# Patient Record
Sex: FEMALE | Race: OTHER | NOT HISPANIC OR LATINO | Employment: OTHER | URBAN - NONMETROPOLITAN AREA
[De-identification: names, ages, dates, MRNs, and addresses within clinical notes are randomized per-mention and may not be internally consistent; named-entity substitution may affect disease eponyms.]

---

## 2017-02-02 ENCOUNTER — HOSPITAL ENCOUNTER (EMERGENCY)
Facility: HOSPITAL | Age: 32
Discharge: HOME | End: 2017-02-03
Attending: EMERGENCY MEDICINE | Admitting: EMERGENCY MEDICINE

## 2017-02-02 ENCOUNTER — APPOINTMENT (OUTPATIENT)
Dept: CT IMAGING | Facility: HOSPITAL | Age: 32
End: 2017-02-02

## 2017-02-02 DIAGNOSIS — K51.90 ULCERATIVE COLITIS WITHOUT COMPLICATIONS, UNSPECIFIED LOCATION (HCC): Primary | ICD-10-CM

## 2017-02-02 LAB
ALBUMIN SERPL-MCNC: 4.3 G/DL (ref 3.5–5)
ALBUMIN/GLOB SERPL: 1 G/DL (ref 1–2)
ALP SERPL-CCNC: 112 U/L (ref 38–126)
ALT SERPL W P-5'-P-CCNC: 49 U/L (ref 13–69)
ANION GAP SERPL CALCULATED.3IONS-SCNC: 14 MMOL/L
AST SERPL-CCNC: 35 U/L (ref 15–46)
B-HCG UR QL: NEGATIVE
BACTERIA UR QL AUTO: ABNORMAL /HPF
BASOPHILS # BLD AUTO: 0.05 10*3/MM3 (ref 0–0.2)
BASOPHILS NFR BLD AUTO: 0.4 % (ref 0–2.5)
BILIRUB SERPL-MCNC: 0.3 MG/DL (ref 0.2–1.3)
BILIRUB UR QL STRIP: NEGATIVE
BUN BLD-MCNC: 11 MG/DL (ref 7–20)
BUN/CREAT SERPL: 15.7 (ref 7.1–23.5)
CALCIUM SPEC-SCNC: 9.4 MG/DL (ref 8.4–10.2)
CHLORIDE SERPL-SCNC: 101 MMOL/L (ref 98–107)
CLARITY UR: CLEAR
CO2 SERPL-SCNC: 29 MMOL/L (ref 26–30)
COLOR UR: YELLOW
CREAT BLD-MCNC: 0.7 MG/DL (ref 0.6–1.3)
DEPRECATED RDW RBC AUTO: 39.2 FL (ref 37–54)
EOSINOPHIL # BLD AUTO: 0.17 10*3/MM3 (ref 0–0.7)
EOSINOPHIL NFR BLD AUTO: 1.3 % (ref 0–7)
ERYTHROCYTE [DISTWIDTH] IN BLOOD BY AUTOMATED COUNT: 13.3 % (ref 11.5–14.5)
GFR SERPL CREATININE-BSD FRML MDRD: 98 ML/MIN/1.73
GLOBULIN UR ELPH-MCNC: 4.3 GM/DL
GLUCOSE BLD-MCNC: 147 MG/DL (ref 74–98)
GLUCOSE UR STRIP-MCNC: NEGATIVE MG/DL
HCT VFR BLD AUTO: 40.7 % (ref 37–47)
HGB BLD-MCNC: 13.5 G/DL (ref 12–16)
HGB UR QL STRIP.AUTO: ABNORMAL
HYALINE CASTS UR QL AUTO: ABNORMAL /LPF
IMM GRANULOCYTES # BLD: 0.04 10*3/MM3 (ref 0–0.06)
IMM GRANULOCYTES NFR BLD: 0.3 % (ref 0–0.6)
KETONES UR QL STRIP: NEGATIVE
LEUKOCYTE ESTERASE UR QL STRIP.AUTO: NEGATIVE
LIPASE SERPL-CCNC: 40 U/L (ref 23–300)
LYMPHOCYTES # BLD AUTO: 5.29 10*3/MM3 (ref 0.6–3.4)
LYMPHOCYTES NFR BLD AUTO: 39.4 % (ref 10–50)
MCH RBC QN AUTO: 27 PG (ref 27–31)
MCHC RBC AUTO-ENTMCNC: 33.2 G/DL (ref 30–37)
MCV RBC AUTO: 81.4 FL (ref 81–99)
MONOCYTES # BLD AUTO: 0.5 10*3/MM3 (ref 0–0.9)
MONOCYTES NFR BLD AUTO: 3.7 % (ref 0–12)
MUCOUS THREADS URNS QL MICRO: ABNORMAL /HPF
NEUTROPHILS # BLD AUTO: 7.36 10*3/MM3 (ref 2–6.9)
NEUTROPHILS NFR BLD AUTO: 54.9 % (ref 37–80)
NITRITE UR QL STRIP: NEGATIVE
NRBC BLD MANUAL-RTO: 0 /100 WBC (ref 0–0)
PH UR STRIP.AUTO: 6 [PH] (ref 5–8)
PLATELET # BLD AUTO: 333 10*3/MM3 (ref 130–400)
PMV BLD AUTO: 10.1 FL (ref 6–12)
POTASSIUM BLD-SCNC: 4 MMOL/L (ref 3.5–5.1)
PROT SERPL-MCNC: 8.6 G/DL (ref 6.3–8.2)
PROT UR QL STRIP: NEGATIVE
RBC # BLD AUTO: 5 10*6/MM3 (ref 4.2–5.4)
RBC # UR: ABNORMAL /HPF
REF LAB TEST METHOD: ABNORMAL
SODIUM BLD-SCNC: 140 MMOL/L (ref 137–145)
SP GR UR STRIP: >=1.03 (ref 1–1.03)
SQUAMOUS #/AREA URNS HPF: ABNORMAL /HPF
UROBILINOGEN UR QL STRIP: ABNORMAL
WBC NRBC COR # BLD: 13.41 10*3/MM3 (ref 4.8–10.8)
WBC UR QL AUTO: ABNORMAL /HPF

## 2017-02-02 PROCEDURE — 25010000002 METHYLPREDNISOLONE PER 125 MG: Performed by: EMERGENCY MEDICINE

## 2017-02-02 PROCEDURE — 81025 URINE PREGNANCY TEST: CPT | Performed by: EMERGENCY MEDICINE

## 2017-02-02 PROCEDURE — 80053 COMPREHEN METABOLIC PANEL: CPT | Performed by: EMERGENCY MEDICINE

## 2017-02-02 PROCEDURE — 0 IOPAMIDOL 61 % SOLUTION: Performed by: EMERGENCY MEDICINE

## 2017-02-02 PROCEDURE — 96361 HYDRATE IV INFUSION ADD-ON: CPT

## 2017-02-02 PROCEDURE — 99284 EMERGENCY DEPT VISIT MOD MDM: CPT

## 2017-02-02 PROCEDURE — 25010000002 MORPHINE PER 10 MG: Performed by: EMERGENCY MEDICINE

## 2017-02-02 PROCEDURE — 81001 URINALYSIS AUTO W/SCOPE: CPT | Performed by: EMERGENCY MEDICINE

## 2017-02-02 PROCEDURE — 83690 ASSAY OF LIPASE: CPT | Performed by: EMERGENCY MEDICINE

## 2017-02-02 PROCEDURE — 74177 CT ABD & PELVIS W/CONTRAST: CPT

## 2017-02-02 PROCEDURE — 96375 TX/PRO/DX INJ NEW DRUG ADDON: CPT

## 2017-02-02 PROCEDURE — 87086 URINE CULTURE/COLONY COUNT: CPT | Performed by: EMERGENCY MEDICINE

## 2017-02-02 PROCEDURE — 96374 THER/PROPH/DIAG INJ IV PUSH: CPT

## 2017-02-02 PROCEDURE — 85025 COMPLETE CBC W/AUTO DIFF WBC: CPT | Performed by: EMERGENCY MEDICINE

## 2017-02-02 RX ORDER — MORPHINE SULFATE 4 MG/ML
4 INJECTION, SOLUTION INTRAMUSCULAR; INTRAVENOUS ONCE
Status: COMPLETED | OUTPATIENT
Start: 2017-02-02 | End: 2017-02-02

## 2017-02-02 RX ORDER — SODIUM CHLORIDE 9 MG/ML
1000 INJECTION, SOLUTION INTRAVENOUS ONCE
Status: COMPLETED | OUTPATIENT
Start: 2017-02-02 | End: 2017-02-03

## 2017-02-02 RX ORDER — METHYLPREDNISOLONE SODIUM SUCCINATE 125 MG/2ML
125 INJECTION, POWDER, LYOPHILIZED, FOR SOLUTION INTRAMUSCULAR; INTRAVENOUS ONCE
Status: COMPLETED | OUTPATIENT
Start: 2017-02-02 | End: 2017-02-02

## 2017-02-02 RX ORDER — SODIUM CHLORIDE 0.9 % (FLUSH) 0.9 %
10 SYRINGE (ML) INJECTION AS NEEDED
Status: DISCONTINUED | OUTPATIENT
Start: 2017-02-02 | End: 2017-02-03 | Stop reason: HOSPADM

## 2017-02-02 RX ORDER — ONDANSETRON 2 MG/ML
4 INJECTION INTRAMUSCULAR; INTRAVENOUS ONCE
Status: DISCONTINUED | OUTPATIENT
Start: 2017-02-02 | End: 2017-02-03 | Stop reason: HOSPADM

## 2017-02-02 RX ORDER — PROMETHAZINE HYDROCHLORIDE 25 MG/ML
12.5 INJECTION, SOLUTION INTRAMUSCULAR; INTRAVENOUS ONCE
Status: COMPLETED | OUTPATIENT
Start: 2017-02-02 | End: 2017-02-03

## 2017-02-02 RX ADMIN — MORPHINE SULFATE 4 MG: 4 INJECTION, SOLUTION INTRAMUSCULAR; INTRAVENOUS at 22:45

## 2017-02-02 RX ADMIN — METHYLPREDNISOLONE SODIUM SUCCINATE 125 MG: 125 INJECTION, POWDER, FOR SOLUTION INTRAMUSCULAR; INTRAVENOUS at 22:43

## 2017-02-02 RX ADMIN — SODIUM CHLORIDE 1000 ML: 9 INJECTION, SOLUTION INTRAVENOUS at 22:42

## 2017-02-02 RX ADMIN — IOPAMIDOL 90 ML: 612 INJECTION, SOLUTION INTRAVENOUS at 23:15

## 2017-02-03 VITALS
BODY MASS INDEX: 38.98 KG/M2 | HEART RATE: 112 BPM | HEIGHT: 63 IN | WEIGHT: 220 LBS | TEMPERATURE: 98.5 F | RESPIRATION RATE: 16 BRPM | DIASTOLIC BLOOD PRESSURE: 73 MMHG | OXYGEN SATURATION: 96 % | SYSTOLIC BLOOD PRESSURE: 106 MMHG

## 2017-02-03 LAB
GASTROCULT GAST QL: POSITIVE
HOLD SPECIMEN: NORMAL
WHOLE BLOOD HOLD SPECIMEN: NORMAL

## 2017-02-03 PROCEDURE — 25010000002 PROMETHAZINE PER 50 MG: Performed by: EMERGENCY MEDICINE

## 2017-02-03 PROCEDURE — 25010000002 MORPHINE PER 10 MG: Performed by: EMERGENCY MEDICINE

## 2017-02-03 PROCEDURE — 82270 OCCULT BLOOD FECES: CPT | Performed by: EMERGENCY MEDICINE

## 2017-02-03 PROCEDURE — 96376 TX/PRO/DX INJ SAME DRUG ADON: CPT

## 2017-02-03 RX ORDER — HYDROCODONE BITARTRATE AND ACETAMINOPHEN 5; 325 MG/1; MG/1
1 TABLET ORAL EVERY 6 HOURS PRN
Qty: 10 TABLET | Refills: 0 | Status: SHIPPED | OUTPATIENT
Start: 2017-02-03 | End: 2019-07-22

## 2017-02-03 RX ORDER — PROMETHAZINE HYDROCHLORIDE 25 MG/1
25 TABLET ORAL EVERY 6 HOURS PRN
Qty: 15 TABLET | Refills: 0 | Status: SHIPPED | OUTPATIENT
Start: 2017-02-03 | End: 2019-08-05 | Stop reason: SDUPTHER

## 2017-02-03 RX ORDER — MORPHINE SULFATE 4 MG/ML
4 INJECTION, SOLUTION INTRAMUSCULAR; INTRAVENOUS ONCE
Status: COMPLETED | OUTPATIENT
Start: 2017-02-03 | End: 2017-02-03

## 2017-02-03 RX ORDER — PREDNISONE 50 MG/1
50 TABLET ORAL DAILY
Qty: 5 TABLET | Refills: 0 | Status: SHIPPED | OUTPATIENT
Start: 2017-02-03 | End: 2019-07-22

## 2017-02-03 RX ADMIN — PROMETHAZINE HYDROCHLORIDE 12.5 MG: 25 INJECTION INTRAMUSCULAR; INTRAVENOUS at 00:38

## 2017-02-03 RX ADMIN — MORPHINE SULFATE 4 MG: 4 INJECTION, SOLUTION INTRAMUSCULAR; INTRAVENOUS at 00:44

## 2017-02-03 NOTE — ED PROVIDER NOTES
TRIAGE CHIEF COMPLAINT:   Chief Complaint   Patient presents with   • Black or Bloody Stool     bright red blood in vomit and stool x 3 days. hx ulcerative colitis   • Abdominal Pain      HPI: Thais Hobson is a 31 y.o. female who presents to the emergency department complaining of abdominal pain, nausea, vomiting, and diarrhea.  Patient has a history of ulcerative colitis.  She states she's been having symptoms for the past 3 days.  She states she's had a lot of diarrhea and has had some blood in her diarrhea.  Has had several episodes of nonbilious emesis with a small amount of blood and that as well.  Denies fevers or chills.  Describes a diffuse abdominal cramping and sharp type pain.  States this feels like previous episodes of her ulcerative colitis that she has had.  Is followed by a GI physician at [facility] in [place].     REVIEW OF SYSTEMS: Otherwise negative unless stated above     PAST MEDICAL HISTORY:   Past Medical History   Diagnosis Date   • Abdominal pain      periumbilicul   • Abdominal tenderness      Periumbil   • Alopecia    • Anemia    • Anxiety    • Back pain    • Blood in stool    • Colitis    • Colon polyps    • Depression    • Diabetes mellitus    • Diarrhea    • H/O colonoscopy 08/01/2013     Terminal ileal biopsies negative. Cecal&ascending biopsie revealed chronic active colitis w/ features cons. w/ inflammatory bowel disease. Transevere colon biopsies revealved chronic active colitis. Desc. colon biopsies revealed chronic active colitis. Rect colon biopsie revealed chronic active colitis. Negative for dysplasia   • Hematemesis    • Nausea      alone   • Positive pregnancy test    • Universal ulcerative colitis    • UTI (urinary tract infection)    • Weight loss         FAMILY HISTORY:   Family History   Problem Relation Age of Onset   • Heart failure Father         SOCIAL HISTORY:   Social History     Social History   • Marital status: Single     Spouse name: N/A    • Number of children: N/A   • Years of education: N/A     Occupational History   • Not on file.     Social History Main Topics   • Smoking status: Never Smoker   • Smokeless tobacco: Not on file   • Alcohol use No   • Drug use: No   • Sexual activity: Not on file     Other Topics Concern   • Not on file     Social History Narrative   • No narrative on file        SURGICAL HISTORY:   Past Surgical History   Procedure Laterality Date   • Cholecystectomy       for stone disease   • Breast surgery Left      breast lump removed benign   • Hip surgery     •  section     • Dilatation and curettage     • Colonoscopy     • Leep     • Endoscopy  2013     Erosive esophagitis, grade 2; erosive gastritis; small sliding hiatal hernia less than 3 cm, erosive deodenitis duodenal polyp.No gan mucosa.Second portion duedenal biopsy neg. Second postion of dudoenal biopsy revealed polypoid intestinal mucosa w/ lymphoid aggregate. 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 no helicobacter pylori. Negative for mateaplasia, dysplasia        CURRENT MEDICATIONS:      Medication List      Notice     You have not been prescribed any medications.         ALLERGIES: Hydrochlorothiazide; Penicillins; and Reglan [metoclopramide]     PHYSICAL EXAM:   VITAL SIGNS:   Vitals:    17 2037   BP: 119/79   Pulse: (!) 133   Resp: 20   Temp: 98.6 °F (37 °C)   SpO2: 100%      CONSTITUTIONAL: Awake, oriented, appears non-toxic   HENT: Atraumatic, normocephalic, oral mucosa pink and moist, airway patent. Nares patent without drainage.  EYES: Conjunctiva clear   NECK: Trachea midline, non-tender, supple   CARDIOVASCULAR: Normal heart rate, Normal rhythm, No murmurs, rubs, gallops   PULMONARY/CHEST: Clear to auscultation, no rhonchi, wheezes, or rales. Symmetrical breath sounds.  ABDOMINAL: Non-distended, soft, mild diffuse abdominal tenderness to palpation  NEUROLOGIC: Non-focal, moving all four  extremities, no gross sensory or motor deficits.   EXTREMITIES: No clubbing, cyanosis, or edema   SKIN: Warm, Dry, No erythema, No rash     ED COURSE / MEDICAL DECISION MAKING:   Thais Hobson is a 31 y.o. female who presents to the emergency department for evaluation of abdominal discomfort, nausea, vomiting, and diarrhea.  Patient with a history of ulcerative colitis.  Exam reveals mild diffuse abdominal tenderness to palpation.  No rebound or guarding per my interpretation.  We'll obtain labs and imaging for further evaluation.  Patient was also given IV fluids, pain, nausea medicine with improvement in her symptoms.  Her heart rate improved as well.  Laboratory tests revealed a slight leukocytosis with a white blood cell count of 13.  Patient had no gross blood on her rectal exam but did have positive fecal occult blood.  CAT scan did not reveal any obvious signs of inflammation and did not reveal any acute abnormalities.  Given the patient's symptoms I suspect she is likely starting to develop an ulcerative colitis exacerbation.  I will start the patient on steroids and she was given a dose of steroids in the emergency department.  Given her vital signs improved and the patient was stable I felt she should be safe for outpatient therapy to follow-up with her GI physician in the next 1-2 days.  Patient states that she would call her GI physician in the morning.    DECISION TO DISCHARGE/ADMIT: see ED care timeline     FINAL IMPRESSION:   1 -- ulcerative colitis   2 --   3 --     Electronically signed by: Janet Yee MD, 2/2/2017 9:18 PM       Janet Yee MD  02/03/17 0120

## 2017-02-03 NOTE — DISCHARGE INSTRUCTIONS
"Most recent vital signs:   Visit Vitals   • /84   • Pulse 114   • Temp 98.6 °F (37 °C) (Oral)   • Resp 18   • Ht 63\" (160 cm)   • Wt 220 lb (99.8 kg)   • SpO2 98%   • BMI 38.97 kg/m2        Below you fill find any summaries of imaging results and further discharge instructions as discussed during your visit.     CT Abdomen Pelvis With Contrast   Final Result   No acute abnormality      Authenticated by Krystin Ronquillo MD on 02/02/2017 11:38:59 PM           --PLEASE READ THESE DIRECTIONS IN FULL--     Our goal is to provide the best care we can AND to find any medical or surgical emergency while you are in the ER. If a medical or surgical emergency was not identified during your visit (or if the issue was resolved during the visit), following these directions for follow-up with a primary care provider and/or specialists and following the return precautions will be the safest and most effective way to protect your health after leaving the emergency room.     Therefore, in addition to the conversation we had in the room, please read all of the information in these discharge instructions, take medications as directed, and follow-up as directed.     You should seek immediate medical help for:     Worsening pain that is not helped with prescribed pain medicines and/or the recommended doses of over the counter pain medicines such as acetaminophen (Tylenol) or ibuprofen (Motrin/Advil)*.   New or worsening chest pain or trouble breathing   New or worsening headache, confusion, weakness, or visual changes   New or worsening fever (>100.4 F) that does not improve with the recommended doses of over the counter fever treatments such as acetaminophen (Tylenol) or ibuprofen (Motrin/Advil)* for more than a few hours.   Any other concerns that you feel could be life, limb, or eye-sight threatening     As a reminder, if you received any medicines or prescriptions for medicines that may be sedating (\"narcotics\", \"opioids\"), do " NOT:   - operate any vehicles   - take if you are already tired   - take if you have had or plan to have alcohol   - perform other responsibilities that require your full attention.     Common medications include, but are not limited to:   Opiates: Morphine, Norco, Lortab, Vicodin, Percocet, oxycodone, hydrocodone, Dilaudid, hydromorphone   Benzodiazepines: Ativan, Valium, alprazolam, lorazepam, diazepam,   Other sedating medications: promethazine, Phenergan, trazodone, Benadryl, hydroxyzine, Vistaril, diphenhydramine, zolpidem, and Ambien     *If you have any history of GI (stomach/intestinal) bleeding, allergic reactions, kidney problems, and/or certain heart problems or there is any chance you could be pregnant, and have been told to avoid NSAIDs (ibuprofen, naproxen, Aleve, Motrin, Advil) please avoid these medications. Please remember that prescribed pain medicines often contain Tylenol/acetaminophen, so make sure your total daily (24 hour) intake does not exceed 4 grams or 4000mg.

## 2017-02-04 LAB — BACTERIA SPEC AEROBE CULT: NORMAL

## 2019-07-22 ENCOUNTER — OFFICE VISIT (OUTPATIENT)
Dept: FAMILY MEDICINE CLINIC | Facility: CLINIC | Age: 34
End: 2019-07-22

## 2019-07-22 ENCOUNTER — PRIOR AUTHORIZATION (OUTPATIENT)
Dept: FAMILY MEDICINE CLINIC | Facility: CLINIC | Age: 34
End: 2019-07-22

## 2019-07-22 VITALS
HEART RATE: 101 BPM | BODY MASS INDEX: 42.52 KG/M2 | RESPIRATION RATE: 14 BRPM | WEIGHT: 240 LBS | HEIGHT: 63 IN | SYSTOLIC BLOOD PRESSURE: 112 MMHG | DIASTOLIC BLOOD PRESSURE: 74 MMHG | OXYGEN SATURATION: 98 %

## 2019-07-22 DIAGNOSIS — R13.19 ESOPHAGEAL DYSPHAGIA: ICD-10-CM

## 2019-07-22 DIAGNOSIS — K51.90 ULCERATIVE COLITIS WITHOUT COMPLICATIONS, UNSPECIFIED LOCATION (HCC): ICD-10-CM

## 2019-07-22 DIAGNOSIS — Z90.710 HISTORY OF TOTAL ABDOMINAL HYSTERECTOMY: ICD-10-CM

## 2019-07-22 DIAGNOSIS — K21.9 GASTROESOPHAGEAL REFLUX DISEASE WITHOUT ESOPHAGITIS: Chronic | ICD-10-CM

## 2019-07-22 DIAGNOSIS — Z79.4 TYPE 2 DIABETES MELLITUS TREATED WITH INSULIN (HCC): Primary | ICD-10-CM

## 2019-07-22 DIAGNOSIS — E11.9 TYPE 2 DIABETES MELLITUS TREATED WITH INSULIN (HCC): Primary | ICD-10-CM

## 2019-07-22 DIAGNOSIS — E11.44 DIABETIC AMYOTROPHY ASSOCIATED WITH TYPE 2 DIABETES MELLITUS (HCC): ICD-10-CM

## 2019-07-22 DIAGNOSIS — F41.8 DEPRESSION WITH ANXIETY: ICD-10-CM

## 2019-07-22 PROCEDURE — 99204 OFFICE O/P NEW MOD 45 MIN: CPT | Performed by: FAMILY MEDICINE

## 2019-07-22 RX ORDER — DIPHENHYDRAMINE HCL 25 MG
25 CAPSULE ORAL EVERY 6 HOURS PRN
COMMUNITY
End: 2019-08-05 | Stop reason: SDUPTHER

## 2019-07-22 RX ORDER — ESTRADIOL 0.5 MG/1
0.5 TABLET ORAL
COMMUNITY
End: 2020-02-21 | Stop reason: SDUPTHER

## 2019-07-22 RX ORDER — OMEPRAZOLE 40 MG/1
40 CAPSULE, DELAYED RELEASE ORAL DAILY
Qty: 30 CAPSULE | Refills: 3 | Status: SHIPPED | OUTPATIENT
Start: 2019-07-22 | End: 2019-11-20

## 2019-07-22 RX ORDER — GABAPENTIN 600 MG/1
600 TABLET ORAL 3 TIMES DAILY
Qty: 90 TABLET | Refills: 0 | Status: SHIPPED | OUTPATIENT
Start: 2019-07-22 | End: 2019-08-19 | Stop reason: SDUPTHER

## 2019-07-22 NOTE — PROGRESS NOTES
New Patient History and Physical      Referring Physician: No ref. provider found    Chief Complaint:    Chief Complaint   Patient presents with   • Establish Care     Would like referral to GI closer to home       History of Present Illness:   Patient is a 34-year-old female who is here to establish with a new primary care provider.  She presents with a known history of ulcerative colitis, having previously been followed by gastroenterology in [place].  Patient also admits to esophageal stricture history, her last EGD with dilatation was approximately 1 year ago.  She does continue to have periodic esophageal dysphagia, predominantly to solids.  Patient also gives a history of gestational diabetes, having transition to type 2 diabetes mellitus in the past.  She has 2 children, age 11 years and 5 years old.  Patient states her last hemoglobin A1c was approximately 4 weeks ago; she does admit to varying levels of control, predominantly remaining high despite admitting to some noncompliance with her insulin dosing.  Patient does state that she did not take this morning's dose yet..  She denies any cyclical/persistent episodes of hypoglycemia.  She does utilize combination insulin therapy at this time, Humalog 75/25 with 60 units in the morning and 30 units at night.  She is requesting to be referred to a gastroenterologist closer to her home in between [place] and [place].  She states the distance to drive to [place] is of significant burden to her.  Patient states she was last hospitalized, as a result of her ulcerative colitis, approximately 2 months ago at [facility] in [place].    Subjective     Review of Systems     1. Constitutional: Negative for fever. Negative for chills, diaphoresis, fatigue and unexpected weight change.   2. HENT: No dysphagia; no changes to vision/hearing/smell/taste; no epistaxis  3. Eyes: Negative for redness and visual disturbance.   4. Respiratory: negative for  shortness of breath. Negative for chest pain . Negative for cough and chest tightness.   5. Cardiovascular: Negative for chest pain and palpitations.   6. Gastrointestinal: Negative for abdominal distention, abdominal pain and blood in stool.   7. Endocrine: Negative for cold intolerance and heat intolerance.   8. Genitourinary: Negative for difficulty urinating, dysuria and frequency.   9. Musculoskeletal: Negative for arthralgias, back pain and myalgias.   10. Skin: Negative for color change, rash and wound.   11. Neurological: Negative for syncope, weakness and headaches.   12. Hematological: Negative for adenopathy. Does not bruise/bleed easily.   13. Psychiatric/Behavioral: Negative for confusion. The patient is not nervous/anxious.     The following portions of the patient's history were reviewed and updated as appropriate: allergies, current medications, past family history, past medical history, past social history, past surgical history and problem list.    Past Medical History:   Past Medical History:   Diagnosis Date   • Abdominal pain     periumbilicul   • Abdominal tenderness     Periumbil   • Alopecia    • Anemia    • Anxiety    • Arthritis    • Asthma    • Back pain    • Bipolar disorder (CMS/HCC)    • Blood in stool    • Colitis    • Colitis    • Colon polyps    • Depression    • Diabetes mellitus (CMS/HCC)    • Diarrhea    • Fractures    • GERD (gastroesophageal reflux disease)    • H/O colonoscopy 08/01/2013    Terminal ileal biopsies negative. Cecal&ascending biopsie revealed chronic active colitis w/ features cons. w/ inflammatory bowel disease. Transevere colon biopsies revealved chronic active colitis. Desc. colon biopsies revealed chronic active colitis. Rect colon biopsie revealed chronic active colitis. Negative for dysplasia   • Hematemesis    • Migraine headache    • Nausea     alone   • Pancreatitis    • Positive pregnancy test    • Universal ulcerative colitis (CMS/HCC)    • UTI (urinary  tract infection)    • Weight loss        Past Surgical History:  Past Surgical History:   Procedure Laterality Date   • BREAST SURGERY Left     breast lump removed benign   •  SECTION     • CHOLECYSTECTOMY      for stone disease   • COLONOSCOPY     • DILATATION AND CURETTAGE     • ENDOSCOPY  2013    Erosive esophagitis, grade 2; erosive gastritis; small sliding hiatal hernia less than 3 cm, erosive deodenitis duodenal polyp.No gan mucosa.Second portion duedenal biopsy neg. Second postion of dudoenal biopsy revealed polypoid intestinal mucosa w/ lymphoid aggregate. 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 no helicobacter pylori. Negative for mateaplasia, dysplasia   • HIP SURGERY     • HYSTERECTOMY     • LEEP         Family History: family history includes Heart failure in her father.    Social History:  reports that she has never smoked. She has never used smokeless tobacco. She reports that she does not drink alcohol or use drugs.    Medications:    Current Outpatient Medications:   •  BUSPIRONE HCL PO, Take  by mouth., Disp: , Rfl:   •  diphenhydrAMINE (BENADRYL) 25 mg capsule, Take 25 mg by mouth Every 6 (Six) Hours As Needed., Disp: , Rfl:   •  estradiol (ESTRACE) 0.5 MG tablet, Take 0.5 mg by mouth. ONE EVERY 3 DAYS, Disp: , Rfl:   •  guanFACINE HCl (INTUNIV PO), Take  by mouth., Disp: , Rfl:   •  promethazine (PHENERGAN) 25 MG tablet, Take 1 tablet by mouth Every 6 (Six) Hours As Needed for nausea or vomiting., Disp: 15 tablet, Rfl: 0  •  gabapentin (NEURONTIN) 600 MG tablet, Take 1 tablet by mouth 3 (Three) Times a Day., Disp: 90 tablet, Rfl: 0  •  insulin detemir (LEVEMIR) 100 UNIT/ML injection, Inject 35 Units under the skin into the appropriate area as directed 2 (Two) Times a Day., Disp: 10 pen, Rfl: 0  •  omeprazole (priLOSEC) 40 MG capsule, Take 1 capsule by mouth Daily., Disp: 30 capsule, Rfl: 3    Allergies:  Allergies   Allergen Reactions   •  "Hydrochlorothiazide    • Penicillins    • Reglan [Metoclopramide] Other (See Comments)     States feels weird when takes it   • Remicade [Infliximab] Unknown (See Comments)     Unknown       Objective     Physical Exam:  Vital Signs: /74   Pulse 101   Resp 14   Ht 160 cm (63\")   Wt 109 kg (240 lb)   SpO2 98%   BMI 42.51 kg/m²      General Appearance: alert, oriented x 3, no acute distress.  Skin: warm and dry.   HEENT: Atraumatic.  pupils round and reactive to light and accommodation, oral mucosa pink and moist.  Nares patent without epistaxis.  External auditory canals are patent tympanic membranes intact.  Neck: supple, no JVD, trachea midline.  No thyromegaly  Lungs: CTA, unlabored breathing effort.  Heart: RRR, normal S1 and S2, no S3, no rub.  Abdomen: soft, non-tender, no palpable bladder, present bowel sounds to auscultation ×4.  No guarding or rigidity.  Extremities: no clubbing, cyanosis or edema.  Good range of motion actively and passively.  Symmetric muscle strength and development  Neuro: normal speech and mental status.  Cranial nerves II through XII intact.  No anosmia. DTR 2+; proprioception intact.  No focal motor/sensory deficits.        Assessment / Plan     Assessment:   Thais was seen today for establish care.    Diagnoses and all orders for this visit:    Type 2 diabetes mellitus treated with insulin (CMS/[facility])  -     Hemoglobin A1c  -     Comprehensive Metabolic Panel  -     CBC & Differential  -     TSH  -     T4, Free  -     insulin detemir (LEVEMIR) 100 UNIT/ML injection; Inject 35 Units under the skin into the appropriate area as directed 2 (Two) Times a Day.    Depression with anxiety    Esophageal dysphagia  -     Ambulatory Referral to Gastroenterology  -     omeprazole (priLOSEC) 40 MG capsule; Take 1 capsule by mouth Daily.    Gastroesophageal reflux disease without esophagitis  -     Comprehensive Metabolic Panel  -     CBC & Differential  -     omeprazole (priLOSEC) 40 " MG capsule; Take 1 capsule by mouth Daily.    Ulcerative colitis without complications, unspecified location (CMS/[facility])  -     Comprehensive Metabolic Panel  -     CBC & Differential  -     TSH  -     T4, Free  -     Ambulatory Referral to Gastroenterology    Diabetic amyotrophy associated with type 2 diabetes mellitus (CMS/[facility])  -     Hemoglobin A1c  -     Comprehensive Metabolic Panel  -     CBC & Differential  -     TSH  -     T4, Free  -     gabapentin (NEURONTIN) 600 MG tablet; Take 1 tablet by mouth 3 (Three) Times a Day.    BMI 40.0-44.9, adult (CMS/[facility])    History of total abdominal hysterectomy        Plan:  Referral has been placed to gastroenterologist closer to her home.  Surveillance labs today will include hemoglobin A1c/CMP/CBC/thyroid evaluation.  I have refilled her gabapentin, of chronic use, today.  I recommended addition of proton pump inhibitor therapy for treatment of her esophageal dysphagia, most likely a result of GERD, as patient states that her reflux symptoms have worsened additionally.  I recommended a change to her insulin regimen to Levemir, utilizing 35 units twice daily.  Planned titration of her dose upwards and follow-up visits to maximize her blood glucose control.  I have advised her to avoid prolonged fasting periods, maintain adequate hydration status.  Make healthier choices with her snacks and meals.  Discussion Summary:  Discussed need for reduction in sodium/salt/caffeine intake; improve sleep habits as able; inc formal CV exercise program with goal of vigorous activity most, if not all, days of the week; goal of 50 min of sustained HR CV exercise.    Anti - reflux measures, trigger foods and drinks to avoid: Fatty foods, alcohol, chocolate, coffee, tea, caffeinated soft drinks (decaffeinated coffee still has some caffeine), peppermint and spearmint, spices and vinegar, citrus fruits and juices, tomatoes and tomato sauces, and smoking. Other antireflux measures include  weight reduction if overweight, avoid tight clothing around the abdomen, elevate the head of her bed 6 inches (May use a bed wedge which is placed between the mattress in box [place]) or blocks under the head of the bed, don't drink or eat for 2 hours before going to bed and avoid lying down immediately after meals.    Discussed plan of care in detail with pt today; pt verb understanding and agrees.  Follow up:  No Follow-up on file.     There are no Patient Instructions on file for this visit.    Lv Sanchez,   07/22/19  2:18 PM    Please note that portions of this note may have been completed with a voice recognition program. Efforts were made to edit the dictations, but occasionally words are mistranscribed.

## 2019-07-23 ENCOUNTER — TELEPHONE (OUTPATIENT)
Dept: FAMILY MEDICINE CLINIC | Facility: CLINIC | Age: 34
End: 2019-07-23

## 2019-07-26 LAB
ALBUMIN SERPL-MCNC: 3.9 G/DL (ref 3.5–5)
ALBUMIN/GLOB SERPL: 1.2 G/DL (ref 1–2)
ALP SERPL-CCNC: 145 U/L (ref 38–126)
ALT SERPL-CCNC: 41 U/L (ref 13–69)
AST SERPL-CCNC: 39 U/L (ref 15–46)
BASOPHILS # BLD AUTO: 0.05 10*3/MM3 (ref 0–0.2)
BASOPHILS NFR BLD AUTO: 0.5 % (ref 0–1.5)
BILIRUB SERPL-MCNC: 0.3 MG/DL (ref 0.2–1.3)
BUN SERPL-MCNC: 4 MG/DL (ref 7–20)
BUN/CREAT SERPL: 10 (ref 7.1–23.5)
CALCIUM SERPL-MCNC: 9.3 MG/DL (ref 8.4–10.2)
CHLORIDE SERPL-SCNC: 98 MMOL/L (ref 98–107)
CO2 SERPL-SCNC: 26 MMOL/L (ref 26–30)
CREAT SERPL-MCNC: 0.4 MG/DL (ref 0.6–1.3)
EOSINOPHIL # BLD AUTO: 0.23 10*3/MM3 (ref 0–0.4)
EOSINOPHIL NFR BLD AUTO: 2.2 % (ref 0.3–6.2)
ERYTHROCYTE [DISTWIDTH] IN BLOOD BY AUTOMATED COUNT: 12.5 % (ref 12.3–15.4)
GLOBULIN SER CALC-MCNC: 3.3 GM/DL
GLUCOSE SERPL-MCNC: 299 MG/DL (ref 74–98)
HBA1C MFR BLD: 10.3 % (ref 4.8–5.6)
HCT VFR BLD AUTO: 40.9 % (ref 34–46.6)
HGB BLD-MCNC: 13.1 G/DL (ref 12–15.9)
IMM GRANULOCYTES # BLD AUTO: 0.04 10*3/MM3 (ref 0–0.05)
IMM GRANULOCYTES NFR BLD AUTO: 0.4 % (ref 0–0.5)
LYMPHOCYTES # BLD AUTO: 3.68 10*3/MM3 (ref 0.7–3.1)
LYMPHOCYTES NFR BLD AUTO: 35.2 % (ref 19.6–45.3)
MCH RBC QN AUTO: 27 PG (ref 26.6–33)
MCHC RBC AUTO-ENTMCNC: 32 G/DL (ref 31.5–35.7)
MCV RBC AUTO: 84.2 FL (ref 79–97)
MONOCYTES # BLD AUTO: 0.42 10*3/MM3 (ref 0.1–0.9)
MONOCYTES NFR BLD AUTO: 4 % (ref 5–12)
NEUTROPHILS # BLD AUTO: 6.04 10*3/MM3 (ref 1.7–7)
NEUTROPHILS NFR BLD AUTO: 57.7 % (ref 42.7–76)
NRBC BLD AUTO-RTO: 0 /100 WBC (ref 0–0.2)
PLATELET # BLD AUTO: 311 10*3/MM3 (ref 140–450)
POTASSIUM SERPL-SCNC: 4.3 MMOL/L (ref 3.5–5.1)
PROT SERPL-MCNC: 7.2 G/DL (ref 6.3–8.2)
RBC # BLD AUTO: 4.86 10*6/MM3 (ref 3.77–5.28)
SODIUM SERPL-SCNC: 135 MMOL/L (ref 137–145)
T4 FREE SERPL-MCNC: 1.28 NG/DL (ref 0.78–2.19)
TSH SERPL DL<=0.005 MIU/L-ACNC: 1.46 MIU/ML (ref 0.47–4.68)
WBC # BLD AUTO: 10.46 10*3/MM3 (ref 3.4–10.8)

## 2019-08-05 ENCOUNTER — OFFICE VISIT (OUTPATIENT)
Dept: FAMILY MEDICINE CLINIC | Facility: CLINIC | Age: 34
End: 2019-08-05

## 2019-08-05 VITALS
WEIGHT: 238 LBS | OXYGEN SATURATION: 96 % | DIASTOLIC BLOOD PRESSURE: 70 MMHG | SYSTOLIC BLOOD PRESSURE: 118 MMHG | TEMPERATURE: 97.3 F | BODY MASS INDEX: 42.17 KG/M2 | RESPIRATION RATE: 14 BRPM | HEART RATE: 104 BPM | HEIGHT: 63 IN

## 2019-08-05 DIAGNOSIS — F41.8 DEPRESSION WITH ANXIETY: ICD-10-CM

## 2019-08-05 DIAGNOSIS — K21.9 GASTROESOPHAGEAL REFLUX DISEASE WITHOUT ESOPHAGITIS: Chronic | ICD-10-CM

## 2019-08-05 DIAGNOSIS — K51.90 ULCERATIVE COLITIS WITHOUT COMPLICATIONS, UNSPECIFIED LOCATION (HCC): ICD-10-CM

## 2019-08-05 DIAGNOSIS — Z79.4 TYPE 2 DIABETES MELLITUS TREATED WITH INSULIN (HCC): Primary | ICD-10-CM

## 2019-08-05 DIAGNOSIS — E11.9 TYPE 2 DIABETES MELLITUS TREATED WITH INSULIN (HCC): Primary | ICD-10-CM

## 2019-08-05 DIAGNOSIS — E11.44 DIABETIC AMYOTROPHY ASSOCIATED WITH TYPE 2 DIABETES MELLITUS (HCC): ICD-10-CM

## 2019-08-05 PROCEDURE — 99214 OFFICE O/P EST MOD 30 MIN: CPT | Performed by: FAMILY MEDICINE

## 2019-08-05 RX ORDER — PROMETHAZINE HYDROCHLORIDE 25 MG/1
25 TABLET ORAL EVERY 6 HOURS PRN
Qty: 15 TABLET | Refills: 0 | Status: SHIPPED | OUTPATIENT
Start: 2019-08-05 | End: 2019-08-05 | Stop reason: SDUPTHER

## 2019-08-05 RX ORDER — PROMETHAZINE HYDROCHLORIDE 25 MG/1
25 TABLET ORAL EVERY 6 HOURS PRN
Qty: 15 TABLET | Refills: 1 | Status: SHIPPED | OUTPATIENT
Start: 2019-08-05 | End: 2019-08-15 | Stop reason: SDUPTHER

## 2019-08-05 RX ORDER — DIPHENHYDRAMINE HCL 25 MG
25 CAPSULE ORAL EVERY 6 HOURS PRN
Qty: 90 CAPSULE | Refills: 1 | Status: SHIPPED | OUTPATIENT
Start: 2019-08-05 | End: 2020-02-21 | Stop reason: ALTCHOICE

## 2019-08-05 NOTE — PROGRESS NOTES
Established Patient        Chief Complaint:   Chief Complaint   Patient presents with   • Diabetes     Follow up on labs        Thais Hobson is a 34 y.o. female    History of Present Illness:   Here for scheduled follow-up visit concerning known type 2 diabetes mellitus/GERD/ulcerative colitis/bipolar disorder.    Blood glucose fasting this am was 200, which is consistent to readings since change to insulin regimen.    No episodes of cyclical/persistent hypoglycemia.  Subjective     The following portions of the patient's history were reviewed and updated as appropriate: allergies, current medications, past family history, past medical history, past social history, past surgical history and problem list.    Allergies   Allergen Reactions   • Hydrochlorothiazide    • Penicillins    • Reglan [Metoclopramide] Other (See Comments)     States feels weird when takes it   • Remicade [Infliximab] Unknown (See Comments)     Unknown       Review of Systems  1. Constitutional: Negative for fever. Negative for chills, diaphoresis, fatigue and unexpected weight change.   2. HENT: No dysphagia; no changes to vision/hearing/smell/taste; no epistaxis  3. Eyes: Negative for redness and visual disturbance.   4. Respiratory: negative for shortness of breath. Negative for chest pain . Negative for cough and chest tightness.   5. Cardiovascular: Negative for chest pain and palpitations.   6. Gastrointestinal: Negative for abdominal distention, abdominal pain and blood in stool.   7. Endocrine: Negative for cold intolerance and heat intolerance.   8. Genitourinary: Negative for difficulty urinating, dysuria and frequency.   9. Musculoskeletal: Negative for arthralgias, back pain and myalgias.   10. Skin: Negative for color change, rash and wound.   11. Neurological: Negative for syncope, weakness and headaches.   12. Hematological: Negative for adenopathy. Does not bruise/bleed easily.   13. Psychiatric/Behavioral: Negative  "for confusion. The patient is not nervous/anxious.    Objective     Physical Exam   Vital Signs: /70   Pulse 104   Temp 97.3 °F (36.3 °C)   Resp 14   Ht 160 cm (63\")   Wt 108 kg (238 lb)   SpO2 96%   BMI 42.16 kg/m²     General Appearance: alert, oriented x 3, no acute distress.  Skin: warm and dry.   HEENT: Atraumatic.  pupils round and reactive to light and accommodation, oral mucosa pink and moist.  Nares patent without epistaxis.  External auditory canals are patent tympanic membranes intact.  Neck: supple, no JVD, trachea midline.  No thyromegaly  Lungs: CTA, unlabored breathing effort.  Heart: RRR, normal S1 and S2, no S3, no rub.  Abdomen: soft, non-tender, no palpable bladder, present bowel sounds to auscultation ×4.  No guarding or rigidity.  Extremities: no clubbing, cyanosis or edema.  Good range of motion actively and passively.  Symmetric muscle strength and development  Neuro: normal speech and mental status.  Cranial nerves II through XII intact.  No anosmia. DTR 2+; proprioception intact.  No focal motor/sensory deficits.    Assessment and Plan      Assessment:   Thais was seen today for diabetes.    Diagnoses and all orders for this visit:    Type 2 diabetes mellitus treated with insulin (CMS/[facility])  -     insulin detemir (LEVEMIR) 100 UNIT/ML injection; Inject 42 Units under the skin into the appropriate area as directed 2 (Two) Times a Day.    Diabetic amyotrophy associated with type 2 diabetes mellitus (CMS/[facility])    Depression with anxiety    Gastroesophageal reflux disease without esophagitis  -     promethazine (PHENERGAN) 25 MG tablet; Take 1 tablet by mouth Every 6 (Six) Hours As Needed for Nausea or Vomiting.    Ulcerative colitis without complications, unspecified location (CMS/[facility])  -     promethazine (PHENERGAN) 25 MG tablet; Take 1 tablet by mouth Every 6 (Six) Hours As Needed for Nausea or Vomiting.    Other orders  -     Discontinue: promethazine (PHENERGAN) 25 MG tablet; " Take 1 tablet by mouth Every 6 (Six) Hours As Needed for Nausea or Vomiting.  -     diphenhydrAMINE (BENADRYL) 25 mg capsule; Take 1 capsule by mouth Every 6 (Six) Hours As Needed for Itching or Allergies.        Plan:  BP at goal.    Inc levemir to 42 units bid; maintain adequate hydration and avoid prolonged fasting periods.    Has appt to see GI.    Refilled phenergan; refilled benadryl; suspect diabetic gastroparesis, but intolerance to metoclopramide in past.  Discussion Summary:    Discussed plan of care in detail with pt today; pt verb understanding and agrees.  Follow up:  Return in about 4 weeks (around 9/2/2019) for Recheck.     There are no Patient Instructions on file for this visit.    Lv Sanchez,   08/05/19  10:26 AM    Please note that portions of this note may have been completed with a voice recognition program. Efforts were made to edit the dictations, but occasionally words are mistranscribed.

## 2019-08-15 DIAGNOSIS — K51.90 ULCERATIVE COLITIS WITHOUT COMPLICATIONS, UNSPECIFIED LOCATION (HCC): ICD-10-CM

## 2019-08-15 DIAGNOSIS — K21.9 GASTROESOPHAGEAL REFLUX DISEASE WITHOUT ESOPHAGITIS: Chronic | ICD-10-CM

## 2019-08-15 RX ORDER — PROMETHAZINE HYDROCHLORIDE 25 MG/1
25 TABLET ORAL EVERY 6 HOURS PRN
Qty: 30 TABLET | Refills: 1 | Status: SHIPPED | OUTPATIENT
Start: 2019-08-15 | End: 2019-10-09 | Stop reason: SDUPTHER

## 2019-08-19 DIAGNOSIS — E11.44 DIABETIC AMYOTROPHY ASSOCIATED WITH TYPE 2 DIABETES MELLITUS (HCC): ICD-10-CM

## 2019-08-19 RX ORDER — GABAPENTIN 600 MG/1
600 TABLET ORAL 3 TIMES DAILY
Qty: 90 TABLET | Refills: 0 | Status: SHIPPED | OUTPATIENT
Start: 2019-08-19 | End: 2019-11-20 | Stop reason: SDUPTHER

## 2019-09-25 ENCOUNTER — TELEPHONE (OUTPATIENT)
Dept: INTERNAL MEDICINE | Facility: CLINIC | Age: 34
End: 2019-09-25

## 2019-09-25 NOTE — TELEPHONE ENCOUNTER
Pt was admitted to [facility] 9/16/19-9/20/19.  Talked with patient today. She states she is still in pain and her stools were starting to be formed, but now have gone back to loose. Has eaten very little, but is trying to drink.  She is taking her medicines, but does not feel they are working.  Denies fever or SOB.   Appointment changed to tomorrow with Anh DUNAWAY.

## 2019-09-27 ENCOUNTER — OFFICE VISIT (OUTPATIENT)
Dept: FAMILY MEDICINE CLINIC | Facility: CLINIC | Age: 34
End: 2019-09-27

## 2019-09-27 VITALS
SYSTOLIC BLOOD PRESSURE: 106 MMHG | HEIGHT: 63 IN | BODY MASS INDEX: 41.29 KG/M2 | DIASTOLIC BLOOD PRESSURE: 72 MMHG | WEIGHT: 233 LBS | OXYGEN SATURATION: 98 % | HEART RATE: 104 BPM

## 2019-09-27 DIAGNOSIS — R10.9 INTRACTABLE ABDOMINAL PAIN: ICD-10-CM

## 2019-09-27 DIAGNOSIS — A04.72 C. DIFFICILE COLITIS: ICD-10-CM

## 2019-09-27 DIAGNOSIS — K51.818 OTHER ULCERATIVE COLITIS WITH OTHER COMPLICATION (HCC): Primary | ICD-10-CM

## 2019-09-27 DIAGNOSIS — R11.2 NON-INTRACTABLE VOMITING WITH NAUSEA, UNSPECIFIED VOMITING TYPE: ICD-10-CM

## 2019-09-27 PROCEDURE — 99214 OFFICE O/P EST MOD 30 MIN: CPT | Performed by: NURSE PRACTITIONER

## 2019-09-27 RX ORDER — SULFAMETHOXAZOLE AND TRIMETHOPRIM 800; 160 MG/1; MG/1
TABLET ORAL
COMMUNITY
Start: 2019-07-09 | End: 2019-11-20

## 2019-09-27 RX ORDER — DICYCLOMINE HCL 20 MG
TABLET ORAL
COMMUNITY
Start: 2019-07-18 | End: 2020-03-02

## 2019-09-27 RX ORDER — FIDAXOMICIN 200 MG/1
TABLET, FILM COATED ORAL
Refills: 0 | COMMUNITY
Start: 2019-09-18 | End: 2019-11-20

## 2019-09-27 RX ORDER — INSULIN LISPRO 100 [IU]/ML
INJECTION, SUSPENSION SUBCUTANEOUS
COMMUNITY
Start: 2019-06-25 | End: 2019-11-20 | Stop reason: SDUPTHER

## 2019-09-27 NOTE — PROGRESS NOTES
Transitional Care Follow Up Visit  Subjective     Thais Hobson is a 34 y.o. female who presents for a transitional care management visit.    Within 48 business hours after discharge our office contacted her via telephone to coordinate her care and needs.      I reviewed and discussed the details of that call along with the discharge summary, hospital problems, inpatient lab results, inpatient diagnostic studies, and consultation reports with Thais.     Current outpatient and discharge medications have been reconciled for the patient.  Reviewed by: EMELYN Seymour      No flowsheet data found.  Risk for Readmission (LACE) No Data Recorded    History of Present Illness   Course During Hospital Stay: She was recently diagnosed with c diff and was given oral vanocmycin. She took as directed. She started experiencing worsening abdominal pain and diarrhea. She presented to [facility] and then was transferred to [facility]. She was seen by GI and ID. She was admitted and given IVF. She was given Dificid. Symptoms improved. She was discharged on 9/20 on dificid.     HPI as of today:   Has ran out of dificid. Took it TID instead of BID by mistake. Last dose was 2 days ago. While on it she was having 5 BM per day, loose. Since being off dificid she is having worsening abdominal pain and more frequent BM. Watery, foul odor. Fever last 2 nights has been low grade around 100.0. + headaches. No dizziness. Pain in her abdominal is severe. Cannot eat. Can barely drink. + nausea. Feels weak.      The following portions of the patient's history were reviewed and updated as appropriate: allergies, current medications, past family history, past medical history, past social history, past surgical history and problem list.    Review of Systems   Constitutional: Positive for activity change, appetite change, chills and fever.   HENT: Negative.    Eyes: Negative.    Respiratory: Negative for cough and shortness of breath.     Cardiovascular: Positive for palpitations. Negative for chest pain.   Gastrointestinal: Positive for abdominal pain, blood in stool, diarrhea, nausea and vomiting.   Genitourinary: Negative for dysuria and frequency.   Musculoskeletal: Positive for myalgias.   Skin: Negative.    Psychiatric/Behavioral: Positive for sleep disturbance.       Objective   Physical Exam   Constitutional: She is oriented to person, place, and time. She appears ill.   HENT:   Head: Normocephalic and atraumatic.   Eyes: Pupils are equal, round, and reactive to light.   Neck: Neck supple.   Cardiovascular: Regular rhythm, normal heart sounds and intact distal pulses. Tachycardia present.   Pulmonary/Chest: Effort normal and breath sounds normal.   Abdominal: Soft. Bowel sounds are increased. There is generalized tenderness.   Musculoskeletal: She exhibits no edema.   Neurological: She is alert and oriented to person, place, and time.   Skin: Skin is warm and dry.   Psychiatric: She has a normal mood and affect. Her behavior is normal.   Nursing note and vitals reviewed.      Assessment/Plan   Thais was seen today for diarrhea, abdominal pain and nausea.    Diagnoses and all orders for this visit:    Other ulcerative colitis with other complication (CMS/[facility])    C. difficile colitis    Intractable abdominal pain    Non-intractable vomiting with nausea, unspecified vomiting type        -- It appears that she has recurring C Diff +/- UC flare  -- Due to her current intractable abdominal pain, worsening diarrhea, poor oral intake I feel she needs to be admitted  -- I have reached out to Dr. Martin with [facility]. She has agreed to see patient in hospital, she recommends GI consult for possible scope to explore other etiologies to her symptoms  -- I spoke with the hospitalist (Dr. Mccollum) at [facility] who has agreed for admission  -- Plan of care was discussed at length with patient and she expresses understanding

## 2019-10-02 ENCOUNTER — TELEPHONE (OUTPATIENT)
Dept: INTERNAL MEDICINE | Facility: CLINIC | Age: 34
End: 2019-10-02

## 2019-10-02 NOTE — TELEPHONE ENCOUNTER
"Pt admitted to [facility] 9/2719-10/1/19.  States she is better.  Stools are more formed and going approx 2-3 times/day.  Some nausea, but no vomiting.  Able to eat and drink.  Denies fever, chills, SOB, heart racing or dizziness.  Up and about, but states has to rest. \"I get tired very easy.\"  Is having abdominal pain/cramping that she describes as 7/10 on pain scale.  States it starts just before going for BM, but last 2-3 hours afterwards.  Requested a few norco 5mg. to hold her over until her hospital follow up visit.  I told her I would send the message to office, but she may need to talk to Pallavi Alcazar at her Friday visit for this.   "

## 2019-10-09 DIAGNOSIS — K21.9 GASTROESOPHAGEAL REFLUX DISEASE WITHOUT ESOPHAGITIS: Chronic | ICD-10-CM

## 2019-10-09 DIAGNOSIS — K51.90 ULCERATIVE COLITIS WITHOUT COMPLICATIONS, UNSPECIFIED LOCATION (HCC): ICD-10-CM

## 2019-10-09 RX ORDER — PROMETHAZINE HYDROCHLORIDE 25 MG/1
25 TABLET ORAL EVERY 6 HOURS PRN
Qty: 30 TABLET | Refills: 1 | Status: SHIPPED | OUTPATIENT
Start: 2019-10-09 | End: 2020-02-21 | Stop reason: SDUPTHER

## 2019-10-09 NOTE — TELEPHONE ENCOUNTER
Pt called & said that she forgot about her appt this AM. She rescheduled to next Friday - but said that she will be out of phenergan & would like a refill sent to [facility].  Please advise.

## 2019-11-20 ENCOUNTER — OFFICE VISIT (OUTPATIENT)
Dept: FAMILY MEDICINE CLINIC | Facility: CLINIC | Age: 34
End: 2019-11-20

## 2019-11-20 VITALS
DIASTOLIC BLOOD PRESSURE: 70 MMHG | OXYGEN SATURATION: 98 % | HEART RATE: 100 BPM | TEMPERATURE: 97 F | BODY MASS INDEX: 39.87 KG/M2 | WEIGHT: 225 LBS | SYSTOLIC BLOOD PRESSURE: 130 MMHG | HEIGHT: 63 IN

## 2019-11-20 DIAGNOSIS — K51.90 ULCERATIVE COLITIS WITHOUT COMPLICATIONS, UNSPECIFIED LOCATION (HCC): Primary | ICD-10-CM

## 2019-11-20 DIAGNOSIS — E11.65 TYPE 2 DIABETES MELLITUS WITH HYPERGLYCEMIA, WITH LONG-TERM CURRENT USE OF INSULIN (HCC): ICD-10-CM

## 2019-11-20 DIAGNOSIS — Z79.4 TYPE 2 DIABETES MELLITUS WITH HYPERGLYCEMIA, WITH LONG-TERM CURRENT USE OF INSULIN (HCC): ICD-10-CM

## 2019-11-20 DIAGNOSIS — E11.44 DIABETIC AMYOTROPHY ASSOCIATED WITH TYPE 2 DIABETES MELLITUS (HCC): ICD-10-CM

## 2019-11-20 DIAGNOSIS — R80.9 MICROALBUMINURIA: ICD-10-CM

## 2019-11-20 DIAGNOSIS — F43.10 PTSD (POST-TRAUMATIC STRESS DISORDER): ICD-10-CM

## 2019-11-20 LAB
HBA1C MFR BLD: 10.7 %
POC CREATININE URINE: 100
POC MICROALBUMIN URINE: 30

## 2019-11-20 PROCEDURE — 82044 UR ALBUMIN SEMIQUANTITATIVE: CPT | Performed by: NURSE PRACTITIONER

## 2019-11-20 PROCEDURE — 83036 HEMOGLOBIN GLYCOSYLATED A1C: CPT | Performed by: NURSE PRACTITIONER

## 2019-11-20 PROCEDURE — 99214 OFFICE O/P EST MOD 30 MIN: CPT | Performed by: NURSE PRACTITIONER

## 2019-11-20 RX ORDER — BUSPIRONE HYDROCHLORIDE 7.5 MG/1
7.5 TABLET ORAL 2 TIMES DAILY
Qty: 60 TABLET | Refills: 2 | Status: SHIPPED | OUTPATIENT
Start: 2019-11-20 | End: 2020-03-02

## 2019-11-20 RX ORDER — LISINOPRIL 2.5 MG/1
2.5 TABLET ORAL DAILY
Qty: 30 TABLET | Refills: 2 | Status: SHIPPED | OUTPATIENT
Start: 2019-11-20 | End: 2020-09-23 | Stop reason: HOSPADM

## 2019-11-20 RX ORDER — INSULIN LISPRO 100 [IU]/ML
15 INJECTION, SUSPENSION SUBCUTANEOUS 2 TIMES DAILY WITH MEALS
Qty: 1 PEN | Refills: 2 | Status: SHIPPED | OUTPATIENT
Start: 2019-11-20 | End: 2020-02-21 | Stop reason: SDUPTHER

## 2019-11-20 RX ORDER — GABAPENTIN 600 MG/1
600 TABLET ORAL 3 TIMES DAILY
Qty: 90 TABLET | Refills: 2 | Status: SHIPPED | OUTPATIENT
Start: 2019-11-20 | End: 2020-02-21 | Stop reason: SDUPTHER

## 2019-11-20 NOTE — PROGRESS NOTES
Subjective     Chief Complaint:    Chief Complaint   Patient presents with   • Follow-up     ANEMIA; MED REFILLS       History of Present Illness:   Here to follow up diabetes.  Has been out of insulin for a month.  Glucose has been 200-300.  Is overdue for diabetic eye exam.  Noncompliant with diet.  Has ran out of BuSpar.  Notes worsening anxiety and would like to restart.  Takes gabapentin for diabetic neuropathy.  Works well and helps her symptoms.     Notes she is have a UC flare up for the past week. + cramping. +bloddy BM. + N/V. Has been keeping some fluids down. Unable to keep food down. She sees Dr. Duran. Last flare a few months ago.      Review of Systems   Constitutional: Positive for fatigue. Negative for chills and fever.   Respiratory: Negative for cough and shortness of breath.    Cardiovascular: Negative for chest pain and palpitations.   Neurological: Negative for dizziness and headache.   Psychiatric/Behavioral: Negative for depressed mood. The patient is nervous/anxious.         I have reviewed and/or updated the patient's past medical, surgical, family, social history and problem list as appropriate.     Medications:    Current Outpatient Medications:   •  dicyclomine (BENTYL) 20 MG tablet, , Disp: , Rfl:   •  diphenhydrAMINE (BENADRYL) 25 mg capsule, Take 1 capsule by mouth Every 6 (Six) Hours As Needed for Itching or Allergies., Disp: 90 capsule, Rfl: 1  •  estradiol (ESTRACE) 0.5 MG tablet, Take 0.5 mg by mouth. ONE EVERY 3 DAYS, Disp: , Rfl:   •  gabapentin (NEURONTIN) 600 MG tablet, Take 1 tablet by mouth 3 (Three) Times a Day., Disp: 90 tablet, Rfl: 2  •  HUMALOG MIX 75/25 KWIKPEN (75-25) 100 UNIT/ML suspension pen-injector pen, Inject 15 Units under the skin into the appropriate area as directed 2 (Two) Times a Day With Meals., Disp: 1 pen, Rfl: 2  •  promethazine (PHENERGAN) 25 MG tablet, Take 1 tablet by mouth Every 6 (Six) Hours As Needed for Nausea or Vomiting., Disp: 30  "tablet, Rfl: 1  •  busPIRone (BUSPAR) 7.5 MG tablet, Take 1 tablet by mouth 2 (Two) Times a Day., Disp: 60 tablet, Rfl: 2  •  Continuous Glucose Monitor kit, Use as directed, Disp: 1 each, Rfl: 11  •  lisinopril (ZESTRIL) 2.5 MG tablet, Take 1 tablet by mouth Daily., Disp: 30 tablet, Rfl: 2    Allergies:  Allergies   Allergen Reactions   • Hydrochlorothiazide    • Penicillins    • Reglan [Metoclopramide] Other (See Comments)     States feels weird when takes it   • Remicade [Infliximab] Unknown (See Comments)     Unknown       Objective     Vital Signs:   Vitals:    11/20/19 0909   BP: 130/70   Pulse: 100   Temp: 97 °F (36.1 °C)   SpO2: 98%   Weight: 102 kg (225 lb)   Height: 160 cm (63\")       Physical Exam:    Physical Exam   Constitutional: She is oriented to person, place, and time. She appears well-developed and well-nourished.   HENT:   Head: Normocephalic and atraumatic.   Eyes: Pupils are equal, round, and reactive to light.   Neck: Neck supple.   Cardiovascular: Normal rate and regular rhythm.   Pulmonary/Chest: Effort normal and breath sounds normal.   Abdominal: Soft. Bowel sounds are normal. She exhibits no distension. There is tenderness.   generalized   Neurological: She is alert and oriented to person, place, and time.   Skin: Skin is warm and dry.   Psychiatric: She has a normal mood and affect. Her behavior is normal.   Nursing note and vitals reviewed.      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)    Relevant Medications    gabapentin (NEURONTIN) 600 MG tablet    " HUMALOG MIX 75/25 KWIKPEN (75-25) 100 UNIT/ML suspension pen-injector pen    lisinopril (ZESTRIL) 2.5 MG tablet    Other Relevant Orders    Comprehensive Metabolic Panel (Completed)    CBC Auto Differential    POC Glycosylated Hemoglobin (Hb A1C) (Completed)    POCT microalbumin (Completed)       Other    PTSD (post-traumatic stress disorder)    Relevant Medications    busPIRone (BUSPAR) 7.5 MG tablet      Other Visit Diagnoses     Type 2 diabetes mellitus with hyperglycemia, with long-term current use of insulin (CMS/[facility])        Relevant Medications    HUMALOG MIX 75/25 KWIKPEN (75-25) 100 UNIT/ML suspension pen-injector pen    Continuous Glucose Monitor kit    Other Relevant Orders    Referral for Diabetic Eye Exam (Optometry)    Microalbuminuria              Type 2 diabetes with hyperglycemia and microalbuminuria   -Uncontrolled.  A1c 10.7. Restart Humalog MIX at 15 units BID before meals. Would benefit from continuous glucose monitor.   - neurontin refilled  - POC urine microalbumin . Start ACE-1   - Follow diabetic diet, discussed at length, limit carbs, increase protein in moderation, increase water intake  - Monitor blood sugars as discussed  - See eye doctor annually or as discussed  - Wear protective foot wear/no bare feet  - Check feet regularly for calluses or ulcers  - Discussed risk of poorly controlled diabetes and long-term complications  - Exercise as tolerated up to 30 minutes 5 days a week  - Take all medications as prescribed    Ulcerative Colitis  - called and spoke with Dr. Duran nurse for advice on steroids. She will relay message to Dr. Duran and call patient back  - labs    PTSD  - buspar    Of note, her continued medical non compliance makes her care difficult and puts her at high risk for complications.     Follow up:  Return in about 3 months (around 2/20/2020).    Electronically signed by EMELYN Seymour   11/20/2019 9:35 AM      Please note that portions of this note may  have been completed with a voice recognition program. Efforts were made to edit the dictations, but occasionally words are mistranscribed.

## 2019-11-21 LAB
ALBUMIN SERPL-MCNC: 4.6 G/DL (ref 3.5–5.2)
ALBUMIN/GLOB SERPL: 1.4 G/DL
ALP SERPL-CCNC: 159 U/L (ref 39–117)
ALT SERPL-CCNC: 34 U/L (ref 1–33)
AST SERPL-CCNC: 24 U/L (ref 1–32)
BASOPHILS # BLD AUTO: 0.07 10*3/MM3 (ref 0–0.2)
BASOPHILS NFR BLD AUTO: 0.7 % (ref 0–1.5)
BILIRUB SERPL-MCNC: 0.3 MG/DL (ref 0.2–1.2)
BUN SERPL-MCNC: 7 MG/DL (ref 6–20)
BUN/CREAT SERPL: 12.3 (ref 7–25)
CALCIUM SERPL-MCNC: 9.8 MG/DL (ref 8.6–10.5)
CHLORIDE SERPL-SCNC: 97 MMOL/L (ref 98–107)
CO2 SERPL-SCNC: 23.1 MMOL/L (ref 22–29)
CREAT SERPL-MCNC: 0.57 MG/DL (ref 0.57–1)
EOSINOPHIL # BLD AUTO: 0.28 10*3/MM3 (ref 0–0.4)
EOSINOPHIL NFR BLD AUTO: 2.6 % (ref 0.3–6.2)
ERYTHROCYTE [DISTWIDTH] IN BLOOD BY AUTOMATED COUNT: 13.2 % (ref 12.3–15.4)
GLOBULIN SER CALC-MCNC: 3.3 GM/DL
GLUCOSE SERPL-MCNC: 351 MG/DL (ref 65–99)
HCT VFR BLD AUTO: 43.4 % (ref 34–46.6)
HGB BLD-MCNC: 14.1 G/DL (ref 12–15.9)
IMM GRANULOCYTES # BLD AUTO: 0.05 10*3/MM3 (ref 0–0.05)
IMM GRANULOCYTES NFR BLD AUTO: 0.5 % (ref 0–0.5)
LYMPHOCYTES # BLD AUTO: 4.3 10*3/MM3 (ref 0.7–3.1)
LYMPHOCYTES NFR BLD AUTO: 40 % (ref 19.6–45.3)
MCH RBC QN AUTO: 27.6 PG (ref 26.6–33)
MCHC RBC AUTO-ENTMCNC: 32.5 G/DL (ref 31.5–35.7)
MCV RBC AUTO: 84.9 FL (ref 79–97)
MONOCYTES # BLD AUTO: 0.45 10*3/MM3 (ref 0.1–0.9)
MONOCYTES NFR BLD AUTO: 4.2 % (ref 5–12)
NEUTROPHILS # BLD AUTO: 5.59 10*3/MM3 (ref 1.7–7)
NEUTROPHILS NFR BLD AUTO: 52 % (ref 42.7–76)
NRBC BLD AUTO-RTO: 0 /100 WBC (ref 0–0.2)
PLATELET # BLD AUTO: 358 10*3/MM3 (ref 140–450)
POTASSIUM SERPL-SCNC: 4.4 MMOL/L (ref 3.5–5.2)
PROT SERPL-MCNC: 7.9 G/DL (ref 6–8.5)
RBC # BLD AUTO: 5.11 10*6/MM3 (ref 3.77–5.28)
SODIUM SERPL-SCNC: 134 MMOL/L (ref 136–145)
WBC # BLD AUTO: 10.74 10*3/MM3 (ref 3.4–10.8)

## 2020-02-21 ENCOUNTER — OFFICE VISIT (OUTPATIENT)
Dept: FAMILY MEDICINE CLINIC | Facility: CLINIC | Age: 35
End: 2020-02-21

## 2020-02-21 VITALS
HEART RATE: 108 BPM | DIASTOLIC BLOOD PRESSURE: 70 MMHG | TEMPERATURE: 97.2 F | WEIGHT: 225 LBS | HEIGHT: 63 IN | BODY MASS INDEX: 39.87 KG/M2 | OXYGEN SATURATION: 99 % | SYSTOLIC BLOOD PRESSURE: 120 MMHG

## 2020-02-21 DIAGNOSIS — K21.9 GASTROESOPHAGEAL REFLUX DISEASE WITHOUT ESOPHAGITIS: Chronic | ICD-10-CM

## 2020-02-21 DIAGNOSIS — R13.19 ESOPHAGEAL DYSPHAGIA: ICD-10-CM

## 2020-02-21 DIAGNOSIS — E11.44 DIABETIC AMYOTROPHY ASSOCIATED WITH TYPE 2 DIABETES MELLITUS (HCC): ICD-10-CM

## 2020-02-21 DIAGNOSIS — Z91.14 NONCOMPLIANCE WITH MEDICATIONS: ICD-10-CM

## 2020-02-21 DIAGNOSIS — K51.90 ULCERATIVE COLITIS WITHOUT COMPLICATIONS, UNSPECIFIED LOCATION (HCC): Primary | ICD-10-CM

## 2020-02-21 DIAGNOSIS — E11.9 TYPE 2 DIABETES MELLITUS TREATED WITH INSULIN (HCC): ICD-10-CM

## 2020-02-21 DIAGNOSIS — Z79.4 TYPE 2 DIABETES MELLITUS TREATED WITH INSULIN (HCC): ICD-10-CM

## 2020-02-21 PROBLEM — Z91.148 NONCOMPLIANCE WITH MEDICATIONS: Status: ACTIVE | Noted: 2020-02-21

## 2020-02-21 LAB
GLUCOSE BLDC GLUCOMTR-MCNC: 402 MG/DL (ref 70–130)
HBA1C MFR BLD: 10.6 %

## 2020-02-21 PROCEDURE — 82962 GLUCOSE BLOOD TEST: CPT | Performed by: FAMILY MEDICINE

## 2020-02-21 PROCEDURE — 99214 OFFICE O/P EST MOD 30 MIN: CPT | Performed by: FAMILY MEDICINE

## 2020-02-21 PROCEDURE — 83036 HEMOGLOBIN GLYCOSYLATED A1C: CPT | Performed by: FAMILY MEDICINE

## 2020-02-21 RX ORDER — GABAPENTIN 800 MG/1
800 TABLET ORAL 3 TIMES DAILY
Qty: 90 TABLET | Refills: 1 | Status: SHIPPED | OUTPATIENT
Start: 2020-02-21 | End: 2020-04-14

## 2020-02-21 RX ORDER — DIMENHYDRINATE 50 MG/1
50 TABLET ORAL EVERY 8 HOURS PRN
Start: 2020-02-21 | End: 2020-07-09 | Stop reason: SDUPTHER

## 2020-02-21 RX ORDER — INSULIN LISPRO 100 [IU]/ML
25 INJECTION, SUSPENSION SUBCUTANEOUS 2 TIMES DAILY WITH MEALS
Qty: 1 PEN | Refills: 2
Start: 2020-02-21 | End: 2020-02-21 | Stop reason: SDUPTHER

## 2020-02-21 RX ORDER — INSULIN LISPRO 100 [IU]/ML
15 INJECTION, SUSPENSION SUBCUTANEOUS 2 TIMES DAILY WITH MEALS
Qty: 1 PEN | Refills: 2 | Status: SHIPPED | OUTPATIENT
Start: 2020-02-21 | End: 2020-02-21 | Stop reason: SDUPTHER

## 2020-02-21 RX ORDER — PROMETHAZINE HYDROCHLORIDE 25 MG/1
25 TABLET ORAL EVERY 6 HOURS PRN
Qty: 30 TABLET | Refills: 1 | Status: SHIPPED | OUTPATIENT
Start: 2020-02-21 | End: 2020-03-30

## 2020-02-21 RX ORDER — ESTRADIOL 0.5 MG/1
0.5 TABLET ORAL DAILY
Qty: 30 TABLET | Refills: 3 | Status: SHIPPED | OUTPATIENT
Start: 2020-02-21 | End: 2020-02-24 | Stop reason: SDUPTHER

## 2020-02-21 RX ORDER — INSULIN LISPRO 100 [IU]/ML
25 INJECTION, SUSPENSION SUBCUTANEOUS 2 TIMES DAILY WITH MEALS
Qty: 5 PEN | Refills: 3 | Status: SHIPPED | OUTPATIENT
Start: 2020-02-21 | End: 2020-06-18 | Stop reason: SDUPTHER

## 2020-02-26 ENCOUNTER — TELEPHONE (OUTPATIENT)
Dept: FAMILY MEDICINE CLINIC | Facility: CLINIC | Age: 35
End: 2020-02-26

## 2020-02-26 NOTE — TELEPHONE ENCOUNTER
Diallo with [facility] called requesting last office note in regards to GI. Fax to [phone]  Stated patient is scheduled for infusion tomorrow.

## 2020-03-02 ENCOUNTER — OFFICE VISIT (OUTPATIENT)
Dept: GASTROENTEROLOGY | Facility: CLINIC | Age: 35
End: 2020-03-02

## 2020-03-02 VITALS
HEIGHT: 63 IN | TEMPERATURE: 97.6 F | BODY MASS INDEX: 40.82 KG/M2 | WEIGHT: 230.4 LBS | SYSTOLIC BLOOD PRESSURE: 120 MMHG | DIASTOLIC BLOOD PRESSURE: 80 MMHG | HEART RATE: 112 BPM | RESPIRATION RATE: 18 BRPM | OXYGEN SATURATION: 98 %

## 2020-03-02 DIAGNOSIS — R79.89 ELEVATED LFTS: ICD-10-CM

## 2020-03-02 DIAGNOSIS — R13.19 ESOPHAGEAL DYSPHAGIA: ICD-10-CM

## 2020-03-02 DIAGNOSIS — K51.00 ULCERATIVE PANCOLITIS WITHOUT COMPLICATION (HCC): Primary | ICD-10-CM

## 2020-03-02 DIAGNOSIS — Z80.0 FAMILY HX OF COLON CANCER: ICD-10-CM

## 2020-03-02 DIAGNOSIS — A04.72 C. DIFFICILE COLITIS: ICD-10-CM

## 2020-03-02 DIAGNOSIS — E66.01 CLASS 2 SEVERE OBESITY DUE TO EXCESS CALORIES WITH SERIOUS COMORBIDITY AND BODY MASS INDEX (BMI) OF 39.0 TO 39.9 IN ADULT (HCC): ICD-10-CM

## 2020-03-02 PROCEDURE — 99204 OFFICE O/P NEW MOD 45 MIN: CPT | Performed by: INTERNAL MEDICINE

## 2020-03-02 RX ORDER — BISACODYL 5 MG/1
20 TABLET, DELAYED RELEASE ORAL ONCE
Qty: 4 TABLET | Refills: 0 | Status: SHIPPED | OUTPATIENT
Start: 2020-03-02 | End: 2020-03-02

## 2020-03-02 RX ORDER — SODIUM CHLORIDE 9 MG/ML
70 INJECTION, SOLUTION INTRAVENOUS CONTINUOUS PRN
Status: CANCELLED | OUTPATIENT
Start: 2020-03-02

## 2020-03-02 RX ORDER — DICYCLOMINE HCL 20 MG
20 TABLET ORAL 3 TIMES DAILY PRN
Qty: 60 TABLET | Refills: 1 | Status: SHIPPED | OUTPATIENT
Start: 2020-03-02 | End: 2020-05-12 | Stop reason: SDUPTHER

## 2020-03-02 RX ORDER — ESOMEPRAZOLE MAGNESIUM 20 MG/1
20 TABLET, DELAYED RELEASE ORAL DAILY
Qty: 30 TABLET | Refills: 1 | Status: SHIPPED | OUTPATIENT
Start: 2020-03-02 | End: 2020-04-01

## 2020-03-02 NOTE — PROGRESS NOTES
New Patient Consult      Date: 2020   Patient Name: Thais Hobson  MRN: [id number]  : 1985     Referring Physician: Lv Sanchez DO    Chief Complaint   Patient presents with   • Difficulty Swallowing       History of Present Illness: Thais Hobson is a 34 y.o. female who is here today to establish care with Gastroenterology for evaluation of difficulty swallowing and ulcerative colitis.     The patient has difficulty swallowing off and on for the last few years. Known to have esophogeal stricture as per pt. The symptom is moderate in severity, occurs occasionally 2-3 per week and is mostly associated with solid foods and occasionally liquids too. Last EGD was about year ago and had a dilatation.  The symptoms are progressive.  No odynophagia. Associated mild acid reflux. Deny any nausea or vomiting.   Diagnosed with UC in , pancolitis. Initially tried with mesalamine that did not work. She has allergy to remicade and humira did not work and now on entyvio every three weeks.   Recent C -diff infection 4 months ago was admitted at [facility]. Was on vanco and flagyl for 3 weeks. Flex sig 3 months ago at [facility] and revealed mild sigmoid inflammation without any pseudomembrane. Associated diffuse abdominal pain.  Bowel movement at least 10-15 times now with blood. Last colonoscopy was in  that reveal pancolitis. TI biopsy negative.   There is no history of anemia.  Has family history of colon cancer. Grandmother had colon Ca, dad side.     Subjective      Past Medical History:   Past Medical History:   Diagnosis Date   • Abdominal pain     periumbilicul   • Abdominal tenderness     Periumbil   • Alopecia    • Anemia    • Anxiety    • Arthritis    • Asthma    • Back pain    • Bipolar disorder (CMS/HCC)    • Blood in stool    • Colitis    • Colitis    • Colon polyps    • Depression    • Diabetes mellitus (CMS/HCC)    • Diarrhea    • Fractures    • GERD  (gastroesophageal reflux disease)    • H/O colonoscopy 2013    Terminal ileal biopsies negative. Cecal&ascending biopsie revealed chronic active colitis w/ features cons. w/ inflammatory bowel disease. Transevere colon biopsies revealved chronic active colitis. Desc. colon biopsies revealed chronic active colitis. Rect colon biopsie revealed chronic active colitis. Negative for dysplasia   • Hematemesis    • History of Clostridium difficile infection    • Migraine headache    • Nausea     alone   • Pancreatitis    • Positive pregnancy test    • Universal ulcerative colitis (CMS/HCC)    • UTI (urinary tract infection)    • Weight loss        Past Surgical History:   Past Surgical History:   Procedure Laterality Date   • BREAST SURGERY Left     breast lump removed benign   •  SECTION     • CHOLECYSTECTOMY      for stone disease   • COLONOSCOPY     • DILATATION AND CURETTAGE     • ENDOSCOPY  2013    Erosive esophagitis, grade 2; erosive gastritis; small sliding hiatal hernia less than 3 cm, erosive deodenitis duodenal polyp.No gan mucosa.Second portion duedenal biopsy neg. Second postion of dudoenal biopsy revealed polypoid intestinal mucosa w/ lymphoid aggregate. 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 no helicobacter pylori. Negative for mateaplasia, dysplasia   • HIP SURGERY     • HYSTERECTOMY     • LEEP         Family History:   Family History   Problem Relation Age of Onset   • Heart failure Father        Social History:   Social History     Socioeconomic History   • Marital status: Single     Spouse name: Not on file   • Number of children: Not on file   • Years of education: Not on file   • Highest education level: Not on file   Tobacco Use   • Smoking status: Never Smoker   • Smokeless tobacco: Never Used   Substance and Sexual Activity   • Alcohol use: No   • Drug use: No         Current Outpatient Medications:   •  Continuous Glucose Monitor kit, Use  as directed, Disp: 1 each, Rfl: 11  •  dimenhyDRINATE (DRAMAMINE) 50 MG tablet, Take 1 tablet by mouth Every 8 (Eight) Hours As Needed for Nausea., Disp: , Rfl:   •  gabapentin (NEURONTIN) 800 MG tablet, Take 1 tablet by mouth 3 (Three) Times a Day., Disp: 90 tablet, Rfl: 1  •  HUMALOG MIX 75/25 KWIKPEN (75-25) 100 UNIT/ML suspension pen-injector pen, Inject 25 Units under the skin into the appropriate area as directed 2 (Two) Times a Day With Meals., Disp: 5 pen, Rfl: 3  •  lisinopril (ZESTRIL) 2.5 MG tablet, Take 1 tablet by mouth Daily., Disp: 30 tablet, Rfl: 2  •  promethazine (PHENERGAN) 25 MG tablet, Take 1 tablet by mouth Every 6 (Six) Hours As Needed for Nausea or Vomiting., Disp: 30 tablet, Rfl: 1  •  vedolizumab (ENTYVIO) 300 MG injection, Infuse 300 mg into a venous catheter 1 (One) Time., Disp: , Rfl:   •  bisacodyl (DULCOLAX) 5 MG EC tablet, Take 4 tablets by mouth 1 (One) Time for 1 dose. Please see prep instructions from office., Disp: 4 tablet, Rfl: 0  •  dicyclomine (BENTYL) 20 MG tablet, Take 1 tablet by mouth 3 (Three) Times a Day As Needed (abdominal cramps)., Disp: 60 tablet, Rfl: 1  •  Esomeprazole Magnesium (nexIUM 24HR) 20 MG tablet delayed-release, Take 20 mg by mouth Daily for 30 days., Disp: 30 tablet, Rfl: 1  •  magnesium citrate solution, Dispense three bottles for colonoscopy prep. Please see prep instructions from office., Disp: 888 mL, Rfl: 0    Allergies   Allergen Reactions   • Hydrochlorothiazide Hives   • Penicillins Hives   • Remicade [Infliximab] Anaphylaxis   • Reglan [Metoclopramide] Other (See Comments)     States feels weird when takes it       Review of Systems:   Review of Systems   Constitutional: Negative for chills, fever, unexpected weight gain and unexpected weight loss.   HENT: Positive for trouble swallowing. Negative for congestion, ear pain, postnasal drip, sinus pressure and sore throat.    Eyes: Negative for blurred vision and visual disturbance.   Respiratory:  "Negative for cough, chest tightness and shortness of breath.    Cardiovascular: Negative for chest pain, palpitations and leg swelling.   Gastrointestinal: Positive for abdominal distention, abdominal pain, anal bleeding, blood in stool, diarrhea, nausea and vomiting. Negative for constipation and indigestion.   Endocrine: Negative for polyphagia.   Genitourinary: Negative for dysuria and hematuria.   Musculoskeletal: Negative for back pain, joint swelling and neck pain.   Skin: Negative for rash, skin lesions and bruise.   Neurological: Negative for dizziness, seizures, speech difficulty, weakness, numbness and confusion.   Hematological: Negative for adenopathy. Does not bruise/bleed easily.   Psychiatric/Behavioral: Negative for hallucinations, suicidal ideas and depressed mood.       The following portions of the patient's history were reviewed and updated as appropriate: allergies, current medications, past family history, past medical history, past social history, past surgical history and problem list.    Objective     Physical Exam:  Vital Signs:   Vitals:    03/02/20 1408   BP: 120/80   Pulse: 112   Resp: 18   Temp: 97.6 °F (36.4 °C)   TempSrc: Temporal   SpO2: 98%   Weight: 105 kg (230 lb 6.4 oz)   Height: 160 cm (63\")       Physical Exam   Constitutional: She is oriented to person, place, and time. She appears well-developed and well-nourished.   obese   HENT:   Head: Normocephalic and atraumatic.   Right Ear: External ear normal.   Left Ear: External ear normal.   Mouth/Throat: Oropharynx is clear and moist.   Eyes: EOM are normal.   pallor   Neck: Normal range of motion. No tracheal deviation present. No thyromegaly present.   Cardiovascular: Normal rate and regular rhythm.   No murmur heard.  Pulmonary/Chest: Effort normal and breath sounds normal. No respiratory distress.   Abdominal: Soft. Bowel sounds are normal. She exhibits no mass. There is tenderness (Diffuse discomfort on deep palpation lower " abdomen). No hernia.   Musculoskeletal: Normal range of motion. She exhibits no edema.   Neurological: She is alert and oriented to person, place, and time. No cranial nerve deficit or sensory deficit.   Skin: Skin is warm and dry.   Psychiatric: She has a normal mood and affect. Her behavior is normal. Judgment and thought content normal.   Nursing note and vitals reviewed.      Results Review:   I have reviewed the patient's new clinical and imaging results and agree with the interpretation.     Assessment / Plan      Assessment & Plan:  1. Ulcerative pancolitis without complication (CMS/HCC)  Pancolitis diagnosed in 2013.  Colonoscopy revealed a colitis involving the ascending colon transverse colon descending colon and biopsies were consistent with ulcerative colitis.  Abdominal ileal biopsies were negative.  She was initially treated with mesalamine which did not help.  Subsequently she was on Remicade which caused throat swelling and stopped.  She was on Humira for about 6 months or so did not improve her symptoms.  She is now on Entyvio for the last 3 years or so.  She was followed at [facility] with Dr. Irwni.  Doing reasonably well with the vedolizumab.  Recently 3 months ago she was admitted in [facility] with the diarrhea and she was noted to have a C. difficile colitis.  Per patient she was treated with Vanco and Flagyl and discharged with a p.o. Vanco.  This discharged her diarrhea transiently got better but since last few weeks diarrhea has gotten worse.  Watery stool with blood.  Went to ED and had a blood work done which did not reveal any significant anemia and her CT scan abdomen pelvis was unremarkable.     I suspect patient probably had a relapse of C. Difficile  To rule out nonresponse to entyvio.  We will get the stool studies including the stool C. Difficile stool calprotectin and will schedule for her a colonoscopy for further evaluation  We will start her on Bentyl as needed for significant  abdominal cramps  Last dose of Entyvio few days ago and due for later this month, has been already arranged at [facility]  No significant signs of any extraintestinal manifestations    - Clostridium Difficile Toxin, PCR - Stool, Per Rectum; Future  - Stool Culture (Reference Lab) - Stool, Per Rectum; Future  - Case Request; Standing  - Implement Anesthesia Orders Day of Procedure; Standing  - Obtain Informed Consent; Standing  - Verify Bowel Prep Was Successful; Standing  - Oxygen Therapy- Nasal Cannula; 2 LPM; Titrate for SPO2: equal to or greater than, 90%; Standing  - POC Glucose Once; Standing  - sodium chloride 0.9 % infusion  - Case Request    2. C. difficile colitis  Recently did with the Vanco and Flagyl 3 months ago.  Suspected C. difficile relapse.  We will get a repeat stool studies  - Calprotectin, Fecal - Stool, Per Rectum; Future  - Clostridium Difficile Toxin, PCR - Stool, Per Rectum; Future  - Stool Culture (Reference Lab) - Stool, Per Rectum; Future  - Case Request; Standing  - Implement Anesthesia Orders Day of Procedure; Standing  - Obtain Informed Consent; Standing  - Verify Bowel Prep Was Successful; Standing  - Oxygen Therapy- Nasal Cannula; 2 LPM; Titrate for SPO2: equal to or greater than, 90%; Standing  - POC Glucose Once; Standing  - sodium chloride 0.9 % infusion  - Case Request    3. Elevated LFTs  Borderline elevated ALT and the alkaline phosphatase.  Could be related to fatty liver possible [place]  However soon will work-up for any possibility of sclerosing cholangitis with elevated alkaline phosphatase  We will schedule for MRCP and once her EGD and colonoscopy was performed    4. Esophageal dysphagia  Per patient she did have a esophageal stricture and was dilated a year ago.  Suspect she may have some element of esophageal dysmotility secondary to diabetes mellitus  Will schedule for EGD and dilatation    5. Family hx of colon cancer  Grandma had a colon cancer.  Patient had her last  colonoscopy in 2013  Schedule her for colonoscopy now    6. Class 2 severe obesity due to excess calories with serious comorbidity and body mass index (BMI) of 39.0 to 39.9 in adult (CMS/[facility])  Advised regular exercise half hour every day at least 3 days in a week.   Reduced calorie intake  and lifestyle and dietary changes  have been discussed  Wt reduction of at least 5-7% of the current body weight has been discussed.   Advised to avoid alcohol and smoking cigarette      Follow Up:   Return for Follow Up after procedure.    Alonzo He MD  Gastroenter[facility] [place]  3/2/2020  3:07 PM    Please note that portions of this note may have been completed with a voice recognition program. Efforts were made to edit the dictations, but occasionally words are mistranscribed.

## 2020-03-03 ENCOUNTER — HOSPITAL ENCOUNTER (OUTPATIENT)
Facility: HOSPITAL | Age: 35
Setting detail: HOSPITAL OUTPATIENT SURGERY
Discharge: HOME | End: 2020-03-03
Attending: INTERNAL MEDICINE | Admitting: INTERNAL MEDICINE

## 2020-03-03 DIAGNOSIS — K51.00 ULCERATIVE PANCOLITIS WITHOUT COMPLICATION (HCC): ICD-10-CM

## 2020-03-03 DIAGNOSIS — A04.72 C. DIFFICILE COLITIS: ICD-10-CM

## 2020-03-06 ENCOUNTER — APPOINTMENT (OUTPATIENT)
Dept: LAB | Facility: HOSPITAL | Age: 35
End: 2020-03-06

## 2020-03-06 LAB — C DIFF TOX GENS STL QL NAA+PROBE: NOT DETECTED

## 2020-03-06 PROCEDURE — 87045 FECES CULTURE AEROBIC BACT: CPT | Performed by: INTERNAL MEDICINE

## 2020-03-06 PROCEDURE — 87046 STOOL CULTR AEROBIC BACT EA: CPT | Performed by: INTERNAL MEDICINE

## 2020-03-06 PROCEDURE — 87493 C DIFF AMPLIFIED PROBE: CPT | Performed by: INTERNAL MEDICINE

## 2020-03-06 PROCEDURE — 83993 ASSAY FOR CALPROTECTIN FECAL: CPT | Performed by: INTERNAL MEDICINE

## 2020-03-06 PROCEDURE — 87427 SHIGA-LIKE TOXIN AG IA: CPT | Performed by: INTERNAL MEDICINE

## 2020-03-10 ENCOUNTER — TELEPHONE (OUTPATIENT)
Dept: FAMILY MEDICINE CLINIC | Facility: CLINIC | Age: 35
End: 2020-03-10

## 2020-03-10 LAB
CALPROTECTIN STL-MCNT: 1087 UG/G (ref 0–120)
CAMPYLOBACTER STL CULT: NORMAL
E COLI SXT STL QL IA: NEGATIVE
Lab: NORMAL
Lab: NORMAL
SALM + SHIG STL CULT: NORMAL

## 2020-03-10 RX ORDER — ESTRADIOL 0.05 MG/D
1 FILM, EXTENDED RELEASE TRANSDERMAL WEEKLY
Qty: 4 PATCH | Refills: 5 | Status: SHIPPED | OUTPATIENT
Start: 2020-03-10 | End: 2020-03-16 | Stop reason: SDUPTHER

## 2020-03-10 NOTE — TELEPHONE ENCOUNTER
Patient requesting a refill ASAP on:    Estradial 0.05 mg hormone patches  Apply 1 patch to skin every 72 hours.     Patient stated she is getting migraines from not using the patches. She stated that Dr. Sanchez sent a prescription for tablets but she requested the patches.     [facility] to: [id number] - [place], [place] - [address]    Patient’s call back number is: [phone]

## 2020-03-16 ENCOUNTER — TELEPHONE (OUTPATIENT)
Dept: FAMILY MEDICINE CLINIC | Facility: CLINIC | Age: 35
End: 2020-03-16

## 2020-03-16 RX ORDER — ESTRADIOL 0.05 MG/D
1 FILM, EXTENDED RELEASE TRANSDERMAL
Qty: 10 PATCH | Refills: 2 | Status: SHIPPED | OUTPATIENT
Start: 2020-03-16 | End: 2020-05-12 | Stop reason: SDUPTHER

## 2020-03-16 NOTE — TELEPHONE ENCOUNTER
DONALD FROM [facility] CALLED STATES THEIR IS AN ISSUE WITH PT'S MEDICATION. DONALD IS REQUESTING A CALL BACK AT [employer].     CONTACT: [phone]

## 2020-03-16 NOTE — TELEPHONE ENCOUNTER
PT STATED THAT HER MEDICATION estradiol (MINIVELLE, VIVELLE-DOT) 0.05 MG/24HR patch HAS BEEN CALLED IN SEVERAL TIMES INCORRECTLY. THE PT STATED THAT THE CORRECT INSTRUCTIONS FOR THE RX IS 1 PATCH EVERY 3 DAYS. PT STATED SHE IS HAVING MIGRAINES FROM NOT HAVING THE RX FOR 3 WEEKS.     PT CONTACT [phone]    PLEASE ADVISE     [facility]

## 2020-03-28 DIAGNOSIS — K21.9 GASTROESOPHAGEAL REFLUX DISEASE WITHOUT ESOPHAGITIS: Chronic | ICD-10-CM

## 2020-03-28 DIAGNOSIS — K51.90 ULCERATIVE COLITIS WITHOUT COMPLICATIONS, UNSPECIFIED LOCATION (HCC): ICD-10-CM

## 2020-03-30 RX ORDER — PROMETHAZINE HYDROCHLORIDE 25 MG/1
TABLET ORAL
Qty: 90 TABLET | Refills: 0 | Status: SHIPPED | OUTPATIENT
Start: 2020-03-30 | End: 2020-05-04

## 2020-03-30 RX ORDER — DIPHENHYDRAMINE HYDROCHLORIDE 25 MG/1
CAPSULE ORAL
Qty: 90 CAPSULE | Refills: 0 | Status: SHIPPED | OUTPATIENT
Start: 2020-03-30 | End: 2020-06-18 | Stop reason: SDUPTHER

## 2020-04-13 DIAGNOSIS — E11.44 DIABETIC AMYOTROPHY ASSOCIATED WITH TYPE 2 DIABETES MELLITUS (HCC): ICD-10-CM

## 2020-04-14 RX ORDER — GABAPENTIN 800 MG/1
TABLET ORAL
Qty: 90 TABLET | Refills: 0 | Status: SHIPPED | OUTPATIENT
Start: 2020-04-14 | End: 2020-05-12 | Stop reason: SDUPTHER

## 2020-05-04 DIAGNOSIS — K51.90 ULCERATIVE COLITIS WITHOUT COMPLICATIONS, UNSPECIFIED LOCATION (HCC): ICD-10-CM

## 2020-05-04 DIAGNOSIS — K21.9 GASTROESOPHAGEAL REFLUX DISEASE WITHOUT ESOPHAGITIS: Chronic | ICD-10-CM

## 2020-05-04 RX ORDER — PROMETHAZINE HYDROCHLORIDE 25 MG/1
TABLET ORAL
Qty: 30 TABLET | Refills: 1 | Status: SHIPPED | OUTPATIENT
Start: 2020-05-04 | End: 2020-06-18 | Stop reason: SDUPTHER

## 2020-05-04 RX ORDER — PROMETHAZINE HYDROCHLORIDE 25 MG/1
TABLET ORAL
Qty: 90 TABLET | Refills: 0 | OUTPATIENT
Start: 2020-05-04

## 2020-05-06 ENCOUNTER — TELEPHONE (OUTPATIENT)
Dept: GASTROENTEROLOGY | Facility: CLINIC | Age: 35
End: 2020-05-06

## 2020-05-12 DIAGNOSIS — E11.44 DIABETIC AMYOTROPHY ASSOCIATED WITH TYPE 2 DIABETES MELLITUS (HCC): ICD-10-CM

## 2020-05-12 RX ORDER — DICYCLOMINE HCL 20 MG
20 TABLET ORAL 3 TIMES DAILY PRN
Qty: 60 TABLET | Refills: 1 | Status: SHIPPED | OUTPATIENT
Start: 2020-05-12 | End: 2020-10-01

## 2020-05-13 ENCOUNTER — TELEMEDICINE (OUTPATIENT)
Dept: FAMILY MEDICINE CLINIC | Facility: CLINIC | Age: 35
End: 2020-05-13

## 2020-05-13 DIAGNOSIS — G43.101 MIGRAINE WITH AURA AND WITH STATUS MIGRAINOSUS, NOT INTRACTABLE: Primary | ICD-10-CM

## 2020-05-13 PROCEDURE — 99213 OFFICE O/P EST LOW 20 MIN: CPT | Performed by: FAMILY MEDICINE

## 2020-05-13 RX ORDER — GABAPENTIN 800 MG/1
800 TABLET ORAL 3 TIMES DAILY
Qty: 90 TABLET | Refills: 0 | Status: SHIPPED | OUTPATIENT
Start: 2020-05-13 | End: 2020-06-16

## 2020-05-13 RX ORDER — ESTRADIOL 0.05 MG/D
1 FILM, EXTENDED RELEASE TRANSDERMAL
Qty: 8 PATCH | Refills: 2 | Status: SHIPPED | OUTPATIENT
Start: 2020-05-13 | End: 2020-07-09 | Stop reason: SDUPTHER

## 2020-05-13 RX ORDER — TOPIRAMATE 25 MG/1
CAPSULE, COATED PELLETS ORAL
Qty: 60 CAPSULE | Refills: 1 | Status: SHIPPED | OUTPATIENT
Start: 2020-05-13 | End: 2020-07-09 | Stop reason: SDUPTHER

## 2020-05-13 RX ORDER — RIZATRIPTAN BENZOATE 10 MG/1
10 TABLET ORAL ONCE AS NEEDED
Qty: 8 TABLET | Refills: 1 | Status: SHIPPED | OUTPATIENT
Start: 2020-05-13 | End: 2020-06-18 | Stop reason: SDUPTHER

## 2020-05-13 NOTE — PATIENT INSTRUCTIONS
Migraine Headache    A migraine headache is a very strong throbbing pain on one side or both sides of your head. Migraines can also cause other symptoms. Talk with your doctor about what things may bring on (trigger) your migraine headaches.  Follow these instructions at home:  Medicines  · Take over-the-counter and prescription medicines only as told by your doctor.  · Do not drive or use heavy machinery while taking prescription pain medicine.  · To prevent or treat constipation while you are taking prescription pain medicine, your doctor may recommend that you:  ? Drink enough fluid to keep your pee (urine) clear or pale yellow.  ? Take over-the-counter or prescription medicines.  ? Eat foods that are high in fiber. These include fresh fruits and vegetables, whole grains, and beans.  ? Limit foods that are high in fat and processed sugars. These include fried and sweet foods.  Lifestyle  · Avoid alcohol.  · Do not use any products that contain nicotine or tobacco, such as cigarettes and e-cigarettes. If you need help quitting, ask your doctor.  · Get at least 8 hours of sleep every night.  · Limit your stress.  General instructions    · Keep a journal to find out what may bring on your migraines. For example, write down:  ? What you eat and drink.  ? How much sleep you get.  ? Any change in what you eat or drink.  ? Any change in your medicines.  · If you have a migraine:  ? Avoid things that make your symptoms worse, such as bright lights.  ? It may help to lie down in a dark, quiet room.  ? Do not drive or use heavy machinery.  ? Ask your doctor what activities are safe for you.  · Keep all follow-up visits as told by your doctor. This is important.  Contact a doctor if:  · You get a migraine that is different or worse than your usual migraines.  Get help right away if:  · Your migraine gets very bad.  · You have a fever.  · You have a stiff neck.  · You have trouble seeing.  · Your muscles feel weak or like  you cannot control them.  · You start to lose your balance a lot.  · You start to have trouble walking.  · You pass out (faint).  This information is not intended to replace advice given to you by your health care provider. Make sure you discuss any questions you have with your health care provider.  Document Released: 09/26/2009 Document Revised: 09/11/2019 Document Reviewed: 06/05/2017  [employer] Interactive Patient Education © 2020 [employer]

## 2020-05-13 NOTE — PROGRESS NOTES
Established Patient        Chief Complaint: Migraine BEAN       Thais Hobson is a 34 y.o. female    History of Present Illness:   Presents today with request for virtual visit due to the current viral epidemic and healthcare guidelines.  Patient has a known history of migraine headaches throughout her life, she states they have become progressively worse with respect to frequency and severity.  They have been unresponsive to over-the-counter medications.  She gives a history of approximately 2-3 significant migraines weekly on average, usually involving 8 to 12 days of symptoms per month.  She describes photophobia and phonophobia.  She reports no seizure-like activity, without epistaxis or abnormal smells.  She does admit to an oral, as she can usually sense that the headaches are approaching.  She denies any tinnitus or loss of hearing.  She denies any single known triggers.  No reports of fever, chills or night sweats.  No reports of aspiration or dysphagia.    Subjective     The following portions of the patient's history were reviewed and updated as appropriate: allergies, current medications, past family history, past medical history, past social history, past surgical history and problem list.    Allergies   Allergen Reactions   • Hydrochlorothiazide Hives   • Penicillins Hives   • Remicade [Infliximab] Anaphylaxis   • Reglan [Metoclopramide] Other (See Comments)     States feels weird when takes it       Review of Systems  1. Constitutional: Negative for fever. Negative for chills, diaphoresis, fatigue and unexpected weight change.   2. HENT: No dysphagia; no changes to vision/hearing/smell/taste; no epistaxis  3. Eyes: Negative for redness and visual disturbance.   4. Respiratory: negative for shortness of breath. Negative for chest pain . Negative for cough and chest tightness.   5. Cardiovascular: Negative for chest pain and palpitations.   6. Gastrointestinal: Negative for abdominal distention,  abdominal pain and blood in stool.   7. Endocrine: Negative for cold intolerance and heat intolerance.   8. Genitourinary: Negative for difficulty urinating, dysuria and frequency.   9. Musculoskeletal: Chronic arthralgias.  10. Skin: Negative for color change, rash and wound.   11. Neurological: Negative for syncope, weakness.  Headaches as per above.  12. Hematological: Negative for adenopathy. Does not bruise/bleed easily.   13. Psychiatric/Behavioral: Negative for confusion. The patient is not nervous/anxious.    Objective     Physical Exam   Vital Signs: There were no vitals taken for this visit.    General Appearance: alert, oriented x 3, no acute distress.  Interactive during questioning  Skin: warm and dry.  Without jaundice  HEENT: Atraumatic.  pupils round and reactive to light and accommodation, oral mucosa pink and moist.  Nares patent without epistaxis.  External auditory canals are patent.  Neck: supple, no JVD, trachea midline.  No thyromegaly  Lungs: unlabored breathing effort.  Abdomen: soft, non-tender on patient mediated self exam.  Extremities: Good range of motion actively and passively.  Symmetric muscle strength and development  Neuro: normal speech and mental status.     Assessment and Plan      Assessment:   Diagnoses and all orders for this visit:    Migraine with aura and with status migrainosus, not intractable  -     topiramate (TOPAMAX) 25 MG capsule (sprinkle); 1 po qhs x 7days, then inc to 2 po qhs each night thereafter  -     rizatriptan (MAXALT) 10 MG tablet; Take 1 tablet by mouth 1 (One) Time As Needed for Migraine for up to 1 dose. May repeat in 2 hours if needed        Plan:  Planned trial of Topamax, titration of dosing to achieve therapeutic response if able.  PRN utilization of Maxalt.  Patient verbalized understanding of the correct usage of both medications.  Should she develop any ill effects to the medicines, or if they fail to improve her symptoms, she is to notify the  office immediately for potential change in treatment regimen.  If these medications prove ineffective at reducing the frequency and severity of her headaches, consideration for once monthly injectable medication to aid in symptom control.    I have stressed the importance of avoidance of excessive caffeine intake, as well as alcohol use.  I have also instructed her to maintain appropriate sleep regimen, as well as appropriate hydration status.  Avoid known triggers as best able.  Discussion Summary:  I have spent a total of 15 minutes in direct patient care time today, including formulation of treatment plan and electronic prescriptions sent to her pharmacy of choice.    Discussed plan of care in detail with pt today; pt verb understanding and agrees.  Follow up:  No follow-ups on file.     Patient Instructions   Migraine Headache    A migraine headache is a very strong throbbing pain on one side or both sides of your head. Migraines can also cause other symptoms. Talk with your doctor about what things may bring on (trigger) your migraine headaches.  Follow these instructions at home:  Medicines  · Take over-the-counter and prescription medicines only as told by your doctor.  · Do not drive or use heavy machinery while taking prescription pain medicine.  · To prevent or treat constipation while you are taking prescription pain medicine, your doctor may recommend that you:  ? Drink enough fluid to keep your pee (urine) clear or pale yellow.  ? Take over-the-counter or prescription medicines.  ? Eat foods that are high in fiber. These include fresh fruits and vegetables, whole grains, and beans.  ? Limit foods that are high in fat and processed sugars. These include fried and sweet foods.  Lifestyle  · Avoid alcohol.  · Do not use any products that contain nicotine or tobacco, such as cigarettes and e-cigarettes. If you need help quitting, ask your doctor.  · Get at least 8 hours of sleep every night.  · Limit your  stress.  General instructions    · Keep a journal to find out what may bring on your migraines. For example, write down:  ? What you eat and drink.  ? How much sleep you get.  ? Any change in what you eat or drink.  ? Any change in your medicines.  · If you have a migraine:  ? Avoid things that make your symptoms worse, such as bright lights.  ? It may help to lie down in a dark, quiet room.  ? Do not drive or use heavy machinery.  ? Ask your doctor what activities are safe for you.  · Keep all follow-up visits as told by your doctor. This is important.  Contact a doctor if:  · You get a migraine that is different or worse than your usual migraines.  Get help right away if:  · Your migraine gets very bad.  · You have a fever.  · You have a stiff neck.  · You have trouble seeing.  · Your muscles feel weak or like you cannot control them.  · You start to lose your balance a lot.  · You start to have trouble walking.  · You pass out (faint).  This information is not intended to replace advice given to you by your health care provider. Make sure you discuss any questions you have with your health care provider.  Document Released: 09/26/2009 Document Revised: 09/11/2019 Document Reviewed: 06/05/2017  [employer] Interactive Patient Education © 2020 [employer]        Lv Sanchez DO  05/13/20  11:24          Please note that portions of this note may have been completed with a voice recognition program. Efforts were made to edit the dictations, but occasionally words are mistranscribed.

## 2020-05-14 ENCOUNTER — TELEPHONE (OUTPATIENT)
Dept: GASTROENTEROLOGY | Facility: CLINIC | Age: 35
End: 2020-05-14

## 2020-05-14 DIAGNOSIS — R19.7 BLOODY DIARRHEA: ICD-10-CM

## 2020-05-14 DIAGNOSIS — K51.00 ULCERATIVE PANCOLITIS WITHOUT COMPLICATION (HCC): Primary | ICD-10-CM

## 2020-05-14 RX ORDER — BISACODYL 5 MG/1
20 TABLET, DELAYED RELEASE ORAL ONCE
Qty: 4 TABLET | Refills: 0 | Status: SHIPPED | OUTPATIENT
Start: 2020-05-14 | End: 2020-05-14

## 2020-05-14 RX ORDER — SODIUM CHLORIDE 9 MG/ML
70 INJECTION, SOLUTION INTRAVENOUS CONTINUOUS PRN
Status: CANCELLED | OUTPATIENT
Start: 2020-05-14

## 2020-05-14 NOTE — TELEPHONE ENCOUNTER
Dr. He feels it is important for patient to have colonoscopy next week. Tried calling patient. Reached voicemail. Left message for return call.

## 2020-06-05 PROBLEM — K21.9 GERD (GASTROESOPHAGEAL REFLUX DISEASE): Status: ACTIVE | Noted: 2020-06-05

## 2020-06-05 PROBLEM — R19.7 BLOODY DIARRHEA: Status: ACTIVE | Noted: 2020-06-05

## 2020-06-07 ENCOUNTER — HOSPITAL ENCOUNTER (EMERGENCY)
Facility: HOSPITAL | Age: 35
Discharge: HOME | End: 2020-06-07
Attending: EMERGENCY MEDICINE | Admitting: EMERGENCY MEDICINE

## 2020-06-07 VITALS
WEIGHT: 225 LBS | RESPIRATION RATE: 18 BRPM | SYSTOLIC BLOOD PRESSURE: 136 MMHG | HEART RATE: 101 BPM | BODY MASS INDEX: 39.87 KG/M2 | HEIGHT: 63 IN | DIASTOLIC BLOOD PRESSURE: 107 MMHG | TEMPERATURE: 99 F | OXYGEN SATURATION: 98 %

## 2020-06-07 DIAGNOSIS — Z87.19 HISTORY OF ULCERATIVE COLITIS: ICD-10-CM

## 2020-06-07 DIAGNOSIS — Z86.19 HISTORY OF CLOSTRIDIOIDES DIFFICILE COLITIS: ICD-10-CM

## 2020-06-07 DIAGNOSIS — R10.13 EPIGASTRIC ABDOMINAL PAIN: Primary | ICD-10-CM

## 2020-06-07 DIAGNOSIS — R19.7 DIARRHEA, UNSPECIFIED TYPE: ICD-10-CM

## 2020-06-07 LAB
ALBUMIN SERPL-MCNC: 3.5 G/DL (ref 3.5–5.2)
ALBUMIN/GLOB SERPL: 1 G/DL
ALP SERPL-CCNC: 131 U/L (ref 39–117)
ALT SERPL W P-5'-P-CCNC: 43 U/L (ref 1–33)
ANION GAP SERPL CALCULATED.3IONS-SCNC: 10.9 MMOL/L (ref 5–15)
AST SERPL-CCNC: 49 U/L (ref 1–32)
BASOPHILS # BLD AUTO: 0.05 10*3/MM3 (ref 0–0.2)
BASOPHILS NFR BLD AUTO: 0.6 % (ref 0–1.5)
BILIRUB SERPL-MCNC: 0.2 MG/DL (ref 0.2–1.2)
BILIRUB UR QL STRIP: NEGATIVE
BUN BLD-MCNC: 8 MG/DL (ref 6–20)
BUN/CREAT SERPL: 11.4 (ref 7–25)
CALCIUM SPEC-SCNC: 8.7 MG/DL (ref 8.6–10.5)
CHLORIDE SERPL-SCNC: 103 MMOL/L (ref 98–107)
CLARITY UR: CLEAR
CO2 SERPL-SCNC: 19.1 MMOL/L (ref 22–29)
COLOR UR: YELLOW
CREAT BLD-MCNC: 0.7 MG/DL (ref 0.57–1)
DEPRECATED RDW RBC AUTO: 38.2 FL (ref 37–54)
EOSINOPHIL # BLD AUTO: 0.25 10*3/MM3 (ref 0–0.4)
EOSINOPHIL NFR BLD AUTO: 2.9 % (ref 0.3–6.2)
ERYTHROCYTE [DISTWIDTH] IN BLOOD BY AUTOMATED COUNT: 12.8 % (ref 12.3–15.4)
GFR SERPL CREATININE-BSD FRML MDRD: 116 ML/MIN/1.73
GFR SERPL CREATININE-BSD FRML MDRD: 96 ML/MIN/1.73
GLOBULIN UR ELPH-MCNC: 3.5 GM/DL
GLUCOSE BLD-MCNC: 361 MG/DL (ref 65–99)
GLUCOSE UR STRIP-MCNC: ABNORMAL MG/DL
HCT VFR BLD AUTO: 38 % (ref 34–46.6)
HGB BLD-MCNC: 12.6 G/DL (ref 12–15.9)
HGB UR QL STRIP.AUTO: NEGATIVE
HOLD SPECIMEN: NORMAL
IMM GRANULOCYTES # BLD AUTO: 0.02 10*3/MM3 (ref 0–0.05)
IMM GRANULOCYTES NFR BLD AUTO: 0.2 % (ref 0–0.5)
KETONES UR QL STRIP: ABNORMAL
LEUKOCYTE ESTERASE UR QL STRIP.AUTO: NEGATIVE
LIPASE SERPL-CCNC: 16 U/L (ref 13–60)
LYMPHOCYTES # BLD AUTO: 3.91 10*3/MM3 (ref 0.7–3.1)
LYMPHOCYTES NFR BLD AUTO: 44.6 % (ref 19.6–45.3)
MAGNESIUM SERPL-MCNC: 1.6 MG/DL (ref 1.6–2.6)
MCH RBC QN AUTO: 27.3 PG (ref 26.6–33)
MCHC RBC AUTO-ENTMCNC: 33.2 G/DL (ref 31.5–35.7)
MCV RBC AUTO: 82.3 FL (ref 79–97)
MONOCYTES # BLD AUTO: 0.31 10*3/MM3 (ref 0.1–0.9)
MONOCYTES NFR BLD AUTO: 3.5 % (ref 5–12)
NEUTROPHILS # BLD AUTO: 4.22 10*3/MM3 (ref 1.7–7)
NEUTROPHILS NFR BLD AUTO: 48.2 % (ref 42.7–76)
NITRITE UR QL STRIP: NEGATIVE
NRBC BLD AUTO-RTO: 0 /100 WBC (ref 0–0.2)
PH UR STRIP.AUTO: <=5 [PH] (ref 5–8)
PLATELET # BLD AUTO: 276 10*3/MM3 (ref 140–450)
PMV BLD AUTO: 10.1 FL (ref 6–12)
POTASSIUM BLD-SCNC: 3.9 MMOL/L (ref 3.5–5.2)
PROT SERPL-MCNC: 7 G/DL (ref 6–8.5)
PROT UR QL STRIP: NEGATIVE
RBC # BLD AUTO: 4.62 10*6/MM3 (ref 3.77–5.28)
SODIUM BLD-SCNC: 133 MMOL/L (ref 136–145)
SP GR UR STRIP: >=1.03 (ref 1–1.03)
UROBILINOGEN UR QL STRIP: ABNORMAL
WBC NRBC COR # BLD: 8.76 10*3/MM3 (ref 3.4–10.8)
WHOLE BLOOD HOLD SPECIMEN: NORMAL

## 2020-06-07 PROCEDURE — 81003 URINALYSIS AUTO W/O SCOPE: CPT | Performed by: EMERGENCY MEDICINE

## 2020-06-07 PROCEDURE — 0097U HC BIOFIRE FILMARRAY GI PANEL: CPT | Performed by: EMERGENCY MEDICINE

## 2020-06-07 PROCEDURE — 83690 ASSAY OF LIPASE: CPT | Performed by: EMERGENCY MEDICINE

## 2020-06-07 PROCEDURE — 96375 TX/PRO/DX INJ NEW DRUG ADDON: CPT

## 2020-06-07 PROCEDURE — 25010000002 PROMETHAZINE PER 50 MG: Performed by: EMERGENCY MEDICINE

## 2020-06-07 PROCEDURE — 85025 COMPLETE CBC W/AUTO DIFF WBC: CPT | Performed by: EMERGENCY MEDICINE

## 2020-06-07 PROCEDURE — 96361 HYDRATE IV INFUSION ADD-ON: CPT

## 2020-06-07 PROCEDURE — 99283 EMERGENCY DEPT VISIT LOW MDM: CPT

## 2020-06-07 PROCEDURE — 80053 COMPREHEN METABOLIC PANEL: CPT | Performed by: EMERGENCY MEDICINE

## 2020-06-07 PROCEDURE — 25010000002 KETOROLAC TROMETHAMINE PER 15 MG: Performed by: EMERGENCY MEDICINE

## 2020-06-07 PROCEDURE — 25010000002 MAGNESIUM SULFATE 2 GM/50ML SOLUTION: Performed by: EMERGENCY MEDICINE

## 2020-06-07 PROCEDURE — 83735 ASSAY OF MAGNESIUM: CPT | Performed by: EMERGENCY MEDICINE

## 2020-06-07 PROCEDURE — 87493 C DIFF AMPLIFIED PROBE: CPT | Performed by: EMERGENCY MEDICINE

## 2020-06-07 PROCEDURE — 96374 THER/PROPH/DIAG INJ IV PUSH: CPT

## 2020-06-07 RX ORDER — SODIUM CHLORIDE 0.9 % (FLUSH) 0.9 %
10 SYRINGE (ML) INJECTION AS NEEDED
Status: DISCONTINUED | OUTPATIENT
Start: 2020-06-07 | End: 2020-06-07 | Stop reason: HOSPADM

## 2020-06-07 RX ORDER — PROMETHAZINE HYDROCHLORIDE 25 MG/ML
12.5 INJECTION, SOLUTION INTRAMUSCULAR; INTRAVENOUS ONCE
Status: COMPLETED | OUTPATIENT
Start: 2020-06-07 | End: 2020-06-07

## 2020-06-07 RX ORDER — PROMETHAZINE HYDROCHLORIDE 25 MG/1
25 TABLET ORAL EVERY 6 HOURS PRN
Qty: 12 TABLET | Refills: 0 | Status: SHIPPED | OUTPATIENT
Start: 2020-06-07 | End: 2020-06-10 | Stop reason: SDUPTHER

## 2020-06-07 RX ORDER — MAGNESIUM SULFATE HEPTAHYDRATE 40 MG/ML
2 INJECTION, SOLUTION INTRAVENOUS ONCE
Status: COMPLETED | OUTPATIENT
Start: 2020-06-07 | End: 2020-06-07

## 2020-06-07 RX ORDER — KETOROLAC TROMETHAMINE 30 MG/ML
30 INJECTION, SOLUTION INTRAMUSCULAR; INTRAVENOUS ONCE
Status: COMPLETED | OUTPATIENT
Start: 2020-06-07 | End: 2020-06-07

## 2020-06-07 RX ADMIN — LIDOCAINE HYDROCHLORIDE: 20 SOLUTION ORAL; TOPICAL at 14:22

## 2020-06-07 RX ADMIN — SODIUM CHLORIDE 1000 ML: 9 INJECTION, SOLUTION INTRAVENOUS at 14:19

## 2020-06-07 RX ADMIN — KETOROLAC TROMETHAMINE 30 MG: 30 INJECTION, SOLUTION INTRAMUSCULAR at 14:20

## 2020-06-07 RX ADMIN — PROMETHAZINE HYDROCHLORIDE 12.5 MG: 25 INJECTION INTRAMUSCULAR; INTRAVENOUS at 14:21

## 2020-06-07 RX ADMIN — MAGNESIUM SULFATE HEPTAHYDRATE 2 G: 40 INJECTION, SOLUTION INTRAVENOUS at 15:09

## 2020-06-07 NOTE — ED NOTES
Patient in the bathroom attempting to obtain a stool sample.      Iliana Andrade, RN  06/07/20 5238

## 2020-06-07 NOTE — ED PROVIDER NOTES
Subjective   History of Present Illness    Chief Complaint: Abdominal pain diarrhea nausea  History of Present Illness: 35-year-old female with a history of ulcerative colitis, C. difficile colitis, seen in outlying ER yesterday had labs which were reassuring.  Is scheduled for endoscopies with Dr. He next week.  Denies any fever GI bleeding and does not suspect that is an exacerbation of her UC due to lack of bleeding.  No mucus in the stools.  Reports multiple episodes of watery diarrhea.  No fever.  Also states that her hands were cramping concerned about her electrolytes being low  Onset: 4 days  Duration: Persistent  Exacerbating / Alleviating factors: Worse with p.o. intake  Associated symptoms: None     Nurses Notes reviewed and agree, including vitals, allergies, social history and prior medical history.     REVIEW OF SYSTEMS: All systems reviewed and not pertinent unless noted.    Positive for: Epigastric abdominal pain nausea diarrhea    Negative for: GI bleeding mucus in the stools flank pain urinary symptoms  Review of Systems    Past Medical History:   Diagnosis Date   • Abdominal pain     periumbilicul   • Abdominal tenderness     Periumbil   • Alopecia    • Anemia    • Anxiety    • Arthritis    • Asthma    • Back pain    • Bipolar disorder (CMS/HCC)    • Blood in stool    • Colitis    • Colitis    • Colon polyps    • Depression    • Diabetes mellitus (CMS/HCC)    • Diarrhea    • Fractures    • GERD (gastroesophageal reflux disease)    • H/O colonoscopy 08/01/2013    Terminal ileal biopsies negative. Cecal&ascending biopsie revealed chronic active colitis w/ features cons. w/ inflammatory bowel disease. Transevere colon biopsies revealved chronic active colitis. Desc. colon biopsies revealed chronic active colitis. Rect colon biopsie revealed chronic active colitis. Negative for dysplasia   • Hematemesis    • History of Clostridium difficile infection    • Migraine headache    • Nausea     alone    • Pancreatitis    • Positive pregnancy test    • Universal ulcerative colitis (CMS/HCC)    • UTI (urinary tract infection)    • Weight loss        Allergies   Allergen Reactions   • Hydrochlorothiazide Hives   • Penicillins Hives   • Remicade [Infliximab] Anaphylaxis   • Reglan [Metoclopramide] Other (See Comments)     States feels weird when takes it       Past Surgical History:   Procedure Laterality Date   • BREAST SURGERY Left     breast lump removed benign   •  SECTION     • CHOLECYSTECTOMY      for stone disease   • COLONOSCOPY     • DILATATION AND CURETTAGE     • ENDOSCOPY  2013    Erosive esophagitis, grade 2; erosive gastritis; small sliding hiatal hernia less than 3 cm, erosive deodenitis duodenal polyp.No gan mucosa.Second portion duedenal biopsy neg. Second postion of dudoenal biopsy revealed polypoid intestinal mucosa w/ lymphoid aggregate. 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 no helicobacter pylori. Negative for mateaplasia, dysplasia   • HIP SURGERY     • HYSTERECTOMY     • LEEP         Family History   Problem Relation Age of Onset   • Heart failure Father        Social History     Socioeconomic History   • Marital status: Single     Spouse name: Not on file   • Number of children: Not on file   • Years of education: Not on file   • Highest education level: Not on file   Tobacco Use   • Smoking status: Never Smoker   • Smokeless tobacco: Never Used   Substance and Sexual Activity   • Alcohol use: No   • Drug use: No           Objective   Physical Exam      GENERAL APPEARANCE: Well developed, well nourished, obese 35-year-old white female mildly uncomfortable no acute distress VITAL SIGNS: per nursing, reviewed and noted  SKIN: no rashes, ulcerations or petechiae.  Head: Normocephalic, atraumatic.   EYES: perrla. EOMI.  ENT: Normal voice.  Patient was wore mask throughout entire history and physical  LUNGS:  normal breath sounds. No  retractions.   CARDIOVASCULAR:  regular rate and rhythm, no murmurs.  Good Peripheral pulses.  ABDOMEN: Soft, epigastric tenderness palpation without rebound tenderness or guarding, normal bowel sounds. No hernia. No ascites.  MUSCULOSKELETAL:  No tenderness. Full ROM. Strength and tone normal.  NEUROLOGIC: Alert, oriented x 3. No gross deficits.   NECK: Supple, symmetric. No tenderness, no masses. Full ROM  Back: full rom, no paraspinal spasm. No CVA tenderness.   PSYCH: appropriate affect,   : no bladder tenderness or distention, no CVA tenderness      Procedures     No attending provider procedures were performed      ED Course  ED Course as of Jun 07 1703   Sun Jun 07, 2020   1455 Glucose(!): 361 [PF]   1455 Magnesium: 1.6 [PF]   1455 Lipase: 16 [PF]   1455 WBC: 8.76 [PF]   1455 Hemoglobin: 12.6 [PF]   1455 Hematocrit: 38.0 [PF]   1455 Platelets: 276 [PF]   1455 Total Bilirubin: 0.2 [PF]   1455 Alkaline Phosphatase(!): 131 [PF]   1455 AST (SGOT)(!): 49 [PF]   1455 ALT (SGPT)(!): 43 [PF]   1526 Glucose(!): 361 [PF]   1526 BUN: 8 [PF]   1526 Creatinine: 0.70 [PF]   1526 Sodium(!): 133 [PF]   1526 Potassium: 3.9 [PF]   1526 Chloride: 103 [PF]   1526 CO2(!): 19.1 [PF]   1526 Calcium: 8.7 [PF]   1526 Total Protein: 7.0 [PF]   1526 Albumin: 3.50 [PF]   1526 ALT (SGPT)(!): 43 [PF]   1526 AST (SGOT)(!): 49 [PF]   1526 Alkaline Phosphatase(!): 131 [PF]   1526 WBC: 8.76 [PF]   1526 Magnesium: 1.6 [PF]   1526 Hemoglobin: 12.6 [PF]   1526 Hematocrit: 38.0 [PF]   1526 Platelets: 276 [PF]   1526 Lipase: 16 [PF]   1526 Bilirubin, UA: Negative [PF]   1526 Blood, UA: Negative [PF]   1526 Protein, UA: Negative [PF]   1526 Leukocytes, UA: Negative [PF]   1526 Nitrite, UA: Negative [PF]   1556 Consent obtained. Mauricio report requested, number [id number]    [PF]      ED Course User Index  [PF] David Palmer W, DO      Old record review from patient's outpatient ER visit yesterday reveals benign work-up.  CT was not performed there as  "the patient states she has had \"too many \".  Patient is nontoxic here.  Unable to provide stool sample.  Reassuring labs other than mild hypomagnesemia, mild elevation glucose 361.  Normal white cell count no anemia.    Patient be discharged with Phenergan prescription and labs requisition for C. difficile toxin and GI panel PCR with results sent to Dr. He.  Advised to keep her appointment for endoscopy this week.  Return precautions were discussed.  Deferred imaging at this time given my evaluation.                                     MDM    Final diagnoses:   Epigastric abdominal pain   History of ulcerative colitis   History of Clostridioides difficile colitis   Diarrhea, unspecified type            David Palmer, DO  06/07/20 [id number]    "

## 2020-06-07 NOTE — DISCHARGE INSTRUCTIONS
WE sent orders for stool studies to the lab. We sent you home with a collection kit, Bring the stool sample back to the hospital lab so Dr. He can get the results.

## 2020-06-08 ENCOUNTER — TELEPHONE (OUTPATIENT)
Dept: GASTROENTEROLOGY | Facility: CLINIC | Age: 35
End: 2020-06-08

## 2020-06-08 ENCOUNTER — TELEPHONE (OUTPATIENT)
Dept: FAMILY MEDICINE CLINIC | Facility: CLINIC | Age: 35
End: 2020-06-08

## 2020-06-08 DIAGNOSIS — Z01.818 PREOP TESTING: Primary | ICD-10-CM

## 2020-06-08 NOTE — H&P (VIEW-ONLY)
Follow Up Note     Date: 2020   Patient Name: Thais Hobson  MRN: [id number]  : 1985     Referring Physician: No ref. provider found    Chief Complaint:    Chief Complaint   Patient presents with   • Follow-up   • Diarrhea       Interval History:   2020  Thais Hobson is a 35 y.o. female who is here today for follow up for her acute diarrhea and Crohn's disease  She had flare up this time with acute diarrhea since a week. Watery with occasional red blood. Bowel movement at least about 10-15 times daily, slightly improved. If she eats anything she will have a diarrhea. No fever, chills. She went ED twice and stool studies were sent and were negative for any infections.including C diff. Her last dose of entyvio was on May 18th  And she is on 4 weekly entyvio. Occasional nausea with vomiting. Her sugar is not under control as well.     3/2/2020  Thais Hobson is a 34 y.o. female who is here today to establish care with Gastroenterology for evaluation of difficulty swallowing and ulcerative colitis.   The patient has difficulty swallowing off and on for the last few years. Known to have esophogeal stricture as per pt. The symptom is moderate in severity, occurs occasionally 2-3 per week and is mostly associated with solid foods and occasionally liquids too. Last EGD was about year ago and had a dilatation.  The symptoms are progressive.  No odynophagia. Associated mild acid reflux. Deny any nausea or vomiting.   Diagnosed with UC in , pancolitis. Initially tried with mesalamine that did not work. She has allergy to remicade and humira did not work and now on entyvio every three weeks.   Recent C -diff infection 4 months ago was admitted at [facility]. Was on vanco and flagyl for 3 weeks. Flex sig 3 months ago at [facility] and revealed mild sigmoid inflammation without any pseudomembrane. Associated diffuse abdominal pain.  Bowel movement at least 10-15 times now with  blood. Last colonoscopy was in  that reveal pancolitis. TI biopsy negative.   There is no history of anemia.  Has family history of colon cancer. Grandmother had colon Ca, dad side.        Subjective      Past Medical History:   Past Medical History:   Diagnosis Date   • Abdominal pain     periumbilicul   • Abdominal tenderness     Periumbil   • Alopecia    • Anemia    • Anxiety    • Arthritis    • Asthma    • Back pain    • Bipolar disorder (CMS/HCC)    • Blood in stool    • Colitis    • Colitis    • Colon polyps    • Depression    • Diabetes mellitus (CMS/HCC)    • Diarrhea    • Fractures    • GERD (gastroesophageal reflux disease)    • H/O colonoscopy 2013    Terminal ileal biopsies negative. Cecal&ascending biopsie revealed chronic active colitis w/ features cons. w/ inflammatory bowel disease. Transevere colon biopsies revealved chronic active colitis. Desc. colon biopsies revealed chronic active colitis. Rect colon biopsie revealed chronic active colitis. Negative for dysplasia   • Hematemesis    • History of Clostridium difficile infection    • Migraine headache    • Nausea     alone   • Pancreatitis    • Positive pregnancy test    • Universal ulcerative colitis (CMS/HCC)    • UTI (urinary tract infection)    • Weight loss      Past Surgical History:   Past Surgical History:   Procedure Laterality Date   • BREAST SURGERY Left     breast lump removed benign   •  SECTION     • CHOLECYSTECTOMY      for stone disease   • COLONOSCOPY     • DILATATION AND CURETTAGE     • ENDOSCOPY  2013    Erosive esophagitis, grade 2; erosive gastritis; small sliding hiatal hernia less than 3 cm, erosive deodenitis duodenal polyp.No gan mucosa.Second portion duedenal biopsy neg. Second postion of dudoenal biopsy revealed polypoid intestinal mucosa w/ lymphoid aggregate. 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 no helicobacter pylori. Negative for mateaplasia, dysplasia     • HIP SURGERY     • HYSTERECTOMY     • LEEP         Family History:   Family History   Problem Relation Age of Onset   • Heart failure Father        Social History:   Social History     Socioeconomic History   • Marital status: Single     Spouse name: Not on file   • Number of children: Not on file   • Years of education: Not on file   • Highest education level: Not on file   Tobacco Use   • Smoking status: Never Smoker   • Smokeless tobacco: Never Used   Substance and Sexual Activity   • Alcohol use: No   • Drug use: No       Medications:     Current Outpatient Medications:   •  BANOPHEN 25 MG capsule, TAKE 1 CAPSULE BY MOUTH EVERY 8 HOURS, Disp: 90 capsule, Rfl: 0  •  Continuous Glucose Monitor kit, Use as directed, Disp: 1 each, Rfl: 11  •  dicyclomine (BENTYL) 20 MG tablet, Take 1 tablet by mouth 3 (Three) Times a Day As Needed (abdominal cramps)., Disp: 60 tablet, Rfl: 1  •  dimenhyDRINATE (DRAMAMINE) 50 MG tablet, Take 1 tablet by mouth Every 8 (Eight) Hours As Needed for Nausea., Disp: , Rfl:   •  gabapentin (NEURONTIN) 800 MG tablet, Take 1 tablet by mouth 3 (Three) Times a Day., Disp: 90 tablet, Rfl: 0  •  HUMALOG MIX 75/25 KWIKPEN (75-25) 100 UNIT/ML suspension pen-injector pen, Inject 25 Units under the skin into the appropriate area as directed 2 (Two) Times a Day With Meals., Disp: 5 pen, Rfl: 3  •  lisinopril (ZESTRIL) 2.5 MG tablet, Take 1 tablet by mouth Daily., Disp: 30 tablet, Rfl: 2  •  promethazine (PHENERGAN) 25 MG tablet, TAKE 1 TABLET BY MOUTH EVERY 6 HOURS AS NEEDED FOR NAUSEA OR VOMITING, Disp: 30 tablet, Rfl: 1  •  promethazine (PHENERGAN) 25 MG tablet, Take 1 tablet by mouth Every 6 (Six) Hours As Needed for Nausea or Vomiting., Disp: 12 tablet, Rfl: 0  •  rizatriptan (MAXALT) 10 MG tablet, Take 1 tablet by mouth 1 (One) Time As Needed for Migraine for up to 1 dose. May repeat in 2 hours if needed, Disp: 8 tablet, Rfl: 1  •  topiramate (TOPAMAX) 25 MG capsule (sprinkle), 1 po qhs x  "7days, then inc to 2 po qhs each night thereafter, Disp: 60 capsule, Rfl: 1  •  vedolizumab (ENTYVIO) 300 MG injection, Infuse 300 mg into a venous catheter 1 (One) Time., Disp: , Rfl:   •  estradiol (MINIVELLE, VIVELLE-DOT) 0.05 MG/24HR patch, Place 1 patch on the skin as directed by provider Every 3 (Three) Days for 30 days., Disp: 8 patch, Rfl: 2    Allergies:   Allergies   Allergen Reactions   • Hydrochlorothiazide Hives   • Penicillins Hives   • Remicade [Infliximab] Anaphylaxis   • Reglan [Metoclopramide] Other (See Comments)     States feels weird when takes it       Review of Systems:   Review of Systems   Constitutional: Positive for fatigue and unexpected weight loss. Negative for appetite change.   HENT: Positive for trouble swallowing.    Gastrointestinal: Positive for abdominal distention, abdominal pain, anal bleeding, blood in stool, diarrhea, nausea and vomiting. Negative for constipation, rectal pain, GERD and indigestion.       The following portions of the patient's history were reviewed and updated as appropriate: allergies, current medications, past family history, past medical history, past social history, past surgical history and problem list.    Objective     Physical Exam:  Vital Signs:   Vitals:    06/09/20 0849   Temp: 97.3 °F (36.3 °C)   TempSrc: Temporal   Weight: 103 kg (228 lb)   Height: 160 cm (63\")       Physical Exam   Constitutional: She is oriented to person, place, and time. She appears well-developed and well-nourished.   She appears comfortable now   HENT:   Mouth/Throat: Oropharynx is clear and moist.   Eyes: Conjunctivae and EOM are normal.   Neck: Neck supple.   Cardiovascular: Normal rate and regular rhythm.   No murmur heard.  Pulmonary/Chest: Effort normal and breath sounds normal. No respiratory distress.   Abdominal: Soft. Bowel sounds are normal. She exhibits no distension and no mass. There is tenderness (Diffuse mild abdominal tenderness involving the left lower " quadrant left upper quadrant and right side abdomen). There is no guarding. No hernia.   Lymphadenopathy:     She has no cervical adenopathy.   Neurological: She is alert and oriented to person, place, and time. No sensory deficit.   Skin: Skin is warm and dry.   Nursing note and vitals reviewed.      Results Review:   I reviewed the patient's new clinical results.    Scanned Document on 06/08/2020   Component Date Value Ref Range Status   • Campylobacter 06/07/2020 Not Detected  Not Detected Final   • Plesiomonas shigelloides 06/07/2020 Not Detected  Not Detected Final   • Salmonella 06/07/2020 Not Detected  Not Detected Final   • Vibrio 06/07/2020 Not Detected  Not Detected Final   • Vibrio cholerae 06/07/2020 Not Detected  Not Detected Final   • Yersinia enterocolitica 06/07/2020 Not Detected  Not Detected Final   • Enteroaggregative E. coli (EAEC) 06/07/2020 Not Detected  Not Detected Final   • Enteropathogenic E. coli (EPEC) 06/07/2020 Not Detected  Not Detected Final   • Enterotoxigenic E. coli (ETEC) lt/* 06/07/2020 Not Detected  Not Detected Final   • Shiga-like toxin-producing E. coli* 06/07/2020 Not Detected  Not Detected Final   • E. coli O157 06/07/2020 Not Detected  Not Detected Final   • Shigella/Enteroinvasive E. coli (E* 06/07/2020 Not Detected  Not Detected Final   • Cryptosporidium 06/07/2020 Not Detected  Not Detected Final   • Cyclospora cayetanensis 06/07/2020 Not Detected  Not Detected Final   • Entamoeba histolytica 06/07/2020 Not Detected  Not Detected Final   • Giardia lamblia 06/07/2020 Not Detected  Not Detected Final   • Adenovirus F40/41 06/07/2020 Not Detected  Not Detected Final   • Astrovirus 06/07/2020 Not Detected  Not Detected Final   • Norovirus GI/GII 06/07/2020 Not Detected  Not Detected Final   • Rotavirus A 06/07/2020 Not Detected  Not Detected Final   • Sapovirus (I, II, IV or V) 06/07/2020 Not Detected  Not Detected Final   • C. Difficile Toxins by PCR 06/07/2020 Not  Detected  Not Detected Final   Admission on 06/07/2020, Discharged on 06/07/2020   Component Date Value Ref Range Status   • Glucose 06/07/2020 361* 65 - 99 mg/dL Final    Glucose >180, Hemoglobin A1C recommended.   • BUN 06/07/2020 8  6 - 20 mg/dL Final   • Creatinine 06/07/2020 0.70  0.57 - 1.00 mg/dL Final   • Sodium 06/07/2020 133* 136 - 145 mmol/L Final   • Potassium 06/07/2020 3.9  3.5 - 5.2 mmol/L Final   • Chloride 06/07/2020 103  98 - 107 mmol/L Final   • CO2 06/07/2020 19.1* 22.0 - 29.0 mmol/L Final   • Calcium 06/07/2020 8.7  8.6 - 10.5 mg/dL Final   • Total Protein 06/07/2020 7.0  6.0 - 8.5 g/dL Final   • Albumin 06/07/2020 3.50  3.50 - 5.20 g/dL Final   • ALT (SGPT) 06/07/2020 43* 1 - 33 U/L Final   • AST (SGOT) 06/07/2020 49* 1 - 32 U/L Final    Specimen hemolyzed.  Results may be affected.   • Alkaline Phosphatase 06/07/2020 131* 39 - 117 U/L Final   • Total Bilirubin 06/07/2020 0.2  0.2 - 1.2 mg/dL Final   • eGFR Non African Amer 06/07/2020 96  >60 mL/min/1.73 Final   • eGFR   Amer 06/07/2020 116  >60 mL/min/1.73 Final   • Globulin 06/07/2020 3.5  gm/dL Final   • A/G Ratio 06/07/2020 1.0  g/dL Final   • BUN/Creatinine Ratio 06/07/2020 11.4  7.0 - 25.0 Final   • Anion Gap 06/07/2020 10.9  5.0 - 15.0 mmol/L Final   • Lipase 06/07/2020 16  13 - 60 U/L Final   • Color, UA 06/07/2020 Yellow  Yellow, Straw Final   • Appearance, UA 06/07/2020 Clear  Clear Final   • pH, UA 06/07/2020 <=5.0  5.0 - 8.0 Final   • Specific Gravity, UA 06/07/2020 >=1.030  1.005 - 1.030 Final   • Glucose, UA 06/07/2020 >=1000 mg/dL (3+)* Negative Final   • Ketones, UA 06/07/2020 Trace* Negative Final   • Bilirubin, UA 06/07/2020 Negative  Negative Final   • Blood, UA 06/07/2020 Negative  Negative Final   • Protein, UA 06/07/2020 Negative  Negative Final   • Leuk Esterase, UA 06/07/2020 Negative  Negative Final   • Nitrite, UA 06/07/2020 Negative  Negative Final   • Urobilinogen, UA 06/07/2020 1.0 E.U./dL  0.2 - 1.0 E.U./dL  Final   • Extra Tube 06/07/2020 Hold for add-ons.   Final    Auto resulted.   • Extra Tube 06/07/2020 hold for add-on   Final    Auto resulted   • WBC 06/07/2020 8.76  3.40 - 10.80 10*3/mm3 Final   • RBC 06/07/2020 4.62  3.77 - 5.28 10*6/mm3 Final   • Hemoglobin 06/07/2020 12.6  12.0 - 15.9 g/dL Final   • Hematocrit 06/07/2020 38.0  34.0 - 46.6 % Final   • MCV 06/07/2020 82.3  79.0 - 97.0 fL Final   • MCH 06/07/2020 27.3  26.6 - 33.0 pg Final   • MCHC 06/07/2020 33.2  31.5 - 35.7 g/dL Final   • RDW 06/07/2020 12.8  12.3 - 15.4 % Final   • RDW-SD 06/07/2020 38.2  37.0 - 54.0 fl Final   • MPV 06/07/2020 10.1  6.0 - 12.0 fL Final   • Platelets 06/07/2020 276  140 - 450 10*3/mm3 Final   • Neutrophil % 06/07/2020 48.2  42.7 - 76.0 % Final   • Lymphocyte % 06/07/2020 44.6  19.6 - 45.3 % Final   • Monocyte % 06/07/2020 3.5* 5.0 - 12.0 % Final   • Eosinophil % 06/07/2020 2.9  0.3 - 6.2 % Final   • Basophil % 06/07/2020 0.6  0.0 - 1.5 % Final   • Immature Grans % 06/07/2020 0.2  0.0 - 0.5 % Final   • Neutrophils, Absolute 06/07/2020 4.22  1.70 - 7.00 10*3/mm3 Final   • Lymphocytes, Absolute 06/07/2020 3.91* 0.70 - 3.10 10*3/mm3 Final   • Monocytes, Absolute 06/07/2020 0.31  0.10 - 0.90 10*3/mm3 Final   • Eosinophils, Absolute 06/07/2020 0.25  0.00 - 0.40 10*3/mm3 Final   • Basophils, Absolute 06/07/2020 0.05  0.00 - 0.20 10*3/mm3 Final   • Immature Grans, Absolute 06/07/2020 0.02  0.00 - 0.05 10*3/mm3 Final   • nRBC 06/07/2020 0.0  0.0 - 0.2 /100 WBC Final   • Magnesium 06/07/2020 1.6  1.6 - 2.6 mg/dL Final      No radiology results for the last 90 days.    Assessment / Plan      1.  Acute diarrhea  6/9/2012  UC flare versus IBS  She is on Entyvio last dose was 3 weeks ago.  As per patient she is compliant with the medication.  Watery diarrhea more than 10 times per day without any fever chills.   Given occasional blood in the stool I suspect this could be a UC flare.  If it is of UC flare, her interview may not be working.   This has happened 6-month ago as well.   We will send a NT view blood level in the interview antibodies  Her recent stool studies done last week including the GI stool PCR panel and the C. difficile were negative.  She has been scheduled for an urgent colonoscopy on Friday  I am going to start her on Imodium 2 mg p.o. twice daily as needed  We will start her on a steroid based on colonoscopy findings on Friday  We will schedule her for an EGD and dilatation when once a colonoscopy done in a separate session for her ongoing dysphagia  Patient already had a trial of Humira, infliximab and now is on Entyvio.  We may have to discontinue Entyvio if she still has ongoing endoscopic signs of colitis    2. Ulcerative pancolitis without complication (CMS/[facility])  3/2/2020  Pancolitis diagnosed in 2013.  Colonoscopy revealed a colitis involving the ascending colon transverse colon descending colon and biopsies were consistent with ulcerative colitis.  Abdominal ileal biopsies were negative.  She was initially treated with mesalamine which did not help.  Subsequently she was on Remicade which caused throat swelling and stopped.  She was on Humira for about 6 months or so did not improve her symptoms.  She is now on Entyvio for the last 3 years or so.  She was followed at [facility] with Dr. Irwin.  Doing reasonably well with the vedolizumab.  Recently 3 months ago she was admitted in [facility] with the diarrhea and she was noted to have a C. difficile colitis.  Per patient she was treated with Vanco and Flagyl and discharged with a p.o. Vanco.  This discharged her diarrhea transiently got better but since last few weeks diarrhea has gotten worse.  Watery stool with blood.  Went to ED and had a blood work done which did not reveal any significant anemia and her CT scan abdomen pelvis was unremarkable.   I suspect patient probably had a relapse of C. Difficile  To rule out nonresponse to entyvio.  We will get the stool studies  including the stool C. Difficile stool calprotectin and will schedule for her a colonoscopy for further evaluation  We will start her on Bentyl as needed for significant abdominal cramps  Last dose of Entyvio few days ago and due for later this month, has been already arranged at [place]  No significant signs of any extraintestinal manifestations     3. Elevated LFTs  We will get a basic liver work-up for elevated liver enzymes  Could be related to fatty liver possible [place].  Other etiology to rule out including PSC  will work-up for any possibility of sclerosing cholangitis with elevated alkaline phosphatase  We will schedule for MRCP and once her EGD and colonoscopy was performed     Previous history    4. Esophageal dysphagia  Per patient she did have a esophageal stricture and was dilated a year ago.  Suspect she may have some element of esophageal dysmotility secondary to diabetes mellitus  Will schedule for EGD and dilatation     5. Family hx of colon cancer  Grandma had a colon cancer.  Patient had her last colonoscopy in 2013, few polyps removed  Schedule her for colonoscopy now     6. Class 2 severe obesity due to excess calories with serious comorbidity and body mass index (BMI) of 39.0 to 39.9 in adult (CMS/HCC)  Advised regular exercise half hour every day at least 3 days in a week.   Reduced calorie intake  and lifestyle and dietary changes  have been discussed  Wt reduction of at least 5-7% of the current body weight has been discussed.   Advised to avoid alcohol and smoking cigarette    Follow Up:   No follow-ups on file.    Alonzo He MD  [facility]  6/9/2020  08:53     Please note that portions of this note may have been completed with a voice recognition program. Efforts were made to edit the dictations, but occasionally words are mistranscribed.

## 2020-06-08 NOTE — PROGRESS NOTES
Follow Up Note     Date: 2020   Patient Name: Thais Hobson  MRN: [id number]  : 1985     Referring Physician: No ref. provider found    Chief Complaint:    Chief Complaint   Patient presents with   • Follow-up   • Diarrhea       Interval History:   2020  Thais Hobson is a 35 y.o. female who is here today for follow up for her acute diarrhea and Crohn's disease  She had flare up this time with acute diarrhea since a week. Watery with occasional red blood. Bowel movement at least about 10-15 times daily, slightly improved. If she eats anything she will have a diarrhea. No fever, chills. She went ED twice and stool studies were sent and were negative for any infections.including C diff. Her last dose of entyvio was on May 18th  And she is on 4 weekly entyvio. Occasional nausea with vomiting. Her sugar is not under control as well.     3/2/2020  Thais Hobson is a 34 y.o. female who is here today to establish care with Gastroenterology for evaluation of difficulty swallowing and ulcerative colitis.   The patient has difficulty swallowing off and on for the last few years. Known to have esophogeal stricture as per pt. The symptom is moderate in severity, occurs occasionally 2-3 per week and is mostly associated with solid foods and occasionally liquids too. Last EGD was about year ago and had a dilatation.  The symptoms are progressive.  No odynophagia. Associated mild acid reflux. Deny any nausea or vomiting.   Diagnosed with UC in , pancolitis. Initially tried with mesalamine that did not work. She has allergy to remicade and humira did not work and now on entyvio every three weeks.   Recent C -diff infection 4 months ago was admitted at [facility]. Was on vanco and flagyl for 3 weeks. Flex sig 3 months ago at [facility] and revealed mild sigmoid inflammation without any pseudomembrane. Associated diffuse abdominal pain.  Bowel movement at least 10-15 times now with  blood. Last colonoscopy was in  that reveal pancolitis. TI biopsy negative.   There is no history of anemia.  Has family history of colon cancer. Grandmother had colon Ca, dad side.        Subjective      Past Medical History:   Past Medical History:   Diagnosis Date   • Abdominal pain     periumbilicul   • Abdominal tenderness     Periumbil   • Alopecia    • Anemia    • Anxiety    • Arthritis    • Asthma    • Back pain    • Bipolar disorder (CMS/HCC)    • Blood in stool    • Colitis    • Colitis    • Colon polyps    • Depression    • Diabetes mellitus (CMS/HCC)    • Diarrhea    • Fractures    • GERD (gastroesophageal reflux disease)    • H/O colonoscopy 2013    Terminal ileal biopsies negative. Cecal&ascending biopsie revealed chronic active colitis w/ features cons. w/ inflammatory bowel disease. Transevere colon biopsies revealved chronic active colitis. Desc. colon biopsies revealed chronic active colitis. Rect colon biopsie revealed chronic active colitis. Negative for dysplasia   • Hematemesis    • History of Clostridium difficile infection    • Migraine headache    • Nausea     alone   • Pancreatitis    • Positive pregnancy test    • Universal ulcerative colitis (CMS/HCC)    • UTI (urinary tract infection)    • Weight loss      Past Surgical History:   Past Surgical History:   Procedure Laterality Date   • BREAST SURGERY Left     breast lump removed benign   •  SECTION     • CHOLECYSTECTOMY      for stone disease   • COLONOSCOPY     • DILATATION AND CURETTAGE     • ENDOSCOPY  2013    Erosive esophagitis, grade 2; erosive gastritis; small sliding hiatal hernia less than 3 cm, erosive deodenitis duodenal polyp.No gan mucosa.Second portion duedenal biopsy neg. Second postion of dudoenal biopsy revealed polypoid intestinal mucosa w/ lymphoid aggregate. 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 no helicobacter pylori. Negative for mateaplasia, dysplasia    • HIP SURGERY     • HYSTERECTOMY     • LEEP         Family History:   Family History   Problem Relation Age of Onset   • Heart failure Father        Social History:   Social History     Socioeconomic History   • Marital status: Single     Spouse name: Not on file   • Number of children: Not on file   • Years of education: Not on file   • Highest education level: Not on file   Tobacco Use   • Smoking status: Never Smoker   • Smokeless tobacco: Never Used   Substance and Sexual Activity   • Alcohol use: No   • Drug use: No       Medications:     Current Outpatient Medications:   •  BANOPHEN 25 MG capsule, TAKE 1 CAPSULE BY MOUTH EVERY 8 HOURS, Disp: 90 capsule, Rfl: 0  •  Continuous Glucose Monitor kit, Use as directed, Disp: 1 each, Rfl: 11  •  dicyclomine (BENTYL) 20 MG tablet, Take 1 tablet by mouth 3 (Three) Times a Day As Needed (abdominal cramps)., Disp: 60 tablet, Rfl: 1  •  dimenhyDRINATE (DRAMAMINE) 50 MG tablet, Take 1 tablet by mouth Every 8 (Eight) Hours As Needed for Nausea., Disp: , Rfl:   •  gabapentin (NEURONTIN) 800 MG tablet, Take 1 tablet by mouth 3 (Three) Times a Day., Disp: 90 tablet, Rfl: 0  •  HUMALOG MIX 75/25 KWIKPEN (75-25) 100 UNIT/ML suspension pen-injector pen, Inject 25 Units under the skin into the appropriate area as directed 2 (Two) Times a Day With Meals., Disp: 5 pen, Rfl: 3  •  lisinopril (ZESTRIL) 2.5 MG tablet, Take 1 tablet by mouth Daily., Disp: 30 tablet, Rfl: 2  •  promethazine (PHENERGAN) 25 MG tablet, TAKE 1 TABLET BY MOUTH EVERY 6 HOURS AS NEEDED FOR NAUSEA OR VOMITING, Disp: 30 tablet, Rfl: 1  •  promethazine (PHENERGAN) 25 MG tablet, Take 1 tablet by mouth Every 6 (Six) Hours As Needed for Nausea or Vomiting., Disp: 12 tablet, Rfl: 0  •  rizatriptan (MAXALT) 10 MG tablet, Take 1 tablet by mouth 1 (One) Time As Needed for Migraine for up to 1 dose. May repeat in 2 hours if needed, Disp: 8 tablet, Rfl: 1  •  topiramate (TOPAMAX) 25 MG capsule (sprinkle), 1 po qhs x  "7days, then inc to 2 po qhs each night thereafter, Disp: 60 capsule, Rfl: 1  •  vedolizumab (ENTYVIO) 300 MG injection, Infuse 300 mg into a venous catheter 1 (One) Time., Disp: , Rfl:   •  estradiol (MINIVELLE, VIVELLE-DOT) 0.05 MG/24HR patch, Place 1 patch on the skin as directed by provider Every 3 (Three) Days for 30 days., Disp: 8 patch, Rfl: 2    Allergies:   Allergies   Allergen Reactions   • Hydrochlorothiazide Hives   • Penicillins Hives   • Remicade [Infliximab] Anaphylaxis   • Reglan [Metoclopramide] Other (See Comments)     States feels weird when takes it       Review of Systems:   Review of Systems   Constitutional: Positive for fatigue and unexpected weight loss. Negative for appetite change.   HENT: Positive for trouble swallowing.    Gastrointestinal: Positive for abdominal distention, abdominal pain, anal bleeding, blood in stool, diarrhea, nausea and vomiting. Negative for constipation, rectal pain, GERD and indigestion.       The following portions of the patient's history were reviewed and updated as appropriate: allergies, current medications, past family history, past medical history, past social history, past surgical history and problem list.    Objective     Physical Exam:  Vital Signs:   Vitals:    06/09/20 0849   Temp: 97.3 °F (36.3 °C)   TempSrc: Temporal   Weight: 103 kg (228 lb)   Height: 160 cm (63\")       Physical Exam   Constitutional: She is oriented to person, place, and time. She appears well-developed and well-nourished.   She appears comfortable now   HENT:   Mouth/Throat: Oropharynx is clear and moist.   Eyes: Conjunctivae and EOM are normal.   Neck: Neck supple.   Cardiovascular: Normal rate and regular rhythm.   No murmur heard.  Pulmonary/Chest: Effort normal and breath sounds normal. No respiratory distress.   Abdominal: Soft. Bowel sounds are normal. She exhibits no distension and no mass. There is tenderness (Diffuse mild abdominal tenderness involving the left lower " quadrant left upper quadrant and right side abdomen). There is no guarding. No hernia.   Lymphadenopathy:     She has no cervical adenopathy.   Neurological: She is alert and oriented to person, place, and time. No sensory deficit.   Skin: Skin is warm and dry.   Nursing note and vitals reviewed.      Results Review:   I reviewed the patient's new clinical results.    Scanned Document on 06/08/2020   Component Date Value Ref Range Status   • Campylobacter 06/07/2020 Not Detected  Not Detected Final   • Plesiomonas shigelloides 06/07/2020 Not Detected  Not Detected Final   • Salmonella 06/07/2020 Not Detected  Not Detected Final   • Vibrio 06/07/2020 Not Detected  Not Detected Final   • Vibrio cholerae 06/07/2020 Not Detected  Not Detected Final   • Yersinia enterocolitica 06/07/2020 Not Detected  Not Detected Final   • Enteroaggregative E. coli (EAEC) 06/07/2020 Not Detected  Not Detected Final   • Enteropathogenic E. coli (EPEC) 06/07/2020 Not Detected  Not Detected Final   • Enterotoxigenic E. coli (ETEC) lt/* 06/07/2020 Not Detected  Not Detected Final   • Shiga-like toxin-producing E. coli* 06/07/2020 Not Detected  Not Detected Final   • E. coli O157 06/07/2020 Not Detected  Not Detected Final   • Shigella/Enteroinvasive E. coli (E* 06/07/2020 Not Detected  Not Detected Final   • Cryptosporidium 06/07/2020 Not Detected  Not Detected Final   • Cyclospora cayetanensis 06/07/2020 Not Detected  Not Detected Final   • Entamoeba histolytica 06/07/2020 Not Detected  Not Detected Final   • Giardia lamblia 06/07/2020 Not Detected  Not Detected Final   • Adenovirus F40/41 06/07/2020 Not Detected  Not Detected Final   • Astrovirus 06/07/2020 Not Detected  Not Detected Final   • Norovirus GI/GII 06/07/2020 Not Detected  Not Detected Final   • Rotavirus A 06/07/2020 Not Detected  Not Detected Final   • Sapovirus (I, II, IV or V) 06/07/2020 Not Detected  Not Detected Final   • C. Difficile Toxins by PCR 06/07/2020 Not  Detected  Not Detected Final   Admission on 06/07/2020, Discharged on 06/07/2020   Component Date Value Ref Range Status   • Glucose 06/07/2020 361* 65 - 99 mg/dL Final    Glucose >180, Hemoglobin A1C recommended.   • BUN 06/07/2020 8  6 - 20 mg/dL Final   • Creatinine 06/07/2020 0.70  0.57 - 1.00 mg/dL Final   • Sodium 06/07/2020 133* 136 - 145 mmol/L Final   • Potassium 06/07/2020 3.9  3.5 - 5.2 mmol/L Final   • Chloride 06/07/2020 103  98 - 107 mmol/L Final   • CO2 06/07/2020 19.1* 22.0 - 29.0 mmol/L Final   • Calcium 06/07/2020 8.7  8.6 - 10.5 mg/dL Final   • Total Protein 06/07/2020 7.0  6.0 - 8.5 g/dL Final   • Albumin 06/07/2020 3.50  3.50 - 5.20 g/dL Final   • ALT (SGPT) 06/07/2020 43* 1 - 33 U/L Final   • AST (SGOT) 06/07/2020 49* 1 - 32 U/L Final    Specimen hemolyzed.  Results may be affected.   • Alkaline Phosphatase 06/07/2020 131* 39 - 117 U/L Final   • Total Bilirubin 06/07/2020 0.2  0.2 - 1.2 mg/dL Final   • eGFR Non African Amer 06/07/2020 96  >60 mL/min/1.73 Final   • eGFR   Amer 06/07/2020 116  >60 mL/min/1.73 Final   • Globulin 06/07/2020 3.5  gm/dL Final   • A/G Ratio 06/07/2020 1.0  g/dL Final   • BUN/Creatinine Ratio 06/07/2020 11.4  7.0 - 25.0 Final   • Anion Gap 06/07/2020 10.9  5.0 - 15.0 mmol/L Final   • Lipase 06/07/2020 16  13 - 60 U/L Final   • Color, UA 06/07/2020 Yellow  Yellow, Straw Final   • Appearance, UA 06/07/2020 Clear  Clear Final   • pH, UA 06/07/2020 <=5.0  5.0 - 8.0 Final   • Specific Gravity, UA 06/07/2020 >=1.030  1.005 - 1.030 Final   • Glucose, UA 06/07/2020 >=1000 mg/dL (3+)* Negative Final   • Ketones, UA 06/07/2020 Trace* Negative Final   • Bilirubin, UA 06/07/2020 Negative  Negative Final   • Blood, UA 06/07/2020 Negative  Negative Final   • Protein, UA 06/07/2020 Negative  Negative Final   • Leuk Esterase, UA 06/07/2020 Negative  Negative Final   • Nitrite, UA 06/07/2020 Negative  Negative Final   • Urobilinogen, UA 06/07/2020 1.0 E.U./dL  0.2 - 1.0 E.U./dL  Final   • Extra Tube 06/07/2020 Hold for add-ons.   Final    Auto resulted.   • Extra Tube 06/07/2020 hold for add-on   Final    Auto resulted   • WBC 06/07/2020 8.76  3.40 - 10.80 10*3/mm3 Final   • RBC 06/07/2020 4.62  3.77 - 5.28 10*6/mm3 Final   • Hemoglobin 06/07/2020 12.6  12.0 - 15.9 g/dL Final   • Hematocrit 06/07/2020 38.0  34.0 - 46.6 % Final   • MCV 06/07/2020 82.3  79.0 - 97.0 fL Final   • MCH 06/07/2020 27.3  26.6 - 33.0 pg Final   • MCHC 06/07/2020 33.2  31.5 - 35.7 g/dL Final   • RDW 06/07/2020 12.8  12.3 - 15.4 % Final   • RDW-SD 06/07/2020 38.2  37.0 - 54.0 fl Final   • MPV 06/07/2020 10.1  6.0 - 12.0 fL Final   • Platelets 06/07/2020 276  140 - 450 10*3/mm3 Final   • Neutrophil % 06/07/2020 48.2  42.7 - 76.0 % Final   • Lymphocyte % 06/07/2020 44.6  19.6 - 45.3 % Final   • Monocyte % 06/07/2020 3.5* 5.0 - 12.0 % Final   • Eosinophil % 06/07/2020 2.9  0.3 - 6.2 % Final   • Basophil % 06/07/2020 0.6  0.0 - 1.5 % Final   • Immature Grans % 06/07/2020 0.2  0.0 - 0.5 % Final   • Neutrophils, Absolute 06/07/2020 4.22  1.70 - 7.00 10*3/mm3 Final   • Lymphocytes, Absolute 06/07/2020 3.91* 0.70 - 3.10 10*3/mm3 Final   • Monocytes, Absolute 06/07/2020 0.31  0.10 - 0.90 10*3/mm3 Final   • Eosinophils, Absolute 06/07/2020 0.25  0.00 - 0.40 10*3/mm3 Final   • Basophils, Absolute 06/07/2020 0.05  0.00 - 0.20 10*3/mm3 Final   • Immature Grans, Absolute 06/07/2020 0.02  0.00 - 0.05 10*3/mm3 Final   • nRBC 06/07/2020 0.0  0.0 - 0.2 /100 WBC Final   • Magnesium 06/07/2020 1.6  1.6 - 2.6 mg/dL Final      No radiology results for the last 90 days.    Assessment / Plan      1.  Acute diarrhea  6/9/2012  UC flare versus IBS  She is on Entyvio last dose was 3 weeks ago.  As per patient she is compliant with the medication.  Watery diarrhea more than 10 times per day without any fever chills.   Given occasional blood in the stool I suspect this could be a UC flare.  If it is of UC flare, her interview may not be working.   This has happened 6-month ago as well.   We will send a NT view blood level in the interview antibodies  Her recent stool studies done last week including the GI stool PCR panel and the C. difficile were negative.  She has been scheduled for an urgent colonoscopy on Friday  I am going to start her on Imodium 2 mg p.o. twice daily as needed  We will start her on a steroid based on colonoscopy findings on Friday  We will schedule her for an EGD and dilatation when once a colonoscopy done in a separate session for her ongoing dysphagia  Patient already had a trial of Humira, infliximab and now is on Entyvio.  We may have to discontinue Entyvio if she still has ongoing endoscopic signs of colitis    2. Ulcerative pancolitis without complication (CMS/[facility])  3/2/2020  Pancolitis diagnosed in 2013.  Colonoscopy revealed a colitis involving the ascending colon transverse colon descending colon and biopsies were consistent with ulcerative colitis.  Abdominal ileal biopsies were negative.  She was initially treated with mesalamine which did not help.  Subsequently she was on Remicade which caused throat swelling and stopped.  She was on Humira for about 6 months or so did not improve her symptoms.  She is now on Entyvio for the last 3 years or so.  She was followed at [facility] with Dr. Irwin.  Doing reasonably well with the vedolizumab.  Recently 3 months ago she was admitted in [facility] with the diarrhea and she was noted to have a C. difficile colitis.  Per patient she was treated with Vanco and Flagyl and discharged with a p.o. Vanco.  This discharged her diarrhea transiently got better but since last few weeks diarrhea has gotten worse.  Watery stool with blood.  Went to ED and had a blood work done which did not reveal any significant anemia and her CT scan abdomen pelvis was unremarkable.   I suspect patient probably had a relapse of C. Difficile  To rule out nonresponse to entyvio.  We will get the stool studies  including the stool C. Difficile stool calprotectin and will schedule for her a colonoscopy for further evaluation  We will start her on Bentyl as needed for significant abdominal cramps  Last dose of Entyvio few days ago and due for later this month, has been already arranged at [place]  No significant signs of any extraintestinal manifestations     3. Elevated LFTs  We will get a basic liver work-up for elevated liver enzymes  Could be related to fatty liver possible Mackenzie.  Other etiology to rule out including PSC  will work-up for any possibility of sclerosing cholangitis with elevated alkaline phosphatase  We will schedule for MRCP and once her EGD and colonoscopy was performed     Previous history    4. Esophageal dysphagia  Per patient she did have a esophageal stricture and was dilated a year ago.  Suspect she may have some element of esophageal dysmotility secondary to diabetes mellitus  Will schedule for EGD and dilatation     5. Family hx of colon cancer  Grandma had a colon cancer.  Patient had her last colonoscopy in 2013, few polyps removed  Schedule her for colonoscopy now     6. Class 2 severe obesity due to excess calories with serious comorbidity and body mass index (BMI) of 39.0 to 39.9 in adult (CMS/HCC)  Advised regular exercise half hour every day at least 3 days in a week.   Reduced calorie intake  and lifestyle and dietary changes  have been discussed  Wt reduction of at least 5-7% of the current body weight has been discussed.   Advised to avoid alcohol and smoking cigarette      Addendum    This patient had a severe anaphylactic reaction to mesalamine preparation many years ago as per patient.  She was allergic to Remicade.  As per patient she lost response to Humira after some time and that need to be stopped.   She is on Entyvio for almost 3 years now.  She had a severe flare in March April 2019, and had a 1 more flare earlier this year in January, and had a third flare last month within 1  year.  Patient was on Entyvio high-dose every 4 weeks. This time her Entyvio drug levels was 16 which was within the therapeutic range and no Entyvio antibody detected, indicating lost the response to vedolizumab  Her colonoscopy again showed a moderate Cheng score 2 extensive colitis.  Stool studies including the C. difficile bacterial and viral studies were negative.  I have discussed with her discontinuing the Entyvio and starting  on  for now low molecule Xeljang 10mg po BID for now   We will reduce the dose to 5 mg twice daily after induction  I have also discussed the risk and adverse reactions, including, increased risk for infection,  Hematological neoplastic process, increased risk for DVT, PE.  Etc.  She agrees on continuing with Xeljang for now          Follow Up:   No follow-ups on file.    Alonzo He MD  [facility]  6/9/2020  08:53     Please note that portions of this note may have been completed with a voice recognition program. Efforts were made to edit the dictations, but occasionally words are mistranscribed.

## 2020-06-08 NOTE — TELEPHONE ENCOUNTER
Patients mother called stating that the patient has UC and is having severe pain and is extremely dehydrated.    She states that the patient was in the ER twice yesterday, once in [place] and once in [place].  She states that the patient can not hardly get out of bed, she is messing on her self.      I advised patient mother that she would need to take the patient back to the ER. Patient's mother kept wanting to get in with Dr. Sanchez, but I explained to her that the patient would receive the appropriate treatment in a more timely manor then what our office could provide.    Patients mother was agreeable and stated that she would take her back.    I also advised patients mother to contact Dr. Stokes office regarding the issues to see if he needed to intervene before the patients surgery on 6/12.    Dr. Sanchez has been notified of this.

## 2020-06-09 ENCOUNTER — OFFICE VISIT (OUTPATIENT)
Dept: GASTROENTEROLOGY | Facility: CLINIC | Age: 35
End: 2020-06-09

## 2020-06-09 ENCOUNTER — LAB (OUTPATIENT)
Dept: LAB | Facility: HOSPITAL | Age: 35
End: 2020-06-09

## 2020-06-09 VITALS
HEART RATE: 101 BPM | DIASTOLIC BLOOD PRESSURE: 78 MMHG | SYSTOLIC BLOOD PRESSURE: 110 MMHG | RESPIRATION RATE: 18 BRPM | HEIGHT: 63 IN | TEMPERATURE: 97.3 F | WEIGHT: 228 LBS | BODY MASS INDEX: 40.4 KG/M2 | OXYGEN SATURATION: 100 %

## 2020-06-09 DIAGNOSIS — R79.89 ELEVATED LFTS: ICD-10-CM

## 2020-06-09 DIAGNOSIS — K51.00 ULCERATIVE PANCOLITIS WITHOUT COMPLICATION (HCC): ICD-10-CM

## 2020-06-09 DIAGNOSIS — K51.00 ULCERATIVE PANCOLITIS WITHOUT COMPLICATION (HCC): Primary | ICD-10-CM

## 2020-06-09 DIAGNOSIS — Z01.818 PREOP TESTING: ICD-10-CM

## 2020-06-09 LAB
ALPHA1 GLOB MFR UR ELPH: 96 MG/DL (ref 90–200)
CERULOPLASMIN SERPL-MCNC: 31 MG/DL (ref 19–39)
FERRITIN SERPL-MCNC: 102 NG/ML (ref 13–150)
HBV SURFACE AB SER RIA-ACNC: REACTIVE
HBV SURFACE AG SERPL QL IA: NORMAL
HOLD SPECIMEN: NORMAL
IRON 24H UR-MRATE: 46 MCG/DL (ref 37–145)
IRON SATN MFR SERPL: 14 % (ref 20–50)
TIBC SERPL-MCNC: 338 MCG/DL (ref 298–536)
TRANSFERRIN SERPL-MCNC: 227 MG/DL (ref 200–360)

## 2020-06-09 PROCEDURE — 82397 CHEMILUMINESCENT ASSAY: CPT

## 2020-06-09 PROCEDURE — 36415 COLL VENOUS BLD VENIPUNCTURE: CPT

## 2020-06-09 PROCEDURE — C9803 HOPD COVID-19 SPEC COLLECT: HCPCS

## 2020-06-09 PROCEDURE — 86704 HEP B CORE ANTIBODY TOTAL: CPT

## 2020-06-09 PROCEDURE — 82728 ASSAY OF FERRITIN: CPT

## 2020-06-09 PROCEDURE — 83516 IMMUNOASSAY NONANTIBODY: CPT

## 2020-06-09 PROCEDURE — 87340 HEPATITIS B SURFACE AG IA: CPT

## 2020-06-09 PROCEDURE — 99214 OFFICE O/P EST MOD 30 MIN: CPT | Performed by: INTERNAL MEDICINE

## 2020-06-09 PROCEDURE — 86038 ANTINUCLEAR ANTIBODIES: CPT

## 2020-06-09 PROCEDURE — 84466 ASSAY OF TRANSFERRIN: CPT

## 2020-06-09 PROCEDURE — 82390 ASSAY OF CERULOPLASMIN: CPT

## 2020-06-09 PROCEDURE — 86706 HEP B SURFACE ANTIBODY: CPT

## 2020-06-09 PROCEDURE — 82103 ALPHA-1-ANTITRYPSIN TOTAL: CPT

## 2020-06-09 PROCEDURE — U0002 COVID-19 LAB TEST NON-CDC: HCPCS

## 2020-06-09 PROCEDURE — 86708 HEPATITIS A ANTIBODY: CPT

## 2020-06-09 PROCEDURE — U0004 COV-19 TEST NON-CDC HGH THRU: HCPCS

## 2020-06-09 PROCEDURE — 80280 DRUG ASSAY VEDOLIZUMAB: CPT

## 2020-06-09 PROCEDURE — 83540 ASSAY OF IRON: CPT

## 2020-06-09 RX ORDER — LOPERAMIDE HYDROCHLORIDE 2 MG/1
2 TABLET ORAL 2 TIMES DAILY PRN
Qty: 30 TABLET | Refills: 1 | Status: SHIPPED | OUTPATIENT
Start: 2020-06-09 | End: 2020-10-01

## 2020-06-10 LAB
ACTIN IGG SERPL-ACNC: 8 UNITS (ref 0–19)
ANA SER QL: NEGATIVE
DEPRECATED MITOCHONDRIA M2 IGG SER-ACNC: <20 UNITS (ref 0–20)
HAV AB SER QL IA: NEGATIVE
HBV CORE AB SER DONR QL IA: NEGATIVE
REF LAB TEST METHOD: NORMAL
SARS-COV-2 RNA RESP QL NAA+PROBE: NOT DETECTED

## 2020-06-12 ENCOUNTER — ANESTHESIA EVENT (OUTPATIENT)
Dept: GASTROENTEROLOGY | Facility: HOSPITAL | Age: 35
End: 2020-06-12

## 2020-06-12 ENCOUNTER — HOSPITAL ENCOUNTER (OUTPATIENT)
Facility: HOSPITAL | Age: 35
Setting detail: HOSPITAL OUTPATIENT SURGERY
Discharge: HOME | End: 2020-06-12
Attending: INTERNAL MEDICINE | Admitting: INTERNAL MEDICINE

## 2020-06-12 ENCOUNTER — ANESTHESIA (OUTPATIENT)
Dept: GASTROENTEROLOGY | Facility: HOSPITAL | Age: 35
End: 2020-06-12

## 2020-06-12 VITALS
TEMPERATURE: 97.3 F | RESPIRATION RATE: 16 BRPM | SYSTOLIC BLOOD PRESSURE: 129 MMHG | HEIGHT: 63 IN | OXYGEN SATURATION: 100 % | HEART RATE: 90 BPM | BODY MASS INDEX: 39.87 KG/M2 | DIASTOLIC BLOOD PRESSURE: 94 MMHG | WEIGHT: 225 LBS

## 2020-06-12 DIAGNOSIS — K21.9 GERD (GASTROESOPHAGEAL REFLUX DISEASE): ICD-10-CM

## 2020-06-12 DIAGNOSIS — K51.00 ULCERATIVE PANCOLITIS WITHOUT COMPLICATION (HCC): ICD-10-CM

## 2020-06-12 DIAGNOSIS — R13.19 ESOPHAGEAL DYSPHAGIA: ICD-10-CM

## 2020-06-12 DIAGNOSIS — R19.7 BLOODY DIARRHEA: ICD-10-CM

## 2020-06-12 LAB — GLUCOSE BLDC GLUCOMTR-MCNC: 314 MG/DL (ref 70–130)

## 2020-06-12 PROCEDURE — 25010000002 PROPOFOL 200 MG/20ML EMULSION: Performed by: NURSE ANESTHETIST, CERTIFIED REGISTERED

## 2020-06-12 PROCEDURE — 82962 GLUCOSE BLOOD TEST: CPT

## 2020-06-12 RX ORDER — SODIUM CHLORIDE 0.9 % (FLUSH) 0.9 %
10 SYRINGE (ML) INJECTION AS NEEDED
Status: DISCONTINUED | OUTPATIENT
Start: 2020-06-12 | End: 2020-06-12 | Stop reason: HOSPADM

## 2020-06-12 RX ORDER — PROPOFOL 10 MG/ML
INJECTION, EMULSION INTRAVENOUS AS NEEDED
Status: DISCONTINUED | OUTPATIENT
Start: 2020-06-12 | End: 2020-06-12 | Stop reason: SURG

## 2020-06-12 RX ORDER — PREDNISONE 10 MG/1
TABLET ORAL
Qty: 91 TABLET | Refills: 0 | Status: SHIPPED | OUTPATIENT
Start: 2020-06-12 | End: 2020-08-12 | Stop reason: SDUPTHER

## 2020-06-12 RX ORDER — LIDOCAINE HYDROCHLORIDE 20 MG/ML
INJECTION, SOLUTION INTRAVENOUS AS NEEDED
Status: DISCONTINUED | OUTPATIENT
Start: 2020-06-12 | End: 2020-06-12 | Stop reason: SURG

## 2020-06-12 RX ORDER — PROPOFOL 10 MG/ML
INJECTION, EMULSION INTRAVENOUS AS NEEDED
Status: DISCONTINUED | OUTPATIENT
Start: 2020-06-12 | End: 2020-06-12

## 2020-06-12 RX ORDER — SODIUM CHLORIDE 9 MG/ML
70 INJECTION, SOLUTION INTRAVENOUS CONTINUOUS PRN
Status: DISCONTINUED | OUTPATIENT
Start: 2020-06-12 | End: 2020-06-15 | Stop reason: HOSPADM

## 2020-06-12 RX ORDER — LIDOCAINE HYDROCHLORIDE 20 MG/ML
INJECTION, SOLUTION INTRAVENOUS AS NEEDED
Status: DISCONTINUED | OUTPATIENT
Start: 2020-06-12 | End: 2020-06-12

## 2020-06-12 RX ADMIN — PROPOFOL 50 MG: 10 INJECTION, EMULSION INTRAVENOUS at 13:29

## 2020-06-12 RX ADMIN — PROPOFOL 50 MG: 10 INJECTION, EMULSION INTRAVENOUS at 13:27

## 2020-06-12 RX ADMIN — SODIUM CHLORIDE 70 ML/HR: 9 INJECTION, SOLUTION INTRAVENOUS at 12:37

## 2020-06-12 RX ADMIN — PROPOFOL 50 MG: 10 INJECTION, EMULSION INTRAVENOUS at 13:22

## 2020-06-12 RX ADMIN — PROPOFOL 50 MG: 10 INJECTION, EMULSION INTRAVENOUS at 13:18

## 2020-06-12 RX ADMIN — PROPOFOL 50 MG: 10 INJECTION, EMULSION INTRAVENOUS at 13:24

## 2020-06-12 RX ADMIN — LIDOCAINE HYDROCHLORIDE 60 MG: 20 INJECTION, SOLUTION INTRAVENOUS at 13:18

## 2020-06-12 NOTE — ANESTHESIA PREPROCEDURE EVALUATION
Anesthesia Evaluation     Patient summary reviewed and Nursing notes reviewed   NPO Solid Status: > 8 hours  NPO Liquid Status: > 8 hours           Airway   Mallampati: II  TM distance: >3 FB  Neck ROM: full  No difficulty expected  Dental      Pulmonary    (+) asthma,  Cardiovascular     (+) hyperlipidemia,       Neuro/Psych  (+) headaches,     GI/Hepatic/Renal/Endo    (+) obesity, morbid obesity, GERD, GI bleeding , diabetes mellitus,     Musculoskeletal     (+) back pain,   Abdominal    Substance History      OB/GYN          Other   arthritis,                      Anesthesia Plan    ASA 3     MAC     intravenous induction     Anesthetic plan, all risks, benefits, and alternatives have been provided, discussed and informed consent has been obtained with: patient.    Plan discussed with CRNA.

## 2020-06-12 NOTE — DISCHARGE INSTRUCTIONS
No pushing, pulling, tugging,  heavy lifting, or strenuous activity.  No major decision making, driving, or drinking alcoholic beverages for 24 hours. ( due to the medications you have  received)  Always use good hand hygiene/washing techniques.  NO driving while taking pain medications.    * if you have an incision:  Check your incision area every day for signs of infection.   Check for:  * more redness, swelling, or pain  *more fluid or blood  *warmth  *pus or bad smell    To assist you in voiding:  Drink plenty of fluids  Listen to running water while attempting to void.    If you are unable to urinate and you have an uncomfortable urge to void or it has been   6 hours since you were discharged, return to the Emergency Room    Avoid NSAIDs  prednisone tapering dose  Continue imodium prn  F/u in office 4 weeks or keep current appointment

## 2020-06-12 NOTE — NURSING NOTE
"Pt with history of being a hard IV stick, multiple attempts made by myself, Haley MARTÍNEZ, Bart RING, and Francisco MYERS. Francisco MYERS has success on the final attempt. Pt has bruises to Bilat Breast, scattered to BUE she states is from previous IV sticks for her Infusion treatments. Pt states \"they always have a hard time with her veins\"  "

## 2020-06-12 NOTE — ANESTHESIA POSTPROCEDURE EVALUATION
Patient: Thais Hobson    Procedure Summary     Date:  06/12/20 Room / Location:  [facility] ENDOSCOPY 2 / [facility] ENDOSCOPY    Anesthesia Start:  1317 Anesthesia Stop:  1337    Procedure:  COLONOSCOPY (N/A Anus) Diagnosis:       Ulcerative pancolitis without complication (CMS/HCC)      Bloody diarrhea      Esophageal dysphagia      GERD (gastroesophageal reflux disease)      (Ulcerative pancolitis without complication (CMS/HCC) [K51.00])      (Bloody diarrhea [R19.7])    Surgeon:  Alonzo He MD Provider:      Anesthesia Type:  MAC ASA Status:  3          Anesthesia Type: MAC    Vitals  No vitals data found for the desired time range.          Post Anesthesia Care and Evaluation    Patient location during evaluation: bedside  Patient participation: complete - patient participated  Level of consciousness: awake and alert  Pain score: 0  Pain management: adequate  Airway patency: patent  Anesthetic complications: No anesthetic complications  PONV Status: none  Cardiovascular status: acceptable  Respiratory status: acceptable  Hydration status: acceptable

## 2020-06-14 LAB
ANTI-VEDOLIZUMAB ANTIBODY: <25 NG/ML
VEDOLIZUMAB: 14 UG/ML

## 2020-06-16 DIAGNOSIS — E11.44 DIABETIC AMYOTROPHY ASSOCIATED WITH TYPE 2 DIABETES MELLITUS (HCC): ICD-10-CM

## 2020-06-16 RX ORDER — GABAPENTIN 800 MG/1
TABLET ORAL
Qty: 90 TABLET | Refills: 0 | Status: SHIPPED | OUTPATIENT
Start: 2020-06-16 | End: 2020-07-09 | Stop reason: SDUPTHER

## 2020-06-17 LAB
LAB AP CASE REPORT: NORMAL
PATH REPORT.FINAL DX SPEC: NORMAL

## 2020-06-18 DIAGNOSIS — K51.90 ULCERATIVE COLITIS WITHOUT COMPLICATIONS, UNSPECIFIED LOCATION (HCC): ICD-10-CM

## 2020-06-18 DIAGNOSIS — K21.9 GASTROESOPHAGEAL REFLUX DISEASE WITHOUT ESOPHAGITIS: Chronic | ICD-10-CM

## 2020-06-18 DIAGNOSIS — Z79.4 TYPE 2 DIABETES MELLITUS TREATED WITH INSULIN (HCC): ICD-10-CM

## 2020-06-18 DIAGNOSIS — E11.9 TYPE 2 DIABETES MELLITUS TREATED WITH INSULIN (HCC): ICD-10-CM

## 2020-06-18 DIAGNOSIS — G43.101 MIGRAINE WITH AURA AND WITH STATUS MIGRAINOSUS, NOT INTRACTABLE: ICD-10-CM

## 2020-06-19 ENCOUNTER — TELEPHONE (OUTPATIENT)
Dept: GASTROENTEROLOGY | Facility: CLINIC | Age: 35
End: 2020-06-19

## 2020-06-19 RX ORDER — INSULIN LISPRO 100 [IU]/ML
25 INJECTION, SUSPENSION SUBCUTANEOUS 2 TIMES DAILY WITH MEALS
Qty: 5 PEN | Refills: 3 | Status: SHIPPED | OUTPATIENT
Start: 2020-06-19 | End: 2020-07-09 | Stop reason: SDUPTHER

## 2020-06-19 RX ORDER — RIZATRIPTAN BENZOATE 10 MG/1
10 TABLET ORAL ONCE AS NEEDED
Qty: 8 TABLET | Refills: 1 | Status: SHIPPED | OUTPATIENT
Start: 2020-06-19 | End: 2020-07-09 | Stop reason: SDUPTHER

## 2020-06-19 RX ORDER — PROMETHAZINE HYDROCHLORIDE 25 MG/1
25 TABLET ORAL EVERY 6 HOURS PRN
Qty: 30 TABLET | Refills: 1 | Status: SHIPPED | OUTPATIENT
Start: 2020-06-19 | End: 2020-08-03 | Stop reason: SDUPTHER

## 2020-06-19 RX ORDER — DIPHENHYDRAMINE HCL 25 MG
25 CAPSULE ORAL EVERY 8 HOURS
Qty: 90 CAPSULE | Refills: 0 | Status: SHIPPED | OUTPATIENT
Start: 2020-06-19 | End: 2020-09-01

## 2020-06-23 ENCOUNTER — PRIOR AUTHORIZATION (OUTPATIENT)
Dept: FAMILY MEDICINE CLINIC | Facility: CLINIC | Age: 35
End: 2020-06-23

## 2020-06-23 NOTE — TELEPHONE ENCOUNTER
A prior auth has been started through cover my meds for humalog.    Currently waiting on a response from the insurance.

## 2020-07-02 ENCOUNTER — TELEPHONE (OUTPATIENT)
Dept: FAMILY MEDICINE CLINIC | Facility: CLINIC | Age: 35
End: 2020-07-02

## 2020-07-02 NOTE — TELEPHONE ENCOUNTER
Patient stated she is out of insulin and needs a prescription. The Humalog is no longer covered and needs a new medication to replace it.     [id number] - [place], [place] - [address] - [phone] [facility] [phone] FX     Please call patient and [phone]

## 2020-07-09 DIAGNOSIS — Z79.4 TYPE 2 DIABETES MELLITUS TREATED WITH INSULIN (HCC): ICD-10-CM

## 2020-07-09 DIAGNOSIS — K51.90 ULCERATIVE COLITIS WITHOUT COMPLICATIONS, UNSPECIFIED LOCATION (HCC): ICD-10-CM

## 2020-07-09 DIAGNOSIS — E11.9 TYPE 2 DIABETES MELLITUS TREATED WITH INSULIN (HCC): ICD-10-CM

## 2020-07-09 DIAGNOSIS — G43.101 MIGRAINE WITH AURA AND WITH STATUS MIGRAINOSUS, NOT INTRACTABLE: ICD-10-CM

## 2020-07-09 DIAGNOSIS — E11.44 DIABETIC AMYOTROPHY ASSOCIATED WITH TYPE 2 DIABETES MELLITUS (HCC): ICD-10-CM

## 2020-07-13 ENCOUNTER — TELEPHONE (OUTPATIENT)
Dept: FAMILY MEDICINE CLINIC | Facility: CLINIC | Age: 35
End: 2020-07-13

## 2020-07-13 NOTE — TELEPHONE ENCOUNTER
Patient received a messaged from the nurse advising her the insurance has approved the Insulin Lispro 75/25 pen. She stated the pharmacy told her they don't have a prescription for Lispro - they only have a prescription for Humalog.     Please call pharmacy and patient to advise - she has been out of insulin for a couple of weeks.     Patient's call back number is [phone]    [id number] - [place], [place] - [address] - [phone]  - [phone] FX

## 2020-07-13 NOTE — TELEPHONE ENCOUNTER
Spoke to [facility] pharmacy, they have the prescription from our office but do not have medication in stock. Called patient and informed per pharmacy that medication shipment should be in today and advised to check back with pharmacy. Patient voiced understanding.

## 2020-07-14 RX ORDER — ESTRADIOL 0.05 MG/D
1 FILM, EXTENDED RELEASE TRANSDERMAL
Qty: 8 PATCH | Refills: 2 | Status: SHIPPED | OUTPATIENT
Start: 2020-07-14 | End: 2020-08-13

## 2020-07-14 RX ORDER — GABAPENTIN 800 MG/1
800 TABLET ORAL 3 TIMES DAILY
Qty: 90 TABLET | Refills: 0 | Status: SHIPPED | OUTPATIENT
Start: 2020-07-14 | End: 2020-08-13

## 2020-07-14 RX ORDER — RIZATRIPTAN BENZOATE 10 MG/1
10 TABLET ORAL ONCE AS NEEDED
Qty: 8 TABLET | Refills: 1 | Status: SHIPPED | OUTPATIENT
Start: 2020-07-14 | End: 2020-10-01 | Stop reason: SDUPTHER

## 2020-07-14 RX ORDER — DIMENHYDRINATE 50 MG/1
50 TABLET ORAL EVERY 8 HOURS PRN
Qty: 30 TABLET | Refills: 0 | Status: SHIPPED | OUTPATIENT
Start: 2020-07-14 | End: 2020-10-01

## 2020-07-14 RX ORDER — INSULIN LISPRO 100 [IU]/ML
25 INJECTION, SUSPENSION SUBCUTANEOUS 2 TIMES DAILY WITH MEALS
Qty: 5 PEN | Refills: 3 | Status: SHIPPED | OUTPATIENT
Start: 2020-07-14 | End: 2020-10-01 | Stop reason: SDUPTHER

## 2020-07-14 RX ORDER — TOPIRAMATE 25 MG/1
CAPSULE, COATED PELLETS ORAL
Qty: 60 CAPSULE | Refills: 1 | Status: SHIPPED | OUTPATIENT
Start: 2020-07-14 | End: 2020-09-08

## 2020-07-17 ENCOUNTER — PRIOR AUTHORIZATION (OUTPATIENT)
Dept: FAMILY MEDICINE CLINIC | Facility: CLINIC | Age: 35
End: 2020-07-17

## 2020-07-17 NOTE — TELEPHONE ENCOUNTER
A prior auth for [facility] 75/25 kwikpen has been started through cover my meds.    Currently waiting on a response from the insurance.

## 2020-07-27 ENCOUNTER — TELEMEDICINE (OUTPATIENT)
Dept: FAMILY MEDICINE CLINIC | Facility: CLINIC | Age: 35
End: 2020-07-27

## 2020-07-27 DIAGNOSIS — Z79.4 TYPE 2 DIABETES MELLITUS TREATED WITH INSULIN (HCC): Primary | ICD-10-CM

## 2020-07-27 DIAGNOSIS — F41.8 DEPRESSION WITH ANXIETY: ICD-10-CM

## 2020-07-27 DIAGNOSIS — B37.0 ORAL CANDIDIASIS: ICD-10-CM

## 2020-07-27 DIAGNOSIS — K21.9 GASTROESOPHAGEAL REFLUX DISEASE WITHOUT ESOPHAGITIS: Chronic | ICD-10-CM

## 2020-07-27 DIAGNOSIS — K51.00 ULCERATIVE PANCOLITIS WITHOUT COMPLICATION (HCC): ICD-10-CM

## 2020-07-27 DIAGNOSIS — E11.9 TYPE 2 DIABETES MELLITUS TREATED WITH INSULIN (HCC): Primary | ICD-10-CM

## 2020-07-27 PROBLEM — K51.90 ULCERATIVE COLITIS WITHOUT COMPLICATIONS: Status: RESOLVED | Noted: 2019-07-22 | Resolved: 2020-07-27

## 2020-07-27 PROCEDURE — 99214 OFFICE O/P EST MOD 30 MIN: CPT | Performed by: FAMILY MEDICINE

## 2020-07-27 RX ORDER — BUPROPION HYDROCHLORIDE 100 MG/1
100 TABLET, EXTENDED RELEASE ORAL EVERY MORNING
Qty: 30 TABLET | Refills: 1 | Status: SHIPPED | OUTPATIENT
Start: 2020-07-27 | End: 2020-10-01 | Stop reason: SDUPTHER

## 2020-07-27 RX ORDER — FLUCONAZOLE 100 MG/1
100 TABLET ORAL DAILY
Qty: 5 TABLET | Refills: 0 | Status: ON HOLD | OUTPATIENT
Start: 2020-07-27 | End: 2020-09-16

## 2020-08-03 ENCOUNTER — TELEPHONE (OUTPATIENT)
Dept: FAMILY MEDICINE CLINIC | Facility: CLINIC | Age: 35
End: 2020-08-03

## 2020-08-03 DIAGNOSIS — K51.90 ULCERATIVE COLITIS WITHOUT COMPLICATIONS, UNSPECIFIED LOCATION (HCC): ICD-10-CM

## 2020-08-03 DIAGNOSIS — K21.9 GASTROESOPHAGEAL REFLUX DISEASE WITHOUT ESOPHAGITIS: Chronic | ICD-10-CM

## 2020-08-03 RX ORDER — PROMETHAZINE HYDROCHLORIDE 25 MG/1
25 TABLET ORAL EVERY 6 HOURS PRN
Qty: 30 TABLET | Refills: 1 | Status: SHIPPED | OUTPATIENT
Start: 2020-08-03 | End: 2020-09-01

## 2020-08-03 NOTE — TELEPHONE ENCOUNTER
Caller: Thais Hobson    Relationship: Self    Best call back number: [phone]    Medication needed:   Requested Prescriptions     Pending Prescriptions Disp Refills   • promethazine (PHENERGAN) 25 MG tablet 30 tablet 1     Sig: Take 1 tablet by mouth Every 6 (Six) Hours As Needed for Nausea or Vomiting.       When do you need the refill by: ASAP    What details did the patient provide when requesting the medication: PATIENT HAS A REALLY BAD STOMACH VIRUS AND IS VOMITING.  WOULD LIKE THIS CALLED IN.  SHE DOESN'T CURRENTLY HAVE ANY MEDICATION  .    Does the patient have less than a 3 day supply:  [x] Yes  [] No    What is the patient's preferred pharmacy: [id number] - [place], [place] - [address] - [phone] [facility] [phone] FX

## 2020-08-12 ENCOUNTER — TELEPHONE (OUTPATIENT)
Dept: GASTROENTEROLOGY | Facility: CLINIC | Age: 35
End: 2020-08-12

## 2020-08-12 DIAGNOSIS — R19.7 BLOODY DIARRHEA: Primary | ICD-10-CM

## 2020-08-12 RX ORDER — PREDNISONE 10 MG/1
TABLET ORAL
Qty: 42 TABLET | Refills: 0 | Status: ON HOLD | OUTPATIENT
Start: 2020-08-12 | End: 2020-09-23 | Stop reason: SDUPTHER

## 2020-08-12 NOTE — TELEPHONE ENCOUNTER
----- Message from Katelin Womack sent at 8/7/2020 11:37 AM EDT -----  Called the patient to confirm her appointment on 08/10/2020.  She states she has diarrhea so bad that she cannot make it to the bathroom without messing on herself.  She, therefore, canceled her appt.  She was encouraged to call her PCP or present to the emergency room.

## 2020-08-12 NOTE — TELEPHONE ENCOUNTER
Spoke with Dr. He. He is ordered low dose of steroids for 4 weeks as well as stool studies. He states that patient needs to come into the office for evaluation as soon as possible. Returned call to patient. Reached voicemail. Left message for return call.

## 2020-08-13 DIAGNOSIS — E11.44 DIABETIC AMYOTROPHY ASSOCIATED WITH TYPE 2 DIABETES MELLITUS (HCC): ICD-10-CM

## 2020-08-13 RX ORDER — GABAPENTIN 800 MG/1
TABLET ORAL
Qty: 90 TABLET | Refills: 0 | Status: SHIPPED | OUTPATIENT
Start: 2020-08-13 | End: 2020-10-01 | Stop reason: SDUPTHER

## 2020-09-01 DIAGNOSIS — K51.90 ULCERATIVE COLITIS WITHOUT COMPLICATIONS, UNSPECIFIED LOCATION (HCC): ICD-10-CM

## 2020-09-01 DIAGNOSIS — K21.9 GASTROESOPHAGEAL REFLUX DISEASE WITHOUT ESOPHAGITIS: Chronic | ICD-10-CM

## 2020-09-01 RX ORDER — MEDICAL SUPPLY, MISCELLANEOUS
EACH MISCELLANEOUS
Qty: 90 CAPSULE | Refills: 0 | Status: SHIPPED | OUTPATIENT
Start: 2020-09-01 | End: 2020-10-01

## 2020-09-01 RX ORDER — PROMETHAZINE HYDROCHLORIDE 25 MG/1
TABLET ORAL
Qty: 30 TABLET | Refills: 1 | Status: SHIPPED | OUTPATIENT
Start: 2020-09-01 | End: 2020-10-01 | Stop reason: SDUPTHER

## 2020-09-08 DIAGNOSIS — G43.101 MIGRAINE WITH AURA AND WITH STATUS MIGRAINOSUS, NOT INTRACTABLE: ICD-10-CM

## 2020-09-08 RX ORDER — ESTRADIOL 0.05 MG/D
FILM, EXTENDED RELEASE TRANSDERMAL
Qty: 8 PATCH | Refills: 2 | Status: SHIPPED | OUTPATIENT
Start: 2020-09-08 | End: 2020-12-19 | Stop reason: SDUPTHER

## 2020-09-08 RX ORDER — TOPIRAMATE 25 MG/1
CAPSULE, COATED PELLETS ORAL
Qty: 60 CAPSULE | Refills: 1 | Status: SHIPPED | OUTPATIENT
Start: 2020-09-08 | End: 2020-10-01 | Stop reason: SDUPTHER

## 2020-09-10 ENCOUNTER — TELEPHONE (OUTPATIENT)
Dept: FAMILY MEDICINE CLINIC | Facility: CLINIC | Age: 35
End: 2020-09-10

## 2020-09-10 DIAGNOSIS — E11.44 DIABETIC AMYOTROPHY ASSOCIATED WITH TYPE 2 DIABETES MELLITUS (HCC): ICD-10-CM

## 2020-09-10 RX ORDER — GABAPENTIN 800 MG/1
TABLET ORAL
Qty: 90 TABLET | OUTPATIENT
Start: 2020-09-10

## 2020-09-10 NOTE — TELEPHONE ENCOUNTER
HUB to read  No Answer    Patient notified via [facility] message that she must schedule an appointment for further refills; A controlled Substance Agreement must be sign and urine drug screen must be done.

## 2020-09-10 NOTE — TELEPHONE ENCOUNTER
PT IS REQUESTING A CALL BACK REGARDING gabapentin (NEURONTIN) 800 MG tablet REFUSAL    PLEASE ADVISE AT: [phone]

## 2020-09-16 ENCOUNTER — TELEPHONE (OUTPATIENT)
Dept: GASTROENTEROLOGY | Facility: CLINIC | Age: 35
End: 2020-09-16

## 2020-09-16 ENCOUNTER — HOSPITAL ENCOUNTER (INPATIENT)
Facility: HOSPITAL | Age: 35
LOS: 7 days | Discharge: HOME | End: 2020-09-23
Attending: FAMILY MEDICINE | Admitting: INTERNAL MEDICINE

## 2020-09-16 ENCOUNTER — HOSPITAL ENCOUNTER (EMERGENCY)
Facility: HOSPITAL | Age: 35
Discharge: LEFT BEFORE DISCHARGE | End: 2020-09-16

## 2020-09-16 ENCOUNTER — TELEMEDICINE (OUTPATIENT)
Dept: FAMILY MEDICINE CLINIC | Facility: CLINIC | Age: 35
End: 2020-09-16

## 2020-09-16 VITALS
WEIGHT: 214.2 LBS | RESPIRATION RATE: 18 BRPM | OXYGEN SATURATION: 96 % | HEART RATE: 130 BPM | HEIGHT: 63 IN | BODY MASS INDEX: 37.95 KG/M2 | SYSTOLIC BLOOD PRESSURE: 130 MMHG | TEMPERATURE: 98.8 F | DIASTOLIC BLOOD PRESSURE: 98 MMHG

## 2020-09-16 DIAGNOSIS — E86.0 DEHYDRATION: ICD-10-CM

## 2020-09-16 DIAGNOSIS — R13.19 ESOPHAGEAL DYSPHAGIA: ICD-10-CM

## 2020-09-16 DIAGNOSIS — G43.101 MIGRAINE WITH AURA AND WITH STATUS MIGRAINOSUS, NOT INTRACTABLE: ICD-10-CM

## 2020-09-16 DIAGNOSIS — E11.9 TYPE 2 DIABETES MELLITUS TREATED WITH INSULIN (HCC): ICD-10-CM

## 2020-09-16 DIAGNOSIS — Z91.14 NONCOMPLIANCE WITH MEDICATIONS: ICD-10-CM

## 2020-09-16 DIAGNOSIS — K51.911 ACUTE ULCERATIVE COLITIS WITH RECTAL BLEEDING (HCC): Primary | ICD-10-CM

## 2020-09-16 DIAGNOSIS — Z87.19 HISTORY OF ESOPHAGEAL STRICTURE: ICD-10-CM

## 2020-09-16 DIAGNOSIS — Z79.4 TYPE 2 DIABETES MELLITUS TREATED WITH INSULIN (HCC): ICD-10-CM

## 2020-09-16 DIAGNOSIS — F41.8 DEPRESSION WITH ANXIETY: ICD-10-CM

## 2020-09-16 DIAGNOSIS — K51.00 ULCERATIVE PANCOLITIS WITHOUT COMPLICATION (HCC): Primary | ICD-10-CM

## 2020-09-16 PROBLEM — K51.90 ULCERATIVE COLITIS, ACUTE: Status: ACTIVE | Noted: 2020-09-16

## 2020-09-16 LAB
ALBUMIN SERPL-MCNC: 4.3 G/DL (ref 3.5–5.2)
ALBUMIN/GLOB SERPL: 1 G/DL
ALP SERPL-CCNC: 231 U/L (ref 39–117)
ALT SERPL W P-5'-P-CCNC: 88 U/L (ref 1–33)
ANION GAP SERPL CALCULATED.3IONS-SCNC: 18.5 MMOL/L (ref 5–15)
AST SERPL-CCNC: 49 U/L (ref 1–32)
BILIRUB SERPL-MCNC: 0.4 MG/DL (ref 0–1.2)
BUN SERPL-MCNC: 6 MG/DL (ref 6–20)
BUN/CREAT SERPL: 8.1 (ref 7–25)
CALCIUM SPEC-SCNC: 9.9 MG/DL (ref 8.6–10.5)
CHLORIDE SERPL-SCNC: 96 MMOL/L (ref 98–107)
CO2 SERPL-SCNC: 17.5 MMOL/L (ref 22–29)
CREAT SERPL-MCNC: 0.74 MG/DL (ref 0.57–1)
CRP SERPL-MCNC: 0.8 MG/DL (ref 0–0.5)
DEPRECATED RDW RBC AUTO: 40.8 FL (ref 37–54)
ERYTHROCYTE [DISTWIDTH] IN BLOOD BY AUTOMATED COUNT: 13.2 % (ref 12.3–15.4)
ERYTHROCYTE [SEDIMENTATION RATE] IN BLOOD: 20 MM/HR (ref 0–20)
GFR SERPL CREATININE-BSD FRML MDRD: 109 ML/MIN/1.73
GFR SERPL CREATININE-BSD FRML MDRD: 90 ML/MIN/1.73
GLOBULIN UR ELPH-MCNC: 4.1 GM/DL
GLUCOSE BLDC GLUCOMTR-MCNC: 291 MG/DL (ref 70–130)
GLUCOSE BLDC GLUCOMTR-MCNC: 450 MG/DL (ref 70–130)
GLUCOSE SERPL-MCNC: 446 MG/DL (ref 65–99)
HCT VFR BLD AUTO: 45.1 % (ref 34–46.6)
HGB BLD-MCNC: 14.6 G/DL (ref 12–15.9)
LIPASE SERPL-CCNC: 14 U/L (ref 13–60)
LYMPHOCYTES # BLD MANUAL: 3.54 10*3/MM3 (ref 0.7–3.1)
LYMPHOCYTES NFR BLD MANUAL: 27 % (ref 19.6–45.3)
LYMPHOCYTES NFR BLD MANUAL: 5 % (ref 5–12)
MCH RBC QN AUTO: 27.8 PG (ref 26.6–33)
MCHC RBC AUTO-ENTMCNC: 32.4 G/DL (ref 31.5–35.7)
MCV RBC AUTO: 85.7 FL (ref 79–97)
MONOCYTES # BLD AUTO: 0.66 10*3/MM3 (ref 0.1–0.9)
NEUTROPHILS # BLD AUTO: 8.92 10*3/MM3 (ref 1.7–7)
NEUTROPHILS NFR BLD MANUAL: 67 % (ref 42.7–76)
NEUTS BAND NFR BLD MANUAL: 1 % (ref 0–5)
PLAT MORPH BLD: NORMAL
PLATELET # BLD AUTO: 345 10*3/MM3 (ref 140–450)
PMV BLD AUTO: 10.1 FL (ref 6–12)
POTASSIUM SERPL-SCNC: 3.5 MMOL/L (ref 3.5–5.2)
PROT SERPL-MCNC: 8.4 G/DL (ref 6–8.5)
RBC # BLD AUTO: 5.26 10*6/MM3 (ref 3.77–5.28)
RBC MORPH BLD: NORMAL
SODIUM SERPL-SCNC: 132 MMOL/L (ref 136–145)
WBC # BLD AUTO: 13.12 10*3/MM3 (ref 3.4–10.8)
WBC MORPH BLD: NORMAL

## 2020-09-16 PROCEDURE — 99211 OFF/OP EST MAY X REQ PHY/QHP: CPT

## 2020-09-16 PROCEDURE — 63710000001 INSULIN ASPART PER 5 UNITS: Performed by: FAMILY MEDICINE

## 2020-09-16 PROCEDURE — 25010000002 METHYLPREDNISOLONE PER 40 MG: Performed by: FAMILY MEDICINE

## 2020-09-16 PROCEDURE — 25010000002 HYDROMORPHONE 1 MG/ML SOLUTION: Performed by: FAMILY MEDICINE

## 2020-09-16 PROCEDURE — 85007 BL SMEAR W/DIFF WBC COUNT: CPT | Performed by: FAMILY MEDICINE

## 2020-09-16 PROCEDURE — 80053 COMPREHEN METABOLIC PANEL: CPT | Performed by: FAMILY MEDICINE

## 2020-09-16 PROCEDURE — 63710000001 INSULIN DETEMIR PER 5 UNITS: Performed by: FAMILY MEDICINE

## 2020-09-16 PROCEDURE — 85651 RBC SED RATE NONAUTOMATED: CPT | Performed by: FAMILY MEDICINE

## 2020-09-16 PROCEDURE — 86140 C-REACTIVE PROTEIN: CPT | Performed by: FAMILY MEDICINE

## 2020-09-16 PROCEDURE — 99222 1ST HOSP IP/OBS MODERATE 55: CPT | Performed by: FAMILY MEDICINE

## 2020-09-16 PROCEDURE — 85027 COMPLETE CBC AUTOMATED: CPT | Performed by: FAMILY MEDICINE

## 2020-09-16 PROCEDURE — 63710000001 DIPHENHYDRAMINE PER 50 MG: Performed by: FAMILY MEDICINE

## 2020-09-16 PROCEDURE — 99214 OFFICE O/P EST MOD 30 MIN: CPT | Performed by: NURSE PRACTITIONER

## 2020-09-16 PROCEDURE — 82962 GLUCOSE BLOOD TEST: CPT

## 2020-09-16 PROCEDURE — 83690 ASSAY OF LIPASE: CPT | Performed by: FAMILY MEDICINE

## 2020-09-16 RX ORDER — ACETAMINOPHEN 650 MG/1
650 SUPPOSITORY RECTAL EVERY 4 HOURS PRN
Status: DISCONTINUED | OUTPATIENT
Start: 2020-09-16 | End: 2020-09-23 | Stop reason: HOSPADM

## 2020-09-16 RX ORDER — TOPIRAMATE 25 MG/1
50 TABLET ORAL NIGHTLY
Status: DISCONTINUED | OUTPATIENT
Start: 2020-09-16 | End: 2020-09-23 | Stop reason: HOSPADM

## 2020-09-16 RX ORDER — LOPERAMIDE HYDROCHLORIDE 2 MG/1
2 CAPSULE ORAL 3 TIMES DAILY
Status: DISPENSED | OUTPATIENT
Start: 2020-09-16 | End: 2020-09-18

## 2020-09-16 RX ORDER — BUPROPION HYDROCHLORIDE 100 MG/1
100 TABLET, EXTENDED RELEASE ORAL EVERY MORNING
Status: DISCONTINUED | OUTPATIENT
Start: 2020-09-17 | End: 2020-09-23 | Stop reason: HOSPADM

## 2020-09-16 RX ORDER — DEXTROSE MONOHYDRATE 25 G/50ML
25 INJECTION, SOLUTION INTRAVENOUS
Status: DISCONTINUED | OUTPATIENT
Start: 2020-09-16 | End: 2020-09-23 | Stop reason: HOSPADM

## 2020-09-16 RX ORDER — SODIUM CHLORIDE 0.9 % (FLUSH) 0.9 %
10 SYRINGE (ML) INJECTION EVERY 12 HOURS SCHEDULED
Status: DISCONTINUED | OUTPATIENT
Start: 2020-09-16 | End: 2020-09-23 | Stop reason: HOSPADM

## 2020-09-16 RX ORDER — NICOTINE POLACRILEX 4 MG
1 LOZENGE BUCCAL
Status: DISCONTINUED | OUTPATIENT
Start: 2020-09-16 | End: 2020-09-23 | Stop reason: HOSPADM

## 2020-09-16 RX ORDER — METHYLPREDNISOLONE SODIUM SUCCINATE 40 MG/ML
20 INJECTION, POWDER, LYOPHILIZED, FOR SOLUTION INTRAMUSCULAR; INTRAVENOUS EVERY 8 HOURS
Status: COMPLETED | OUTPATIENT
Start: 2020-09-16 | End: 2020-09-18

## 2020-09-16 RX ORDER — SODIUM CHLORIDE 0.9 % (FLUSH) 0.9 %
10 SYRINGE (ML) INJECTION AS NEEDED
Status: DISCONTINUED | OUTPATIENT
Start: 2020-09-16 | End: 2020-09-23 | Stop reason: HOSPADM

## 2020-09-16 RX ORDER — ACETAMINOPHEN 160 MG/5ML
650 SOLUTION ORAL EVERY 4 HOURS PRN
Status: DISCONTINUED | OUTPATIENT
Start: 2020-09-16 | End: 2020-09-23 | Stop reason: HOSPADM

## 2020-09-16 RX ORDER — ONDANSETRON 2 MG/ML
4 INJECTION INTRAMUSCULAR; INTRAVENOUS EVERY 6 HOURS PRN
Status: DISCONTINUED | OUTPATIENT
Start: 2020-09-16 | End: 2020-09-16

## 2020-09-16 RX ORDER — GABAPENTIN 400 MG/1
800 CAPSULE ORAL EVERY 8 HOURS SCHEDULED
Status: DISCONTINUED | OUTPATIENT
Start: 2020-09-16 | End: 2020-09-23 | Stop reason: HOSPADM

## 2020-09-16 RX ORDER — DICYCLOMINE HYDROCHLORIDE 10 MG/1
10 CAPSULE ORAL 3 TIMES DAILY PRN
Status: DISCONTINUED | OUTPATIENT
Start: 2020-09-16 | End: 2020-09-23 | Stop reason: HOSPADM

## 2020-09-16 RX ORDER — SODIUM CHLORIDE 9 MG/ML
100 INJECTION, SOLUTION INTRAVENOUS CONTINUOUS
Status: DISCONTINUED | OUTPATIENT
Start: 2020-09-16 | End: 2020-09-18

## 2020-09-16 RX ORDER — ACETAMINOPHEN 325 MG/1
650 TABLET ORAL EVERY 4 HOURS PRN
Status: DISCONTINUED | OUTPATIENT
Start: 2020-09-16 | End: 2020-09-23 | Stop reason: HOSPADM

## 2020-09-16 RX ORDER — PROCHLORPERAZINE EDISYLATE 5 MG/ML
2.5 INJECTION INTRAMUSCULAR; INTRAVENOUS EVERY 6 HOURS PRN
Status: DISCONTINUED | OUTPATIENT
Start: 2020-09-16 | End: 2020-09-23 | Stop reason: HOSPADM

## 2020-09-16 RX ORDER — PROMETHAZINE HYDROCHLORIDE 12.5 MG/1
12.5 TABLET ORAL EVERY 8 HOURS PRN
Status: DISCONTINUED | OUTPATIENT
Start: 2020-09-16 | End: 2020-09-23 | Stop reason: HOSPADM

## 2020-09-16 RX ORDER — DIPHENHYDRAMINE HCL 25 MG
25 CAPSULE ORAL EVERY 8 HOURS
Status: DISCONTINUED | OUTPATIENT
Start: 2020-09-16 | End: 2020-09-23 | Stop reason: HOSPADM

## 2020-09-16 RX ORDER — MORPHINE SULFATE 2 MG/ML
1 INJECTION, SOLUTION INTRAMUSCULAR; INTRAVENOUS EVERY 4 HOURS PRN
Status: DISCONTINUED | OUTPATIENT
Start: 2020-09-16 | End: 2020-09-16

## 2020-09-16 RX ORDER — NALOXONE HCL 0.4 MG/ML
0.4 VIAL (ML) INJECTION
Status: DISCONTINUED | OUTPATIENT
Start: 2020-09-16 | End: 2020-09-16

## 2020-09-16 RX ADMIN — HYDROMORPHONE HYDROCHLORIDE 0.5 MG: 1 INJECTION, SOLUTION INTRAMUSCULAR; INTRAVENOUS; SUBCUTANEOUS at 17:46

## 2020-09-16 RX ADMIN — SODIUM CHLORIDE 100 ML/HR: 9 INJECTION, SOLUTION INTRAVENOUS at 17:41

## 2020-09-16 RX ADMIN — GABAPENTIN 800 MG: 400 CAPSULE ORAL at 21:33

## 2020-09-16 RX ADMIN — DIPHENHYDRAMINE HYDROCHLORIDE 25 MG: 25 CAPSULE ORAL at 17:34

## 2020-09-16 RX ADMIN — METHYLPREDNISOLONE SODIUM SUCCINATE 20 MG: 40 INJECTION, POWDER, FOR SOLUTION INTRAMUSCULAR; INTRAVENOUS at 17:46

## 2020-09-16 RX ADMIN — SODIUM CHLORIDE, PRESERVATIVE FREE 10 ML: 5 INJECTION INTRAVENOUS at 21:00

## 2020-09-16 RX ADMIN — INSULIN DETEMIR 25 UNITS: 100 INJECTION, SOLUTION SUBCUTANEOUS at 21:32

## 2020-09-16 RX ADMIN — INSULIN ASPART 14 UNITS: 100 INJECTION, SOLUTION INTRAVENOUS; SUBCUTANEOUS at 17:34

## 2020-09-16 RX ADMIN — HYDROMORPHONE HYDROCHLORIDE 0.5 MG: 1 INJECTION, SOLUTION INTRAMUSCULAR; INTRAVENOUS; SUBCUTANEOUS at 21:33

## 2020-09-16 RX ADMIN — SODIUM CHLORIDE 500 ML: 9 INJECTION, SOLUTION INTRAVENOUS at 17:42

## 2020-09-16 RX ADMIN — TOPIRAMATE 50 MG: 25 TABLET, FILM COATED ORAL at 21:33

## 2020-09-16 NOTE — PROGRESS NOTES
Subjective     Chief Complaint:  F/u diabetes    History of Present Illness:   Notes she is currently in a flare up of Crohn's. She saw new GI doc (Ying). Notes she was changed to xeljanz. She had an allergic reaction. Has since been having trouble getting back in with Dr. He. Notes she cannot make it to the bathroom due severe diarrhea. She was put on low dose steroids back in August. She saw no improvement in her symptoms. She went to [facility] ED last night. She was given IV steroids, IV pain medication and bathroom.   She is also having trouble tolerating food and liquid.   Notes she is going have constant diarrhea. Bloody, feels weak, dehydrated. 15 BM per day. Cannot tolerate food. + vomiting.     She is diabetic. She has not been using her insulin due to not eating because she is afraid of bottoming out.   She takes topamax for migraines. Notes migraines are worse with being. Is taking wellbutrin for her mood but notes her mood is down due to being emotionally drained from being sick. Takes gabapentin for pain and neuropathy.     Review of Systems  Gen- No fevers, chills  CV- No chest pain, palpitations  Resp- No cough, dyspnea  GI- No N/V/D, abd pain  Neuro-No dizziness, headaches      I have reviewed and/or updated the patient's past medical, surgical, family, social history and problem list as appropriate.     Medications:    Current Outpatient Medications:   •  buPROPion SR (WELLBUTRIN SR) 100 MG 12 hr tablet, Take 1 tablet by mouth Every Morning., Disp: 30 tablet, Rfl: 1  •  CBD (cannabidiol) oral oil, Take 0.5 mL by mouth Daily., Disp: , Rfl:   •  Continuous Glucose Monitor kit, Use as directed, Disp: 1 each, Rfl: 11  •  dicyclomine (BENTYL) 20 MG tablet, Take 1 tablet by mouth 3 (Three) Times a Day As Needed (abdominal cramps)., Disp: 60 tablet, Rfl: 1  •  dimenhyDRINATE (Dramamine) 50 MG tablet, Take 1 tablet by mouth Every 8 (Eight) Hours As Needed for Nausea., Disp: 30 tablet, Rfl: 0  •   "estradiol (MINIVELLE, VIVELLE-DOT) 0.05 MG/24HR patch, APPLY 1 PATCH TO SKIN EVERY 3.5 DAYS(TWICE A WEEK), Disp: 8 patch, Rfl: 2  •  fluconazole (DIFLUCAN) 100 MG tablet, Take 1 tablet by mouth Daily., Disp: 5 tablet, Rfl: 0  •  gabapentin (NEURONTIN) 800 MG tablet, TAKE 1 TABLET BY MOUTH THREE TIMES DAILY, Disp: 90 tablet, Rfl: 0  •  HUMALOG MIX 75/25 KWIKPEN (75-25) 100 UNIT/ML suspension pen-injector pen, Inject 25 Units under the skin into the appropriate area as directed 2 (Two) Times a Day With Meals., Disp: 5 pen, Rfl: 3  •  lisinopril (ZESTRIL) 2.5 MG tablet, Take 1 tablet by mouth Daily., Disp: 30 tablet, Rfl: 2  •  loperamide (Imodium A-D) 2 MG tablet, Take 1 tablet by mouth 2 (Two) Times a Day As Needed for Diarrhea., Disp: 30 tablet, Rfl: 1  •  predniSONE (DELTASONE) 10 MG tablet, 20 mg po daily for 2 weeks followed by 10 mg for 2 week.  Dispense: 42 tablet, Disp: 42 tablet, Rfl: 0  •  promethazine (PHENERGAN) 25 MG tablet, TAKE 1 TABLET BY MOUTH EVERY 6 HOURS AS NEEDED FOR NAUSEA OR VOMITING, Disp: 30 tablet, Rfl: 1  •  rizatriptan (MAXALT) 10 MG tablet, Take 1 tablet by mouth 1 (One) Time As Needed for Migraine for up to 1 dose. May repeat in 2 hours if needed, Disp: 8 tablet, Rfl: 1  •  Tofacitinib Citrate 10 MG tablet, Take 10 mg by mouth 2 (Two) Times a Day., Disp: 60 tablet, Rfl: 1  •  topiramate (TOPAMAX) 25 MG capsule (sprinkle), TAKE 1 CAPSULE BY MOUTH EVERY NIGHT AT BEDTIME, THEN INCREASE TO 2 CAPSULES BY MOUTH EVERY NIGHT AT BEDTIME THEREAFTER, Disp: 60 capsule, Rfl: 1  •  WAL-DRYL ALLERGY 25 MG capsule, TAKE 1 CAPSULE BY MOUTH EVERY 8 HOURS, Disp: 90 capsule, Rfl: 0    Allergies:  Allergies   Allergen Reactions   • Hydrochlorothiazide Hives   • Penicillins Hives   • Remicade [Infliximab] Anaphylaxis   • Soybean-Containing Drug Products Swelling     \"Soy sauce\"   • Reglan [Metoclopramide] Other (See Comments)     States feels weird when takes it       Objective     Vital Signs: There were no " vitals filed for this visit.    Physical Exam:  Gen-ill appearing  HENT-NCAT  Resp- normal WOB, on RA  Neuro-A&Ox3, face symmetrical, speech clear  Skin-no overt rashes noted  Psych-tearful mood, cooperative       Assessment / Plan     Assessment/Plan:   Problem List Items Addressed This Visit        Cardiovascular and Mediastinum    Migraine    Relevant Medications    Tofacitinib Citrate 10 MG tablet    rizatriptan (MAXALT) 10 MG tablet    buPROPion SR (WELLBUTRIN SR) 100 MG 12 hr tablet    gabapentin (NEURONTIN) 800 MG tablet    topiramate (TOPAMAX) 25 MG capsule (sprinkle)       Digestive    Ulcerative pancolitis without complication (CMS/[facility]) - Primary    Overview     Added automatically from request for surgery [id number]         Relevant Medications    dicyclomine (BENTYL) 20 MG tablet       Endocrine    Type 2 diabetes mellitus treated with insulin (CMS/[facility])    Relevant Medications    HUMALOG MIX 75/25 KWIKPEN (75-25) 100 UNIT/ML suspension pen-injector pen       Other    Depression with anxiety    Relevant Medications    buPROPion SR (WELLBUTRIN SR) 100 MG 12 hr tablet    Noncompliance with medications      Other Visit Diagnoses     Dehydration            --She needs to be admitted to the hospital for tuneup due to persistent dehydration, uncontrolled diarrhea, hematochezia, and vomiting.  Unfortunately she has failed several Crohn's medications.  May benefit from colectomy.  I recommend GI consult, IV steroids, bowel rest, and hydration.  I discussed her case with the hospitalist at [facility] who graciously accepted for admission.  Currently trying to arrange bed.  If no beds currently available she will need to go through the emergency department.  --Diabetes historically uncontrolled.  Did discuss with her that she cannot skip insulin even if she is vomiting as this puts her at risk for DKA.  Needs A1c check  --Continue Wellbutrin for mood  --Continue Topamax and Maxalt for migraines.   Currently worsened by dehydration  --Continue gabapentin for neuropathy and pain.  Discussed will need updated UDS and narcotic contract once she is released from the hospital  --Her continued noncompliance makes every aspect of her care difficult    Follow up:  After discharge from hospital    Total Time of Encounter 30 minutes    Electronically signed by EMELYN Seymour   09/16/2020 08:42 EDT      Please note that portions of this note may have been completed with a voice recognition program. Efforts were made to edit the dictations, but occasionally words are mistranscribed.

## 2020-09-16 NOTE — TELEPHONE ENCOUNTER
Called patient to set up appt for video visit, patient is being admitted to [facility] for same symptoms.

## 2020-09-16 NOTE — PLAN OF CARE
Goal Outcome Evaluation:  Plan of Care Reviewed With: patient  Progress: no change  Outcome Summary: VSS.  New admit.  ABdominal pain.  Resting comfortably in bed.

## 2020-09-16 NOTE — CONSULTS
In Patient Consult      Date of Consultation: 2020  Patient Name: Thais Hobson  MRN: [id number]  : 1985     Referring provider: Deb Boles, *    Primary care provider:  Lv Sanchez DO    Reason for consultation: Severe diarrhea abdominal pain nausea, suspected UC flare    History of Present Illness:   This is a 35-year-old female patient with a prior history of ulcerative pan colitis, known to me was sent from primary care physician's office for worsening abdominal pain, nausea and diarrhea.   She was diagnosed with IBD about 12 years ago.  He was followed by Dr. Langston at that time subsequently she changed her physician and started following at [facility].     She had multiple treatment regimens in the past with various reactions.  He initially states she was treated with mesalamine which caused her a severe reaction to the bronchospasm as per patient.  Subsequently she had a Remicade that also caused a reaction.  She was put on Humira which did not help her much and was.  She was also put on the Stelara which caused the throat swelling.  She was on vedolizumab for the last 3 years.  She continued to have a multiple flareups and recent colonoscopy revealed a significant colitis despite on Entyvio for which Entyvio was discontinued.  She was started on Xeljanz recently however she again developed multiple blistering lesions in the oral cavity face and the hands for which Zosyn was discontinued.  Currently patient is not on any medication for the past 3 to 4 weeks time.   She had a few episodes of C. difficile colitis last year in 2019 and 18.     She is here again with continued worsening diarrhea symptoms.  Associated nausea and abdominal pain diffuse without any fever and chills.  She had a telephone visit with the PCP and was advised to come to the hospital for further management      Subjective     Past Medical History:   Diagnosis Date   • Abdominal  pain     periumbilicul   • Abdominal tenderness     Periumbil   • Alopecia    • Anemia    • Anxiety    • Arthritis    • Asthma     as a child   • Back pain    • Bipolar disorder (CMS/HCC)    • Blood in stool    • Body piercing     ears   • Colitis    • Colitis    • Colon polyps    • Depression    • Diabetes mellitus (CMS/HCC)    • Diarrhea    • Dysphagia     Patient reported he has trouble from time to time and that her throat has to be dilated   • Elevated cholesterol     Reported slightly elevated - taking no medication    • Fractures     Right pinky toe - no surgery required   • GERD (gastroesophageal reflux disease)    • H/O colonoscopy 2013    Terminal ileal biopsies negative. Cecal&ascending biopsie revealed chronic active colitis w/ features cons. w/ inflammatory bowel disease. Transevere colon biopsies revealved chronic active colitis. Desc. colon biopsies revealed chronic active colitis. Rect colon biopsie revealed chronic active colitis. Negative for dysplasia   • Hematemesis    • History of Clostridium difficile infection    • History of transfusion     No reaction to transfusion reported   • Migraine headache    • Nausea     alone   • Pancreatitis    • Restless leg    • Tattoos     x9   • Universal ulcerative colitis (CMS/HCC)    • UTI (urinary tract infection)    • Weight loss        Past Surgical History:   Procedure Laterality Date   • BREAST SURGERY Left     breast lump removed benign   •  SECTION     • CHOLECYSTECTOMY      for stone disease   • COLONOSCOPY     • COLONOSCOPY N/A 2020    Procedure: COLONOSCOPY;  Surgeon: Alonzo He MD;  Location: [facility] ENDOSCOPY;  Service: Gastroenterology;  Laterality: N/A;   • DILATATION AND CURETTAGE     • ENDOSCOPY  2013    Erosive esophagitis, grade 2; erosive gastritis; small sliding hiatal hernia less than 3 cm, erosive deodenitis duodenal polyp.No gan mucosa.Second portion duedenal biopsy neg. Second postion of  dudoenal biopsy revealed polypoid intestinal mucosa w/ lymphoid aggregate. 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 no helicobacter pylori. Negative for mateaplasia, dysplasia   • HIP SURGERY Right    • HYSTERECTOMY     • LEEP         Family History   Problem Relation Age of Onset   • Heart failure Father        Social History     Socioeconomic History   • Marital status: Single     Spouse name: Not on file   • Number of children: Not on file   • Years of education: Not on file   • Highest education level: Not on file   Tobacco Use   • Smoking status: Never Smoker   • Smokeless tobacco: Never Used   Substance and Sexual Activity   • Alcohol use: No   • Drug use: No   • Sexual activity: Defer         Current Facility-Administered Medications:   •  acetaminophen (TYLENOL) tablet 650 mg, 650 mg, Oral, Q4H PRN **OR** acetaminophen (TYLENOL) 160 MG/5ML solution 650 mg, 650 mg, Oral, Q4H PRN **OR** acetaminophen (TYLENOL) suppository 650 mg, 650 mg, Rectal, Q4H PRN, Deb Boles DO  •  [START ON 9/17/2020] buPROPion SR (WELLBUTRIN SR) 12 hr tablet 100 mg, 100 mg, Oral, QAM, Deb Boles, DO  •  dextrose (D50W) 25 g/ 50mL Intravenous Solution 25 g, 25 g, Intravenous, Q15 Min PRN, Deb Boles DO  •  dextrose (GLUTOSE) oral gel 1 tube, 1 tube, Oral, Q15 Min PRN, Deb Boles DO  •  dicyclomine (BENTYL) capsule 10 mg, 10 mg, Oral, TID PRN, Deb Boles DO  •  diphenhydrAMINE (BENADRYL) capsule 25 mg, 25 mg, Oral, Q8H, Deb Boles DO, 25 mg at 09/16/20 1734  •  gabapentin (NEURONTIN) capsule 800 mg, 800 mg, Oral, Q8H, Deb Boles DO  •  glucagon (human recombinant) (GLUCAGEN DIAGNOSTIC) injection 1 mg, 1 mg, Subcutaneous, PRN, Deb Boles, DO  •  HYDROmorphone (DILAUDID) injection 0.5 mg, 0.5 mg, Intravenous, Q2H PRN, Deb Boles DO, 0.5 mg at 09/16/20 1746  •  insulin aspart (novoLOG) injection 0-14  "Units, 0-14 Units, Subcutaneous, TID AC, Deb Boles DO, 14 Units at 09/16/20 1734  •  insulin detemir (LEVEMIR) injection 25 Units, 25 Units, Subcutaneous, Nightly, Deb Boles DO  •  methylPREDNISolone sodium succinate (SOLU-Medrol) injection 20 mg, 20 mg, Intravenous, Q8H, Deb Boles DO, 20 mg at 09/16/20 1746  •  prochlorperazine (COMPAZINE) injection 2.5 mg, 2.5 mg, Intravenous, Q6H PRN, Deb Boles DO  •  promethazine (PHENERGAN) tablet 12.5 mg, 12.5 mg, Oral, Q8H PRN, Deb Boles DO  •  sodium chloride 0.9 % flush 10 mL, 10 mL, Intravenous, Q12H, Deb Boles DO  •  sodium chloride 0.9 % flush 10 mL, 10 mL, Intravenous, PRN, Deb Boles DO  •  sodium chloride 0.9 % infusion, 100 mL/hr, Intravenous, Continuous, Deb Boles DO, Last Rate: 100 mL/hr at 09/16/20 1741, 100 mL/hr at 09/16/20 1741  •  topiramate (TOPAMAX) tablet 50 mg, 50 mg, Oral, Nightly, Deb Boles DO    Allergies   Allergen Reactions   • Hydrochlorothiazide Hives   • Penicillins Hives   • Remicade [Infliximab] Anaphylaxis   • Soybean-Containing Drug Products Swelling     \"Soy sauce\"   • Xeljanz [Tofacitinib Citrate] Other (See Comments)     BLISTERS IN THROAT & ON ARMS   • Zofran [Ondansetron Hcl] Headache     migraines   • Reglan [Metoclopramide] Other (See Comments)     States feels weird when takes it       Review of Systems   Constitutional: Positive for fatigue and unexpected weight loss. Negative for appetite change and fever.   Respiratory: Negative for cough, shortness of breath and wheezing.    Cardiovascular: Negative for chest pain, palpitations and leg swelling.   Gastrointestinal: Positive for abdominal distention, abdominal pain, blood in stool and diarrhea. Negative for anal bleeding, constipation, nausea, rectal pain, vomiting, GERD and indigestion.   Genitourinary: Negative for dysuria, frequency and hematuria.   " "  Musculoskeletal: Positive for arthralgias. Negative for back pain and joint swelling.   Skin: Negative for color change, rash and skin lesions.   Neurological: Negative for dizziness, syncope, speech difficulty, weakness, headache and memory problem.   Hematological: Negative for adenopathy. Does not bruise/bleed easily.   Psychiatric/Behavioral: Negative for agitation, behavioral problems, suicidal ideas and depressed mood.        The following portions of the patient's history were reviewed and updated as appropriate: allergies, current medications, past family history, past medical history, past social history, past surgical history and problem list.    Objective     Vitals:    09/16/20 1626   BP: 131/88   BP Location: Left arm   Patient Position: Lying   Pulse: 110   Resp: 18   Temp: 98.9 °F (37.2 °C)   TempSrc: Oral   SpO2: 98%   Weight: 94.6 kg (208 lb 8.9 oz)   Height: 160 cm (62.99\")       Physical Exam  Vitals signs and nursing note reviewed.   Constitutional:       Appearance: Normal appearance. She is well-developed.   HENT:      Head: Normocephalic and atraumatic.      Right Ear: External ear normal.      Left Ear: External ear normal.      Mouth/Throat:      Mouth: Mucous membranes are moist.      Pharynx: Oropharynx is clear.   Eyes:      Conjunctiva/sclera: Conjunctivae normal.      Pupils: Pupils are equal, round, and reactive to light.   Neck:      Musculoskeletal: Normal range of motion.      Thyroid: No thyromegaly.      Trachea: No tracheal deviation.   Cardiovascular:      Rate and Rhythm: Normal rate and regular rhythm.      Heart sounds: No murmur.   Pulmonary:      Effort: Pulmonary effort is normal. No respiratory distress.      Breath sounds: Normal breath sounds.   Abdominal:      General: Bowel sounds are normal. There is no distension.      Palpations: Abdomen is soft. There is no mass.      Tenderness: There is no abdominal tenderness (Mild mid and upper abdominal discomfort on deep " palpation).      Hernia: No hernia is present.   Musculoskeletal: Normal range of motion.   Skin:     General: Skin is warm and dry.   Neurological:      Mental Status: She is alert and oriented to person, place, and time.      Cranial Nerves: No cranial nerve deficit.      Sensory: No sensory deficit.   Psychiatric:         Behavior: Behavior normal.         Thought Content: Thought content normal.         Judgment: Judgment normal.         Results from last 7 days   Lab Units 09/16/20  1655   SODIUM mmol/L 132*   POTASSIUM mmol/L 3.5   CHLORIDE mmol/L 96*   CO2 mmol/L 17.5*   BUN mg/dL 6   CREATININE mg/dL 0.74   CALCIUM mg/dL 9.9   ALBUMIN g/dL 4.30   BILIRUBIN mg/dL 0.4   ALK PHOS U/L 231*   ALT (SGPT) U/L 88*   AST (SGOT) U/L 49*   GLUCOSE mg/dL 446*   WBC 10*3/mm3 13.12*   HEMOGLOBIN g/dL 14.6   PLATELETS 10*3/mm3 345       Imaging Results (Last 24 Hours)     ** No results found for the last 24 hours. **          Assessment / Plan      Assessment/Recommendations:   1. Ulcerative pancolitis, with a suspected flare  I am not entirely convinced that all her symptoms are from UC flare.  However her recent colonoscopy done did reveal a moderate colitis involving the left side colon and the transverse colon.   Patient may have a associated irritable bowel syndrome.  Her CRP is almost normal.  No fever chills.   Clinical examination is benign    She had multiple treatment regimens in the past with various reactions.  He initially states she was treated with mesalamine which caused her a severe reaction to the bronchospasm as per patient.  Subsequently she had a Remicade that also caused a reaction.  She was put on Humira which did not help her much and was.  She was also put on the Stelara which caused the throat swelling.  She was on vedolizumab for the last 3 years.  She continued to have a multiple flareups and recent colonoscopy revealed a significant colitis despite on Entyvio for which Entyvio was discontinued.   She was started on Xeljanz recently however she again developed multiple blistering lesions in the oral cavity face and the hands for which Zosyn was discontinued.    There are not many medications left to try.  She may end up with a subtotal colectomy in the near future given her significant issues with the allergies with the medications.  We will consider Imuran with vedolizumab again.     For now we will start her on Solu-Medrol 20 mg IV every 8 for 48 hours followed by prednisone tapering  Send a fecal calprotectin level  Send a stool C. Difficile  Will start on Imodium 2 mg p.o. 3 times daily for 2 days  Will also give her zoster vaccine before starting any Biologics at this time  She also needs hepatitis A vaccine as an outpatient    2.  Nonalcoholic steatohepatitis with elevated liver enzymes  Her hepatitis panel were negative.  RAQUEL anti-smooth muscle antibody antimitochondrial antibody were negative.  Alpha-1 antitrypsin level, ceruloplasmin level were normal.  Transferrin saturation was normal  Recommended to lose at least a 5 pounds weight      Thank you very much for letting me participate in the care of this patient.  Please do not hesitate to call me if you have any questions.    Alonzo He MD  [facility] [place]  9/16/2020  19:24 EDT    Please note that portions of this note may have been completed with a voice recognition program.

## 2020-09-16 NOTE — H&P
[facility]   HISTORY AND PHYSICAL      Name:  Thais Hobson   Age:  35 y.o.  Sex:  female  :  1985  MRN:  [id number]   Visit Number:  [id number]  Admission Date:  2020  Date Of Service:  20  Primary Care Physician:  Lv Sanchez DO    Chief Complaint:     Abdominal pain, diarrhea, bloody stools    History Of Presenting Illness:      The patient is a 35-year-old female with history of ulcerative colitis who had complained of 1 week history of worsening abdominal pain, bloody stools, and dehydration.  She has been followed with gastroenterology by Dr. He.  Patient had actually went to the emergency room in outlying facility yesterday was given IV fluids and discharged at that time.  She had a tele-visit with her PCP today, with ongoing symptoms that have failed to improve.  Discussed the case with me for potential direct admission.    Of note, patient has been diagnosed with ulcerative colitis since roughly 23 years of age.  She states that she has been on multiple medications for this including immunomodulators, however has either failed the treatment or had allergic reactions to these medications.  Most recently had been on Xeljanz.  She states that she is still having 15 or so diarrhea like bloody stools daily.  Has low-grade fevers at home.  Has not really ate or drink much in the last 4 to 5 days.  She lives with her mother and her 2 children.  She denies any recent medication changes otherwise.    Review Of Systems:     General: Low-grade fever  Psychological: No history of any hallucinations and delusions.  Ophthalmic: No history of any diplopia or transient loss of vision.  ENT: No history of sore throat, nasal congestion or ear pain.   Allergy and immunology: No history of rash or itching.  Hematological and Lymphatic: No history of neck swelling or easy bleeding.  Endocrine: No history of any recent unintentional weight gain or  loss.  Respiratory: No history of cough or shortness of breath.   Cardiovascular: No history of chest pain or palpitations.   Gastrointestinal: Abdominal pain, diarrhea, bloody stool  Genitourinary: No history of dysuria or hematuria.  Musculoskeletal: No muscle pain. No calf pain.   Neurological: No history of any focal weakness. No loss of consciousness. Denies any numbness.  Dermatological: No history of any redness or pruritis.     Past Medical History:    Past Medical History:   Diagnosis Date   • Abdominal pain     periumbilicul   • Abdominal tenderness     Periumbil   • Alopecia    • Anemia    • Anxiety    • Arthritis    • Asthma     as a child   • Back pain    • Bipolar disorder (CMS/HCC)    • Blood in stool    • Body piercing     ears   • Colitis    • Colitis    • Colon polyps    • Depression    • Diabetes mellitus (CMS/HCC)    • Diarrhea    • Dysphagia     Patient reported he has trouble from time to time and that her throat has to be dilated   • Elevated cholesterol     Reported slightly elevated - taking no medication    • Fractures     Right pinky toe - no surgery required   • GERD (gastroesophageal reflux disease)    • H/O colonoscopy 08/01/2013    Terminal ileal biopsies negative. Cecal&ascending biopsie revealed chronic active colitis w/ features cons. w/ inflammatory bowel disease. Transevere colon biopsies revealved chronic active colitis. Desc. colon biopsies revealed chronic active colitis. Rect colon biopsie revealed chronic active colitis. Negative for dysplasia   • Hematemesis    • History of Clostridium difficile infection    • History of transfusion     No reaction to transfusion reported   • Migraine headache    • Nausea     alone   • Pancreatitis    • Restless leg    • Tattoos     x9   • Universal ulcerative colitis (CMS/HCC)    • UTI (urinary tract infection)    • Weight loss        Past Surgical history:    Past Surgical History:   Procedure Laterality Date   • BREAST SURGERY Left      breast lump removed benign   •  SECTION     • CHOLECYSTECTOMY      for stone disease   • COLONOSCOPY     • COLONOSCOPY N/A 2020    Procedure: COLONOSCOPY;  Surgeon: Alonzo He MD;  Location: [facility] ENDOSCOPY;  Service: Gastroenterology;  Laterality: N/A;   • DILATATION AND CURETTAGE     • ENDOSCOPY  2013    Erosive esophagitis, grade 2; erosive gastritis; small sliding hiatal hernia less than 3 cm, erosive deodenitis duodenal polyp.No gan mucosa.Second portion duedenal biopsy neg. Second postion of dudoenal biopsy revealed polypoid intestinal mucosa w/ lymphoid aggregate. 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 no helicobacter pylori. Negative for mateaplasia, dysplasia   • HIP SURGERY Right    • HYSTERECTOMY     • LEEP         Social History:    Social History     Socioeconomic History   • Marital status: Single     Spouse name: Not on file   • Number of children: Not on file   • Years of education: Not on file   • Highest education level: Not on file   Tobacco Use   • Smoking status: Never Smoker   • Smokeless tobacco: Never Used   Substance and Sexual Activity   • Alcohol use: No   • Drug use: No   • Sexual activity: Defer       Family History:    Family History   Problem Relation Age of Onset   • Heart failure Father        Allergies:      Hydrochlorothiazide, Penicillins, Remicade [infliximab], Soybean-containing drug products, Xeljanz [tofacitinib citrate], Zofran [ondansetron hcl], and Reglan [metoclopramide]    Home Medications:    Prior to Admission Medications     Prescriptions Last Dose Informant Patient Reported? Taking?    buPROPion SR (WELLBUTRIN SR) 100 MG 12 hr tablet 9/15/2020  No Yes    Take 1 tablet by mouth Every Morning.    dicyclomine (BENTYL) 20 MG tablet 9/15/2020 Self No Yes    Take 1 tablet by mouth 3 (Three) Times a Day As Needed (abdominal cramps).    dimenhyDRINATE (Dramamine) 50 MG tablet 9/15/2020  No Yes    Take 1  tablet by mouth Every 8 (Eight) Hours As Needed for Nausea.    estradiol (MINIVELLE, VIVELLE-DOT) 0.05 MG/24HR patch Past Week  No Yes    APPLY 1 PATCH TO SKIN EVERY 3.5 DAYS(TWICE A WEEK)    gabapentin (NEURONTIN) 800 MG tablet 9/15/2020  No Yes    TAKE 1 TABLET BY MOUTH THREE TIMES DAILY    HUMALOG MIX 75/25 KWIKPEN (75-25) 100 UNIT/ML suspension pen-injector pen Past Week  No Yes    Inject 25 Units under the skin into the appropriate area as directed 2 (Two) Times a Day With Meals.    lisinopril (ZESTRIL) 2.5 MG tablet 9/15/2020 Self No Yes    Take 1 tablet by mouth Daily.    loperamide (Imodium A-D) 2 MG tablet Past Month Self No Yes    Take 1 tablet by mouth 2 (Two) Times a Day As Needed for Diarrhea.    predniSONE (DELTASONE) 10 MG tablet 9/15/2020  No Yes    20 mg po daily for 2 weeks followed by 10 mg for 2 week.  Dispense: 42 tablet    promethazine (PHENERGAN) 25 MG tablet 9/15/2020  No Yes    TAKE 1 TABLET BY MOUTH EVERY 6 HOURS AS NEEDED FOR NAUSEA OR VOMITING    rizatriptan (MAXALT) 10 MG tablet Past Week  No Yes    Take 1 tablet by mouth 1 (One) Time As Needed for Migraine for up to 1 dose. May repeat in 2 hours if needed    topiramate (TOPAMAX) 25 MG capsule (sprinkle) 9/15/2020  No Yes    TAKE 1 CAPSULE BY MOUTH EVERY NIGHT AT BEDTIME, THEN INCREASE TO 2 CAPSULES BY MOUTH EVERY NIGHT AT BEDTIME THEREAFTER    WAL-DRYL ALLERGY 25 MG capsule 9/15/2020  No Yes    TAKE 1 CAPSULE BY MOUTH EVERY 8 HOURS    Continuous Glucose Monitor kit Unknown Self No No    Use as directed             ED Medications:    Medications   sodium chloride 0.9 % flush 10 mL (has no administration in time range)   sodium chloride 0.9 % flush 10 mL (has no administration in time range)   sodium chloride 0.9 % infusion (has no administration in time range)   acetaminophen (TYLENOL) tablet 650 mg (has no administration in time range)     Or   acetaminophen (TYLENOL) 160 MG/5ML solution 650 mg (has no administration in time range)      Or   acetaminophen (TYLENOL) suppository 650 mg (has no administration in time range)   methylPREDNISolone sodium succinate (SOLU-Medrol) injection 20 mg (has no administration in time range)   prochlorperazine (COMPAZINE) injection 2.5 mg (has no administration in time range)   HYDROmorphone (DILAUDID) injection 0.5 mg (has no administration in time range)   promethazine (PHENERGAN) tablet 12.5 mg (has no administration in time range)       Vital Signs:    Temp:  [98.8 °F (37.1 °C)-98.9 °F (37.2 °C)] 98.9 °F (37.2 °C)  Heart Rate:  [110-130] 110  Resp:  [18] 18  BP: (130-131)/(88-98) 131/88        09/16/20  1626   Weight: 94.6 kg (208 lb 8.9 oz)       Body mass index is 36.94 kg/m².    Physical Exam:    General Appearance:  Alert and cooperative, not in any acute distress.   Head:  Atraumatic and normocephalic, without obvious abnormality.   Eyes:          PERRLA, conjunctivae and sclerae normal, no Icterus. No pallor. Extraocular movements are within normal limits.   Ears:  Ears appear intact with no abnormalities noted.   Throat: No oral lesions, no thrush, oral mucosa dry   Neck: Supple, trachea midline, no thyromegaly, no carotid bruit.   Back:   No tenderness to palpation, range of motion normal.   Lungs:   Breath sounds heard bilaterally equally.  No crackles or wheezing. No pleural rub or bronchial breathing.   Heart:   Tachycardic, normal S1 and S2, no murmur, no gallop, no rub. No JVD.   Abdomen:   Normal bowel sounds, no masses, no organomegaly. Soft, moderate diffuse abdominal tenderness, nondistended, no guarding, no rebound tenderness.  Obese   Extremities: Moves all extremities well, no edema, no cyanosis, no clubbing.   Pulses: Pulses palpable and equal bilaterally.   Skin: No bleeding, bruising or rash.   Neurologic: Alert and oriented x 3. Moves all four limbs equally. No tremors. No facial asymmetry.     Laboratory data:    I have reviewed the labs done in the emergency room.          Invalid  input(s): LABALBU, PROT                                    Invalid input(s): USDES,  BLOODU, NITRITITE, BACT, EP  Pain Management Panel     Pain Management Panel 11/20/2019    CREATININE               EKG:      Deferred    Radiology:    Imaging Results (Last 72 Hours)     ** No results found for the last 72 hours. **            Ulcerative colitis, acute (CMS/[facility])      Assessment:    1.  Acute ulcerative colitis flare, present on admission  2.  Abdominal pain with diarrhea  3.  Dehydration  4.  Uncontrolled type 2 diabetes  5.  Obesity  6.  History of PTSD  7.  Bipolar disorder  8.  GERD    Plan:    Patient be admitted to the hospital.  She unfortunately is failed multiple anti-inflammatories and immunomodulating agents for ulcerative colitis, most recently been on Xeljanz.  Follows with Dr. He.  I discussed the case with him.  Recommends IV steroids, fluids, antiemetics, pain control.  Have ordered lab work-up to include inflammatory markers.  GI panel and C. difficile panel ordered.  Clear liquid diet for now.  DVT prophylaxis with SCDs.    I do believe patient will need inpatient level of care anticipated stay is greater than 2 midnights.  She has acute ulcerative colitis flare with significant abdominal pain and dehydration requiring IV steroids, pain control, and further work-up.    Patient is a full code.  Advance Care Planning   ACP discussion was held with the patient during this visit. Patient does not have an advance directive, declines further assistance.    Deb Boles DO  09/16/20  17:09 EDT    Dictated utilizing Dragon dictation.

## 2020-09-16 NOTE — TELEPHONE ENCOUNTER
----- Message from Alnozo He MD sent at 9/14/2020 10:34 PM EDT -----  Video visit at end of list is fine    ----- Message -----  From: Leesa Martinez MA  Sent: 9/14/2020   1:06 PM EDT  To: Alonzo He MD    Patient has called.  She states she was allergic to the Xeljanz you placed her on, and has not gotten a replacement medication.  Spoke with Ana, patient needs FUP to discuss medications.  Patient inquires whether she could do a video visit for that appt due to symptoms.    Leesa

## 2020-09-17 LAB
ADV 40+41 DNA STL QL NAA+NON-PROBE: NOT DETECTED
ALBUMIN SERPL-MCNC: 4.1 G/DL (ref 3.5–5.2)
ALBUMIN/GLOB SERPL: 1.4 G/DL
ALP SERPL-CCNC: 174 U/L (ref 39–117)
ALT SERPL W P-5'-P-CCNC: 62 U/L (ref 1–33)
ANION GAP SERPL CALCULATED.3IONS-SCNC: 14.4 MMOL/L (ref 5–15)
AST SERPL-CCNC: 26 U/L (ref 1–32)
ASTRO TYP 1-8 RNA STL QL NAA+NON-PROBE: NOT DETECTED
BASOPHILS # BLD AUTO: 0.03 10*3/MM3 (ref 0–0.2)
BASOPHILS NFR BLD AUTO: 0.2 % (ref 0–1.5)
BILIRUB SERPL-MCNC: 0.3 MG/DL (ref 0–1.2)
BUN SERPL-MCNC: 6 MG/DL (ref 6–20)
BUN/CREAT SERPL: 11.1 (ref 7–25)
C CAYETANENSIS DNA STL QL NAA+NON-PROBE: NOT DETECTED
C DIFF GDH STL QL: NORMAL
C DIFF TOX GENS STL QL NAA+PROBE: NOT DETECTED
CALCIUM SPEC-SCNC: 9.1 MG/DL (ref 8.6–10.5)
CAMPY SP DNA.DIARRHEA STL QL NAA+PROBE: NOT DETECTED
CHLORIDE SERPL-SCNC: 99 MMOL/L (ref 98–107)
CO2 SERPL-SCNC: 20.6 MMOL/L (ref 22–29)
CREAT SERPL-MCNC: 0.54 MG/DL (ref 0.57–1)
CRYPTOSP STL CULT: NOT DETECTED
DEPRECATED RDW RBC AUTO: 41.2 FL (ref 37–54)
E COLI DNA SPEC QL NAA+PROBE: NOT DETECTED
E HISTOLYT AG STL-ACNC: NOT DETECTED
EAEC PAA PLAS AGGR+AATA ST NAA+NON-PRB: NOT DETECTED
EC STX1 + STX2 GENES STL NAA+PROBE: NOT DETECTED
EOSINOPHIL # BLD AUTO: 0 10*3/MM3 (ref 0–0.4)
EOSINOPHIL NFR BLD AUTO: 0 % (ref 0.3–6.2)
EPEC EAE GENE STL QL NAA+NON-PROBE: NOT DETECTED
ERYTHROCYTE [DISTWIDTH] IN BLOOD BY AUTOMATED COUNT: 13.3 % (ref 12.3–15.4)
ETEC LTA+ST1A+ST1B TOX ST NAA+NON-PROBE: NOT DETECTED
G LAMBLIA DNA SPEC QL NAA+PROBE: NOT DETECTED
GFR SERPL CREATININE-BSD FRML MDRD: 129 ML/MIN/1.73
GFR SERPL CREATININE-BSD FRML MDRD: >150 ML/MIN/1.73
GLOBULIN UR ELPH-MCNC: 3 GM/DL
GLUCOSE BLDC GLUCOMTR-MCNC: 326 MG/DL (ref 70–130)
GLUCOSE BLDC GLUCOMTR-MCNC: 382 MG/DL (ref 70–130)
GLUCOSE BLDC GLUCOMTR-MCNC: 398 MG/DL (ref 70–130)
GLUCOSE BLDC GLUCOMTR-MCNC: 450 MG/DL (ref 70–130)
GLUCOSE SERPL-MCNC: 415 MG/DL (ref 65–99)
HBA1C MFR BLD: 11.5 % (ref 4.8–5.6)
HCT VFR BLD AUTO: 38.3 % (ref 34–46.6)
HGB BLD-MCNC: 12.7 G/DL (ref 12–15.9)
IMM GRANULOCYTES # BLD AUTO: 0.06 10*3/MM3 (ref 0–0.05)
IMM GRANULOCYTES NFR BLD AUTO: 0.4 % (ref 0–0.5)
LYMPHOCYTES # BLD AUTO: 2.78 10*3/MM3 (ref 0.7–3.1)
LYMPHOCYTES NFR BLD AUTO: 18.5 % (ref 19.6–45.3)
MCH RBC QN AUTO: 27.9 PG (ref 26.6–33)
MCHC RBC AUTO-ENTMCNC: 33.2 G/DL (ref 31.5–35.7)
MCV RBC AUTO: 84 FL (ref 79–97)
MONOCYTES # BLD AUTO: 0.27 10*3/MM3 (ref 0.1–0.9)
MONOCYTES NFR BLD AUTO: 1.8 % (ref 5–12)
NEUTROPHILS NFR BLD AUTO: 11.9 10*3/MM3 (ref 1.7–7)
NEUTROPHILS NFR BLD AUTO: 79.1 % (ref 42.7–76)
NOROVIRUS GI+II RNA STL QL NAA+NON-PROBE: NOT DETECTED
NRBC BLD AUTO-RTO: 0 /100 WBC (ref 0–0.2)
P SHIGELLOIDES DNA STL QL NAA+PROBE: NOT DETECTED
PLATELET # BLD AUTO: 291 10*3/MM3 (ref 140–450)
PMV BLD AUTO: 10.3 FL (ref 6–12)
POTASSIUM SERPL-SCNC: 4 MMOL/L (ref 3.5–5.2)
PROT SERPL-MCNC: 7.1 G/DL (ref 6–8.5)
RBC # BLD AUTO: 4.56 10*6/MM3 (ref 3.77–5.28)
RV RNA STL NAA+PROBE: NOT DETECTED
SALMONELLA DNA SPEC QL NAA+PROBE: NOT DETECTED
SAPO I+II+IV+V RNA STL QL NAA+NON-PROBE: NOT DETECTED
SHIGELLA SP+EIEC IPAH STL QL NAA+PROBE: NOT DETECTED
SODIUM SERPL-SCNC: 134 MMOL/L (ref 136–145)
V CHOLERAE DNA SPEC QL NAA+PROBE: NOT DETECTED
VIBRIO DNA SPEC NAA+PROBE: NOT DETECTED
WBC # BLD AUTO: 15.04 10*3/MM3 (ref 3.4–10.8)
YERSINIA STL CULT: NOT DETECTED

## 2020-09-17 PROCEDURE — 83036 HEMOGLOBIN GLYCOSYLATED A1C: CPT | Performed by: FAMILY MEDICINE

## 2020-09-17 PROCEDURE — 85025 COMPLETE CBC W/AUTO DIFF WBC: CPT | Performed by: FAMILY MEDICINE

## 2020-09-17 PROCEDURE — 82962 GLUCOSE BLOOD TEST: CPT

## 2020-09-17 PROCEDURE — 0097U HC BIOFIRE FILMARRAY GI PANEL: CPT | Performed by: FAMILY MEDICINE

## 2020-09-17 PROCEDURE — 86480 TB TEST CELL IMMUN MEASURE: CPT | Performed by: INTERNAL MEDICINE

## 2020-09-17 PROCEDURE — 82542 COL CHROMOTOGRAPHY QUAL/QUAN: CPT | Performed by: INTERNAL MEDICINE

## 2020-09-17 PROCEDURE — 63710000001 DIPHENHYDRAMINE PER 50 MG: Performed by: FAMILY MEDICINE

## 2020-09-17 PROCEDURE — 63710000001 INSULIN DETEMIR PER 5 UNITS: Performed by: FAMILY MEDICINE

## 2020-09-17 PROCEDURE — 80053 COMPREHEN METABOLIC PANEL: CPT | Performed by: FAMILY MEDICINE

## 2020-09-17 PROCEDURE — 81335 TPMT GENE COM VARIANTS: CPT | Performed by: INTERNAL MEDICINE

## 2020-09-17 PROCEDURE — 99232 SBSQ HOSP IP/OBS MODERATE 35: CPT | Performed by: INTERNAL MEDICINE

## 2020-09-17 PROCEDURE — 63710000001 INSULIN ASPART PER 5 UNITS: Performed by: INTERNAL MEDICINE

## 2020-09-17 PROCEDURE — 99232 SBSQ HOSP IP/OBS MODERATE 35: CPT | Performed by: FAMILY MEDICINE

## 2020-09-17 PROCEDURE — 25010000002 METHYLPREDNISOLONE PER 40 MG: Performed by: FAMILY MEDICINE

## 2020-09-17 PROCEDURE — 25010000002 HYDROMORPHONE 1 MG/ML SOLUTION: Performed by: FAMILY MEDICINE

## 2020-09-17 PROCEDURE — 87493 C DIFF AMPLIFIED PROBE: CPT | Performed by: FAMILY MEDICINE

## 2020-09-17 PROCEDURE — 63710000001 INSULIN ASPART PER 5 UNITS: Performed by: FAMILY MEDICINE

## 2020-09-17 RX ORDER — ACETAMINOPHEN AND CODEINE PHOSPHATE 300; 30 MG/1; MG/1
1 TABLET ORAL EVERY 8 HOURS PRN
COMMUNITY
End: 2020-09-23 | Stop reason: HOSPADM

## 2020-09-17 RX ORDER — PANTOPRAZOLE SODIUM 40 MG/1
40 TABLET, DELAYED RELEASE ORAL
Status: DISCONTINUED | OUTPATIENT
Start: 2020-09-17 | End: 2020-09-23 | Stop reason: HOSPADM

## 2020-09-17 RX ADMIN — TOPIRAMATE 50 MG: 25 TABLET, FILM COATED ORAL at 20:52

## 2020-09-17 RX ADMIN — INSULIN ASPART 14 UNITS: 100 INJECTION, SOLUTION INTRAVENOUS; SUBCUTANEOUS at 06:31

## 2020-09-17 RX ADMIN — SODIUM CHLORIDE, PRESERVATIVE FREE 10 ML: 5 INJECTION INTRAVENOUS at 20:54

## 2020-09-17 RX ADMIN — METHYLPREDNISOLONE SODIUM SUCCINATE 20 MG: 40 INJECTION, POWDER, FOR SOLUTION INTRAMUSCULAR; INTRAVENOUS at 08:17

## 2020-09-17 RX ADMIN — METHYLPREDNISOLONE SODIUM SUCCINATE 20 MG: 40 INJECTION, POWDER, FOR SOLUTION INTRAMUSCULAR; INTRAVENOUS at 01:19

## 2020-09-17 RX ADMIN — HYDROMORPHONE HYDROCHLORIDE 0.5 MG: 1 INJECTION, SOLUTION INTRAMUSCULAR; INTRAVENOUS; SUBCUTANEOUS at 16:45

## 2020-09-17 RX ADMIN — LOPERAMIDE HYDROCHLORIDE 2 MG: 2 CAPSULE ORAL at 16:39

## 2020-09-17 RX ADMIN — INSULIN ASPART 12 UNITS: 100 INJECTION, SOLUTION INTRAVENOUS; SUBCUTANEOUS at 20:53

## 2020-09-17 RX ADMIN — LOPERAMIDE HYDROCHLORIDE 2 MG: 2 CAPSULE ORAL at 20:53

## 2020-09-17 RX ADMIN — HYDROMORPHONE HYDROCHLORIDE 0.5 MG: 1 INJECTION, SOLUTION INTRAMUSCULAR; INTRAVENOUS; SUBCUTANEOUS at 20:53

## 2020-09-17 RX ADMIN — DIPHENHYDRAMINE HYDROCHLORIDE 25 MG: 25 CAPSULE ORAL at 10:12

## 2020-09-17 RX ADMIN — LOPERAMIDE HYDROCHLORIDE 2 MG: 2 CAPSULE ORAL at 08:17

## 2020-09-17 RX ADMIN — DIPHENHYDRAMINE HYDROCHLORIDE 25 MG: 25 CAPSULE ORAL at 17:39

## 2020-09-17 RX ADMIN — HYDROMORPHONE HYDROCHLORIDE 0.5 MG: 1 INJECTION, SOLUTION INTRAMUSCULAR; INTRAVENOUS; SUBCUTANEOUS at 01:19

## 2020-09-17 RX ADMIN — INSULIN ASPART 10 UNITS: 100 INJECTION, SOLUTION INTRAVENOUS; SUBCUTANEOUS at 11:35

## 2020-09-17 RX ADMIN — GABAPENTIN 800 MG: 400 CAPSULE ORAL at 14:03

## 2020-09-17 RX ADMIN — INSULIN DETEMIR 30 UNITS: 100 INJECTION, SOLUTION SUBCUTANEOUS at 20:53

## 2020-09-17 RX ADMIN — GABAPENTIN 800 MG: 400 CAPSULE ORAL at 05:01

## 2020-09-17 RX ADMIN — HYDROMORPHONE HYDROCHLORIDE 0.5 MG: 1 INJECTION, SOLUTION INTRAMUSCULAR; INTRAVENOUS; SUBCUTANEOUS at 11:35

## 2020-09-17 RX ADMIN — HYDROMORPHONE HYDROCHLORIDE 0.5 MG: 1 INJECTION, SOLUTION INTRAMUSCULAR; INTRAVENOUS; SUBCUTANEOUS at 08:26

## 2020-09-17 RX ADMIN — INSULIN ASPART 12 UNITS: 100 INJECTION, SOLUTION INTRAVENOUS; SUBCUTANEOUS at 16:39

## 2020-09-17 RX ADMIN — BUPROPION HYDROCHLORIDE 100 MG: 100 TABLET, FILM COATED, EXTENDED RELEASE ORAL at 08:17

## 2020-09-17 RX ADMIN — SODIUM CHLORIDE 100 ML/HR: 9 INJECTION, SOLUTION INTRAVENOUS at 02:40

## 2020-09-17 RX ADMIN — HYDROMORPHONE HYDROCHLORIDE 0.5 MG: 1 INJECTION, SOLUTION INTRAMUSCULAR; INTRAVENOUS; SUBCUTANEOUS at 04:58

## 2020-09-17 RX ADMIN — METHYLPREDNISOLONE SODIUM SUCCINATE 20 MG: 40 INJECTION, POWDER, FOR SOLUTION INTRAMUSCULAR; INTRAVENOUS at 16:39

## 2020-09-17 RX ADMIN — SODIUM CHLORIDE 100 ML/HR: 9 INJECTION, SOLUTION INTRAVENOUS at 12:33

## 2020-09-17 RX ADMIN — PANTOPRAZOLE SODIUM 40 MG: 40 TABLET, DELAYED RELEASE ORAL at 16:39

## 2020-09-17 RX ADMIN — INSULIN DETEMIR 30 UNITS: 100 INJECTION, SOLUTION SUBCUTANEOUS at 10:12

## 2020-09-17 RX ADMIN — DIPHENHYDRAMINE HYDROCHLORIDE 25 MG: 25 CAPSULE ORAL at 01:20

## 2020-09-17 RX ADMIN — SODIUM CHLORIDE, PRESERVATIVE FREE 10 ML: 5 INJECTION INTRAVENOUS at 08:17

## 2020-09-17 RX ADMIN — GABAPENTIN 800 MG: 400 CAPSULE ORAL at 21:00

## 2020-09-17 NOTE — PAYOR COMM NOTE
"TO:[facility]  FROM:FRANCIA SEYMOUR, RN PHONE [phone]  INPT NOTIFICATION/CLINICALS    Thais Hobson (35 y.o. Female)     Date of Birth Social Security Number Address Home Phone MRN    1985  [address]  [place] [address] [phone] [id number]    Yazidism Marital Status          Patient Refused Single       Admission Date Admission Type Admitting Provider Attending Provider Department, Room/Bed    20 Urgent Deb Boles DO Karrick, Shandy Marcus, DO [facility] MED SURG  4, 427/1    Discharge Date Discharge Disposition Discharge Destination                       Attending Provider: Deb Boles DO    Allergies: Hydrochlorothiazide, Penicillins, Remicade [Infliximab], Soybean-containing Drug Products, Xeljanz [Tofacitinib Citrate], Zofran [Ondansetron Hcl], Reglan [Metoclopramide]    Isolation: Spore   Infection: C.difficile (rule out) (20)   Code Status: CPR    Ht: 160 cm (63\")   Wt: 94.6 kg (208 lb 8.9 oz)    Admission Cmt: None   Principal Problem: None                Active Insurance as of 2020     Primary Coverage     Payor Plan Insurance Group Employer/Plan Group    [employer] [place] [id number]     Payor Plan Address Payor Plan Phone Number Payor Plan Fax Number Effective Dates    [address] [address] [phone]  2017 - None Entered    [facility] [id number]       Subscriber Name Subscriber Birth Date Member ID       THAIS HOBSON 1985 [id number]                 Emergency Contacts      (Rel.) Home Phone Work Phone Mobile Phone    HobsonKandi pickard (Step Parent) [phone] -- --    RENEE HOBSON (Father) [phone] -- --    FRANCK ALONSO (Mother) [phone] -- --               History & Physical      Deb Boles DO at 20 1709              [facility]      Name:  Thais Hobson   Age:  35 y.o.  Sex:  female  :  " 1985  MRN:  [id number]   Visit Number:  [id number]  Admission Date:  9/16/2020  Date Of Service:  09/16/20  Primary Care Physician:  Lv Sanchez DO    Chief Complaint:     Abdominal pain, diarrhea, bloody stools    History Of Presenting Illness:      The patient is a 35-year-old female with history of ulcerative colitis who had complained of 1 week history of worsening abdominal pain, bloody stools, and dehydration.  She has been followed with gastroenterology by Dr. He.  Patient had actually went to the emergency room in outlying facility yesterday was given IV fluids and discharged at that time.  She had a tele-visit with her PCP today, with ongoing symptoms that have failed to improve.  Discussed the case with me for potential direct admission.    Of note, patient has been diagnosed with ulcerative colitis since roughly 23 years of age.  She states that she has been on multiple medications for this including immunomodulators, however has either failed the treatment or had allergic reactions to these medications.  Most recently had been on Xeljanz.  She states that she is still having 15 or so diarrhea like bloody stools daily.  Has low-grade fevers at home.  Has not really ate or drink much in the last 4 to 5 days.  She lives with her mother and her 2 children.  She denies any recent medication changes otherwise.    Review Of Systems:     General: Low-grade fever  Psychological: No history of any hallucinations and delusions.  Ophthalmic: No history of any diplopia or transient loss of vision.  ENT: No history of sore throat, nasal congestion or ear pain.   Allergy and immunology: No history of rash or itching.  Hematological and Lymphatic: No history of neck swelling or easy bleeding.  Endocrine: No history of any recent unintentional weight gain or loss.  Respiratory: No history of cough or shortness of breath.   Cardiovascular: No history of chest pain or palpitations.   Gastrointestinal:  Abdominal pain, diarrhea, bloody stool  Genitourinary: No history of dysuria or hematuria.  Musculoskeletal: No muscle pain. No calf pain.   Neurological: No history of any focal weakness. No loss of consciousness. Denies any numbness.  Dermatological: No history of any redness or pruritis.     Past Medical History:    Past Medical History:   Diagnosis Date   • Abdominal pain     periumbilicul   • Abdominal tenderness     Periumbil   • Alopecia    • Anemia    • Anxiety    • Arthritis    • Asthma     as a child   • Back pain    • Bipolar disorder (CMS/HCC)    • Blood in stool    • Body piercing     ears   • Colitis    • Colitis    • Colon polyps    • Depression    • Diabetes mellitus (CMS/HCC)    • Diarrhea    • Dysphagia     Patient reported he has trouble from time to time and that her throat has to be dilated   • Elevated cholesterol     Reported slightly elevated - taking no medication    • Fractures     Right pinky toe - no surgery required   • GERD (gastroesophageal reflux disease)    • H/O colonoscopy 2013    Terminal ileal biopsies negative. Cecal&ascending biopsie revealed chronic active colitis w/ features cons. w/ inflammatory bowel disease. Transevere colon biopsies revealved chronic active colitis. Desc. colon biopsies revealed chronic active colitis. Rect colon biopsie revealed chronic active colitis. Negative for dysplasia   • Hematemesis    • History of Clostridium difficile infection    • History of transfusion     No reaction to transfusion reported   • Migraine headache    • Nausea     alone   • Pancreatitis    • Restless leg    • Tattoos     x9   • Universal ulcerative colitis (CMS/HCC)    • UTI (urinary tract infection)    • Weight loss        Past Surgical history:    Past Surgical History:   Procedure Laterality Date   • BREAST SURGERY Left     breast lump removed benign   •  SECTION     • CHOLECYSTECTOMY      for stone disease   • COLONOSCOPY     • COLONOSCOPY N/A  6/12/2020    Procedure: COLONOSCOPY;  Surgeon: Alonzo He MD;  Location: [facility] ENDOSCOPY;  Service: Gastroenterology;  Laterality: N/A;   • DILATATION AND CURETTAGE     • ENDOSCOPY  07/30/2013    Erosive esophagitis, grade 2; erosive gastritis; small sliding hiatal hernia less than 3 cm, erosive deodenitis duodenal polyp.No gan mucosa.Second portion duedenal biopsy neg. Second postion of dudoenal biopsy revealed polypoid intestinal mucosa w/ lymphoid aggregate. 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 no helicobacter pylori. Negative for mateaplasia, dysplasia   • HIP SURGERY Right    • HYSTERECTOMY     • LEEP         Social History:    Social History     Socioeconomic History   • Marital status: Single     Spouse name: Not on file   • Number of children: Not on file   • Years of education: Not on file   • Highest education level: Not on file   Tobacco Use   • Smoking status: Never Smoker   • Smokeless tobacco: Never Used   Substance and Sexual Activity   • Alcohol use: No   • Drug use: No   • Sexual activity: Defer       Family History:    Family History   Problem Relation Age of Onset   • Heart failure Father        Allergies:      Hydrochlorothiazide, Penicillins, Remicade [infliximab], Soybean-containing drug products, Xeljanz [tofacitinib citrate], Zofran [ondansetron hcl], and Reglan [metoclopramide]    Home Medications:    Prior to Admission Medications     Prescriptions Last Dose Informant Patient Reported? Taking?    buPROPion SR (WELLBUTRIN SR) 100 MG 12 hr tablet 9/15/2020  No Yes    Take 1 tablet by mouth Every Morning.    dicyclomine (BENTYL) 20 MG tablet 9/15/2020 Self No Yes    Take 1 tablet by mouth 3 (Three) Times a Day As Needed (abdominal cramps).    dimenhyDRINATE (Dramamine) 50 MG tablet 9/15/2020  No Yes    Take 1 tablet by mouth Every 8 (Eight) Hours As Needed for Nausea.    estradiol (MINIVELLE, VIVELLE-DOT) 0.05 MG/24HR patch Past Week  No Yes     APPLY 1 PATCH TO SKIN EVERY 3.5 DAYS(TWICE A WEEK)    gabapentin (NEURONTIN) 800 MG tablet 9/15/2020  No Yes    TAKE 1 TABLET BY MOUTH THREE TIMES DAILY    HUMALOG MIX 75/25 KWIKPEN (75-25) 100 UNIT/ML suspension pen-injector pen Past Week  No Yes    Inject 25 Units under the skin into the appropriate area as directed 2 (Two) Times a Day With Meals.    lisinopril (ZESTRIL) 2.5 MG tablet 9/15/2020 Self No Yes    Take 1 tablet by mouth Daily.    loperamide (Imodium A-D) 2 MG tablet Past Month Self No Yes    Take 1 tablet by mouth 2 (Two) Times a Day As Needed for Diarrhea.    predniSONE (DELTASONE) 10 MG tablet 9/15/2020  No Yes    20 mg po daily for 2 weeks followed by 10 mg for 2 week.  Dispense: 42 tablet    promethazine (PHENERGAN) 25 MG tablet 9/15/2020  No Yes    TAKE 1 TABLET BY MOUTH EVERY 6 HOURS AS NEEDED FOR NAUSEA OR VOMITING    rizatriptan (MAXALT) 10 MG tablet Past Week  No Yes    Take 1 tablet by mouth 1 (One) Time As Needed for Migraine for up to 1 dose. May repeat in 2 hours if needed    topiramate (TOPAMAX) 25 MG capsule (sprinkle) 9/15/2020  No Yes    TAKE 1 CAPSULE BY MOUTH EVERY NIGHT AT BEDTIME, THEN INCREASE TO 2 CAPSULES BY MOUTH EVERY NIGHT AT BEDTIME THEREAFTER    WAL-DRYL ALLERGY 25 MG capsule 9/15/2020  No Yes    TAKE 1 CAPSULE BY MOUTH EVERY 8 HOURS    Continuous Glucose Monitor kit Unknown Self No No    Use as directed             ED Medications:    Medications   sodium chloride 0.9 % flush 10 mL (has no administration in time range)   sodium chloride 0.9 % flush 10 mL (has no administration in time range)   sodium chloride 0.9 % infusion (has no administration in time range)   acetaminophen (TYLENOL) tablet 650 mg (has no administration in time range)     Or   acetaminophen (TYLENOL) 160 MG/5ML solution 650 mg (has no administration in time range)     Or   acetaminophen (TYLENOL) suppository 650 mg (has no administration in time range)   methylPREDNISolone sodium succinate (SOLU-Medrol)  injection 20 mg (has no administration in time range)   prochlorperazine (COMPAZINE) injection 2.5 mg (has no administration in time range)   HYDROmorphone (DILAUDID) injection 0.5 mg (has no administration in time range)   promethazine (PHENERGAN) tablet 12.5 mg (has no administration in time range)       Vital Signs:    Temp:  [98.8 °F (37.1 °C)-98.9 °F (37.2 °C)] 98.9 °F (37.2 °C)  Heart Rate:  [110-130] 110  Resp:  [18] 18  BP: (130-131)/(88-98) 131/88        09/16/20  1626   Weight: 94.6 kg (208 lb 8.9 oz)       Body mass index is 36.94 kg/m².    Physical Exam:    General Appearance:  Alert and cooperative, not in any acute distress.   Head:  Atraumatic and normocephalic, without obvious abnormality.   Eyes:          PERRLA, conjunctivae and sclerae normal, no Icterus. No pallor. Extraocular movements are within normal limits.   Ears:  Ears appear intact with no abnormalities noted.   Throat: No oral lesions, no thrush, oral mucosa dry   Neck: Supple, trachea midline, no thyromegaly, no carotid bruit.   Back:   No tenderness to palpation, range of motion normal.   Lungs:   Breath sounds heard bilaterally equally.  No crackles or wheezing. No pleural rub or bronchial breathing.   Heart:   Tachycardic, normal S1 and S2, no murmur, no gallop, no rub. No JVD.   Abdomen:   Normal bowel sounds, no masses, no organomegaly. Soft, moderate diffuse abdominal tenderness, nondistended, no guarding, no rebound tenderness.  Obese   Extremities: Moves all extremities well, no edema, no cyanosis, no clubbing.   Pulses: Pulses palpable and equal bilaterally.   Skin: No bleeding, bruising or rash.   Neurologic: Alert and oriented x 3. Moves all four limbs equally. No tremors. No facial asymmetry.     Laboratory data:    I have reviewed the labs done in the emergency room.          Invalid input(s): LABALBU, PROT                                    Invalid input(s): USDES,  BLOODU, NITRITITE, BACT, EP  Pain Management Panel      Pain Management Panel 11/20/2019    CREATININE               EKG:      Deferred    Radiology:    Imaging Results (Last 72 Hours)     ** No results found for the last 72 hours. **            Ulcerative colitis, acute (CMS/[facility])      Assessment:    1.  Acute ulcerative colitis flare, present on admission  2.  Abdominal pain with diarrhea  3.  Dehydration  4.  Uncontrolled type 2 diabetes  5.  Obesity  6.  History of PTSD  7.  Bipolar disorder  8.  GERD    Plan:    Patient be admitted to the hospital.  She unfortunately is failed multiple anti-inflammatories and immunomodulating agents for ulcerative colitis, most recently been on Xeljanz.  Follows with Dr. He.  I discussed the case with him.  Recommends IV steroids, fluids, antiemetics, pain control.  Have ordered lab work-up to include inflammatory markers.  GI panel and C. difficile panel ordered.  Clear liquid diet for now.  DVT prophylaxis with SCDs.    I do believe patient will need inpatient level of care anticipated stay is greater than 2 midnights.  She has acute ulcerative colitis flare with significant abdominal pain and dehydration requiring IV steroids, pain control, and further work-up.    Patient is a full code.  Advance Care Planning   ACP discussion was held with the patient during this visit. Patient does not have an advance directive, declines further assistance.    Deb Boles DO  09/16/20  17:09 EDT    Dictated utilizing Dragon dictation.    Electronically signed by Deb Boles DO at 09/16/20 1717       Vital Signs (last day)     Date/Time   Temp   Temp src   Pulse   Resp   BP   Patient Position   SpO2    09/17/20 1100   98.1 (36.7)   Oral   85   18   130/88   Lying   100    09/17/20 0752   98.2 (36.8)   Oral   89   18   122/81   Lying   100    09/17/20 0430   98.6 (37)   Oral   85   18   124/80   Lying   100    09/16/20 2325   98.8 (37.1)   Oral   94   18   132/84   Lying   97    09/16/20 1933   98.6 (37)   Oral   108    18   126/92   Lying   100    09/16/20 1626   98.9 (37.2)   Oral   110   18   131/88   Lying   98                Current Facility-Administered Medications   Medication Dose Route Frequency Provider Last Rate Last Dose   • acetaminophen (TYLENOL) tablet 650 mg  650 mg Oral Q4H PRN Deb Boles DO        Or   • acetaminophen (TYLENOL) 160 MG/5ML solution 650 mg  650 mg Oral Q4H PRN Deb Boles DO        Or   • acetaminophen (TYLENOL) suppository 650 mg  650 mg Rectal Q4H PRN Deb Boles DO       • buPROPion SR (WELLBUTRIN SR) 12 hr tablet 100 mg  100 mg Oral QAM Deb Boles DO   100 mg at 09/17/20 0817   • dextrose (D50W) 25 g/ 50mL Intravenous Solution 25 g  25 g Intravenous Q15 Min PRN Deb Boles DO       • dextrose (GLUTOSE) oral gel 1 tube  1 tube Oral Q15 Min PRN Deb Boles DO       • dicyclomine (BENTYL) capsule 10 mg  10 mg Oral TID PRN Deb Boles DO       • diphenhydrAMINE (BENADRYL) capsule 25 mg  25 mg Oral Q8H Deb Boles DO   25 mg at 09/17/20 1012   • gabapentin (NEURONTIN) capsule 800 mg  800 mg Oral Q8H Deb Boles DO   800 mg at 09/17/20 0501   • glucagon (human recombinant) (GLUCAGEN DIAGNOSTIC) injection 1 mg  1 mg Subcutaneous PRN Deb Boles DO       • HYDROmorphone (DILAUDID) injection 0.5 mg  0.5 mg Intravenous Q2H PRN Deb Boles DO   0.5 mg at 09/17/20 1135   • insulin aspart (novoLOG) injection 0-14 Units  0-14 Units Subcutaneous TID AC Deb Boles DO   10 Units at 09/17/20 1135   • insulin detemir (LEVEMIR) injection 30 Units  30 Units Subcutaneous Q12H Deb Boles,    30 Units at 09/17/20 1012   • loperamide (IMODIUM) capsule 2 mg  2 mg Oral TID Alonzo He MD   2 mg at 09/17/20 0817   • methylPREDNISolone sodium succinate (SOLU-Medrol) injection 20 mg  20 mg Intravenous Q8H Deb Boles DO   20 mg at 09/17/20  0817   • prochlorperazine (COMPAZINE) injection 2.5 mg  2.5 mg Intravenous Q6H PRN Ramana, Deb Michele, DO       • promethazine (PHENERGAN) tablet 12.5 mg  12.5 mg Oral Q8H PRN Ramana, Deb Michele, DO       • sodium chloride 0.9 % flush 10 mL  10 mL Intravenous Q12H Ramana, Deb Michele, DO   10 mL at 09/17/20 0817   • sodium chloride 0.9 % flush 10 mL  10 mL Intravenous PRN Ramana, Deb Michele, DO       • sodium chloride 0.9 % infusion  100 mL/hr Intravenous Continuous Deb Boles Michele,  mL/hr at 09/17/20 1233 100 mL/hr at 09/17/20 1233   • topiramate (TOPAMAX) tablet 50 mg  50 mg Oral Nightly Deb Boles Michele, DO   50 mg at 09/16/20 2133       Lab Results (last 24 hours)     Procedure Component Value Units Date/Time    POC Glucose Once [[id number]]  (Abnormal) Collected: 09/17/20 1119    Specimen: Blood Updated: 09/17/20 1125     Glucose 326 mg/dL      Comment: Serial Number: [id number]:  [id number]       Comprehensive Metabolic Panel [[id number]]  (Abnormal) Collected: 09/17/20 0733    Specimen: Blood Updated: 09/17/20 0815     Glucose 415 mg/dL      BUN 6 mg/dL      Creatinine 0.54 mg/dL      Sodium 134 mmol/L      Potassium 4.0 mmol/L      Chloride 99 mmol/L      CO2 20.6 mmol/L      Calcium 9.1 mg/dL      Total Protein 7.1 g/dL      Albumin 4.10 g/dL      ALT (SGPT) 62 U/L      AST (SGOT) 26 U/L      Alkaline Phosphatase 174 U/L      Total Bilirubin 0.3 mg/dL      eGFR Non African Amer 129 mL/min/1.73      eGFR  African Amer >150 mL/min/1.73      Globulin 3.0 gm/dL      A/G Ratio 1.4 g/dL      BUN/Creatinine Ratio 11.1     Anion Gap 14.4 mmol/L     Narrative:      GFR Normal >60  Chronic Kidney Disease <60  Kidney Failure <15      Hemoglobin A1c [[id number]]  (Abnormal) Collected: 09/17/20 0733    Specimen: Blood Updated: 09/17/20 0750     Hemoglobin A1C 11.50 %     Narrative:      Hemoglobin A1C Ranges:    Increased Risk for Diabetes  5.7% to 6.4%  Diabetes                     >=  6.5%  Diabetic Goal                < 7.0%    CBC & Differential [[id number]]  (Abnormal) Collected: 09/17/20 0733    Specimen: Blood Updated: 09/17/20 0747    Narrative:      The following orders were created for panel order CBC & Differential.  Procedure                               Abnormality         Status                     ---------                               -----------         ------                     CBC Auto Differential[[id number]]        Abnormal            Final result                 Please view results for these tests on the individual orders.    CBC Auto Differential [[id number]]  (Abnormal) Collected: 09/17/20 0733    Specimen: Blood Updated: 09/17/20 0747     WBC 15.04 10*3/mm3      RBC 4.56 10*6/mm3      Hemoglobin 12.7 g/dL      Hematocrit 38.3 %      MCV 84.0 fL      MCH 27.9 pg      MCHC 33.2 g/dL      RDW 13.3 %      RDW-SD 41.2 fl      MPV 10.3 fL      Platelets 291 10*3/mm3      Neutrophil % 79.1 %      Lymphocyte % 18.5 %      Monocyte % 1.8 %      Eosinophil % 0.0 %      Basophil % 0.2 %      Immature Grans % 0.4 %      Neutrophils, Absolute 11.90 10*3/mm3      Lymphocytes, Absolute 2.78 10*3/mm3      Monocytes, Absolute 0.27 10*3/mm3      Eosinophils, Absolute 0.00 10*3/mm3      Basophils, Absolute 0.03 10*3/mm3      Immature Grans, Absolute 0.06 10*3/mm3      nRBC 0.0 /100 WBC     Clostridium Difficile Toxin, PCR - Stool, Per Rectum [[id number]]  (Normal) Collected: 09/17/20 0436    Specimen: Stool from Per Rectum Updated: 09/17/20 0654     C. Difficile Toxins by PCR Not Detected    Narrative:      PCR testing is capable of detecting Clostridium difficile that has colonized patients, as well as in patients that have had past infections. This testing is not recommended for monitoring C. diff treatment, results should be correlated with clinical symptoms.    Gastrointestinal Panel, PCR - Stool, Per Rectum [[id number]]  (Normal) Collected: 09/17/20 0436    Specimen: Stool from Per Rectum  Updated: 09/17/20 0653     Campylobacter Not Detected     Plesiomonas shigelloides Not Detected     Salmonella Not Detected     Vibrio Not Detected     Vibrio cholerae Not Detected     Yersinia enterocolitica Not Detected     Enteroaggregative E. coli (EAEC) Not Detected     Enteropathogenic E. coli (EPEC) Not Detected     Enterotoxigenic E. coli (ETEC) lt/st Not Detected     Shiga-like toxin-producing E. coli (STEC) stx1/stx2 Not Detected     E. coli O157 Not Detected     Shigella/Enteroinvasive E. coli (EIEC) Not Detected     Cryptosporidium Not Detected     Cyclospora cayetanensis Not Detected     Entamoeba histolytica Not Detected     Giardia lamblia Not Detected     Adenovirus F40/41 Not Detected     Astrovirus Not Detected     Norovirus GI/GII Not Detected     Rotavirus A Not Detected     Sapovirus (I, II, IV or V) Not Detected    POC Glucose Once [[id number]]  (Abnormal) Collected: 09/17/20 0614    Specimen: Blood Updated: 09/17/20 0626     Glucose 450 mg/dL      Comment: Serial Number: [id number]:  [id number]       Clostridium Difficile Toxin - Stool, Per Rectum [[id number]]  (Normal) Collected: 09/17/20 0436    Specimen: Stool from Per Rectum Updated: 09/17/20 0526    Narrative:      The following orders were created for panel order Clostridium Difficile Toxin - Stool, Per Rectum.  Procedure                               Abnormality         Status                     ---------                               -----------         ------                     Clostridium Difficile EI...[[id number]]  Normal              Final result                 Please view results for these tests on the individual orders.    Clostridium Difficile EIA - Stool, Per Rectum [[id number]]  (Normal) Collected: 09/17/20 0436    Specimen: Stool from Per Rectum Updated: 09/17/20 0526     C Diff GDH / Toxin Indeterminate     Comment: Indeterminate result.  Please refer to PCR result.       Calprotectin, Fecal - Stool, Per Rectum  [[id number]] Updated: 09/17/20 0505    Specimen: Stool from Per Rectum     POC Glucose Once [[id number]]  (Abnormal) Collected: 09/16/20 2003    Specimen: Blood Updated: 09/16/20 2009     Glucose 291 mg/dL      Comment: Serial Number: [id number]:  [id number]       Sedimentation Rate [[id number]]  (Normal) Collected: 09/16/20 1655    Specimen: Blood Updated: 09/16/20 1811     Sed Rate 20 mm/hr     Comprehensive Metabolic Panel [[id number]]  (Abnormal) Collected: 09/16/20 1655    Specimen: Blood Updated: 09/16/20 1738     Glucose 446 mg/dL      BUN 6 mg/dL      Creatinine 0.74 mg/dL      Sodium 132 mmol/L      Potassium 3.5 mmol/L      Chloride 96 mmol/L      CO2 17.5 mmol/L      Calcium 9.9 mg/dL      Total Protein 8.4 g/dL      Albumin 4.30 g/dL      ALT (SGPT) 88 U/L      AST (SGOT) 49 U/L      Alkaline Phosphatase 231 U/L      Total Bilirubin 0.4 mg/dL      eGFR Non African Amer 90 mL/min/1.73      eGFR  African Amer 109 mL/min/1.73      Globulin 4.1 gm/dL      A/G Ratio 1.0 g/dL      BUN/Creatinine Ratio 8.1     Anion Gap 18.5 mmol/L     Narrative:      GFR Normal >60  Chronic Kidney Disease <60  Kidney Failure <15      CBC & Differential [[id number]]  (Abnormal) Collected: 09/16/20 1655    Specimen: Blood Updated: 09/16/20 1728    Narrative:      The following orders were created for panel order CBC & Differential.  Procedure                               Abnormality         Status                     ---------                               -----------         ------                     Manual Differential[[id number]]          Abnormal            Final result               CBC Auto Differential[[id number]]        Abnormal            Final result                 Please view results for these tests on the individual orders.    CBC Auto Differential [[id number]]  (Abnormal) Collected: 09/16/20 1655    Specimen: Blood Updated: 09/16/20 1728     WBC 13.12 10*3/mm3      RBC 5.26 10*6/mm3      Hemoglobin 14.6 g/dL       Hematocrit 45.1 %      MCV 85.7 fL      MCH 27.8 pg      MCHC 32.4 g/dL      RDW 13.2 %      RDW-SD 40.8 fl      MPV 10.1 fL      Platelets 345 10*3/mm3     Manual Differential [[id number]]  (Abnormal) Collected: 20    Specimen: Blood Updated: 20 1728     Neutrophil % 67.0 %      Lymphocyte % 27.0 %      Monocyte % 5.0 %      Bands %  1.0 %      Neutrophils Absolute 8.92 10*3/mm3      Lymphocytes Absolute 3.54 10*3/mm3      Monocytes Absolute 0.66 10*3/mm3      RBC Morphology Normal     WBC Morphology Normal     Platelet Morphology Normal    Lipase [[id number]]  (Normal) Collected: 20    Specimen: Blood Updated: 20 172     Lipase 14 U/L     C-reactive Protein [[id number]]  (Abnormal) Collected: 20    Specimen: Blood Updated: 20 172     C-Reactive Protein 0.80 mg/dL     POC Glucose Once [[id number]]  (Abnormal) Collected: 20 162    Specimen: Blood Updated: 20 1624     Glucose 450 mg/dL      Comment: Serial Number: [id number]:  [id number]           Physician Progress Notes (last 24 hours) (Notes from 20 1304 through 20 1304)    No notes of this type exist for this encounter.            Consult Notes (last 24 hours) (Notes from 20 1304 through 20 1304)      Alonzo He MD at 20 1923      Consult Orders    1. Inpatient Gastroenterology Consult [[id number]] ordered by Deb Boles DO at 20 1629                    In Patient Consult      Date of Consultation: 2020  Patient Name: Thais Hobson  MRN: [id number]  : 1985     Referring provider: Deb Boles, *    Primary care provider:  Lv Sanchez DO    Reason for consultation: Severe diarrhea abdominal pain nausea, suspected UC flare    History of Present Illness:   This is a 35-year-old female patient with a prior history of ulcerative pan colitis, known to me was sent from primary care physician's office  for worsening abdominal pain, nausea and diarrhea.   She was diagnosed with IBD about 12 years ago.  He was followed by Dr. Langston at that time subsequently she changed her physician and started following at [facility].     She had multiple treatment regimens in the past with various reactions.  He initially states she was treated with mesalamine which caused her a severe reaction to the bronchospasm as per patient.  Subsequently she had a Remicade that also caused a reaction.  She was put on Humira which did not help her much and was.  She was also put on the Stelara which caused the throat swelling.  She was on vedolizumab for the last 3 years.  She continued to have a multiple flareups and recent colonoscopy revealed a significant colitis despite on Entyvio for which Entyvio was discontinued.  She was started on Xeljanz recently however she again developed multiple blistering lesions in the oral cavity face and the hands for which Zosyn was discontinued.  Currently patient is not on any medication for the past 3 to 4 weeks time.   She had a few episodes of C. difficile colitis last year in 2019 and 18.     She is here again with continued worsening diarrhea symptoms.  Associated nausea and abdominal pain diffuse without any fever and chills.  She had a telephone visit with the PCP and was advised to come to the hospital for further management      Subjective     Past Medical History:   Diagnosis Date   • Abdominal pain     periumbilicul   • Abdominal tenderness     Periumbil   • Alopecia    • Anemia    • Anxiety    • Arthritis    • Asthma     as a child   • Back pain    • Bipolar disorder (CMS/HCC)    • Blood in stool    • Body piercing     ears   • Colitis    • Colitis    • Colon polyps    • Depression    • Diabetes mellitus (CMS/HCC)    • Diarrhea    • Dysphagia     Patient reported he has trouble from time to time and that her throat has to be dilated   • Elevated cholesterol     Reported slightly  elevated - taking no medication    • Fractures     Right pinky toe - no surgery required   • GERD (gastroesophageal reflux disease)    • H/O colonoscopy 2013    Terminal ileal biopsies negative. Cecal&ascending biopsie revealed chronic active colitis w/ features cons. w/ inflammatory bowel disease. Transevere colon biopsies revealved chronic active colitis. Desc. colon biopsies revealed chronic active colitis. Rect colon biopsie revealed chronic active colitis. Negative for dysplasia   • Hematemesis    • History of Clostridium difficile infection    • History of transfusion     No reaction to transfusion reported   • Migraine headache    • Nausea     alone   • Pancreatitis    • Restless leg    • Tattoos     x9   • Universal ulcerative colitis (CMS/HCC)    • UTI (urinary tract infection)    • Weight loss        Past Surgical History:   Procedure Laterality Date   • BREAST SURGERY Left     breast lump removed benign   •  SECTION     • CHOLECYSTECTOMY      for stone disease   • COLONOSCOPY     • COLONOSCOPY N/A 2020    Procedure: COLONOSCOPY;  Surgeon: Alonzo He MD;  Location: [facility] ENDOSCOPY;  Service: Gastroenterology;  Laterality: N/A;   • DILATATION AND CURETTAGE     • ENDOSCOPY  2013    Erosive esophagitis, grade 2; erosive gastritis; small sliding hiatal hernia less than 3 cm, erosive deodenitis duodenal polyp.No gan mucosa.Second portion duedenal biopsy neg. Second postion of dudoenal biopsy revealed polypoid intestinal mucosa w/ lymphoid aggregate. 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 no helicobacter pylori. Negative for mateaplasia, dysplasia   • HIP SURGERY Right    • HYSTERECTOMY     • LEEP         Family History   Problem Relation Age of Onset   • Heart failure Father        Social History     Socioeconomic History   • Marital status: Single     Spouse name: Not on file   • Number of children: Not on file   • Years of  education: Not on file   • Highest education level: Not on file   Tobacco Use   • Smoking status: Never Smoker   • Smokeless tobacco: Never Used   Substance and Sexual Activity   • Alcohol use: No   • Drug use: No   • Sexual activity: Defer         Current Facility-Administered Medications:   •  acetaminophen (TYLENOL) tablet 650 mg, 650 mg, Oral, Q4H PRN **OR** acetaminophen (TYLENOL) 160 MG/5ML solution 650 mg, 650 mg, Oral, Q4H PRN **OR** acetaminophen (TYLENOL) suppository 650 mg, 650 mg, Rectal, Q4H PRN, Deb Boles DO  •  [START ON 9/17/2020] buPROPion SR (WELLBUTRIN SR) 12 hr tablet 100 mg, 100 mg, Oral, QAM, Deb Boles DO  •  dextrose (D50W) 25 g/ 50mL Intravenous Solution 25 g, 25 g, Intravenous, Q15 Min PRN, Deb Boles DO  •  dextrose (GLUTOSE) oral gel 1 tube, 1 tube, Oral, Q15 Min PRN, Deb Boles DO  •  dicyclomine (BENTYL) capsule 10 mg, 10 mg, Oral, TID PRN, Deb Boles DO  •  diphenhydrAMINE (BENADRYL) capsule 25 mg, 25 mg, Oral, Q8H, Deb Boles DO, 25 mg at 09/16/20 1734  •  gabapentin (NEURONTIN) capsule 800 mg, 800 mg, Oral, Q8H, Deb Boles DO  •  glucagon (human recombinant) (GLUCAGEN DIAGNOSTIC) injection 1 mg, 1 mg, Subcutaneous, PRN, Deb Boles DO  •  HYDROmorphone (DILAUDID) injection 0.5 mg, 0.5 mg, Intravenous, Q2H PRN, Deb Boles DO, 0.5 mg at 09/16/20 1746  •  insulin aspart (novoLOG) injection 0-14 Units, 0-14 Units, Subcutaneous, TID AC, Deb Boles DO, 14 Units at 09/16/20 1734  •  insulin detemir (LEVEMIR) injection 25 Units, 25 Units, Subcutaneous, Nightly, Deb Boles DO  •  methylPREDNISolone sodium succinate (SOLU-Medrol) injection 20 mg, 20 mg, Intravenous, Q8H, Deb Boles DO, 20 mg at 09/16/20 1746  •  prochlorperazine (COMPAZINE) injection 2.5 mg, 2.5 mg, Intravenous, Q6H PRN, Deb Boles DO  •  promethazine  "(PHENERGAN) tablet 12.5 mg, 12.5 mg, Oral, Q8H PRN, Deb Boles DO  •  sodium chloride 0.9 % flush 10 mL, 10 mL, Intravenous, Q12H, Deb Boles DO  •  sodium chloride 0.9 % flush 10 mL, 10 mL, Intravenous, PRN, Deb Boles DO  •  sodium chloride 0.9 % infusion, 100 mL/hr, Intravenous, Continuous, Deb Boles DO, Last Rate: 100 mL/hr at 09/16/20 1741, 100 mL/hr at 09/16/20 1741  •  topiramate (TOPAMAX) tablet 50 mg, 50 mg, Oral, Nightly, Deb Boles DO    Allergies   Allergen Reactions   • Hydrochlorothiazide Hives   • Penicillins Hives   • Remicade [Infliximab] Anaphylaxis   • Soybean-Containing Drug Products Swelling     \"Soy sauce\"   • Xeljanz [Tofacitinib Citrate] Other (See Comments)     BLISTERS IN THROAT & ON ARMS   • Zofran [Ondansetron Hcl] Headache     migraines   • Reglan [Metoclopramide] Other (See Comments)     States feels weird when takes it       Review of Systems   Constitutional: Positive for fatigue and unexpected weight loss. Negative for appetite change and fever.   Respiratory: Negative for cough, shortness of breath and wheezing.    Cardiovascular: Negative for chest pain, palpitations and leg swelling.   Gastrointestinal: Positive for abdominal distention, abdominal pain, blood in stool and diarrhea. Negative for anal bleeding, constipation, nausea, rectal pain, vomiting, GERD and indigestion.   Genitourinary: Negative for dysuria, frequency and hematuria.   Musculoskeletal: Positive for arthralgias. Negative for back pain and joint swelling.   Skin: Negative for color change, rash and skin lesions.   Neurological: Negative for dizziness, syncope, speech difficulty, weakness, headache and memory problem.   Hematological: Negative for adenopathy. Does not bruise/bleed easily.   Psychiatric/Behavioral: Negative for agitation, behavioral problems, suicidal ideas and depressed mood.        The following portions of the patient's history " "were reviewed and updated as appropriate: allergies, current medications, past family history, past medical history, past social history, past surgical history and problem list.    Objective     Vitals:    09/16/20 1626   BP: 131/88   BP Location: Left arm   Patient Position: Lying   Pulse: 110   Resp: 18   Temp: 98.9 °F (37.2 °C)   TempSrc: Oral   SpO2: 98%   Weight: 94.6 kg (208 lb 8.9 oz)   Height: 160 cm (62.99\")       Physical Exam  Vitals signs and nursing note reviewed.   Constitutional:       Appearance: Normal appearance. She is well-developed.   HENT:      Head: Normocephalic and atraumatic.      Right Ear: External ear normal.      Left Ear: External ear normal.      Mouth/Throat:      Mouth: Mucous membranes are moist.      Pharynx: Oropharynx is clear.   Eyes:      Conjunctiva/sclera: Conjunctivae normal.      Pupils: Pupils are equal, round, and reactive to light.   Neck:      Musculoskeletal: Normal range of motion.      Thyroid: No thyromegaly.      Trachea: No tracheal deviation.   Cardiovascular:      Rate and Rhythm: Normal rate and regular rhythm.      Heart sounds: No murmur.   Pulmonary:      Effort: Pulmonary effort is normal. No respiratory distress.      Breath sounds: Normal breath sounds.   Abdominal:      General: Bowel sounds are normal. There is no distension.      Palpations: Abdomen is soft. There is no mass.      Tenderness: There is no abdominal tenderness (Mild mid and upper abdominal discomfort on deep palpation).      Hernia: No hernia is present.   Musculoskeletal: Normal range of motion.   Skin:     General: Skin is warm and dry.   Neurological:      Mental Status: She is alert and oriented to person, place, and time.      Cranial Nerves: No cranial nerve deficit.      Sensory: No sensory deficit.   Psychiatric:         Behavior: Behavior normal.         Thought Content: Thought content normal.         Judgment: Judgment normal.         Results from last 7 days   Lab Units " 09/16/20  1655   SODIUM mmol/L 132*   POTASSIUM mmol/L 3.5   CHLORIDE mmol/L 96*   CO2 mmol/L 17.5*   BUN mg/dL 6   CREATININE mg/dL 0.74   CALCIUM mg/dL 9.9   ALBUMIN g/dL 4.30   BILIRUBIN mg/dL 0.4   ALK PHOS U/L 231*   ALT (SGPT) U/L 88*   AST (SGOT) U/L 49*   GLUCOSE mg/dL 446*   WBC 10*3/mm3 13.12*   HEMOGLOBIN g/dL 14.6   PLATELETS 10*3/mm3 345       Imaging Results (Last 24 Hours)     ** No results found for the last 24 hours. **          Assessment / Plan      Assessment/Recommendations:   1. Ulcerative pancolitis, with a suspected flare  I am not entirely convinced that all her symptoms are from UC flare.  However her recent colonoscopy done did reveal a moderate colitis involving the left side colon and the transverse colon.   Patient may have a associated irritable bowel syndrome.  Her CRP is almost normal.  No fever chills.   Clinical examination is benign    She had multiple treatment regimens in the past with various reactions.  He initially states she was treated with mesalamine which caused her a severe reaction to the bronchospasm as per patient.  Subsequently she had a Remicade that also caused a reaction.  She was put on Humira which did not help her much and was.  She was also put on the Stelara which caused the throat swelling.  She was on vedolizumab for the last 3 years.  She continued to have a multiple flareups and recent colonoscopy revealed a significant colitis despite on Entyvio for which Entyvio was discontinued.  She was started on Xeljanz recently however she again developed multiple blistering lesions in the oral cavity face and the hands for which Zosyn was discontinued.    There are not many medications left to try.  She may end up with a subtotal colectomy in the near future given her significant issues with the allergies with the medications.  We will consider Imuran with vedolizumab again.     For now we will start her on Solu-Medrol 20 mg IV every 8 for 48 hours followed by  prednisone tapering  Send a fecal calprotectin level  Send a stool C. Difficile  Will start on Imodium 2 mg p.o. 3 times daily for 2 days  Will also give her zoster vaccine before starting any Biologics at this time  She also needs hepatitis A vaccine as an outpatient    2.  Nonalcoholic steatohepatitis with elevated liver enzymes  Her hepatitis panel were negative.  RAQUEL anti-smooth muscle antibody antimitochondrial antibody were negative.  Alpha-1 antitrypsin level, ceruloplasmin level were normal.  Transferrin saturation was normal  Recommended to lose at least a 5 pounds weight      Thank you very much for letting me participate in the care of this patient.  Please do not hesitate to call me if you have any questions.    Alonzo He MD  [facility]  9/16/2020  19:24 EDT    Please note that portions of this note may have been completed with a voice recognition program.     Electronically signed by Alonzo He MD at 09/16/20 5428

## 2020-09-17 NOTE — CONSULTS
"Diabetes Education  Assessment/Teaching    Patient Name:  Thais Hobson  YOB: 1985  MRN: [id number]  Admit Date:  9/16/2020      Assessment Date:  9/17/2020    Most Recent Value   General Information    Referral From:  Other (comment) [Pt states \"I forget to take my insulin sometimes & other times, I'm afraid to take it because I'm not eating much & it might make me low. Says was dx type 2 early 20's, prescribed Metformin briefly, then changed to Humalog Mix 75/25 BID.]   Height  160 cm (63\")   Height Method  Stated   Weight  94.6 kg (208 lb 8.9 oz)   Weight Method  Bed scale   Pregnancy Assessment   Diabetes History   What type of diabetes do you have?  Other (comment) [T2 per pt. Says interested in establishing with endocrinologist & trying CGM or insulin pump. Encouraged to do so, marina in light of GI dx along with diabetes. States has cousin with T 1 DM \"who is wasting away\".]   Education Preferences   What areas of diabetes would you like to learn about?  -- [Given [facility] DM & BD Insulin Know-How/BD insulin adm instructions for both syringe/vial & Insulin Pen & contents reviewed together.Also discussed infor below with Living Well with Diabetes bk.]   Barriers to Learning  other (comment)   Nutrition Information   Assessment Topics   DM Goals            Most Recent Value   DM Education Needs   Meter  -- [States has working glucometer & tests blood glucose \"4-6 times/day\", including prior to adm. of insulin. Encouraged to continue testing 3-4 times/day & log results on logs given to share with physicians. Say wants to get CGM.]   Meter Type  -- [Discussed rationale for testing blood glucose prior to adm of any fast-acting insulin. Says she only received 50 strips/mo from pharmacy-Informed that she should be able to get 100/mo but that would have to be ordered that way from pharmacy.]   Frequency of Testing  -- [Also discussed testing PRN s/s hypoglycemia/hyperglycemia,  more often if feeling " ill,  before driving,  before, during or after extra activity,  some 2 hr post-meal results & to log readings.]   Blood Glucose Target  -- [Current ADA target goals reviewed with advice to discuss with physicians to individualize for her. Reviewed how A1C test works & her current result (11.5)]   Blood Glucose Target Range  Discussed importance of taking meds as directed & keeping physicians up to date on how they are working for her, including reporting hyper/hyperglycemia.   Medication  Insulin [Reviewed physiology & basics of how her insulin works. Also talked briefly about other insulins used to tx DM & that she could further discuss with endocrinology or PCP. Says she is interested in obtaining insulin pump.]   Problem Solving  Hypoglycemia, Hyperglycemia, Sick days, Signs, Symptoms, Treatment [Reviewed s/s & tx of above. Sick day care as per Living Well with Diabetes book reviewed with advice to discuss with her physicians to individualize for her.]   Reducing Risks  A1C testing, Eye exam, Dental exam, Foot care [Importance of preventive healthcare measures reviewed, including above.]   Healthy Eating  -- [Healthy eating via My Plate reviewed. Briefly discussed concept of carb counting. Advised to follow instructions of physicians for this in light of GI issues & perhaps schedule appt with RD for further help.]   Physical Activity  -- [Benefits of physical activity briefly reviewed. Advised to talk with physicians about what type of activity best for her & how best to go about it.]   Healthy Coping  -- [Effect of both emotional & physical stress on blood glucose briefly reviewed.]   Motivation  Engaged   Teaching Method  Explanation, Discussion, Handouts, Teach back   Patient Response  Verbalized understanding            Other Comments:  Diabetes education done as above. Informed that she could contact me if questions. Informed of availability of diabetes classes/nutrition counseling here at  [facility].        Electronically signed by:  Carolyn Byrd RN  09/17/20 11:41 EDT

## 2020-09-17 NOTE — PLAN OF CARE
Goal Outcome Evaluation:  Plan of Care Reviewed With: patient  Progress: improving  Outcome Summary: Patient complains of abdominal pain.  Given PRN pain medications as ordered with relief from patient.  No other complaints, only one episode of bowel movement.  Continue fluid replacement and steroids.  Monitor blood glucose.

## 2020-09-17 NOTE — CONSULTS
"Adult Nutrition  Assessment/PES    Patient Name:  Thais Hobson  YOB: 1985  MRN: [id number]  Admit Date:  9/16/2020    Assessment Date:  9/17/2020    Comments:    Recommend:  1. Consider advancing diet order as medically appropriate and tolerated.  2. Encourage PO intake. PO intake average ~75% x 1 meal on clear liquids.  3. RD ordered Boost Breeze BID.  4. Consider a multivitamin with minerals daily.  5. Continue to monitor and replace electrolytes PRN.     RD to follow pt and available PRN.      Reason for Assessment     Row Name 09/17/20 1057          Reason for Assessment    Reason For Assessment  diagnosis/disease state;nurse/nurse practitioner consult;identified at risk by screening criteria     Diagnosis  diabetes diagnosis/complications;gastrointestinal disease;fluid status;other (see comments) Dehydration, Ulcerative colitis, Dehydration, DM 2     Identified At Risk by Screening Criteria  BMI;MST SCORE 2+           Anthropometrics     Row Name 09/17/20 1059          Anthropometrics    Height  160 cm (63\")        Ideal Body Weight (IBW)    Ideal Body Weight (IBW) (kg)  52.72         Labs/Tests/Procedures/Meds     Row Name 09/17/20 1058          Labs/Procedures/Meds    Lab Results Reviewed  reviewed, pertinent     Lab Results Comments  Low: Na+, Cr High: Gluc, HgbA1c, ALT, CRP        Medications    Pertinent Medications Reviewed  reviewed, pertinent     Pertinent Medications Comments  Novolog, Levemir, Solu-medrol         Physical Findings     Row Name 09/17/20 1059          Physical Findings    Overall Physical Appearance  obese         Estimated/Assessed Needs     Row Name 09/17/20 1059          Calculation Measurements    Weight Used For Calculations  54.4 kg (120 lb) IBW     Height  160 cm (63\")        Estimated/Assessed Needs    Additional Documentation  Calorie Requirements (Group);Protein Requirements (Group);[place]-St. Jeor Equation (Group);Fluid Requirements (Group)        " Calorie Requirements    Estimated Calorie Need Method  [facility]     Estimated Calorie Requirement Comment  1580 - 1780        [facility] Equation    RMR ([facility] Equation)  1213.445     [facility] Activity Factors  -- AF 1.3        Protein Requirements    Weight Used For Protein Calculations  54.4 kg (120 lb) IBW     Est Protein Requirement Amount (gms/kg)  1.5 gm protein 55 - 81 gm     Estimated Protein Requirements (gms/day)  81.65        Fluid Requirements    Estimated Fluid Requirement Method  Jose-Segar Formula     [place]-Segar Method (over 20 kg)  2588.64         Nutrition Prescription Ordered     Row Name 09/17/20 1101          Nutrition Prescription PO    Current PO Diet  Clear Liquid         Evaluation of Received Nutrient/Fluid Intake     Row Name 09/17/20 1101          PO Evaluation    Number of Days PO Intake Evaluated  1 day     Number of Meals  1     % PO Intake  75 Clear liquid diet order               Problem/Interventions:  Problem 1     Row Name 09/17/20 1102          Nutrition Diagnoses Problem 1    Problem 1  Inadequate Intake/Infusion     Inadequate Intake Type  Oral     Macronutrient  Kcal;Fiber;Protein;Carbohydrate;Fat     Micronutrient  Vitamin;Mineral     Etiology (related to)  MNT for Treatment/Condition     Signs/Symptoms (evidenced by)  NPO         Problem 2     Row Name 09/17/20 1102          Nutrition Diagnoses Problem 2    Problem 2  Impaired Nutrient Utilization     Etiology (related to)  Medical Diagnosis     Endocrine  DM Type 2     Signs/Symptoms (evidenced by)  Biochemical     Specific Labs Noted  Glucose;HgbA1C         Problem 3     Row Name 09/17/20 1102          Nutrition Diagnoses Problem 3    Problem 3  Overweight/Obesity     Etiology (related to)  Factors Affecting Nutrition     Food Habit/Preferences  Large Meals     Signs/Symptoms (evidenced by)  BMI     BMI  35 - 39.9           Intervention Goal     Row Name 09/17/20 1103           Intervention Goal    General  Meet nutritional needs for age/condition     PO  Meet estimated needs;Maintain intake;PO intake (%);Advance diet     PO Intake %  75 %     Weight  No significant weight loss         Nutrition Intervention     Row Name 09/17/20 1103          Nutrition Intervention    RD/Tech Action  Follow Tx progress;Encourage intake;Recommend/ordered     Recommended/Ordered  Supplement;Diet         Nutrition Prescription     Row Name 09/17/20 1103          Nutrition Prescription PO    PO Prescription  Begin/change diet;Begin/change supplement     Begin/Change Diet to  Full Liquid     Supplement  Boost Breeze     Supplement Frequency  2 times a day     New PO Prescription Ordered?  Yes Supplement ordered        Other Orders    Obtain Weight  Daily     Obtain Weight Ordered?  No, recommended     Supplement  Vitamin mineral supplement     Supplement Ordered?  No, recommended     Other  Continue to monitor and replace electrolytes PRN         Education/Evaluation     Row Name 09/17/20 1107          Education    Education  Will Instruct as appropriate        Monitor/Evaluation    Monitor  Per protocol;I&O;PO intake;Supplement intake;Pertinent labs;Weight;Skin status           Electronically signed by:  Caridad Watkins RD  09/17/20 11:04 EDT

## 2020-09-17 NOTE — PROGRESS NOTES
Case Management Discharge Note           Provided Post Acute Provider List?: N/A  Provided Post Acute Provider Quality & Resource List?: N/A    Selected Continued Care - Admitted Since 9/16/2020     Destination    No services have been selected for the patient.              Durable Medical Equipment    No services have been selected for the patient.              Dialysis/Infusion    No services have been selected for the patient.              Home Medical Care    No services have been selected for the patient.              Therapy    No services have been selected for the patient.              Community Resources    No services have been selected for the patient.

## 2020-09-17 NOTE — PROGRESS NOTES
[facility]    Name:  Thais Hobson   Age:  35 y.o.  Sex:  female  :  1985  MRN:  [id number]   Visit Number:  [id number]  Admission Date:  2020  Date Of Service:  20  Primary Care Physician:  Lv Sanchez DO     LOS: 1 day :  Patient Care Team:  Lv Sanchez DO as PCP - General (Family Medicine)  Lv Sanchez DO as Consulting Physician (Family Medicine):    Chief Complaint:      Follow-up on abdominal pain    Subjective / Interval History:     Patient feels somewhat better today.  Still with abdominal cramping.  Only 1-2 stools overnight.  Is requesting to advance her diet today.  No fevers or chills.    Prior work-up:  The patient is a 35-year-old female with history of ulcerative colitis who had complained of 1 week history of worsening abdominal pain, bloody stools, and dehydration.  She has been followed with gastroenterology by Dr. He.  Patient had actually went to the emergency room in outlying facility yesterday was given IV fluids and discharged at that time.  She had a tele-visit with her PCP today, with ongoing symptoms that have failed to improve.  Discussed the case with me for potential direct admission.     Of note, patient has been diagnosed with ulcerative colitis since roughly 23 years of age.  She states that she has been on multiple medications for this including immunomodulators, however has either failed the treatment or had allergic reactions to these medications.  Most recently had been on Xeljanz.  She states that she is still having 15 or so diarrhea like bloody stools daily.  Has low-grade fevers at home.  Has not really ate or drink much in the last 4 to 5 days.  She lives with her mother and her 2 children.  She denies any recent medication changes otherwise.    Review of Systems:     General ROS: Patient denies any fevers, chills or loss of consciousness.  Respiratory ROS: Denies cough or  shortness of breath.  Cardiovascular ROS: Denies chest pain or palpitations. No history of exertional chest pain.  Gastrointestinal ROS: Abdominal pain  Neurological ROS: Denies any focal weakness. No loss of consciousness. Denies any numbness.  Dermatological ROS: Denies any redness or pruritis.    Vital Signs:    Temp:  [98.1 °F (36.7 °C)-98.9 °F (37.2 °C)] 98.1 °F (36.7 °C)  Heart Rate:  [] 85  Resp:  [18] 18  BP: (122-132)/(80-92) 130/88    Intake and output:    I/O last 3 completed shifts:  In: 2460 [P.O.:1560; I.V.:900]  Out: 600 [Urine:600]  I/O this shift:  In: 720 [P.O.:720]  Out: -     Physical Examination:    General Appearance:  Alert and cooperative, not in any acute distress.   Head:  Atraumatic and normocephalic, without obvious abnormality.   Eyes:          PERRLA, conjunctivae and sclerae normal, no Icterus. No pallor. Extraocular movements are within normal limits.   Neck: Supple, trachea midline, no thyromegaly.   Lungs:   Breath sounds heard bilaterally equally.  No crackles or wheezing. No Pleural rub or bronchial breathing.   Heart:  Normal S1 and S2, no murmur, no gallop, no rub. No JVD   Abdomen:   Normal bowel sounds, no masses, no organomegaly. Soft, mildly tender left lower quadrant, non-distended, no guarding, no rebound tenderness.   Extremities: Moves all extremities well, no edema, no cyanosis, no clubbing.   Skin: No bleeding, bruising or rash.  Multiple tattoos noted   Neurologic: Awake, alert and oriented times 3. Moves all 4 extremities equally.     Laboratory results:    Results from last 7 days   Lab Units 09/17/20  0733 09/16/20  1655   SODIUM mmol/L 134* 132*   POTASSIUM mmol/L 4.0 3.5   CHLORIDE mmol/L 99 96*   CO2 mmol/L 20.6* 17.5*   BUN mg/dL 6 6   CREATININE mg/dL 0.54* 0.74   CALCIUM mg/dL 9.1 9.9   BILIRUBIN mg/dL 0.3 0.4   ALK PHOS U/L 174* 231*   ALT (SGPT) U/L 62* 88*   AST (SGOT) U/L 26 49*   GLUCOSE mg/dL 415* 446*     Results from last 7 days   Lab Units  09/17/20  0733 09/16/20  1655   WBC 10*3/mm3 15.04* 13.12*   HEMOGLOBIN g/dL 12.7 14.6   HEMATOCRIT % 38.3 45.1   PLATELETS 10*3/mm3 291 345                       I have reviewed the patient's laboratory results.    Radiology results:    Imaging Results (Last 24 Hours)     ** No results found for the last 24 hours. **          I have reviewed the patient's radiology reports.    Medication Review:     I have reviewed the patients active and prn medications.       Ulcerative colitis, acute (CMS/[facility])      Assessment:    1.  Acute ulcerative colitis flare, present on admission  2.  Abdominal pain with diarrhea  3.  Dehydration  4.  Uncontrolled type 2 diabetes  5.  Obesity  6.  History of PTSD  7.  Bipolar disorder  8.  GERD    Plan:    Patient overall improving.  Vitals are stable.  Labs overall unremarkable.  Continue with steroids per gastroenterology recommendations.  Continue with PRN pain control and antiemetics.  Advance diet to GI soft.  C. difficile and GI panel were negative.  Dietitian consult for diabetes.  Continue Levemir with sliding scale coverage.  Continue Imodium.    Anticipate another 1 to 2-day stay remaining depending upon further improvement.    Deb Boles DO  09/17/20  14:25 EDT    Dictated utilizing Dragon dictation.

## 2020-09-17 NOTE — PLAN OF CARE
Goal Outcome Evaluation:  Plan of Care Reviewed With: patient  Progress: improving  Outcome Summary: VSS.  Pain medication given for abdominal pain.  Pt. had a bowel movement today.  Medications given per orders.  Will monitor.

## 2020-09-18 ENCOUNTER — APPOINTMENT (OUTPATIENT)
Dept: MRI IMAGING | Facility: HOSPITAL | Age: 35
End: 2020-09-18

## 2020-09-18 LAB
ANION GAP SERPL CALCULATED.3IONS-SCNC: 11.5 MMOL/L (ref 5–15)
BUN SERPL-MCNC: 6 MG/DL (ref 6–20)
BUN/CREAT SERPL: 10.9 (ref 7–25)
CALCIUM SPEC-SCNC: 8.9 MG/DL (ref 8.6–10.5)
CHLORIDE SERPL-SCNC: 103 MMOL/L (ref 98–107)
CO2 SERPL-SCNC: 21.5 MMOL/L (ref 22–29)
CREAT SERPL-MCNC: 0.55 MG/DL (ref 0.57–1)
DEPRECATED RDW RBC AUTO: 41.4 FL (ref 37–54)
ERYTHROCYTE [DISTWIDTH] IN BLOOD BY AUTOMATED COUNT: 13.5 % (ref 12.3–15.4)
GFR SERPL CREATININE-BSD FRML MDRD: 127 ML/MIN/1.73
GFR SERPL CREATININE-BSD FRML MDRD: >150 ML/MIN/1.73
GLUCOSE BLDC GLUCOMTR-MCNC: 301 MG/DL (ref 70–130)
GLUCOSE BLDC GLUCOMTR-MCNC: 318 MG/DL (ref 70–130)
GLUCOSE BLDC GLUCOMTR-MCNC: 341 MG/DL (ref 70–130)
GLUCOSE BLDC GLUCOMTR-MCNC: 366 MG/DL (ref 70–130)
GLUCOSE SERPL-MCNC: 367 MG/DL (ref 65–99)
HCT VFR BLD AUTO: 37.5 % (ref 34–46.6)
HGB BLD-MCNC: 12.4 G/DL (ref 12–15.9)
MCH RBC QN AUTO: 27.8 PG (ref 26.6–33)
MCHC RBC AUTO-ENTMCNC: 33.1 G/DL (ref 31.5–35.7)
MCV RBC AUTO: 84.1 FL (ref 79–97)
PLATELET # BLD AUTO: 268 10*3/MM3 (ref 140–450)
PMV BLD AUTO: 10.2 FL (ref 6–12)
POTASSIUM SERPL-SCNC: 4.1 MMOL/L (ref 3.5–5.2)
RBC # BLD AUTO: 4.46 10*6/MM3 (ref 3.77–5.28)
SODIUM SERPL-SCNC: 136 MMOL/L (ref 136–145)
WBC # BLD AUTO: 13.22 10*3/MM3 (ref 3.4–10.8)

## 2020-09-18 PROCEDURE — 82962 GLUCOSE BLOOD TEST: CPT

## 2020-09-18 PROCEDURE — 85027 COMPLETE CBC AUTOMATED: CPT | Performed by: FAMILY MEDICINE

## 2020-09-18 PROCEDURE — 63710000001 DIPHENHYDRAMINE PER 50 MG: Performed by: FAMILY MEDICINE

## 2020-09-18 PROCEDURE — 80048 BASIC METABOLIC PNL TOTAL CA: CPT | Performed by: FAMILY MEDICINE

## 2020-09-18 PROCEDURE — A9577 INJ MULTIHANCE: HCPCS | Performed by: FAMILY MEDICINE

## 2020-09-18 PROCEDURE — 0 GADOBENATE DIMEGLUMINE 529 MG/ML SOLUTION: Performed by: FAMILY MEDICINE

## 2020-09-18 PROCEDURE — 63710000001 INSULIN ASPART PER 5 UNITS: Performed by: FAMILY MEDICINE

## 2020-09-18 PROCEDURE — 99232 SBSQ HOSP IP/OBS MODERATE 35: CPT | Performed by: FAMILY MEDICINE

## 2020-09-18 PROCEDURE — 74183 MRI ABD W/O CNTR FLWD CNTR: CPT

## 2020-09-18 PROCEDURE — 25010000002 HYDROMORPHONE 1 MG/ML SOLUTION: Performed by: FAMILY MEDICINE

## 2020-09-18 PROCEDURE — 25010000002 METHYLPREDNISOLONE PER 40 MG: Performed by: FAMILY MEDICINE

## 2020-09-18 PROCEDURE — 63710000001 INSULIN DETEMIR PER 5 UNITS: Performed by: FAMILY MEDICINE

## 2020-09-18 PROCEDURE — 63710000001 PROMETHAZINE PER 12.5 MG: Performed by: FAMILY MEDICINE

## 2020-09-18 RX ORDER — SODIUM CHLORIDE 9 MG/ML
50 INJECTION, SOLUTION INTRAVENOUS CONTINUOUS
Status: DISCONTINUED | OUTPATIENT
Start: 2020-09-18 | End: 2020-09-23 | Stop reason: HOSPADM

## 2020-09-18 RX ORDER — HYDROCODONE BITARTRATE AND ACETAMINOPHEN 5; 325 MG/1; MG/1
1 TABLET ORAL EVERY 6 HOURS PRN
Status: DISCONTINUED | OUTPATIENT
Start: 2020-09-18 | End: 2020-09-23 | Stop reason: HOSPADM

## 2020-09-18 RX ORDER — PREDNISONE 20 MG/1
40 TABLET ORAL
Status: DISCONTINUED | OUTPATIENT
Start: 2020-09-19 | End: 2020-09-23 | Stop reason: HOSPADM

## 2020-09-18 RX ADMIN — HYDROMORPHONE HYDROCHLORIDE 0.5 MG: 1 INJECTION, SOLUTION INTRAMUSCULAR; INTRAVENOUS; SUBCUTANEOUS at 06:53

## 2020-09-18 RX ADMIN — LOPERAMIDE HYDROCHLORIDE 2 MG: 2 CAPSULE ORAL at 08:43

## 2020-09-18 RX ADMIN — INSULIN ASPART 10 UNITS: 100 INJECTION, SOLUTION INTRAVENOUS; SUBCUTANEOUS at 07:01

## 2020-09-18 RX ADMIN — INSULIN ASPART 16 UNITS: 100 INJECTION, SOLUTION INTRAVENOUS; SUBCUTANEOUS at 17:13

## 2020-09-18 RX ADMIN — INSULIN ASPART 10 UNITS: 100 INJECTION, SOLUTION INTRAVENOUS; SUBCUTANEOUS at 12:41

## 2020-09-18 RX ADMIN — GABAPENTIN 800 MG: 400 CAPSULE ORAL at 13:32

## 2020-09-18 RX ADMIN — BUPROPION HYDROCHLORIDE 100 MG: 100 TABLET, FILM COATED, EXTENDED RELEASE ORAL at 06:50

## 2020-09-18 RX ADMIN — PANTOPRAZOLE SODIUM 40 MG: 40 TABLET, DELAYED RELEASE ORAL at 17:12

## 2020-09-18 RX ADMIN — TOPIRAMATE 50 MG: 25 TABLET, FILM COATED ORAL at 21:13

## 2020-09-18 RX ADMIN — LOPERAMIDE HYDROCHLORIDE 2 MG: 2 CAPSULE ORAL at 17:12

## 2020-09-18 RX ADMIN — PROMETHAZINE HYDROCHLORIDE 12.5 MG: 12.5 TABLET ORAL at 00:21

## 2020-09-18 RX ADMIN — METHYLPREDNISOLONE SODIUM SUCCINATE 20 MG: 40 INJECTION, POWDER, FOR SOLUTION INTRAMUSCULAR; INTRAVENOUS at 17:13

## 2020-09-18 RX ADMIN — SODIUM CHLORIDE 50 ML/HR: 9 INJECTION, SOLUTION INTRAVENOUS at 18:09

## 2020-09-18 RX ADMIN — HYDROMORPHONE HYDROCHLORIDE 0.5 MG: 1 INJECTION, SOLUTION INTRAMUSCULAR; INTRAVENOUS; SUBCUTANEOUS at 09:25

## 2020-09-18 RX ADMIN — HYDROMORPHONE HYDROCHLORIDE 0.5 MG: 1 INJECTION, SOLUTION INTRAMUSCULAR; INTRAVENOUS; SUBCUTANEOUS at 20:07

## 2020-09-18 RX ADMIN — PANTOPRAZOLE SODIUM 40 MG: 40 TABLET, DELAYED RELEASE ORAL at 06:50

## 2020-09-18 RX ADMIN — INSULIN DETEMIR 40 UNITS: 100 INJECTION, SOLUTION SUBCUTANEOUS at 21:13

## 2020-09-18 RX ADMIN — HYDROMORPHONE HYDROCHLORIDE 0.5 MG: 1 INJECTION, SOLUTION INTRAMUSCULAR; INTRAVENOUS; SUBCUTANEOUS at 23:02

## 2020-09-18 RX ADMIN — METHYLPREDNISOLONE SODIUM SUCCINATE 20 MG: 40 INJECTION, POWDER, FOR SOLUTION INTRAMUSCULAR; INTRAVENOUS at 00:08

## 2020-09-18 RX ADMIN — GADOBENATE DIMEGLUMINE 18 ML: 529 INJECTION, SOLUTION INTRAVENOUS at 10:45

## 2020-09-18 RX ADMIN — SODIUM CHLORIDE, PRESERVATIVE FREE 10 ML: 5 INJECTION INTRAVENOUS at 21:15

## 2020-09-18 RX ADMIN — DIPHENHYDRAMINE HYDROCHLORIDE 25 MG: 25 CAPSULE ORAL at 00:08

## 2020-09-18 RX ADMIN — GABAPENTIN 800 MG: 400 CAPSULE ORAL at 21:13

## 2020-09-18 RX ADMIN — SODIUM CHLORIDE 50 ML/HR: 9 INJECTION, SOLUTION INTRAVENOUS at 12:30

## 2020-09-18 RX ADMIN — DIPHENHYDRAMINE HYDROCHLORIDE 25 MG: 25 CAPSULE ORAL at 18:09

## 2020-09-18 RX ADMIN — METHYLPREDNISOLONE SODIUM SUCCINATE 20 MG: 40 INJECTION, POWDER, FOR SOLUTION INTRAMUSCULAR; INTRAVENOUS at 08:43

## 2020-09-18 RX ADMIN — HYDROMORPHONE HYDROCHLORIDE 0.5 MG: 1 INJECTION, SOLUTION INTRAMUSCULAR; INTRAVENOUS; SUBCUTANEOUS at 14:42

## 2020-09-18 RX ADMIN — SODIUM CHLORIDE 100 ML/HR: 9 INJECTION, SOLUTION INTRAVENOUS at 00:08

## 2020-09-18 RX ADMIN — INSULIN DETEMIR 30 UNITS: 100 INJECTION, SOLUTION SUBCUTANEOUS at 08:44

## 2020-09-18 RX ADMIN — GABAPENTIN 800 MG: 400 CAPSULE ORAL at 06:50

## 2020-09-18 RX ADMIN — DIPHENHYDRAMINE HYDROCHLORIDE 25 MG: 25 CAPSULE ORAL at 13:35

## 2020-09-18 RX ADMIN — HYDROCODONE BITARTRATE AND ACETAMINOPHEN 1 TABLET: 5; 325 TABLET ORAL at 13:35

## 2020-09-18 RX ADMIN — HYDROMORPHONE HYDROCHLORIDE 0.5 MG: 1 INJECTION, SOLUTION INTRAMUSCULAR; INTRAVENOUS; SUBCUTANEOUS at 00:21

## 2020-09-18 RX ADMIN — SODIUM CHLORIDE, PRESERVATIVE FREE 10 ML: 5 INJECTION INTRAVENOUS at 08:43

## 2020-09-18 NOTE — PLAN OF CARE
Goal Outcome Evaluation:  Plan of Care Reviewed With: patient  Progress: improving  Outcome Summary: VSS.  Pain controlled with PRN pain medications.  No further bowel movements at this time.  Continue to monitor and provide fluid replacement.

## 2020-09-18 NOTE — PROGRESS NOTES
"Adult Nutrition  Assessment/PES    Patient Name:  Thais Hobson  YOB: 1985  MRN: [id number]  Admit Date:  9/16/2020    Assessment Date:  9/18/2020    Comments:    Recommend:  1. Continue current diet order as medically appropriate and tolerated.  2. Encourage PO intake. Average intake ~75% x 2 meals.  3. RD ordered HS snack.  4. Consider a multivitamin with minerals daily.       RD to follow pt and available PRN.      Reason for Assessment     Row Name 09/18/20 1503          Reason for Assessment    Reason For Assessment  follow-up protocol     Diagnosis  diabetes diagnosis/complications;gastrointestinal disease;fluid status;other (see comments) Dehydration, Ulcerative colitis, Dehydration, DM 2     Identified At Risk by Screening Criteria  BMI;MST SCORE 2+           Anthropometrics     Row Name 09/18/20 1505          Anthropometrics    Height  160 cm (62.99\")        Ideal Body Weight (IBW)    Ideal Body Weight (IBW) (kg)  52.7         Labs/Tests/Procedures/Meds     Row Name 09/18/20 1503          Labs/Procedures/Meds    Lab Results Reviewed  reviewed, pertinent     Lab Results Comments  Low: Cr; High: Gluc, HgbA1c, ALT, CRP        Medications    Pertinent Medications Reviewed  reviewed, pertinent     Pertinent Medications Comments  Novolog, Levemir, Imodium, Prontonix, Prednisone         Physical Findings     Row Name 09/18/20 1505          Physical Findings    Overall Physical Appearance  obese         Estimated/Assessed Needs     Row Name 09/18/20 1505          Calculation Measurements    Weight Used For Calculations  52.7 kg (116 lb 3.6 oz) Ideal BW     Height  160 cm (62.99\")        Estimated/Assessed Needs    Additional Documentation  Calorie Requirements (Group);Protein Requirements (Group);[place]-St. Jeor Equation (Group);Fluid Requirements (Group)        Calorie Requirements    Weight Used For Calorie Calculations  52.7 kg (116 lb 3.6 oz) Ideal BW     Estimated Calorie Need Method  " [place]-St Jeor     Estimated Calorie Requirement Comment  0219-1522        [place]-St. Jeor Equation    RMR ([place]-St. Jeor Equation)  1196.390968721310933     [place]-St. Jeor Activity Factors  1.2     Activity Factors ([place]-St. Jeor)  1435.092298090698291        Protein Requirements    Weight Used For Protein Calculations  52.7 kg (116 lb 3.6 oz) Ideal BW     Est Protein Requirement Amount (gms/kg)  1.5 gm protein 52 - 79 gm     Estimated Protein Requirements (gms/day)  79.08        Fluid Requirements    Estimated Fluid Requirement Method  Jose-Segar Formula     Jose-Segar Method (over 20 kg)  2554.4         Nutrition Prescription Ordered     Row Name 09/18/20 1508          Nutrition Prescription PO    Current PO Diet  Regular     Supplement  Boost Breeze (Ensure)     Supplement Frequency  2 times a day     Common Modifiers  GI Soft/[place];Gluten Free;Consistent Carbohydrate         Evaluation of Received Nutrient/Fluid Intake     Row Name 09/18/20 1505          PO Evaluation    Number of Days PO Intake Evaluated  1 day     Number of Meals  2     % PO Intake  75               Problem/Interventions:  Problem 1     Row Name 09/18/20 1512          Nutrition Diagnoses Problem 1    Problem 1  Impaired Nutrient Utilization     Etiology (related to)  Medical Diagnosis     Endocrine  DM2     Signs/Symptoms (evidenced by)  Biochemical     Specific Labs Noted  Glucose;HgbA1C         Problem 2     Row Name 09/18/20 1513          Nutrition Diagnoses Problem 2    Problem 2  Overweight/Obesity     Etiology (related to)  Factors Affecting Nutrition     Food Habit/Preferences  Large Meals     Signs/Symptoms (evidenced by)  BMI     BMI  35 - 39.9             Intervention Goal     Row Name 09/18/20 1514          Intervention Goal    General  Meet nutritional needs for age/condition;Maintain nutrition     PO  Tolerate PO;Maintain intake     Weight  No significant weight loss         Nutrition Intervention     Row Name  09/18/20 1515          Nutrition Intervention    RD/Tech Action  Follow Tx progress;Recommend/ordered     Recommended/Ordered  Snack         Nutrition Prescription     Row Name 09/18/20 1515          Nutrition Prescription PO    PO Prescription  Other (comment) HS Snack        Other Orders    Obtain Weight  Daily     Obtain Weight Ordered?  No, recommended     Supplement  Vitamin mineral supplement     Supplement Ordered?  No, recommended         Education/Evaluation     Row Name 09/18/20 1516          Education    Education  Will Instruct as appropriate        Monitor/Evaluation    Monitor  Per protocol;I&O;PO intake;Supplement intake;Pertinent labs;Weight;Skin status           Electronically signed by:  Sofia Tejeda RD  09/18/20 15:16 EDT

## 2020-09-18 NOTE — PROGRESS NOTES
In Patient Consult      Date of Consultation: 2020  Patient Name: Thais Hobson  MRN: [id number]  : 1985     Referring provider: Deb Boles, *    Primary care provider:  Lv Sanchez DO    Reason for consultation: UC flare    Interval history:   2020  Diarrhea improved today. Had 4 BM yesterday. Had three three BM today. Still has tenesmus. No fever. Abdominal pain improved.       2020  This is a 35-year-old female patient with a prior history of ulcerative pan colitis, known to me was sent from primary care physician's office for worsening abdominal pain, nausea and diarrhea.   She was diagnosed with IBD about 12 years ago.  He was followed by Dr. Langston at that time subsequently she changed her physician and started following at [facility].      She had multiple treatment regimens in the past with various reactions.  He initially states she was treated with mesalamine which caused her a severe reaction to the bronchospasm as per patient.  Subsequently she had a Remicade that also caused a reaction.  She was put on Humira which did not help her much and was.  She was also put on the Stelara which caused the throat swelling.  She was on vedolizumab for the last 3 years.  She continued to have a multiple flareups and recent colonoscopy revealed a significant colitis despite on Entyvio for which Entyvio was discontinued.  She was started on Xeljanz recently however she again developed multiple blistering lesions in the oral cavity face and the hands for which Zosyn was discontinued.  Currently patient is not on any medication for the past 3 to 4 weeks time.   She had a few episodes of C. difficile colitis last year in  and .      She is here again with continued worsening diarrhea symptoms.  Associated nausea and abdominal pain diffuse without any fever and chills.  She had a telephone visit with the PCP and was advised to come to the hospital  for further management        Subjective     Past Medical History:   Diagnosis Date   • Abdominal pain     periumbilicul   • Abdominal tenderness     Periumbil   • Alopecia    • Anemia    • Anxiety    • Arthritis    • Asthma     as a child   • Back pain    • Bipolar disorder (CMS/HCC)    • Blood in stool    • Body piercing     ears   • Colitis    • Colitis    • Colon polyps    • Depression    • Diabetes mellitus (CMS/HCC)    • Diarrhea    • Dysphagia     Patient reported he has trouble from time to time and that her throat has to be dilated   • Elevated cholesterol     Reported slightly elevated - taking no medication    • Fractures     Right pinky toe - no surgery required   • GERD (gastroesophageal reflux disease)    • H/O colonoscopy 2013    Terminal ileal biopsies negative. Cecal&ascending biopsie revealed chronic active colitis w/ features cons. w/ inflammatory bowel disease. Transevere colon biopsies revealved chronic active colitis. Desc. colon biopsies revealed chronic active colitis. Rect colon biopsie revealed chronic active colitis. Negative for dysplasia   • Hematemesis    • History of Clostridium difficile infection    • History of transfusion     No reaction to transfusion reported   • Migraine headache    • Nausea     alone   • Pancreatitis    • Restless leg    • Tattoos     x9   • Universal ulcerative colitis (CMS/HCC)    • UTI (urinary tract infection)    • Weight loss        Past Surgical History:   Procedure Laterality Date   • BREAST SURGERY Left     breast lump removed benign   •  SECTION     • CHOLECYSTECTOMY      for stone disease   • COLONOSCOPY     • COLONOSCOPY N/A 2020    Procedure: COLONOSCOPY;  Surgeon: Alonzo He MD;  Location: [facility] ENDOSCOPY;  Service: Gastroenterology;  Laterality: N/A;   • DILATATION AND CURETTAGE     • ENDOSCOPY  2013    Erosive esophagitis, grade 2; erosive gastritis; small sliding hiatal hernia less than 3 cm, erosive  deodenitis duodenal polyp.No gan mucosa.Second portion duedenal biopsy neg. Second postion of dudoenal biopsy revealed polypoid intestinal mucosa w/ lymphoid aggregate. 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 no helicobacter pylori. Negative for mateaplasia, dysplasia   • HIP SURGERY Right    • HYSTERECTOMY     • LEEP         Family History   Problem Relation Age of Onset   • Heart failure Father        Social History     Socioeconomic History   • Marital status: Single     Spouse name: Not on file   • Number of children: Not on file   • Years of education: Not on file   • Highest education level: Not on file   Tobacco Use   • Smoking status: Never Smoker   • Smokeless tobacco: Never Used   Substance and Sexual Activity   • Alcohol use: No   • Drug use: No   • Sexual activity: Defer         Current Facility-Administered Medications:   •  acetaminophen (TYLENOL) tablet 650 mg, 650 mg, Oral, Q4H PRN **OR** acetaminophen (TYLENOL) 160 MG/5ML solution 650 mg, 650 mg, Oral, Q4H PRN **OR** acetaminophen (TYLENOL) suppository 650 mg, 650 mg, Rectal, Q4H PRN, Deb Boles DO  •  buPROPion SR (WELLBUTRIN SR) 12 hr tablet 100 mg, 100 mg, Oral, QAM, Deb Boles DO, 100 mg at 09/17/20 0817  •  dextrose (D50W) 25 g/ 50mL Intravenous Solution 25 g, 25 g, Intravenous, Q15 Min PRN, Deb Boles DO  •  dextrose (GLUTOSE) oral gel 1 tube, 1 tube, Oral, Q15 Min PRN, Deb Boles DO  •  dicyclomine (BENTYL) capsule 10 mg, 10 mg, Oral, TID PRN, Deb Boles DO  •  diphenhydrAMINE (BENADRYL) capsule 25 mg, 25 mg, Oral, Q8H, Deb Boles DO, 25 mg at 09/17/20 1739  •  gabapentin (NEURONTIN) capsule 800 mg, 800 mg, Oral, Q8H, Deb Boles DO, 800 mg at 09/17/20 2100  •  glucagon (human recombinant) (GLUCAGEN DIAGNOSTIC) injection 1 mg, 1 mg, Subcutaneous, PRN, Karrick, Deb Michele, DO  •  HYDROmorphone (DILAUDID) injection 0.5 mg,  "0.5 mg, Intravenous, Q2H PRN, Deb Boles DO, 0.5 mg at 09/17/20 1645  •  insulin aspart (novoLOG) injection 0-14 Units, 0-14 Units, Subcutaneous, TID AC, Deb Boles DO, 12 Units at 09/17/20 1639  •  insulin aspart (novoLOG) injection 12 Units, 12 Units, Subcutaneous, Once, Ankur Colon,   •  insulin detemir (LEVEMIR) injection 30 Units, 30 Units, Subcutaneous, Q12H, Deb Boles DO, 30 Units at 09/17/20 1012  •  loperamide (IMODIUM) capsule 2 mg, 2 mg, Oral, TID, Alonzo He MD, 2 mg at 09/17/20 1639  •  methylPREDNISolone sodium succinate (SOLU-Medrol) injection 20 mg, 20 mg, Intravenous, Q8H, Deb Boles DO, 20 mg at 09/17/20 1639  •  pantoprazole (PROTONIX) EC tablet 40 mg, 40 mg, Oral, BID AC, Deb Boles DO, 40 mg at 09/17/20 1639  •  prochlorperazine (COMPAZINE) injection 2.5 mg, 2.5 mg, Intravenous, Q6H PRN, Deb Boles DO  •  promethazine (PHENERGAN) tablet 12.5 mg, 12.5 mg, Oral, Q8H PRN, Deb Boles DO  •  sodium chloride 0.9 % flush 10 mL, 10 mL, Intravenous, Q12H, Deb Boles DO, 10 mL at 09/17/20 0817  •  sodium chloride 0.9 % flush 10 mL, 10 mL, Intravenous, PRN, Deb Boles DO  •  sodium chloride 0.9 % infusion, 100 mL/hr, Intravenous, Continuous, Deb Boles DO, Last Rate: 100 mL/hr at 09/17/20 1233, 100 mL/hr at 09/17/20 1233  •  topiramate (TOPAMAX) tablet 50 mg, 50 mg, Oral, Nightly, Deb Boles DO, 50 mg at 09/16/20 8723    Allergies   Allergen Reactions   • Hydrochlorothiazide Hives   • Penicillins Hives   • Remicade [Infliximab] Anaphylaxis   • Soybean-Containing Drug Products Swelling     \"Soy sauce\"   • Xeljanz [Tofacitinib Citrate] Other (See Comments)     BLISTERS IN THROAT & ON ARMS   • Zofran [Ondansetron Hcl] Headache     migraines   • Reglan [Metoclopramide] Other (See Comments)     States feels weird when takes it       Review of " "Systems   Constitutional: Negative for appetite change and fatigue.   Gastrointestinal: Positive for diarrhea and nausea. Negative for abdominal distention, abdominal pain, anal bleeding, blood in stool, constipation, rectal pain, vomiting, GERD and indigestion.       The following portions of the patient's history were reviewed and updated as appropriate: allergies, current medications, past family history, past medical history, past social history, past surgical history and problem list.    Objective     Vitals:    09/17/20 1059 09/17/20 1100 09/17/20 1625 09/17/20 1927   BP:  130/88 128/89 96/65   BP Location:  Right arm Right arm Right arm   Patient Position:  Lying Lying Lying   Pulse:  85 85 93   Resp:  18 18 18   Temp:  98.1 °F (36.7 °C) 98 °F (36.7 °C) 98.7 °F (37.1 °C)   TempSrc:  Oral Oral Oral   SpO2:  100% 99% 93%   Weight:       Height: 160 cm (63\")          Physical Exam  Vitals signs and nursing note reviewed.   Constitutional:       Appearance: Normal appearance. She is well-developed. She is obese.   HENT:      Head: Normocephalic and atraumatic.      Nose: Nose normal.      Mouth/Throat:      Mouth: Mucous membranes are moist.      Pharynx: Oropharynx is clear.   Eyes:      Conjunctiva/sclera: Conjunctivae normal.   Neck:      Musculoskeletal: Neck supple.   Cardiovascular:      Rate and Rhythm: Normal rate and regular rhythm.      Heart sounds: No murmur.   Pulmonary:      Effort: Pulmonary effort is normal. No respiratory distress.      Breath sounds: Normal breath sounds.   Abdominal:      General: Bowel sounds are normal. There is distension.      Palpations: Abdomen is soft. There is no mass.      Tenderness: There is abdominal tenderness.      Hernia: No hernia is present.   Lymphadenopathy:      Cervical: No cervical adenopathy.   Skin:     General: Skin is warm and dry.   Neurological:      Mental Status: She is alert and oriented to person, place, and time.      Sensory: No sensory " deficit.         Results from last 7 days   Lab Units 09/17/20  0733 09/16/20  1655   SODIUM mmol/L 134* 132*   POTASSIUM mmol/L 4.0 3.5   CHLORIDE mmol/L 99 96*   CO2 mmol/L 20.6* 17.5*   BUN mg/dL 6 6   CREATININE mg/dL 0.54* 0.74   CALCIUM mg/dL 9.1 9.9   ALBUMIN g/dL 4.10 4.30   BILIRUBIN mg/dL 0.3 0.4   ALK PHOS U/L 174* 231*   ALT (SGPT) U/L 62* 88*   AST (SGOT) U/L 26 49*   GLUCOSE mg/dL 415* 446*   WBC 10*3/mm3 15.04* 13.12*   HEMOGLOBIN g/dL 12.7 14.6   PLATELETS 10*3/mm3 291 345       Imaging Results (Last 24 Hours)     ** No results found for the last 24 hours. **             Assessment / Plan    Assessment/Recommendations:  1. Ulcerative pancolitis, with a suspected flare  Symptoms improved  Will plan for discharge tomorrow after MRI scan abdomen  Will start on tapering dose of prednisone 40 mg po daily x 1 week , 30 mg po daily x 1 week, 20 mg po daily x 1 week, 10 mg po daily x 1 week  And 5 mg po daily x1 week and stop.     Will start on Entyvio along with Immuran when once we have TPMT level in hand        2.  Elevated liver enzymes  NARVAEZ vs PSC to rule out  Will get an MRI/MRCP to rule out PSC    Her hepatitis panel were negative.  RAQUEL anti-smooth muscle antibody antimitochondrial antibody were negative.  Alpha-1 antitrypsin level, ceruloplasmin level were normal.  Transferrin saturation was normal  Recommended to lose at least a 5 pounds weight          Thank you very much for letting me participate in the care of this patient.  Please do not hesitate to call me if you have any questions.    Alonzo He MD  [facility] [place]  9/17/2020  22:09 EDT    Please note that portions of this note may have been completed with a voice recognition program.

## 2020-09-18 NOTE — PLAN OF CARE
Goal Outcome Evaluation:  Plan of Care Reviewed With: patient  Progress: improving  Outcome Summary: Patient's MRI was negative today and likely dc home tomorrow per Ramana note.  Still needing stool sample per Ying.

## 2020-09-18 NOTE — PROGRESS NOTES
[facility]    Name:  Thais Hobson   Age:  35 y.o.  Sex:  female  :  1985  MRN:  [id number]   Visit Number:  [id number]  Admission Date:  2020  Date Of Service:  20  Primary Care Physician:  Lv Sanchez DO     LOS: 2 days :  Patient Care Team:  Lv Sanchez DO as PCP - General (Family Medicine)  Lv Sanchez DO as Consulting Physician (Family Medicine):    Chief Complaint:      Follow-up abdominal pain, ulcerative colitis    Subjective / Interval History:     Patient feeling some better today.  Is slowly advancing her diet.  Has had a couple loose stools overnight.  Abdominal pain is overall improving.  She feels nervous about her overall health.  She is afraid she may have to have a colectomy at some point.    Prior work-up:  The patient is a 35-year-old female with history of ulcerative colitis who had complained of 1 week history of worsening abdominal pain, bloody stools, and dehydration.  She has been followed with gastroenterology by Dr. He.  Patient had actually went to the emergency room in outlying facility yesterday was given IV fluids and discharged at that time.  She had a tele-visit with her PCP today, with ongoing symptoms that have failed to improve.  Discussed the case with me for potential direct admission.     Of note, patient has been diagnosed with ulcerative colitis since roughly 23 years of age.  She states that she has been on multiple medications for this including immunomodulators, however has either failed the treatment or had allergic reactions to these medications.  Most recently had been on Xeljanz.  She states that she is still having 15 or so diarrhea like bloody stools daily.  Has low-grade fevers at home.  Has not really ate or drink much in the last 4 to 5 days.  She lives with her mother and her 2 children.  She denies any recent medication changes otherwise.    Review of Systems:          General ROS: Patient denies any fevers, chills or loss of consciousness.  Respiratory ROS: Denies cough or shortness of breath.  Cardiovascular ROS: Denies chest pain or palpitations. No history of exertional chest pain.  Gastrointestinal ROS: Abdominal pain, loose stools  Neurological ROS: Denies any focal weakness. No loss of consciousness. Denies any numbness.  Dermatological ROS: Denies any redness or pruritis.    Vital Signs:    Temp:  [97.6 °F (36.4 °C)-98.7 °F (37.1 °C)] 98 °F (36.7 °C)  Heart Rate:  [74-93] 81  Resp:  [16-18] 18  BP: ()/(62-89) 128/85    Intake and output:    I/O last 3 completed shifts:  In: 6679 [P.O.:3720; I.V.:2959]  Out: 1250 [Urine:1250]  I/O this shift:  In: -   Out: 600 [Urine:600]    Physical Examination:    General Appearance:  Alert and cooperative, not in any acute distress.   Head:  Atraumatic and normocephalic, without obvious abnormality.   Eyes:          PERRLA, conjunctivae and sclerae normal, no Icterus. No pallor. Extraocular movements are within normal limits.   Neck: Supple, trachea midline, no thyromegaly.   Lungs:   Breath sounds heard bilaterally equally.  No crackles or wheezing. No Pleural rub or bronchial breathing.   Heart:  Normal S1 and S2, no murmur, no gallop, no rub. No JVD   Abdomen:   Normal bowel sounds, no masses, no organomegaly. Soft, mild to moderate tenderness primarily left lower quadrant.  Non-distended, no guarding, no rebound tenderness.   Extremities: Moves all extremities well, no edema, no cyanosis, no clubbing.  IV noted in foot.   Skin: No bleeding, bruising or rash.   Neurologic: Awake, alert and oriented times 3. Moves all 4 extremities equally.     Laboratory results:    Results from last 7 days   Lab Units 09/18/20  0620 09/17/20  0733 09/16/20  1655   SODIUM mmol/L 136 134* 132*   POTASSIUM mmol/L 4.1 4.0 3.5   CHLORIDE mmol/L 103 99 96*   CO2 mmol/L 21.5* 20.6* 17.5*   BUN mg/dL 6 6 6   CREATININE mg/dL 0.55* 0.54* 0.74    CALCIUM mg/dL 8.9 9.1 9.9   BILIRUBIN mg/dL  --  0.3 0.4   ALK PHOS U/L  --  174* 231*   ALT (SGPT) U/L  --  62* 88*   AST (SGOT) U/L  --  26 49*   GLUCOSE mg/dL 367* 415* 446*     Results from last 7 days   Lab Units 09/18/20  0620 09/17/20  0733 09/16/20  1655   WBC 10*3/mm3 13.22* 15.04* 13.12*   HEMOGLOBIN g/dL 12.4 12.7 14.6   HEMATOCRIT % 37.5 38.3 45.1   PLATELETS 10*3/mm3 268 291 345                       I have reviewed the patient's laboratory results.    Radiology results:    Imaging Results (Last 24 Hours)     Procedure Component Value Units Date/Time    MRI Abdomen With & Without Contrast [[id number]] Collected: 09/18/20 1041     Updated: 09/18/20 1046    Narrative:      PROCEDURE: MRI ABDOMEN W WO CONTRAST-     HISTORY: Chronic Ulcerative pancolitis, with elevated LFTS suspecious  for PSC. MRI abdomen w/wo with MRCP     PROCEDURE: Multiplanar multisequence imaging of the abdomen was  performed both before and following the administration of 15 mL  MultiHance intravenous contrast. 3-D MRCP images were obtained.     COMPARISON: None.     FINDINGS: MRCP images demonstrate prior cholecystectomy. The common duct  is normal in caliber measuring 4 mm. There is no intrahepatic biliary  dilatation.. There is no evidence of a filling defect within the common  duct to suggest choledocholithiasis. The pancreatic duct is normal.     There is signal dropout within the liver on out of phase imaging  consistent with fatty infiltration. No focal liver lesions are  identified. The adrenals, kidneys, spleen and pancreas are unremarkable.  There is no ascites.       Impression:      1. Unremarkable MRCP.  2. Fatty infiltration of the liver.           This report was finalized on 9/18/2020 10:43 AM by South Das M.D..          I have reviewed the patient's radiology reports.    Medication Review:     I have reviewed the patients active and prn medications.       Ulcerative colitis, acute  (CMS/[facility])      Assessment:    1.  Acute ulcerative colitis flare, present on admission  2.  Abdominal pain with diarrhea  3.  Dehydration  4.  Uncontrolled type 2 diabetes  5.  Obesity  6.  History of PTSD  7.  Bipolar disorder  8.  GERD    Plan:    Patient overall improving albeit slowly.  Vitals are stable.  Glucose remains elevated, increase Levemir to 40 units twice a day with aggressive sliding scale.  Will come off of IV steroids today, transition to oral prednisone tomorrow.  Continue with GI soft diet.  PRN pain control and antiemetics.  Continue the Imodium.  Appreciate gastroenterology recommendations.    Anticipate she will be ready for discharge tomorrow with follow-up with Dr. He.    Deb Boles,   09/18/20  12:34 EDT    Dictated utilizing Dragon dictation.

## 2020-09-19 LAB
GLUCOSE BLDC GLUCOMTR-MCNC: 151 MG/DL (ref 70–130)
GLUCOSE BLDC GLUCOMTR-MCNC: 243 MG/DL (ref 70–130)
GLUCOSE BLDC GLUCOMTR-MCNC: 244 MG/DL (ref 70–130)
GLUCOSE BLDC GLUCOMTR-MCNC: 385 MG/DL (ref 70–130)
GLUCOSE BLDC GLUCOMTR-MCNC: 99 MG/DL (ref 70–130)

## 2020-09-19 PROCEDURE — 99232 SBSQ HOSP IP/OBS MODERATE 35: CPT | Performed by: FAMILY MEDICINE

## 2020-09-19 PROCEDURE — 82962 GLUCOSE BLOOD TEST: CPT

## 2020-09-19 PROCEDURE — 25010000002 ERYTHROMYCIN LACTOBIONATE PER 500 MG: Performed by: FAMILY MEDICINE

## 2020-09-19 PROCEDURE — 63710000001 DIPHENHYDRAMINE PER 50 MG: Performed by: FAMILY MEDICINE

## 2020-09-19 PROCEDURE — 63710000001 INSULIN DETEMIR PER 5 UNITS: Performed by: FAMILY MEDICINE

## 2020-09-19 PROCEDURE — 63710000001 PREDNISONE PER 1 MG: Performed by: FAMILY MEDICINE

## 2020-09-19 PROCEDURE — 63710000001 INSULIN ASPART PER 5 UNITS: Performed by: FAMILY MEDICINE

## 2020-09-19 PROCEDURE — 25010000002 HYDROMORPHONE 1 MG/ML SOLUTION: Performed by: FAMILY MEDICINE

## 2020-09-19 RX ADMIN — INSULIN ASPART 20 UNITS: 100 INJECTION, SOLUTION INTRAVENOUS; SUBCUTANEOUS at 17:30

## 2020-09-19 RX ADMIN — GABAPENTIN 800 MG: 400 CAPSULE ORAL at 05:54

## 2020-09-19 RX ADMIN — SODIUM CHLORIDE, PRESERVATIVE FREE 10 ML: 5 INJECTION INTRAVENOUS at 10:23

## 2020-09-19 RX ADMIN — HYDROCODONE BITARTRATE AND ACETAMINOPHEN 1 TABLET: 5; 325 TABLET ORAL at 20:13

## 2020-09-19 RX ADMIN — GABAPENTIN 800 MG: 400 CAPSULE ORAL at 14:55

## 2020-09-19 RX ADMIN — DIPHENHYDRAMINE HYDROCHLORIDE 25 MG: 25 CAPSULE ORAL at 10:55

## 2020-09-19 RX ADMIN — DIPHENHYDRAMINE HYDROCHLORIDE 25 MG: 25 CAPSULE ORAL at 17:30

## 2020-09-19 RX ADMIN — HYDROMORPHONE HYDROCHLORIDE 0.5 MG: 1 INJECTION, SOLUTION INTRAMUSCULAR; INTRAVENOUS; SUBCUTANEOUS at 16:32

## 2020-09-19 RX ADMIN — INSULIN DETEMIR 40 UNITS: 100 INJECTION, SOLUTION SUBCUTANEOUS at 09:55

## 2020-09-19 RX ADMIN — PANTOPRAZOLE SODIUM 40 MG: 40 TABLET, DELAYED RELEASE ORAL at 06:35

## 2020-09-19 RX ADMIN — INSULIN ASPART 8 UNITS: 100 INJECTION, SOLUTION INTRAVENOUS; SUBCUTANEOUS at 07:35

## 2020-09-19 RX ADMIN — HYDROMORPHONE HYDROCHLORIDE 0.5 MG: 1 INJECTION, SOLUTION INTRAMUSCULAR; INTRAVENOUS; SUBCUTANEOUS at 07:41

## 2020-09-19 RX ADMIN — BUPROPION HYDROCHLORIDE 100 MG: 100 TABLET, FILM COATED, EXTENDED RELEASE ORAL at 06:18

## 2020-09-19 RX ADMIN — SODIUM CHLORIDE, PRESERVATIVE FREE 10 ML: 5 INJECTION INTRAVENOUS at 22:07

## 2020-09-19 RX ADMIN — HYDROMORPHONE HYDROCHLORIDE 0.5 MG: 1 INJECTION, SOLUTION INTRAMUSCULAR; INTRAVENOUS; SUBCUTANEOUS at 12:09

## 2020-09-19 RX ADMIN — HYDROMORPHONE HYDROCHLORIDE 0.5 MG: 1 INJECTION, SOLUTION INTRAMUSCULAR; INTRAVENOUS; SUBCUTANEOUS at 01:54

## 2020-09-19 RX ADMIN — TOPIRAMATE 50 MG: 25 TABLET, FILM COATED ORAL at 22:07

## 2020-09-19 RX ADMIN — ERYTHROMYCIN LACTOBIONATE 250 MG: 500 INJECTION, POWDER, LYOPHILIZED, FOR SOLUTION INTRAVENOUS at 17:04

## 2020-09-19 RX ADMIN — GABAPENTIN 800 MG: 400 CAPSULE ORAL at 22:06

## 2020-09-19 RX ADMIN — HYDROMORPHONE HYDROCHLORIDE 0.5 MG: 1 INJECTION, SOLUTION INTRAMUSCULAR; INTRAVENOUS; SUBCUTANEOUS at 22:06

## 2020-09-19 RX ADMIN — PANTOPRAZOLE SODIUM 40 MG: 40 TABLET, DELAYED RELEASE ORAL at 17:30

## 2020-09-19 RX ADMIN — SODIUM CHLORIDE 50 ML/HR: 9 INJECTION, SOLUTION INTRAVENOUS at 14:36

## 2020-09-19 RX ADMIN — INSULIN DETEMIR 40 UNITS: 100 INJECTION, SOLUTION SUBCUTANEOUS at 22:07

## 2020-09-19 RX ADMIN — HYDROCODONE BITARTRATE AND ACETAMINOPHEN 1 TABLET: 5; 325 TABLET ORAL at 05:54

## 2020-09-19 RX ADMIN — PREDNISONE 40 MG: 20 TABLET ORAL at 07:35

## 2020-09-19 NOTE — PLAN OF CARE
Goal Outcome Evaluation:  Plan of Care Reviewed With: patient  Progress: no change  Outcome Summary: Patient continuing to have recurrent episodes of mid abdominal pain requiring IV pain rx.  Ramana keeping her throughout the weekend to have Ying scope her Monday for evaluation.

## 2020-09-19 NOTE — PROGRESS NOTES
[facility]    Name:  Thais Hobson   Age:  35 y.o.  Sex:  female  :  1985  MRN:  [id number]   Visit Number:  [id number]  Admission Date:  2020  Date Of Service:  20  Primary Care Physician:  Lv Sanchez DO     LOS: 3 days :  Patient Care Team:  Lv Sanchez DO as PCP - General (Family Medicine)  Lv Sanchez DO as Consulting Physician (Family Medicine):    Chief Complaint:      Follow-up ulcerative colitis    Subjective / Interval History:     Patient states overall her lower abdominal pain is improving, however she admits to difficulty with swallowing foods now.  She states she is had a issue with stricture in her esophagus, feels like this is bothering her again.  She notes this is been worsening over the last month.  She has had dilated in the past.  She states food seems to be getting stuck in her stomach, with a burning sensation, and feels bloated.  She denies any fevers or chills.  Denies any significant more stools.    Her work-up:  The patient is a 35-year-old female with history of ulcerative colitis who had complained of 1 week history of worsening abdominal pain, bloody stools, and dehydration.  She has been followed with gastroenterology by Dr. He.  Patient had actually went to the emergency room in outlying facility yesterday was given IV fluids and discharged at that time.  She had a tele-visit with her PCP today, with ongoing symptoms that have failed to improve.  Discussed the case with me for potential direct admission.     Of note, patient has been diagnosed with ulcerative colitis since roughly 23 years of age.  She states that she has been on multiple medications for this including immunomodulators, however has either failed the treatment or had allergic reactions to these medications.  Most recently had been on Xeljanz.  She states that she is still having 15 or so diarrhea like bloody stools daily.   Has low-grade fevers at home.  Has not really ate or drink much in the last 4 to 5 days.  She lives with her mother and her 2 children.  She denies any recent medication changes otherwise.    He was admitted and started on IV steroids switched to oral prednisone per GI recommendations.  The plan is for patient to be treated with a prednisone taper, then starting back on DM ARDS.  She did have an MRI which demonstrated fatty liver, otherwise no other disease.    Patient was complaining of difficulty swallowing primaries solid foods, feeling like getting stuck in her stomach at times.    Review of Systems:     General ROS: Patient denies any fevers, chills or loss of consciousness.  Respiratory ROS: Denies cough or shortness of breath.  Cardiovascular ROS: Denies chest pain or palpitations. No history of exertional chest pain.  Gastrointestinal ROS: Abdominal pain  Neurological ROS: Denies any focal weakness. No loss of consciousness. Denies any numbness.  Dermatological ROS: Denies any redness or pruritis.    Vital Signs:    Temp:  [97.5 °F (36.4 °C)-98.2 °F (36.8 °C)] 97.9 °F (36.6 °C)  Heart Rate:  [67-99] 84  Resp:  [16-18] 18  BP: (118-147)/(78-97) 123/78    Intake and output:    I/O last 3 completed shifts:  In: 3845 [P.O.:1680; I.V.:2165]  Out: 2450 [Urine:2450]  I/O this shift:  In: 240 [P.O.:240]  Out: -     Physical Examination:    General Appearance:  Alert and cooperative, not in any acute distress.   Head:  Atraumatic and normocephalic, without obvious abnormality.   Eyes:          PERRLA, conjunctivae and sclerae normal, no Icterus. No pallor. Extraocular movements are within normal limits.   Neck: Supple, trachea midline, no thyromegaly.   Lungs:   Breath sounds heard bilaterally equally.  No crackles or wheezing. No Pleural rub or bronchial breathing.   Heart:  Normal S1 and S2, no murmur, no gallop, no rub. No JVD   Abdomen:   Normal bowel sounds, no masses, no organomegaly. Soft, mild to moderate  mid abdominal tenderness, non-distended, no guarding, no rebound tenderness.   Extremities: Moves all extremities well, no edema, no cyanosis, no clubbing.   Skin: No bleeding, bruising or rash.   Neurologic: Awake, alert and oriented times 3. Moves all 4 extremities equally.     Laboratory results:    Results from last 7 days   Lab Units 09/18/20  0620 09/17/20  0733 09/16/20  1655   SODIUM mmol/L 136 134* 132*   POTASSIUM mmol/L 4.1 4.0 3.5   CHLORIDE mmol/L 103 99 96*   CO2 mmol/L 21.5* 20.6* 17.5*   BUN mg/dL 6 6 6   CREATININE mg/dL 0.55* 0.54* 0.74   CALCIUM mg/dL 8.9 9.1 9.9   BILIRUBIN mg/dL  --  0.3 0.4   ALK PHOS U/L  --  174* 231*   ALT (SGPT) U/L  --  62* 88*   AST (SGOT) U/L  --  26 49*   GLUCOSE mg/dL 367* 415* 446*     Results from last 7 days   Lab Units 09/18/20  0620 09/17/20  0733 09/16/20  1655   WBC 10*3/mm3 13.22* 15.04* 13.12*   HEMOGLOBIN g/dL 12.4 12.7 14.6   HEMATOCRIT % 37.5 38.3 45.1   PLATELETS 10*3/mm3 268 291 345                       I have reviewed the patient's laboratory results.    Radiology results:    Imaging Results (Last 24 Hours)     ** No results found for the last 24 hours. **          I have reviewed the patient's radiology reports.    Medication Review:     I have reviewed the patients active and prn medications.       Ulcerative colitis, acute (CMS/[facility])      Assessment:    1.  Acute ulcerative colitis flare, present on admission  2.  Abdominal pain with diarrhea  3.  Dehydration  4.  Uncontrolled type 2 diabetes  5.  Obesity  6.  History of PTSD  7.  Bipolar disorder  8.  GERD  9.  Dysphagia, possible gastroparesis    Plan:    Patient's MRI reviewed yesterday, some fatty liver disease, otherwise no evidence of PSC.  Vital signs overall remained stable and afebrile.  Her diet is being slowly advanced.  Abdominal pain is improving, still requiring some doses of IV pain control however.  Discussed would like to wean off of IV pain medication to oral pain medication.   Started on oral prednisone.  Will start on erythromycin for concern for gastroparesis.  Gastroenterology currently off this weekend, anticipate patient may need an EGD early next week depending upon how her symptoms go in the next 24 hours.  Hold on gastric emptying study for now.  Repeat labs tomorrow.  We will continue to adjust insulin regimen to maintain better glycemic control.    Deb Boles,   09/19/20  15:28 EDT    Dictated utilizing Dragon dictation.

## 2020-09-20 LAB
ANION GAP SERPL CALCULATED.3IONS-SCNC: 11.6 MMOL/L (ref 5–15)
BUN SERPL-MCNC: 12 MG/DL (ref 6–20)
BUN/CREAT SERPL: 19.4 (ref 7–25)
CALCIUM SPEC-SCNC: 8.9 MG/DL (ref 8.6–10.5)
CHLORIDE SERPL-SCNC: 103 MMOL/L (ref 98–107)
CO2 SERPL-SCNC: 26.4 MMOL/L (ref 22–29)
CREAT SERPL-MCNC: 0.62 MG/DL (ref 0.57–1)
DEPRECATED RDW RBC AUTO: 39 FL (ref 37–54)
ERYTHROCYTE [DISTWIDTH] IN BLOOD BY AUTOMATED COUNT: 13.3 % (ref 12.3–15.4)
GFR SERPL CREATININE-BSD FRML MDRD: 110 ML/MIN/1.73
GFR SERPL CREATININE-BSD FRML MDRD: 134 ML/MIN/1.73
GLUCOSE BLDC GLUCOMTR-MCNC: 179 MG/DL (ref 70–130)
GLUCOSE BLDC GLUCOMTR-MCNC: 252 MG/DL (ref 70–130)
GLUCOSE BLDC GLUCOMTR-MCNC: 301 MG/DL (ref 70–130)
GLUCOSE BLDC GLUCOMTR-MCNC: 82 MG/DL (ref 70–130)
GLUCOSE SERPL-MCNC: 84 MG/DL (ref 65–99)
HCT VFR BLD AUTO: 40.3 % (ref 34–46.6)
HGB BLD-MCNC: 13.6 G/DL (ref 12–15.9)
MCH RBC QN AUTO: 27.5 PG (ref 26.6–33)
MCHC RBC AUTO-ENTMCNC: 33.7 G/DL (ref 31.5–35.7)
MCV RBC AUTO: 81.6 FL (ref 79–97)
PLATELET # BLD AUTO: 315 10*3/MM3 (ref 140–450)
PMV BLD AUTO: 10 FL (ref 6–12)
POTASSIUM SERPL-SCNC: 3 MMOL/L (ref 3.5–5.2)
RBC # BLD AUTO: 4.94 10*6/MM3 (ref 3.77–5.28)
SODIUM SERPL-SCNC: 141 MMOL/L (ref 136–145)
WBC # BLD AUTO: 19.59 10*3/MM3 (ref 3.4–10.8)

## 2020-09-20 PROCEDURE — 82962 GLUCOSE BLOOD TEST: CPT

## 2020-09-20 PROCEDURE — 80048 BASIC METABOLIC PNL TOTAL CA: CPT | Performed by: FAMILY MEDICINE

## 2020-09-20 PROCEDURE — 83993 ASSAY FOR CALPROTECTIN FECAL: CPT | Performed by: INTERNAL MEDICINE

## 2020-09-20 PROCEDURE — 25010000002 ERYTHROMYCIN LACTOBIONATE PER 500 MG: Performed by: FAMILY MEDICINE

## 2020-09-20 PROCEDURE — 85027 COMPLETE CBC AUTOMATED: CPT | Performed by: FAMILY MEDICINE

## 2020-09-20 PROCEDURE — 99232 SBSQ HOSP IP/OBS MODERATE 35: CPT | Performed by: FAMILY MEDICINE

## 2020-09-20 PROCEDURE — 63710000001 INSULIN DETEMIR PER 5 UNITS: Performed by: FAMILY MEDICINE

## 2020-09-20 PROCEDURE — 63710000001 PREDNISONE PER 1 MG: Performed by: FAMILY MEDICINE

## 2020-09-20 PROCEDURE — 25010000002 PROCHLORPERAZINE 10 MG/2ML SOLUTION: Performed by: FAMILY MEDICINE

## 2020-09-20 PROCEDURE — 63710000001 DIPHENHYDRAMINE PER 50 MG: Performed by: FAMILY MEDICINE

## 2020-09-20 PROCEDURE — 25010000002 HYDROMORPHONE 1 MG/ML SOLUTION: Performed by: FAMILY MEDICINE

## 2020-09-20 PROCEDURE — 63710000001 INSULIN ASPART PER 5 UNITS: Performed by: FAMILY MEDICINE

## 2020-09-20 RX ORDER — POTASSIUM CHLORIDE 750 MG/1
40 CAPSULE, EXTENDED RELEASE ORAL ONCE
Status: COMPLETED | OUTPATIENT
Start: 2020-09-20 | End: 2020-09-20

## 2020-09-20 RX ADMIN — PANTOPRAZOLE SODIUM 40 MG: 40 TABLET, DELAYED RELEASE ORAL at 06:38

## 2020-09-20 RX ADMIN — POTASSIUM CHLORIDE 40 MEQ: 10 CAPSULE, COATED, EXTENDED RELEASE ORAL at 08:22

## 2020-09-20 RX ADMIN — PREDNISONE 40 MG: 20 TABLET ORAL at 08:22

## 2020-09-20 RX ADMIN — DIPHENHYDRAMINE HYDROCHLORIDE 25 MG: 25 CAPSULE ORAL at 17:12

## 2020-09-20 RX ADMIN — INSULIN DETEMIR 40 UNITS: 100 INJECTION, SOLUTION SUBCUTANEOUS at 08:22

## 2020-09-20 RX ADMIN — DIPHENHYDRAMINE HYDROCHLORIDE 25 MG: 25 CAPSULE ORAL at 10:47

## 2020-09-20 RX ADMIN — GABAPENTIN 800 MG: 400 CAPSULE ORAL at 14:11

## 2020-09-20 RX ADMIN — HYDROMORPHONE HYDROCHLORIDE 0.5 MG: 1 INJECTION, SOLUTION INTRAMUSCULAR; INTRAVENOUS; SUBCUTANEOUS at 22:00

## 2020-09-20 RX ADMIN — GABAPENTIN 800 MG: 400 CAPSULE ORAL at 06:38

## 2020-09-20 RX ADMIN — INSULIN DETEMIR 20 UNITS: 100 INJECTION, SOLUTION SUBCUTANEOUS at 22:02

## 2020-09-20 RX ADMIN — SODIUM CHLORIDE, PRESERVATIVE FREE 10 ML: 5 INJECTION INTRAVENOUS at 21:49

## 2020-09-20 RX ADMIN — GABAPENTIN 800 MG: 400 CAPSULE ORAL at 21:47

## 2020-09-20 RX ADMIN — ERYTHROMYCIN LACTOBIONATE 250 MG: 500 INJECTION, POWDER, LYOPHILIZED, FOR SOLUTION INTRAVENOUS at 17:12

## 2020-09-20 RX ADMIN — TOPIRAMATE 50 MG: 25 TABLET, FILM COATED ORAL at 21:48

## 2020-09-20 RX ADMIN — HYDROCODONE BITARTRATE AND ACETAMINOPHEN 1 TABLET: 5; 325 TABLET ORAL at 06:42

## 2020-09-20 RX ADMIN — SODIUM CHLORIDE 50 ML/HR: 9 INJECTION, SOLUTION INTRAVENOUS at 14:13

## 2020-09-20 RX ADMIN — ERYTHROMYCIN LACTOBIONATE 250 MG: 500 INJECTION, POWDER, LYOPHILIZED, FOR SOLUTION INTRAVENOUS at 01:11

## 2020-09-20 RX ADMIN — HYDROCODONE BITARTRATE AND ACETAMINOPHEN 1 TABLET: 5; 325 TABLET ORAL at 13:27

## 2020-09-20 RX ADMIN — DIPHENHYDRAMINE HYDROCHLORIDE 25 MG: 25 CAPSULE ORAL at 01:10

## 2020-09-20 RX ADMIN — INSULIN ASPART 16 UNITS: 100 INJECTION, SOLUTION INTRAVENOUS; SUBCUTANEOUS at 17:31

## 2020-09-20 RX ADMIN — PANTOPRAZOLE SODIUM 40 MG: 40 TABLET, DELAYED RELEASE ORAL at 17:12

## 2020-09-20 RX ADMIN — BUPROPION HYDROCHLORIDE 100 MG: 100 TABLET, FILM COATED, EXTENDED RELEASE ORAL at 06:38

## 2020-09-20 RX ADMIN — ERYTHROMYCIN LACTOBIONATE 250 MG: 500 INJECTION, POWDER, LYOPHILIZED, FOR SOLUTION INTRAVENOUS at 09:24

## 2020-09-20 RX ADMIN — HYDROMORPHONE HYDROCHLORIDE 0.5 MG: 1 INJECTION, SOLUTION INTRAMUSCULAR; INTRAVENOUS; SUBCUTANEOUS at 08:36

## 2020-09-20 RX ADMIN — INSULIN ASPART 4 UNITS: 100 INJECTION, SOLUTION INTRAVENOUS; SUBCUTANEOUS at 11:50

## 2020-09-20 RX ADMIN — HYDROMORPHONE HYDROCHLORIDE 0.5 MG: 1 INJECTION, SOLUTION INTRAMUSCULAR; INTRAVENOUS; SUBCUTANEOUS at 15:48

## 2020-09-20 RX ADMIN — PROCHLORPERAZINE EDISYLATE 2.5 MG: 5 INJECTION INTRAMUSCULAR; INTRAVENOUS at 02:05

## 2020-09-20 NOTE — PLAN OF CARE
Problem: Adult Inpatient Plan of Care  Goal: Plan of Care Review  Outcome: Ongoing, Progressing  Flowsheets (Taken 9/20/2020 1658)  Plan of Care Reviewed With: patient  Outcome Summary: VSS. Patient complains of epigastric pain. Gave prn pain medication per MAR. Will continue to monitor.  Goal: Patient-Specific Goal (Individualized)  Outcome: Ongoing, Progressing  Goal: Absence of Hospital-Acquired Illness or Injury  Outcome: Ongoing, Progressing  Intervention: Identify and Manage Fall Risk  Recent Flowsheet Documentation  Taken 9/20/2020 1600 by Sharlene Garcia RN  Safety Promotion/Fall Prevention: assistive device/personal items within reach  Taken 9/20/2020 1400 by Sharlene Garcia RN  Safety Promotion/Fall Prevention: assistive device/personal items within reach  Taken 9/20/2020 1200 by Sharlene Garcia RN  Safety Promotion/Fall Prevention: assistive device/personal items within reach  Taken 9/20/2020 1000 by Sharlene Garcia RN  Safety Promotion/Fall Prevention: assistive device/personal items within reach  Taken 9/20/2020 0800 by Sharlene Garcia RN  Safety Promotion/Fall Prevention: assistive device/personal items within reach  Intervention: Prevent Skin Injury  Recent Flowsheet Documentation  Taken 9/20/2020 1600 by Sharlene Garcia RN  Body Position: position changed independently  Taken 9/20/2020 1400 by Sharlene Garcia RN  Body Position: position changed independently  Taken 9/20/2020 1200 by Sharlene Garcia RN  Body Position: position changed independently  Taken 9/20/2020 1000 by Sharlene Garcia RN  Body Position: position changed independently  Taken 9/20/2020 0800 by Sharlene Garcia RN  Body Position: position changed independently  Intervention: Prevent and Manage VTE (venous thromboembolism) Risk  Recent Flowsheet Documentation  Taken 9/20/2020 0800 by Sharlene Garcia RN  VTE Prevention/Management:   bilateral   dorsiflexion/plantar flexion performed  Intervention: Prevent Infection  Recent Flowsheet Documentation  Taken  9/20/2020 1000 by Sharlene Garcia RN  Infection Prevention: environmental surveillance performed  Taken 9/20/2020 0800 by Sharlene Garcia RN  Infection Prevention: environmental surveillance performed  Goal: Optimal Comfort and Wellbeing  Outcome: Ongoing, Progressing  Intervention: Provide Person-Centered Care  Recent Flowsheet Documentation  Taken 9/20/2020 0800 by Sharlene Garcia RN  Trust Relationship/Rapport: care explained  Goal: Readiness for Transition of Care  Outcome: Ongoing, Progressing     Problem: Suicide Risk  Goal: Absence of Self-Harm  Outcome: Ongoing, Progressing  Intervention: Promote Psychosocial Wellbeing  Recent Flowsheet Documentation  Taken 9/20/2020 0800 by Sharlene Garcia RN  Supportive Measures: active listening utilized  Family/Support System Care: self-care encouraged     Problem: Skin Injury Risk Increased  Goal: Skin Health and Integrity  Outcome: Ongoing, Progressing  Intervention: Optimize Skin Protection  Recent Flowsheet Documentation  Taken 9/20/2020 1600 by Sharlene Garcia RN  Head of Bed (HOB): HOB at 20-30 degrees  Taken 9/20/2020 1400 by Sharlene Garcia RN  Head of Bed (HOB): HOB at 20-30 degrees  Taken 9/20/2020 1200 by Sharlene Garcia RN  Pressure Reduction Techniques: frequent weight shift encouraged  Head of Bed (HOB): HOB at 20-30 degrees  Skin Protection: protective footwear used  Taken 9/20/2020 1000 by Sharlene Garcia RN  Pressure Reduction Techniques: frequent weight shift encouraged  Head of Bed (HOB): HOB at 20-30 degrees  Taken 9/20/2020 0800 by Sharlene Garcia RN  Pressure Reduction Techniques: frequent weight shift encouraged  Head of Bed (HOB): HOB at 20-30 degrees  Skin Protection: protective footwear used     Problem: Pain Acute  Goal: Optimal Pain Control  Outcome: Ongoing, Progressing  Intervention: Develop Pain Management Plan  Recent Flowsheet Documentation  Taken 9/20/2020 0800 by Sharlene Garcia RN  Pain Management Interventions: care clustered  Intervention: Prevent or  Manage Pain  Recent Flowsheet Documentation  Taken 9/20/2020 0800 by Sharlene Garcia RN  Sensory Stimulation Regulation: care clustered  Sleep/Rest Enhancement: awakenings minimized  Intervention: Optimize Psychosocial Wellbeing  Recent Flowsheet Documentation  Taken 9/20/2020 0800 by Sahrlene Garcia RN  Supportive Measures: active listening utilized  Diversional Activities: television     Problem: Adjustment to Illness (Bowel Disease, Inflammatory)  Goal: Optimal Adaptation to Chronic Illness  Outcome: Ongoing, Progressing  Intervention: Support/Optimize Psychosocial Response to Illness  Recent Flowsheet Documentation  Taken 9/20/2020 0800 by Sharleen Garcia RN  Supportive Measures: active listening utilized  Family/Support System Care: self-care encouraged     Problem: Diarrhea (Bowel Disease, Inflammatory)  Goal: Diarrhea Symptom Relief  Outcome: Ongoing, Progressing  Intervention: Manage Diarrhea  Recent Flowsheet Documentation  Taken 9/20/2020 1200 by Sharlene Garcia RN  Fluid/Electrolyte Management: fluids provided  Taken 9/20/2020 0800 by Sharlene Garcia RN  Diarrhea Management: fluids promoted  Fluid/Electrolyte Management: fluids provided     Problem: Infection (Bowel Disease, Inflammatory)  Goal: Absence of Infection Signs and Symptoms  Outcome: Ongoing, Progressing  Intervention: Prevent or Manage Infection  Recent Flowsheet Documentation  Taken 9/20/2020 1600 by Sharlene Garcia RN  Infection Management: aseptic technique maintained  Taken 9/20/2020 1400 by Sharlene Garcia RN  Infection Management: aseptic technique maintained  Taken 9/20/2020 1200 by Sharlene Garcia RN  Infection Management: aseptic technique maintained  Taken 9/20/2020 1000 by Sharlene Garcia RN  Infection Management: aseptic technique maintained  Taken 9/20/2020 0800 by Sharlene Garcia RN  Infection Management: aseptic technique maintained     Problem: Nutrition Impaired (Bowel Disease, Inflammatory)  Goal: Optimal Nutrition  Outcome: Ongoing,  Progressing  Intervention: Promote and Optimize Nutrition Intake  Recent Flowsheet Documentation  Taken 9/20/2020 0800 by Sharlene Garcia RN  Environmental Support: calm environment promoted     Problem: Pain (Bowel Disease, Inflammatory)  Goal: Acceptable Pain Control  Outcome: Ongoing, Progressing  Intervention: Prevent or Manage Pain  Recent Flowsheet Documentation  Taken 9/20/2020 0800 by Sharlene Garcia RN  Pain Management Interventions: care clustered     Problem: Diabetes Comorbidity  Goal: Blood Glucose Level Within Desired Range  Outcome: Ongoing, Progressing  Intervention: Maintain Glycemic Control  Recent Flowsheet Documentation  Taken 9/20/2020 1200 by Sharlene Garcia RN  Glycemic Management: blood glucose monitoring  Taken 9/20/2020 0800 by Sharlene Garcia RN  Glycemic Management: blood glucose monitoring   Goal Outcome Evaluation:  Plan of Care Reviewed With: patient  Progress: improving  Outcome Summary: VSS. Patient complains of epigastric pain. Gave prn pain medication per MAR. Will continue to monitor.

## 2020-09-20 NOTE — PLAN OF CARE
Goal Outcome Evaluation:  Plan of Care Reviewed With: patient  Progress: improving  Outcome Summary: No acute events overnight.  VSS.  Pt c/o epigastric pain - treated per MAR.  IV antibiotics continued.

## 2020-09-20 NOTE — PROGRESS NOTES
[facility]    [facility]    Name:  Thais Hobson   Age:  35 y.o.  Sex:  female  :  1985  MRN:  [id number]   Visit Number:  [id number]  Admission Date:  2020  Date Of Service:  20  Primary Care Physician:  Lv Sanchez DO     LOS: 4 days :  Patient Care Team:  Lv Sanchez DO as PCP - General (Family Medicine)  Lv Sanchez DO as Consulting Physician (Family Medicine):    Chief Complaint:      Follow-up on abdominal pain, dysphagia    Subjective / Interval History:     Patient overall feels somewhat better today.  States he felt a little nauseous this morning.  She states food still seems to be getting stuck when she is swallowing.  She notes pain is worsened after eating within about 30 minutes or so.  No fevers or chills.  Voiding well.  No bloody bowel movements.    Prior work-up:  The patient is a 35-year-old female with history of ulcerative colitis who had complained of 1 week history of worsening abdominal pain, bloody stools, and dehydration.  She has been followed with gastroenterology by Dr. He.  Patient had actually went to the emergency room in outlying facility yesterday was given IV fluids and discharged at that time.  She had a tele-visit with her PCP today, with ongoing symptoms that have failed to improve.  Discussed the case with me for potential direct admission.     Of note, patient has been diagnosed with ulcerative colitis since roughly 23 years of age.  She states that she has been on multiple medications for this including immunomodulators, however has either failed the treatment or had allergic reactions to these medications.  Most recently had been on Xeljanz.  She states that she is still having 15 or so diarrhea like bloody stools daily.  Has low-grade fevers at home.  Has not really ate or drink much in the last 4 to 5 days.  She lives with her mother and her 2 children.  She denies any recent medication  changes otherwise.     He was admitted and started on IV steroids switched to oral prednisone per GI recommendations.  The plan is for patient to be treated with a prednisone taper, then starting back on DM ARDS.  She did have an MRI which demonstrated fatty liver, otherwise no other disease.     Patient was complaining of difficulty swallowing primaries solid foods, feeling like getting stuck in her stomach at times.    Review of Systems:     General ROS: Patient denies any fevers, chills or loss of consciousness.  Respiratory ROS: Denies cough or shortness of breath.  Cardiovascular ROS: Denies chest pain or palpitations. No history of exertional chest pain.  Gastrointestinal ROS: Dysphagia, abdominal pain, nausea  Neurological ROS: Denies any focal weakness. No loss of consciousness. Denies any numbness.  Dermatological ROS: Denies any redness or pruritis.    Vital Signs:    Temp:  [97.6 °F (36.4 °C)-98 °F (36.7 °C)] 97.9 °F (36.6 °C)  Heart Rate:  [83-99] 84  Resp:  [16-20] 18  BP: ()/(64-82) 117/79    Intake and output:    I/O last 3 completed shifts:  In: 2864 [P.O.:960; I.V.:1804; IV Piggyback:100]  Out: 1900 [Urine:1900]  I/O this shift:  In: 240 [P.O.:240]  Out: -     Physical Examination:    General Appearance:  Alert and cooperative, not in any acute distress.   Head:  Atraumatic and normocephalic, without obvious abnormality.   Eyes:          PERRLA, conjunctivae and sclerae normal, no Icterus. No pallor. Extraocular movements are within normal limits.   Neck: Supple, trachea midline, no thyromegaly.   Lungs:   Breath sounds heard bilaterally equally.  No crackles or wheezing. No Pleural rub or bronchial breathing.   Heart:  Normal S1 and S2, no murmur, no gallop, no rub. No JVD   Abdomen:   Normal bowel sounds, no masses, no organomegaly. Soft, mid to left lower quadrant abdominal tenderness.  No significant distention.  Obese   Extremities: Moves all extremities well, no edema, no cyanosis, no  clubbing.   Skin: No bleeding, bruising or rash.   Neurologic: Awake, alert and oriented times 3. Moves all 4 extremities equally.     Laboratory results:    Results from last 7 days   Lab Units 09/20/20  0713 09/18/20 0620 09/17/20  0733 09/16/20  1655   SODIUM mmol/L 141 136 134* 132*   POTASSIUM mmol/L 3.0* 4.1 4.0 3.5   CHLORIDE mmol/L 103 103 99 96*   CO2 mmol/L 26.4 21.5* 20.6* 17.5*   BUN mg/dL 12 6 6 6   CREATININE mg/dL 0.62 0.55* 0.54* 0.74   CALCIUM mg/dL 8.9 8.9 9.1 9.9   BILIRUBIN mg/dL  --   --  0.3 0.4   ALK PHOS U/L  --   --  174* 231*   ALT (SGPT) U/L  --   --  62* 88*   AST (SGOT) U/L  --   --  26 49*   GLUCOSE mg/dL 84 367* 415* 446*     Results from last 7 days   Lab Units 09/20/20  0713 09/18/20 0620 09/17/20  0733   WBC 10*3/mm3 19.59* 13.22* 15.04*   HEMOGLOBIN g/dL 13.6 12.4 12.7   HEMATOCRIT % 40.3 37.5 38.3   PLATELETS 10*3/mm3 315 268 291                       I have reviewed the patient's laboratory results.    Radiology results:    Imaging Results (Last 24 Hours)     ** No results found for the last 24 hours. **          I have reviewed the patient's radiology reports.    Medication Review:     I have reviewed the patients active and prn medications.       Ulcerative colitis, acute (CMS/[facility])      Assessment:    1.  Acute ulcerative colitis flare, present on admission  2.  Abdominal pain with diarrhea  3.  Dehydration  4.  Uncontrolled type 2 diabetes  5.  Obesity  6.  History of PTSD  7.  Bipolar disorder  8.  GERD  9.  Dysphagia, possible gastroparesis    Plan:    Patient overall hemodynamically stable.  Afebrile.  White count up some, however has been on prednisone.  I did start on erythromycin yesterday evening for potential gastroparesis management.  Her symptoms overall seem to be stable, and is able to eat, however with history of esophageal stricture, feel she would benefit from reevaluation by gastroenterology tomorrow for potential endoscopy prior to discharge.  Gastric  emptying study may be beneficial.  She is at high risk to return to the hospital with worsening of symptoms/complications and I do think further inpatient stay is warranted.  Continue to adjust insulin regimen for glycemic control moving forward.  Continue on PPI therapy.    Please contact Dr. He tomorrow in regards to potential endoscopy for this patient.  If he does not feel she needs scoped, anticipate she can be discharged on prednisone taper and follow-up with GI.    Deb Boles DO  09/20/20  12:41 EDT    Dictated utilizing Dragon dictation.

## 2020-09-21 LAB
B-HCG UR QL: NEGATIVE
GLUCOSE BLDC GLUCOMTR-MCNC: 140 MG/DL (ref 70–130)
GLUCOSE BLDC GLUCOMTR-MCNC: 287 MG/DL (ref 70–130)
GLUCOSE BLDC GLUCOMTR-MCNC: 356 MG/DL (ref 70–130)
GLUCOSE BLDC GLUCOMTR-MCNC: 88 MG/DL (ref 70–130)
SARS-COV-2 RNA PNL SPEC NAA+PROBE: NOT DETECTED

## 2020-09-21 PROCEDURE — 63710000001 INSULIN ASPART PER 5 UNITS: Performed by: FAMILY MEDICINE

## 2020-09-21 PROCEDURE — 25010000002 HYDROMORPHONE 1 MG/ML SOLUTION: Performed by: FAMILY MEDICINE

## 2020-09-21 PROCEDURE — 25010000002 PROCHLORPERAZINE 10 MG/2ML SOLUTION: Performed by: FAMILY MEDICINE

## 2020-09-21 PROCEDURE — 63710000001 INSULIN DETEMIR PER 5 UNITS: Performed by: FAMILY MEDICINE

## 2020-09-21 PROCEDURE — 25010000002 ERYTHROMYCIN LACTOBIONATE PER 500 MG: Performed by: FAMILY MEDICINE

## 2020-09-21 PROCEDURE — 99232 SBSQ HOSP IP/OBS MODERATE 35: CPT | Performed by: INTERNAL MEDICINE

## 2020-09-21 PROCEDURE — 81025 URINE PREGNANCY TEST: CPT | Performed by: INTERNAL MEDICINE

## 2020-09-21 PROCEDURE — 63710000001 DIPHENHYDRAMINE PER 50 MG: Performed by: FAMILY MEDICINE

## 2020-09-21 PROCEDURE — 63710000001 PREDNISONE PER 1 MG: Performed by: FAMILY MEDICINE

## 2020-09-21 PROCEDURE — 82962 GLUCOSE BLOOD TEST: CPT

## 2020-09-21 PROCEDURE — 87635 SARS-COV-2 COVID-19 AMP PRB: CPT | Performed by: INTERNAL MEDICINE

## 2020-09-21 RX ORDER — FLUCONAZOLE 100 MG/1
100 TABLET ORAL EVERY 24 HOURS
Status: DISCONTINUED | OUTPATIENT
Start: 2020-09-21 | End: 2020-09-23 | Stop reason: HOSPADM

## 2020-09-21 RX ORDER — ACETAMINOPHEN AND CODEINE PHOSPHATE 300; 30 MG/1; MG/1
1 TABLET ORAL EVERY 8 HOURS PRN
Status: DISCONTINUED | OUTPATIENT
Start: 2020-09-21 | End: 2020-09-23 | Stop reason: HOSPADM

## 2020-09-21 RX ORDER — SODIUM CHLORIDE 9 MG/ML
70 INJECTION, SOLUTION INTRAVENOUS CONTINUOUS PRN
Status: DISCONTINUED | OUTPATIENT
Start: 2020-09-21 | End: 2020-09-23 | Stop reason: HOSPADM

## 2020-09-21 RX ADMIN — DIPHENHYDRAMINE HYDROCHLORIDE 25 MG: 25 CAPSULE ORAL at 09:58

## 2020-09-21 RX ADMIN — DIPHENHYDRAMINE HYDROCHLORIDE 25 MG: 25 CAPSULE ORAL at 02:23

## 2020-09-21 RX ADMIN — ERYTHROMYCIN LACTOBIONATE 250 MG: 500 INJECTION, POWDER, LYOPHILIZED, FOR SOLUTION INTRAVENOUS at 00:46

## 2020-09-21 RX ADMIN — PROCHLORPERAZINE EDISYLATE 2.5 MG: 5 INJECTION INTRAMUSCULAR; INTRAVENOUS at 00:45

## 2020-09-21 RX ADMIN — TOPIRAMATE 50 MG: 25 TABLET, FILM COATED ORAL at 21:52

## 2020-09-21 RX ADMIN — SODIUM CHLORIDE 50 ML/HR: 9 INJECTION, SOLUTION INTRAVENOUS at 13:28

## 2020-09-21 RX ADMIN — GABAPENTIN 800 MG: 400 CAPSULE ORAL at 06:44

## 2020-09-21 RX ADMIN — DIPHENHYDRAMINE HYDROCHLORIDE 25 MG: 25 CAPSULE ORAL at 17:21

## 2020-09-21 RX ADMIN — INSULIN ASPART 20 UNITS: 100 INJECTION, SOLUTION INTRAVENOUS; SUBCUTANEOUS at 17:22

## 2020-09-21 RX ADMIN — HYDROMORPHONE HYDROCHLORIDE 0.5 MG: 1 INJECTION, SOLUTION INTRAMUSCULAR; INTRAVENOUS; SUBCUTANEOUS at 15:51

## 2020-09-21 RX ADMIN — SODIUM CHLORIDE, PRESERVATIVE FREE 10 ML: 5 INJECTION INTRAVENOUS at 21:53

## 2020-09-21 RX ADMIN — FLUCONAZOLE 100 MG: 100 TABLET ORAL at 15:45

## 2020-09-21 RX ADMIN — GABAPENTIN 800 MG: 400 CAPSULE ORAL at 21:52

## 2020-09-21 RX ADMIN — INSULIN DETEMIR 20 UNITS: 100 INJECTION, SOLUTION SUBCUTANEOUS at 08:40

## 2020-09-21 RX ADMIN — HYDROMORPHONE HYDROCHLORIDE 0.5 MG: 1 INJECTION, SOLUTION INTRAMUSCULAR; INTRAVENOUS; SUBCUTANEOUS at 22:02

## 2020-09-21 RX ADMIN — BUPROPION HYDROCHLORIDE 100 MG: 100 TABLET, FILM COATED, EXTENDED RELEASE ORAL at 06:44

## 2020-09-21 RX ADMIN — HYDROMORPHONE HYDROCHLORIDE 0.5 MG: 1 INJECTION, SOLUTION INTRAMUSCULAR; INTRAVENOUS; SUBCUTANEOUS at 08:59

## 2020-09-21 RX ADMIN — INSULIN DETEMIR 20 UNITS: 100 INJECTION, SOLUTION SUBCUTANEOUS at 22:04

## 2020-09-21 RX ADMIN — HYDROCODONE BITARTRATE AND ACETAMINOPHEN 1 TABLET: 5; 325 TABLET ORAL at 11:23

## 2020-09-21 RX ADMIN — PANTOPRAZOLE SODIUM 40 MG: 40 TABLET, DELAYED RELEASE ORAL at 17:21

## 2020-09-21 RX ADMIN — GABAPENTIN 800 MG: 400 CAPSULE ORAL at 13:28

## 2020-09-21 RX ADMIN — PANTOPRAZOLE SODIUM 40 MG: 40 TABLET, DELAYED RELEASE ORAL at 06:44

## 2020-09-21 RX ADMIN — ERYTHROMYCIN LACTOBIONATE 250 MG: 500 INJECTION, POWDER, LYOPHILIZED, FOR SOLUTION INTRAVENOUS at 09:12

## 2020-09-21 RX ADMIN — PREDNISONE 40 MG: 20 TABLET ORAL at 08:40

## 2020-09-21 NOTE — PLAN OF CARE
Problem: Adult Inpatient Plan of Care  Goal: Plan of Care Review  Outcome: Ongoing, Progressing  Flowsheets (Taken 9/21/2020 1621)  Plan of Care Reviewed With: patient  Outcome Summary: VSS. Patient resting in bed. Receiving pain meds as needed. Will continue to monitor.  Goal: Patient-Specific Goal (Individualized)  Outcome: Ongoing, Progressing  Goal: Absence of Hospital-Acquired Illness or Injury  Outcome: Ongoing, Progressing  Intervention: Identify and Manage Fall Risk  Recent Flowsheet Documentation  Taken 9/21/2020 1600 by Sharlene Garcia RN  Safety Promotion/Fall Prevention: safety round/check completed  Taken 9/21/2020 1400 by Sharlene Garcia RN  Safety Promotion/Fall Prevention: safety round/check completed  Taken 9/21/2020 1200 by Sharlene Garcia RN  Safety Promotion/Fall Prevention:   safety round/check completed   nonskid shoes/slippers when out of bed  Taken 9/21/2020 1000 by Sharlene Garcia RN  Safety Promotion/Fall Prevention: safety round/check completed  Taken 9/21/2020 0800 by Sharlene Garcia RN  Safety Promotion/Fall Prevention: safety round/check completed  Intervention: Prevent Skin Injury  Recent Flowsheet Documentation  Taken 9/21/2020 1600 by Sharlene Garcia RN  Body Position: position changed independently  Taken 9/21/2020 1400 by Sharlene Garcia RN  Body Position: position changed independently  Taken 9/21/2020 1200 by Sharlene Garcia RN  Body Position: position changed independently  Taken 9/21/2020 1000 by Sharlene Garcia RN  Body Position: position changed independently  Taken 9/21/2020 0800 by Sharlene Garcia RN  Body Position: position changed independently  Intervention: Prevent and Manage VTE (venous thromboembolism) Risk  Recent Flowsheet Documentation  Taken 9/21/2020 1200 by Sharlene Garcia RN  VTE Prevention/Management:   bilateral   dorsiflexion/plantar flexion performed  Taken 9/21/2020 0800 by Sharlene Garcia RN  VTE Prevention/Management:   bilateral   dorsiflexion/plantar flexion  performed  Intervention: Prevent Infection  Recent Flowsheet Documentation  Taken 9/21/2020 1200 by Sharlene Garcia RN  Infection Prevention: environmental surveillance performed  Taken 9/21/2020 1000 by Sharlene Garcia RN  Infection Prevention: environmental surveillance performed  Taken 9/21/2020 0800 by Sharlene Garcia RN  Infection Prevention: environmental surveillance performed  Goal: Optimal Comfort and Wellbeing  Outcome: Ongoing, Progressing  Intervention: Provide Person-Centered Care  Recent Flowsheet Documentation  Taken 9/21/2020 0800 by Sharlene Garcia RN  Trust Relationship/Rapport: care explained  Goal: Readiness for Transition of Care  Outcome: Ongoing, Progressing     Problem: Suicide Risk  Goal: Absence of Self-Harm  Outcome: Ongoing, Progressing  Intervention: Promote Psychosocial Wellbeing  Recent Flowsheet Documentation  Taken 9/21/2020 0800 by Sharlene Garcia RN  Supportive Measures: active listening utilized  Family/Support System Care: self-care encouraged     Problem: Skin Injury Risk Increased  Goal: Skin Health and Integrity  Outcome: Ongoing, Progressing  Intervention: Optimize Skin Protection  Recent Flowsheet Documentation  Taken 9/21/2020 1600 by Sharlene Garcia RN  Head of Bed (HOB): HOB elevated  Taken 9/21/2020 1400 by Sharlene Garcia RN  Head of Bed (HOB): HOB elevated  Taken 9/21/2020 1200 by Sharlene Garcia RN  Head of Bed (HOB): HOB elevated  Taken 9/21/2020 1000 by Sharlene Garcia RN  Pressure Reduction Techniques: frequent weight shift encouraged  Head of Bed (HOB): HOB elevated  Taken 9/21/2020 0800 by Sharlene Garcia RN  Pressure Reduction Techniques: frequent weight shift encouraged  Head of Bed (HOB): HOB elevated     Problem: Pain Acute  Goal: Optimal Pain Control  Outcome: Ongoing, Progressing  Intervention: Prevent or Manage Pain  Recent Flowsheet Documentation  Taken 9/21/2020 0800 by Sharlene Garcia RN  Sensory Stimulation Regulation: care clustered  Intervention: Optimize Psychosocial  Wellbeing  Recent Flowsheet Documentation  Taken 9/21/2020 0800 by Sharlene Garcia RN  Supportive Measures: active listening utilized  Diversional Activities: television     Problem: Adjustment to Illness (Bowel Disease, Inflammatory)  Goal: Optimal Adaptation to Chronic Illness  Outcome: Ongoing, Progressing  Intervention: Support/Optimize Psychosocial Response to Illness  Recent Flowsheet Documentation  Taken 9/21/2020 0800 by Sharlene Garcia RN  Supportive Measures: active listening utilized  Family/Support System Care: self-care encouraged     Problem: Diarrhea (Bowel Disease, Inflammatory)  Goal: Diarrhea Symptom Relief  Outcome: Ongoing, Progressing  Intervention: Manage Diarrhea  Recent Flowsheet Documentation  Taken 9/21/2020 0800 by Sharlene Garcia RN  Fluid/Electrolyte Management: fluids provided     Problem: Infection (Bowel Disease, Inflammatory)  Goal: Absence of Infection Signs and Symptoms  Outcome: Ongoing, Progressing  Intervention: Prevent or Manage Infection  Recent Flowsheet Documentation  Taken 9/21/2020 1600 by Sharlene Garcia RN  Infection Management: aseptic technique maintained  Taken 9/21/2020 1400 by Sharlene Garcia RN  Infection Management: aseptic technique maintained  Taken 9/21/2020 1200 by Sharlene Garcia RN  Infection Management: aseptic technique maintained  Taken 9/21/2020 1000 by Sharlene Garcia RN  Infection Management: aseptic technique maintained  Taken 9/21/2020 0800 by Sharlene Garcia RN  Infection Management: aseptic technique maintained     Problem: Nutrition Impaired (Bowel Disease, Inflammatory)  Goal: Optimal Nutrition  Outcome: Ongoing, Progressing  Intervention: Promote and Optimize Nutrition Intake  Recent Flowsheet Documentation  Taken 9/21/2020 0800 by Sharlene Garcia RN  Environmental Support: calm environment promoted     Problem: Pain (Bowel Disease, Inflammatory)  Goal: Acceptable Pain Control  Outcome: Ongoing, Progressing     Problem: Diabetes Comorbidity  Goal: Blood Glucose  Level Within Desired Range  Outcome: Ongoing, Progressing   Goal Outcome Evaluation:  Plan of Care Reviewed With: patient  Progress: improving  Outcome Summary: VSS. Patient resting in bed. Receiving pain meds as needed. Will continue to monitor.

## 2020-09-21 NOTE — PROGRESS NOTES
[facility]    Name:  Thais Hobson   Age:  35 y.o.  Sex:  female  :  1985  MRN:  [id number]   Visit Number:  [id number]  Admission Date:  2020  Date Of Service:  20  Primary Care Physician:  Lv Sanchez DO     LOS: 5 days :  Patient Care Team:  Lv Sanchez DO as PCP - General (Family Medicine)  Lv Sanchez DO as Consulting Physician (Family Medicine):    Chief Complaint:      Abdominal pain, difficulty swallowing and diarrhea.    Subjective / Interval History:     Ms. Hobson is currently lying down on the bed and is comfortable at rest.  She does have history of longstanding ulcerative colitis with failure of multiple treatment modalities due to allergic reactions, who has been followed by Dr. He was admitted directly from the clinic with ulcerative colitis exacerbation.  She has been placed on IV steroids and was seen by Dr. He.  Patient was planned for discharge with steroid taper last weekend but unfortunately developed symptoms of food getting stuck in the throat.  She does have prior history of esophageal stricture and states that it was dilated about a year ago by Dr. Cuevas.  She states that the erythromycin that she was started on over the weekend caused a rash and she did not take it this morning.  Patient states that she thinks that she may be having vaginal yeast infection and requesting Diflucan.  Previous physician documentation, laboratory and imaging data have been reviewed.    Review of Systems:     General ROS: Patient denies any fevers, chills or loss of consciousness.  Respiratory ROS: Denies cough or shortness of breath.  Cardiovascular ROS: Denies chest pain or palpitations. No history of exertional chest pain.  Gastrointestinal ROS: Denies nausea and vomiting.  Abdominal pain and diarrhea.  Neurological ROS: Denies any focal weakness. No loss of consciousness. Denies any  numbness.  Dermatological ROS: Denies any redness or pruritis.    Vital Signs:    Temp:  [97.9 °F (36.6 °C)-98.4 °F (36.9 °C)] 98.4 °F (36.9 °C)  Heart Rate:  [75-86] 83  Resp:  [18-20] 18  BP: (112-142)/(76-92) 120/76    Intake and output:    I/O last 3 completed shifts:  In: 2883 [P.O.:960; I.V.:1923]  Out: 700 [Urine:700]  I/O this shift:  In: 480 [P.O.:480]  Out: -     Physical Examination:    General Appearance:  Alert and cooperative, not in any acute distress.   Head:  Atraumatic and normocephalic, without obvious abnormality.   Eyes:          PERRLA, conjunctivae and sclerae normal, no Icterus. No pallor. Extraocular movements are within normal limits.   Neck: Supple, trachea midline, no thyromegaly, no carotid bruit.   Lungs:   Chest shape is normal. Breath sounds heard bilaterally equally.  No crackles or wheezing. No pleural rub or bronchial breathing.   Heart:  Normal S1 and S2, no murmur, no gallop, no rub. No JVD   Abdomen:   Normal bowel sounds, no masses, no organomegaly. Soft, diffuse tenderness on palpation, obese, no guarding, no rebound tenderness.  No oral thrush noted.   Extremities: Moves all extremities, no edema, no cyanosis, no clubbing.   Skin: No bleeding.   Neurologic: Awake, alert and oriented times 3. Moves all 4 extremities equally.     Laboratory results:    Results from last 7 days   Lab Units 09/20/20  0713 09/18/20  0620 09/17/20  0733 09/16/20  1655   SODIUM mmol/L 141 136 134* 132*   POTASSIUM mmol/L 3.0* 4.1 4.0 3.5   CHLORIDE mmol/L 103 103 99 96*   CO2 mmol/L 26.4 21.5* 20.6* 17.5*   BUN mg/dL 12 6 6 6   CREATININE mg/dL 0.62 0.55* 0.54* 0.74   CALCIUM mg/dL 8.9 8.9 9.1 9.9   BILIRUBIN mg/dL  --   --  0.3 0.4   ALK PHOS U/L  --   --  174* 231*   ALT (SGPT) U/L  --   --  62* 88*   AST (SGOT) U/L  --   --  26 49*   GLUCOSE mg/dL 84 367* 415* 446*     Results from last 7 days   Lab Units 09/20/20  0713 09/18/20  0620 09/17/20  0733   WBC 10*3/mm3 19.59* 13.22* 15.04*    HEMOGLOBIN g/dL 13.6 12.4 12.7   HEMATOCRIT % 40.3 37.5 38.3   PLATELETS 10*3/mm3 315 268 291                       I have reviewed the patient's laboratory results.    Radiology results:    Imaging Results (Last 24 Hours)     ** No results found for the last 24 hours. **        I have reviewed the patient's radiology reports.    Medication Review:     I have reviewed the patient's active and prn medications.       Ulcerative colitis, acute (CMS/[facility])    Assessment:    1.  Acute ulcerative colitis flare, present on admission  2.  Abdominal pain with diarrhea  3.  Dehydration  4.  Uncontrolled type 2 diabetes  5.  Obesity  6.  History of PTSD  7.  Bipolar disorder  8.  GERD  9.  Dysphagia, with suspected esophageal stricture.    Plan:    Ms. Hobson is currently hemodynamically stable.  She is on oral prednisone for ulcerative colitis exacerbation.  She has been on erythromycin for suspected gastroparesis but she does not have a history of this.  I have discussed the patient's condition with Dr. He and he has recommended discontinuation of erythromycin and keep her n.p.o. after midnight for upper GI endoscopy tomorrow.  She is currently on proton pump inhibitor.    She will be continued on Levemir and subcutaneous insulin protocol for coverage.  Blood sugar control has improved.  Hopefully, she should be able to go home in the next 1 to 2 days with long prednisone taper and outpatient follow-up with Dr. He for possible therapy with Entyvio and Imuran.    Alvin Angel MD  09/21/20  15:22 EDT    Dictated utilizing Dragon dictation.

## 2020-09-21 NOTE — PAYOR COMM NOTE
"TO:WE[employer]ARE  FROM:FRANCIA SEYMOUR, RN PHONE [phone]  UPDATED CLINICALS     Thais Hobson (35 y.o. Female)     Date of Birth Social Security Number Address Home Phone MRN    1985  [address]   [place] [address] [phone] [id number]    Catholic Marital Status          Patient Refused Single       Admission Date Admission Type Admitting Provider Attending Provider Department, Room/Bed    9/16/20 Urgent Alvin Angel MD Pais, Roshan, MD [facility] MED SURG  4, 427/1    Discharge Date Discharge Disposition Discharge Destination                       Attending Provider: Alvin Angel MD    Allergies: Hydrochlorothiazide, Penicillins, Remicade [Infliximab], Soybean-containing Drug Products, Xeljanz [Tofacitinib Citrate], Zofran [Ondansetron Hcl], Reglan [Metoclopramide]    Isolation: None   Infection: None   Code Status: CPR    Ht: 160 cm (62.99\")   Wt: 94.6 kg (208 lb 8.9 oz)    Admission Cmt: None   Principal Problem: None                Active Insurance as of 9/16/2020     Primary Coverage     Payor Plan Insurance Group Employer/Plan Group    [employer] [place] [id number]     Payor Plan Address Payor Plan Phone Number Payor Plan Fax Number Effective Dates    [address] [address] [phone]  2/2/2017 - None Entered    [facility] [id number]       Subscriber Name Subscriber Birth Date Member ID       THAIS HOBSON 1985 [id number]                 Emergency Contacts      (Rel.) Home Phone Work Phone Mobile Phone    Kandi Hobson (Step Parent) [phone] -- --    ZOILARENEE (Father) [phone] -- --    FRANCK ALONSO (Mother) [phone] -- --            Vital Signs (last day)     Date/Time   Temp   Temp src   Pulse   Resp   BP   Patient Position   SpO2    09/21/20 1100   98.4 (36.9)   Oral   83   18   120/76   Lying   98    09/21/20 0700   98.1 (36.7)   Oral   75   20   112/80   Lying   98    09/21/20 0332   98 (36.7)   Oral   " 81   18   142/84   Lying   98    09/21/20 0035   98.2 (36.8)   Oral   79   18   129/92   Sitting   100    09/20/20 1954   98.2 (36.8)   Oral   84   18   132/80   Lying   99    09/20/20 1535   97.9 (36.6)   Oral   86   18   125/82   Lying   100    09/20/20 1108   97.9 (36.6)   Oral   84   18   117/79   Lying   97    09/20/20 0735   97.7 (36.5)   Oral   83   18   97/64   Lying   98    09/20/20 0341   97.6 (36.4)   Oral   94   16   107/75   Lying   97    09/20/20 0012   97.6 (36.4)   Oral   99   18   112/71   Lying   96              Current Facility-Administered Medications   Medication Dose Route Frequency Provider Last Rate Last Dose   • acetaminophen (TYLENOL) tablet 650 mg  650 mg Oral Q4H PRN Deb Boles DO        Or   • acetaminophen (TYLENOL) 160 MG/5ML solution 650 mg  650 mg Oral Q4H PRN Deb Boles DO        Or   • acetaminophen (TYLENOL) suppository 650 mg  650 mg Rectal Q4H PRN Deb Boles DO       • buPROPion SR (WELLBUTRIN SR) 12 hr tablet 100 mg  100 mg Oral QAM Deb Boles DO   100 mg at 09/21/20 0644   • dextrose (D50W) 25 g/ 50mL Intravenous Solution 25 g  25 g Intravenous Q15 Min PRN Deb Boles DO       • dextrose (GLUTOSE) oral gel 1 tube  1 tube Oral Q15 Min PRN Deb Boles DO       • dicyclomine (BENTYL) capsule 10 mg  10 mg Oral TID PRN Deb Boles DO       • diphenhydrAMINE (BENADRYL) capsule 25 mg  25 mg Oral Q8H Deb Boles DO   25 mg at 09/21/20 0958   • erythromycin (ERYTHROCIN) 250 mg in sodium chloride 0.9 % 100 mL IVPB  250 mg Intravenous Q8H Deb Boles  mL/hr at 09/21/20 0912 250 mg at 09/21/20 0912   • gabapentin (NEURONTIN) capsule 800 mg  800 mg Oral Q8H Deb Boles,    800 mg at 09/21/20 1328   • glucagon (human recombinant) (GLUCAGEN DIAGNOSTIC) injection 1 mg  1 mg Subcutaneous PRN Deb Boles DO       • HYDROcodone-acetaminophen (NORCO) 5-325  MG per tablet 1 tablet  1 tablet Oral Q6H PRN Deb Boles, DO   1 tablet at 09/21/20 1123   • HYDROmorphone (DILAUDID) injection 0.5 mg  0.5 mg Intravenous Q4H PRN Deb Boles, DO   0.5 mg at 09/21/20 0859   • insulin aspart (novoLOG) injection 0-24 Units  0-24 Units Subcutaneous TID AC Deb Boles, DO   16 Units at 09/20/20 1731   • insulin detemir (LEVEMIR) injection 20 Units  20 Units Subcutaneous Q12H Deb Boles, DO   20 Units at 09/21/20 0840   • pantoprazole (PROTONIX) EC tablet 40 mg  40 mg Oral BID AC Deb Boles, DO   40 mg at 09/21/20 0644   • predniSONE (DELTASONE) tablet 40 mg  40 mg Oral Daily With Breakfast Deb Boles, DO   40 mg at 09/21/20 0840   • prochlorperazine (COMPAZINE) injection 2.5 mg  2.5 mg Intravenous Q6H PRN Deb Boles, DO   2.5 mg at 09/21/20 0045   • promethazine (PHENERGAN) tablet 12.5 mg  12.5 mg Oral Q8H PRN Deb Boles, DO   12.5 mg at 09/18/20 0021   • sodium chloride 0.9 % flush 10 mL  10 mL Intravenous Q12H Deb Boles, DO   10 mL at 09/20/20 2149   • sodium chloride 0.9 % flush 10 mL  10 mL Intravenous PRN Deb Boles, DO       • sodium chloride 0.9 % infusion  50 mL/hr Intravenous Continuous Deb Boles DO 50 mL/hr at 09/21/20 1328 50 mL/hr at 09/21/20 1328   • topiramate (TOPAMAX) tablet 50 mg  50 mg Oral Nightly Deb Boles, DO   50 mg at 09/20/20 2148     Lab Results (last 48 hours)     Procedure Component Value Units Date/Time    POC Glucose Once [[id number]]  (Abnormal) Collected: 09/21/20 1041    Specimen: Blood Updated: 09/21/20 1044     Glucose 140 mg/dL      Comment: Serial Number: [id number]:  [id number]       POC Glucose Once [[id number]]  (Normal) Collected: 09/21/20 0608    Specimen: Blood Updated: 09/21/20 0617     Glucose 88 mg/dL      Comment: Serial Number: [id number]:  [id number]       Calprotectin, Fecal - Stool, Per  Rectum [[id number]] Collected: 09/20/20 2200    Specimen: Stool from Per Rectum Updated: 09/20/20 2212    POC Glucose Once [[id number]]  (Abnormal) Collected: 09/20/20 2019    Specimen: Blood Updated: 09/20/20 2021     Glucose 252 mg/dL      Comment: Serial Number: [id number]:  [id number]       POC Glucose Once [[id number]]  (Abnormal) Collected: 09/20/20 1607    Specimen: Blood Updated: 09/20/20 1614     Glucose 301 mg/dL      Comment: Serial Number: [id number]:  [id number]       POC Glucose Once [[id number]]  (Abnormal) Collected: 09/20/20 1110    Specimen: Blood Updated: 09/20/20 1137     Glucose 179 mg/dL      Comment: Serial Number: [id number]:  [id number]       Basic Metabolic Panel [[id number]]  (Abnormal) Collected: 09/20/20 0713    Specimen: Blood Updated: 09/20/20 0745     Glucose 84 mg/dL      BUN 12 mg/dL      Creatinine 0.62 mg/dL      Sodium 141 mmol/L      Potassium 3.0 mmol/L      Chloride 103 mmol/L      CO2 26.4 mmol/L      Calcium 8.9 mg/dL      eGFR  African Amer 134 mL/min/1.73      eGFR Non African Amer 110 mL/min/1.73      BUN/Creatinine Ratio 19.4     Anion Gap 11.6 mmol/L     Narrative:      GFR Normal >60  Chronic Kidney Disease <60  Kidney Failure <15      CBC (No Diff) [[id number]]  (Abnormal) Collected: 09/20/20 0713    Specimen: Blood Updated: 09/20/20 0735     WBC 19.59 10*3/mm3      RBC 4.94 10*6/mm3      Hemoglobin 13.6 g/dL      Hematocrit 40.3 %      MCV 81.6 fL      MCH 27.5 pg      MCHC 33.7 g/dL      RDW 13.3 %      RDW-SD 39.0 fl      MPV 10.0 fL      Platelets 315 10*3/mm3     POC Glucose Once [[id number]]  (Normal) Collected: 09/20/20 0637    Specimen: Blood Updated: 09/20/20 0646     Glucose 82 mg/dL      Comment: Serial Number: [id number]:  [id number]       POC Glucose Once [[id number]]  (Normal) Collected: 09/19/20 2323    Specimen: Blood Updated: 09/19/20 2325     Glucose 99 mg/dL      Comment: Serial Number: [id number]:  [id number]       POC Glucose Once  [[id number]]  (Abnormal) Collected: 20    Specimen: Blood Updated: 20     Glucose 244 mg/dL      Comment: Serial Number: [id number]:  [id number]       POC Glucose Once [[id number]]  (Abnormal) Collected: 20 1620    Specimen: Blood Updated: 20 1627     Glucose 385 mg/dL      Comment: Serial Number: [id number]:  [id number]                Physician Progress Notes (last 48 hours) (Notes from 20 1359 through 20 1359)      Deb Boles DO at 20 1241                [facility]    PROGRESS NOTE    Name:  Thais Hobson   Age:  35 y.o.  Sex:  female  :  1985  MRN:  [id number]   Visit Number:  [id number]  Admission Date:  2020  Date Of Service:  20  Primary Care Physician:  Lv Sanchez DO     LOS: 4 days :  Patient Care Team:  Lv Sanchez DO as PCP - General (Family Medicine)  Lv Sanchez DO as Consulting Physician (Family Medicine):    Chief Complaint:      Follow-up on abdominal pain, dysphagia    Subjective / Interval History:     Patient overall feels somewhat better today.  States he felt a little nauseous this morning.  She states food still seems to be getting stuck when she is swallowing.  She notes pain is worsened after eating within about 30 minutes or so.  No fevers or chills.  Voiding well.  No bloody bowel movements.    Prior work-up:  The patient is a 35-year-old female with history of ulcerative colitis who had complained of 1 week history of worsening abdominal pain, bloody stools, and dehydration.  She has been followed with gastroenterology by Dr. He.  Patient had actually went to the emergency room in outlying facility yesterday was given IV fluids and discharged at that time.  She had a tele-visit with her PCP today, with ongoing symptoms that have failed to improve.  Discussed the case with me for potential direct admission.     Of note, patient has been diagnosed  with ulcerative colitis since roughly 23 years of age.  She states that she has been on multiple medications for this including immunomodulators, however has either failed the treatment or had allergic reactions to these medications.  Most recently had been on Xeljanz.  She states that she is still having 15 or so diarrhea like bloody stools daily.  Has low-grade fevers at home.  Has not really ate or drink much in the last 4 to 5 days.  She lives with her mother and her 2 children.  She denies any recent medication changes otherwise.     He was admitted and started on IV steroids switched to oral prednisone per GI recommendations.  The plan is for patient to be treated with a prednisone taper, then starting back on DM ARDS.  She did have an MRI which demonstrated fatty liver, otherwise no other disease.     Patient was complaining of difficulty swallowing primaries solid foods, feeling like getting stuck in her stomach at times.    Review of Systems:     General ROS: Patient denies any fevers, chills or loss of consciousness.  Respiratory ROS: Denies cough or shortness of breath.  Cardiovascular ROS: Denies chest pain or palpitations. No history of exertional chest pain.  Gastrointestinal ROS: Dysphagia, abdominal pain, nausea  Neurological ROS: Denies any focal weakness. No loss of consciousness. Denies any numbness.  Dermatological ROS: Denies any redness or pruritis.    Vital Signs:    Temp:  [97.6 °F (36.4 °C)-98 °F (36.7 °C)] 97.9 °F (36.6 °C)  Heart Rate:  [83-99] 84  Resp:  [16-20] 18  BP: ()/(64-82) 117/79    Intake and output:    I/O last 3 completed shifts:  In: 2864 [P.O.:960; I.V.:1804; IV Piggyback:100]  Out: 1900 [Urine:1900]  I/O this shift:  In: 240 [P.O.:240]  Out: -     Physical Examination:    General Appearance:  Alert and cooperative, not in any acute distress.   Head:  Atraumatic and normocephalic, without obvious abnormality.   Eyes:          PERRLA, conjunctivae and sclerae normal, no  Icterus. No pallor. Extraocular movements are within normal limits.   Neck: Supple, trachea midline, no thyromegaly.   Lungs:   Breath sounds heard bilaterally equally.  No crackles or wheezing. No Pleural rub or bronchial breathing.   Heart:  Normal S1 and S2, no murmur, no gallop, no rub. No JVD   Abdomen:   Normal bowel sounds, no masses, no organomegaly. Soft, mid to left lower quadrant abdominal tenderness.  No significant distention.  Obese   Extremities: Moves all extremities well, no edema, no cyanosis, no clubbing.   Skin: No bleeding, bruising or rash.   Neurologic: Awake, alert and oriented times 3. Moves all 4 extremities equally.     Laboratory results:    Results from last 7 days   Lab Units 09/20/20  0713 09/18/20  0620 09/17/20  0733 09/16/20  1655   SODIUM mmol/L 141 136 134* 132*   POTASSIUM mmol/L 3.0* 4.1 4.0 3.5   CHLORIDE mmol/L 103 103 99 96*   CO2 mmol/L 26.4 21.5* 20.6* 17.5*   BUN mg/dL 12 6 6 6   CREATININE mg/dL 0.62 0.55* 0.54* 0.74   CALCIUM mg/dL 8.9 8.9 9.1 9.9   BILIRUBIN mg/dL  --   --  0.3 0.4   ALK PHOS U/L  --   --  174* 231*   ALT (SGPT) U/L  --   --  62* 88*   AST (SGOT) U/L  --   --  26 49*   GLUCOSE mg/dL 84 367* 415* 446*     Results from last 7 days   Lab Units 09/20/20  0713 09/18/20  0620 09/17/20  0733   WBC 10*3/mm3 19.59* 13.22* 15.04*   HEMOGLOBIN g/dL 13.6 12.4 12.7   HEMATOCRIT % 40.3 37.5 38.3   PLATELETS 10*3/mm3 315 268 291                       I have reviewed the patient's laboratory results.    Radiology results:    Imaging Results (Last 24 Hours)     ** No results found for the last 24 hours. **          I have reviewed the patient's radiology reports.    Medication Review:     I have reviewed the patients active and prn medications.       Ulcerative colitis, acute (CMS/[facility])      Assessment:    1.  Acute ulcerative colitis flare, present on admission  2.  Abdominal pain with diarrhea  3.  Dehydration  4.  Uncontrolled type 2 diabetes  5.  Obesity  6.  History  of PTSD  7.  Bipolar disorder  8.  GERD  9.  Dysphagia, possible gastroparesis    Plan:    Patient overall hemodynamically stable.  Afebrile.  White count up some, however has been on prednisone.  I did start on erythromycin yesterday evening for potential gastroparesis management.  Her symptoms overall seem to be stable, and is able to eat, however with history of esophageal stricture, feel she would benefit from reevaluation by gastroenterology tomorrow for potential endoscopy prior to discharge.  Gastric emptying study may be beneficial.  She is at high risk to return to the hospital with worsening of symptoms/complications and I do think further inpatient stay is warranted.  Continue to adjust insulin regimen for glycemic control moving forward.  Continue on PPI therapy.    Please contact Dr. He tomorrow in regards to potential endoscopy for this patient.  If he does not feel she needs scoped, anticipate she can be discharged on prednisone taper and follow-up with GI.    Deb Boles DO  20  12:41 EDT    Dictated utilizing Dragon dictation.    Electronically signed by Deb Boles DO at 20 1245     Deb Boles DO at 20 1528                [facility]    [facility]    Name:  Thais Hobson   Age:  35 y.o.  Sex:  female  :  1985  MRN:  [id number]   Visit Number:  [id number]  Admission Date:  2020  Date Of Service:  20  Primary Care Physician:  Lv Sanchez DO     LOS: 3 days :  Patient Care Team:  Lv Sanchez DO as PCP - General (Family Medicine)  Lv Sanchez DO as Consulting Physician (Family Medicine):    Chief Complaint:      Follow-up ulcerative colitis    Subjective / Interval History:     Patient states overall her lower abdominal pain is improving, however she admits to difficulty with swallowing foods now.  She states she is had a issue with stricture in her esophagus, feels like this  is bothering her again.  She notes this is been worsening over the last month.  She has had dilated in the past.  She states food seems to be getting stuck in her stomach, with a burning sensation, and feels bloated.  She denies any fevers or chills.  Denies any significant more stools.    Her work-up:  The patient is a 35-year-old female with history of ulcerative colitis who had complained of 1 week history of worsening abdominal pain, bloody stools, and dehydration.  She has been followed with gastroenterology by Dr. He.  Patient had actually went to the emergency room in outlying facility yesterday was given IV fluids and discharged at that time.  She had a tele-visit with her PCP today, with ongoing symptoms that have failed to improve.  Discussed the case with me for potential direct admission.     Of note, patient has been diagnosed with ulcerative colitis since roughly 23 years of age.  She states that she has been on multiple medications for this including immunomodulators, however has either failed the treatment or had allergic reactions to these medications.  Most recently had been on Xeljanz.  She states that she is still having 15 or so diarrhea like bloody stools daily.  Has low-grade fevers at home.  Has not really ate or drink much in the last 4 to 5 days.  She lives with her mother and her 2 children.  She denies any recent medication changes otherwise.    He was admitted and started on IV steroids switched to oral prednisone per GI recommendations.  The plan is for patient to be treated with a prednisone taper, then starting back on DM ARDS.  She did have an MRI which demonstrated fatty liver, otherwise no other disease.    Patient was complaining of difficulty swallowing primaries solid foods, feeling like getting stuck in her stomach at times.    Review of Systems:     General ROS: Patient denies any fevers, chills or loss of consciousness.  Respiratory ROS: Denies cough or shortness of  breath.  Cardiovascular ROS: Denies chest pain or palpitations. No history of exertional chest pain.  Gastrointestinal ROS: Abdominal pain  Neurological ROS: Denies any focal weakness. No loss of consciousness. Denies any numbness.  Dermatological ROS: Denies any redness or pruritis.    Vital Signs:    Temp:  [97.5 °F (36.4 °C)-98.2 °F (36.8 °C)] 97.9 °F (36.6 °C)  Heart Rate:  [67-99] 84  Resp:  [16-18] 18  BP: (118-147)/(78-97) 123/78    Intake and output:    I/O last 3 completed shifts:  In: 3845 [P.O.:1680; I.V.:2165]  Out: 2450 [Urine:2450]  I/O this shift:  In: 240 [P.O.:240]  Out: -     Physical Examination:    General Appearance:  Alert and cooperative, not in any acute distress.   Head:  Atraumatic and normocephalic, without obvious abnormality.   Eyes:          PERRLA, conjunctivae and sclerae normal, no Icterus. No pallor. Extraocular movements are within normal limits.   Neck: Supple, trachea midline, no thyromegaly.   Lungs:   Breath sounds heard bilaterally equally.  No crackles or wheezing. No Pleural rub or bronchial breathing.   Heart:  Normal S1 and S2, no murmur, no gallop, no rub. No JVD   Abdomen:   Normal bowel sounds, no masses, no organomegaly. Soft, mild to moderate mid abdominal tenderness, non-distended, no guarding, no rebound tenderness.   Extremities: Moves all extremities well, no edema, no cyanosis, no clubbing.   Skin: No bleeding, bruising or rash.   Neurologic: Awake, alert and oriented times 3. Moves all 4 extremities equally.     Laboratory results:    Results from last 7 days   Lab Units 09/18/20  0620 09/17/20  0733 09/16/20  1655   SODIUM mmol/L 136 134* 132*   POTASSIUM mmol/L 4.1 4.0 3.5   CHLORIDE mmol/L 103 99 96*   CO2 mmol/L 21.5* 20.6* 17.5*   BUN mg/dL 6 6 6   CREATININE mg/dL 0.55* 0.54* 0.74   CALCIUM mg/dL 8.9 9.1 9.9   BILIRUBIN mg/dL  --  0.3 0.4   ALK PHOS U/L  --  174* 231*   ALT (SGPT) U/L  --  62* 88*   AST (SGOT) U/L  --  26 49*   GLUCOSE mg/dL 367* 415* 446*      Results from last 7 days   Lab Units 09/18/20  0620 09/17/20  0733 09/16/20  1655   WBC 10*3/mm3 13.22* 15.04* 13.12*   HEMOGLOBIN g/dL 12.4 12.7 14.6   HEMATOCRIT % 37.5 38.3 45.1   PLATELETS 10*3/mm3 268 291 345                       I have reviewed the patient's laboratory results.    Radiology results:    Imaging Results (Last 24 Hours)     ** No results found for the last 24 hours. **          I have reviewed the patient's radiology reports.    Medication Review:     I have reviewed the patients active and prn medications.       Ulcerative colitis, acute (CMS/[facility])      Assessment:    1.  Acute ulcerative colitis flare, present on admission  2.  Abdominal pain with diarrhea  3.  Dehydration  4.  Uncontrolled type 2 diabetes  5.  Obesity  6.  History of PTSD  7.  Bipolar disorder  8.  GERD  9.  Dysphagia, possible gastroparesis    Plan:    Patient's MRI reviewed yesterday, some fatty liver disease, otherwise no evidence of PSC.  Vital signs overall remained stable and afebrile.  Her diet is being slowly advanced.  Abdominal pain is improving, still requiring some doses of IV pain control however.  Discussed would like to wean off of IV pain medication to oral pain medication.  Started on oral prednisone.  Will start on erythromycin for concern for gastroparesis.  Gastroenterology currently off this weekend, anticipate patient may need an EGD early next week depending upon how her symptoms go in the next 24 hours.  Hold on gastric emptying study for now.  Repeat labs tomorrow.  We will continue to adjust insulin regimen to maintain better glycemic control.    Deb Boles DO  09/19/20  15:28 EDT    Dictated utilizing Dragon dictation.    Electronically signed by Deb Boles DO at 09/19/20 3594

## 2020-09-22 ENCOUNTER — ANESTHESIA (OUTPATIENT)
Dept: GASTROENTEROLOGY | Facility: HOSPITAL | Age: 35
End: 2020-09-22

## 2020-09-22 ENCOUNTER — ANESTHESIA EVENT (OUTPATIENT)
Dept: GASTROENTEROLOGY | Facility: HOSPITAL | Age: 35
End: 2020-09-22

## 2020-09-22 PROBLEM — Z87.19 HISTORY OF ESOPHAGEAL STRICTURE: Status: ACTIVE | Noted: 2020-09-16

## 2020-09-22 LAB
ANION GAP SERPL CALCULATED.3IONS-SCNC: 10.5 MMOL/L (ref 5–15)
BUN SERPL-MCNC: 11 MG/DL (ref 6–20)
BUN/CREAT SERPL: 20 (ref 7–25)
CALCIUM SPEC-SCNC: 9 MG/DL (ref 8.6–10.5)
CHLORIDE SERPL-SCNC: 104 MMOL/L (ref 98–107)
CO2 SERPL-SCNC: 23.5 MMOL/L (ref 22–29)
CREAT SERPL-MCNC: 0.55 MG/DL (ref 0.57–1)
DEPRECATED RDW RBC AUTO: 39.4 FL (ref 37–54)
ERYTHROCYTE [DISTWIDTH] IN BLOOD BY AUTOMATED COUNT: 13.2 % (ref 12.3–15.4)
GAMMA INTERFERON BACKGROUND BLD IA-ACNC: 0.02 IU/ML
GFR SERPL CREATININE-BSD FRML MDRD: 127 ML/MIN/1.73
GFR SERPL CREATININE-BSD FRML MDRD: >150 ML/MIN/1.73
GLUCOSE BLDC GLUCOMTR-MCNC: 116 MG/DL (ref 70–130)
GLUCOSE BLDC GLUCOMTR-MCNC: 122 MG/DL (ref 70–130)
GLUCOSE BLDC GLUCOMTR-MCNC: 275 MG/DL (ref 70–130)
GLUCOSE BLDC GLUCOMTR-MCNC: 349 MG/DL (ref 70–130)
GLUCOSE SERPL-MCNC: 131 MG/DL (ref 65–99)
HCT VFR BLD AUTO: 40.3 % (ref 34–46.6)
HGB BLD-MCNC: 13.4 G/DL (ref 12–15.9)
M TB IFN-G BLD-IMP: NEGATIVE
M TB IFN-G CD4+ BCKGRND COR BLD-ACNC: 0.02 IU/ML
M TB IFN-G CD4+CD8+ BCKGRND COR BLD-ACNC: 0.02 IU/ML
MAGNESIUM SERPL-MCNC: 2.1 MG/DL (ref 1.6–2.6)
MCH RBC QN AUTO: 27.6 PG (ref 26.6–33)
MCHC RBC AUTO-ENTMCNC: 33.3 G/DL (ref 31.5–35.7)
MCV RBC AUTO: 82.9 FL (ref 79–97)
MITOGEN IGNF BLD-ACNC: >10 IU/ML
PLATELET # BLD AUTO: 290 10*3/MM3 (ref 140–450)
PMV BLD AUTO: 9.9 FL (ref 6–12)
POTASSIUM SERPL-SCNC: 3.7 MMOL/L (ref 3.5–5.2)
RBC # BLD AUTO: 4.86 10*6/MM3 (ref 3.77–5.28)
SERVICE CMNT-IMP: NORMAL
SODIUM SERPL-SCNC: 138 MMOL/L (ref 136–145)
WBC # BLD AUTO: 14.74 10*3/MM3 (ref 3.4–10.8)

## 2020-09-22 PROCEDURE — 0D758ZZ DILATION OF ESOPHAGUS, VIA NATURAL OR ARTIFICIAL OPENING ENDOSCOPIC: ICD-10-PCS | Performed by: INTERNAL MEDICINE

## 2020-09-22 PROCEDURE — 25010000002 HYDROMORPHONE 1 MG/ML SOLUTION: Performed by: INTERNAL MEDICINE

## 2020-09-22 PROCEDURE — 25010000002 PROCHLORPERAZINE 10 MG/2ML SOLUTION: Performed by: FAMILY MEDICINE

## 2020-09-22 PROCEDURE — 43239 EGD BIOPSY SINGLE/MULTIPLE: CPT | Performed by: INTERNAL MEDICINE

## 2020-09-22 PROCEDURE — 43248 EGD GUIDE WIRE INSERTION: CPT | Performed by: INTERNAL MEDICINE

## 2020-09-22 PROCEDURE — 25010000002 FENTANYL CITRATE (PF) 100 MCG/2ML SOLUTION: Performed by: NURSE ANESTHETIST, CERTIFIED REGISTERED

## 2020-09-22 PROCEDURE — 25010000002 HYDROMORPHONE 1 MG/ML SOLUTION: Performed by: FAMILY MEDICINE

## 2020-09-22 PROCEDURE — 63710000001 DIPHENHYDRAMINE PER 50 MG: Performed by: INTERNAL MEDICINE

## 2020-09-22 PROCEDURE — 99232 SBSQ HOSP IP/OBS MODERATE 35: CPT | Performed by: INTERNAL MEDICINE

## 2020-09-22 PROCEDURE — 85027 COMPLETE CBC AUTOMATED: CPT | Performed by: INTERNAL MEDICINE

## 2020-09-22 PROCEDURE — 63710000001 DIPHENHYDRAMINE PER 50 MG: Performed by: FAMILY MEDICINE

## 2020-09-22 PROCEDURE — 63710000001 PREDNISONE PER 1 MG: Performed by: FAMILY MEDICINE

## 2020-09-22 PROCEDURE — 63710000001 INSULIN ASPART PER 5 UNITS: Performed by: INTERNAL MEDICINE

## 2020-09-22 PROCEDURE — 25010000002 MIDAZOLAM PER 1MG: Performed by: NURSE ANESTHETIST, CERTIFIED REGISTERED

## 2020-09-22 PROCEDURE — 82962 GLUCOSE BLOOD TEST: CPT

## 2020-09-22 PROCEDURE — 0DB68ZX EXCISION OF STOMACH, VIA NATURAL OR ARTIFICIAL OPENING ENDOSCOPIC, DIAGNOSTIC: ICD-10-PCS | Performed by: INTERNAL MEDICINE

## 2020-09-22 PROCEDURE — 63710000001 INSULIN DETEMIR PER 5 UNITS: Performed by: FAMILY MEDICINE

## 2020-09-22 PROCEDURE — 88305 TISSUE EXAM BY PATHOLOGIST: CPT | Performed by: INTERNAL MEDICINE

## 2020-09-22 PROCEDURE — 80048 BASIC METABOLIC PNL TOTAL CA: CPT | Performed by: INTERNAL MEDICINE

## 2020-09-22 PROCEDURE — 83735 ASSAY OF MAGNESIUM: CPT | Performed by: INTERNAL MEDICINE

## 2020-09-22 PROCEDURE — 0DB58ZX EXCISION OF ESOPHAGUS, VIA NATURAL OR ARTIFICIAL OPENING ENDOSCOPIC, DIAGNOSTIC: ICD-10-PCS | Performed by: INTERNAL MEDICINE

## 2020-09-22 PROCEDURE — 63710000001 INSULIN DETEMIR PER 5 UNITS: Performed by: INTERNAL MEDICINE

## 2020-09-22 RX ORDER — FENTANYL CITRATE 50 UG/ML
INJECTION, SOLUTION INTRAMUSCULAR; INTRAVENOUS AS NEEDED
Status: DISCONTINUED | OUTPATIENT
Start: 2020-09-22 | End: 2020-09-22 | Stop reason: SURG

## 2020-09-22 RX ORDER — LIDOCAINE HYDROCHLORIDE 20 MG/ML
INJECTION, SOLUTION INTRAVENOUS AS NEEDED
Status: DISCONTINUED | OUTPATIENT
Start: 2020-09-22 | End: 2020-09-22 | Stop reason: SURG

## 2020-09-22 RX ORDER — MIDAZOLAM HYDROCHLORIDE 2 MG/2ML
INJECTION, SOLUTION INTRAMUSCULAR; INTRAVENOUS AS NEEDED
Status: DISCONTINUED | OUTPATIENT
Start: 2020-09-22 | End: 2020-09-22 | Stop reason: SURG

## 2020-09-22 RX ORDER — ETOMIDATE 2 MG/ML
INJECTION INTRAVENOUS AS NEEDED
Status: DISCONTINUED | OUTPATIENT
Start: 2020-09-22 | End: 2020-09-22 | Stop reason: SURG

## 2020-09-22 RX ADMIN — MIDAZOLAM HYDROCHLORIDE 2 MG: 1 INJECTION, SOLUTION INTRAMUSCULAR; INTRAVENOUS at 11:25

## 2020-09-22 RX ADMIN — FENTANYL CITRATE 100 MCG: 50 INJECTION, SOLUTION INTRAMUSCULAR; INTRAVENOUS at 11:25

## 2020-09-22 RX ADMIN — ETOMIDATE 2 MG: 2 INJECTION, SOLUTION INTRAVENOUS at 11:34

## 2020-09-22 RX ADMIN — HYDROMORPHONE HYDROCHLORIDE 0.5 MG: 1 INJECTION, SOLUTION INTRAMUSCULAR; INTRAVENOUS; SUBCUTANEOUS at 12:40

## 2020-09-22 RX ADMIN — HYDROMORPHONE HYDROCHLORIDE 0.5 MG: 1 INJECTION, SOLUTION INTRAMUSCULAR; INTRAVENOUS; SUBCUTANEOUS at 17:39

## 2020-09-22 RX ADMIN — DIPHENHYDRAMINE HYDROCHLORIDE 25 MG: 25 CAPSULE ORAL at 01:32

## 2020-09-22 RX ADMIN — HYDROMORPHONE HYDROCHLORIDE 0.5 MG: 1 INJECTION, SOLUTION INTRAMUSCULAR; INTRAVENOUS; SUBCUTANEOUS at 01:35

## 2020-09-22 RX ADMIN — PROCHLORPERAZINE EDISYLATE 2.5 MG: 5 INJECTION INTRAMUSCULAR; INTRAVENOUS at 04:17

## 2020-09-22 RX ADMIN — TOPIRAMATE 50 MG: 25 TABLET, FILM COATED ORAL at 20:10

## 2020-09-22 RX ADMIN — SODIUM CHLORIDE 50 ML/HR: 9 INJECTION, SOLUTION INTRAVENOUS at 12:37

## 2020-09-22 RX ADMIN — DIPHENHYDRAMINE HYDROCHLORIDE 25 MG: 25 CAPSULE ORAL at 08:32

## 2020-09-22 RX ADMIN — SODIUM CHLORIDE 50 ML/HR: 9 INJECTION, SOLUTION INTRAVENOUS at 11:01

## 2020-09-22 RX ADMIN — DIPHENHYDRAMINE HYDROCHLORIDE 25 MG: 25 CAPSULE ORAL at 17:34

## 2020-09-22 RX ADMIN — SODIUM CHLORIDE, PRESERVATIVE FREE 10 ML: 5 INJECTION INTRAVENOUS at 08:32

## 2020-09-22 RX ADMIN — GABAPENTIN 800 MG: 400 CAPSULE ORAL at 16:51

## 2020-09-22 RX ADMIN — HYDROCODONE BITARTRATE AND ACETAMINOPHEN 1 TABLET: 5; 325 TABLET ORAL at 08:32

## 2020-09-22 RX ADMIN — PANTOPRAZOLE SODIUM 40 MG: 40 TABLET, DELAYED RELEASE ORAL at 16:52

## 2020-09-22 RX ADMIN — ETOMIDATE 2 MG: 2 INJECTION, SOLUTION INTRAVENOUS at 11:30

## 2020-09-22 RX ADMIN — HYDROCODONE BITARTRATE AND ACETAMINOPHEN 1 TABLET: 5; 325 TABLET ORAL at 20:10

## 2020-09-22 RX ADMIN — HYDROMORPHONE HYDROCHLORIDE 0.5 MG: 1 INJECTION, SOLUTION INTRAMUSCULAR; INTRAVENOUS; SUBCUTANEOUS at 21:24

## 2020-09-22 RX ADMIN — INSULIN DETEMIR 20 UNITS: 100 INJECTION, SOLUTION SUBCUTANEOUS at 21:20

## 2020-09-22 RX ADMIN — FLUCONAZOLE 100 MG: 100 TABLET ORAL at 16:52

## 2020-09-22 RX ADMIN — GABAPENTIN 800 MG: 400 CAPSULE ORAL at 21:00

## 2020-09-22 RX ADMIN — INSULIN ASPART 16 UNITS: 100 INJECTION, SOLUTION INTRAVENOUS; SUBCUTANEOUS at 17:35

## 2020-09-22 RX ADMIN — LIDOCAINE HYDROCHLORIDE 60 MG: 20 INJECTION, SOLUTION INTRAVENOUS at 11:22

## 2020-09-22 RX ADMIN — INSULIN DETEMIR 20 UNITS: 100 INJECTION, SOLUTION SUBCUTANEOUS at 08:35

## 2020-09-22 RX ADMIN — PREDNISONE 40 MG: 20 TABLET ORAL at 08:32

## 2020-09-22 RX ADMIN — ETOMIDATE 2 MG: 2 INJECTION, SOLUTION INTRAVENOUS at 11:26

## 2020-09-22 RX ADMIN — SODIUM CHLORIDE 50 ML/HR: 9 INJECTION, SOLUTION INTRAVENOUS at 06:31

## 2020-09-22 NOTE — PLAN OF CARE
Goal Outcome Evaluation:  Plan of Care Reviewed With: patient  Progress: improving  Outcome Summary: VSS.  Continues to receive iv fluids as ordered.  NPO since midnight for EGD today per Dr Galindo.  Pain meds given as needed.  Labs monitored daily.  Anticipates being able to return home in 1-2 days.

## 2020-09-22 NOTE — PLAN OF CARE
Goal Outcome Evaluation:  Plan of Care Reviewed With: patient  Progress: improving  Outcome Summary: Pt underwent upper GI endoscopy today. Pt tolerated procedure well. Pt still states abdominal pain RT ulcerative colitis which was dx approx 8 years ago. Pt to be discharged in am with outpatient management by Dr. He

## 2020-09-22 NOTE — PROGRESS NOTES
[facility]    Name:  Thais Hobson   Age:  35 y.o.  Sex:  female  :  1985  MRN:  [id number]   Visit Number:  [id number]  Admission Date:  2020  Date Of Service:  20  Primary Care Physician:  Lv Sanchez DO     LOS: 6 days :  Patient Care Team:  Lv Sanchez DO as PCP - General (Family Medicine)  Lv Sanchez DO as Consulting Physician (Family Medicine):    Chief Complaint:      Abdominal pain, difficulty swallowing and diarrhea.    Subjective / Interval History:     Ms. Hobson was seen and examined twice today.  She is currently lying down on the bed and is comfortable at rest.  She did undergo upper GI endoscopy by Dr. He.  No significant stenosis was noted in her esophagus but it was dilated due to her symptoms of dysphagia.  Patient has tolerated the procedure well.  She is still complaining of diffuse abdominal pain which has been a chronic symptom.  She has been suffering from ulcerative colitis since she was 23 years old.    Review of Systems:     General ROS: Patient denies any fevers, chills or loss of consciousness.  Respiratory ROS: Denies cough or shortness of breath.  Cardiovascular ROS: Denies chest pain or palpitations. No history of exertional chest pain.  Gastrointestinal ROS: Denies nausea and vomiting.  Abdominal pain and diarrhea.  Neurological ROS: Denies any focal weakness. No loss of consciousness. Denies any numbness.  Dermatological ROS: Denies any redness or pruritis.    Vital Signs:    Temp:  [96.9 °F (36.1 °C)-98.6 °F (37 °C)] 97.7 °F (36.5 °C)  Heart Rate:  [] 88  Resp:  [11-18] 11  BP: (107-141)/(71-96) 122/82    Intake and output:    I/O last 3 completed shifts:  In: 3241 [P.O.:720; I.V.:2521]  Out: -   I/O this shift:  In: 100 [I.V.:100]  Out: -     Physical Examination:    General Appearance:  Alert and cooperative, not in any acute distress.   Head:  Atraumatic and normocephalic,  without obvious abnormality.   Eyes:          PERRLA, conjunctivae and sclerae normal, no Icterus. No pallor. Extraocular movements are within normal limits.   Neck: Supple, trachea midline, no thyromegaly, no carotid bruit.   Lungs:   Chest shape is normal. Breath sounds heard bilaterally equally.  No crackles or wheezing. No pleural rub or bronchial breathing.   Heart:  Normal S1 and S2, no murmur, no gallop, no rub. No JVD   Abdomen:   Normal bowel sounds, no masses, no organomegaly. Soft, diffuse tenderness on palpation, obese, no guarding, no rebound tenderness.  No oral thrush noted.   Extremities: Moves all extremities, no edema, no cyanosis, no clubbing.   Skin: No bleeding.   Neurologic: Awake, alert and oriented times 3. Moves all 4 extremities equally.     Laboratory results:    Results from last 7 days   Lab Units 09/22/20  0602 09/20/20 0713 09/18/20  0620 09/17/20  0733 09/16/20  1655   SODIUM mmol/L 138 141 136 134* 132*   POTASSIUM mmol/L 3.7 3.0* 4.1 4.0 3.5   CHLORIDE mmol/L 104 103 103 99 96*   CO2 mmol/L 23.5 26.4 21.5* 20.6* 17.5*   BUN mg/dL 11 12 6 6 6   CREATININE mg/dL 0.55* 0.62 0.55* 0.54* 0.74   CALCIUM mg/dL 9.0 8.9 8.9 9.1 9.9   BILIRUBIN mg/dL  --   --   --  0.3 0.4   ALK PHOS U/L  --   --   --  174* 231*   ALT (SGPT) U/L  --   --   --  62* 88*   AST (SGOT) U/L  --   --   --  26 49*   GLUCOSE mg/dL 131* 84 367* 415* 446*     Results from last 7 days   Lab Units 09/22/20  0602 09/20/20 0713 09/18/20 0620   WBC 10*3/mm3 14.74* 19.59* 13.22*   HEMOGLOBIN g/dL 13.4 13.6 12.4   HEMATOCRIT % 40.3 40.3 37.5   PLATELETS 10*3/mm3 290 315 268                       I have reviewed the patient's laboratory results.    Radiology results:    Imaging Results (Last 24 Hours)     ** No results found for the last 24 hours. **        I have reviewed the patient's radiology reports.    Medication Review:     I have reviewed the patient's active and prn medications.       Ulcerative colitis, acute  (CMS/[facility])    Bipolar disorder (CMS/[facility])    Type 2 diabetes mellitus treated with insulin (CMS/[facility])    Esophageal dysphagia    History of esophageal stricture    Assessment:    1.  Acute ulcerative colitis flare, present on admission  2.  Abdominal pain with diarrhea  3.  Dehydration  4.  Uncontrolled type 2 diabetes  5.  Obesity  6.  History of PTSD  7.  Bipolar disorder  8.  GERD  9.  Dysphagia, with suspected esophageal stricture.    Plan:    Ms. Hobson is currently hemodynamically stable but complains of abdominal pain.  She did have upper GI endoscopy with dilation of the esophagus.  She is currently on IV fluids.  Patient wants to go home tomorrow as she is still feeling scared about her pain and swallowing.  We will observe her overnight and discharge her home in the morning with long prednisone taper.  She is currently on prednisone 40 mg daily and will be tapered off 10 mg weekly.  She is on proton pump inhibitor therapy.    She will be continued on Levemir and subcutaneous insulin protocol for coverage.  Blood sugar control has improved.  Unfortunately, her recent hemoglobin A1c is 11.5 and I suspect her blood sugar control will worsen with steroids.  Hopefully, she should be able to go home tomorrow and follow-up with Dr. He for possible therapy with Entyvio and [place]air.    Alvin Angel MD  09/22/20  16:04 EDT    Dictated utilizing Dragon dictation.

## 2020-09-22 NOTE — ANESTHESIA POSTPROCEDURE EVALUATION
Patient: Thais Hobson    Procedure Summary     Date: 09/22/20 Room / Location: [facility] ENDOSCOPY 2 / [facility] ENDOSCOPY    Anesthesia Start: 1122 Anesthesia Stop: 1139    Procedure: ESOPHAGOGASTRODUODENOSCOPY WITH DILATATION AND BIOPSIES (N/A ) Diagnosis:       Esophageal dysphagia      History of esophageal stricture      (Esophageal dysphagia [R13.10])      (History of esophageal stricture [Z87.19])    Surgeon: Alonzo He MD Provider: Boubacar Astudillo CRNA    Anesthesia Type: MAC ASA Status: 3          Anesthesia Type: MAC    Vitals  No vitals data found for the desired time range.          Post Anesthesia Care and Evaluation    Patient location during evaluation: bedside  Patient participation: complete - patient participated  Level of consciousness: awake and alert  Pain score: 0  Pain management: adequate  Airway patency: patent  Anesthetic complications: No anesthetic complications  PONV Status: none  Cardiovascular status: acceptable  Respiratory status: acceptable  Hydration status: acceptable

## 2020-09-22 NOTE — PROGRESS NOTES
Adult Nutrition  Assessment/PES    Patient Name:  Thais Hobson  YOB: 1985  MRN: [id number]  Admit Date:  9/16/2020    Assessment Date:  9/22/2020    Comments:    Recommend:  1. Consider advancing diet as medically appropriate and tolerated.  2. Encourage PO intake. PO intake average ~96% x 7 meals prior to NPO diet order.  3. Consider an MVI with minerals daily.     RD to follow pt and available PRN.      Reason for Assessment     Row Name 09/22/20 0954          Reason for Assessment    Reason For Assessment  follow-up protocol     Diagnosis  diabetes diagnosis/complications;gastrointestinal disease;fluid status;other (see comments) Dehydration, Ulcerative colitis, Dehydration, DM 2     Identified At Risk by Screening Criteria  BMI;MST SCORE 2+             Labs/Tests/Procedures/Meds     Row Name 09/22/20 0954          Labs/Procedures/Meds    Lab Results Reviewed  reviewed, pertinent     Lab Results Comments  Low: Cr High: HgbA1c, ALT, CRP        Medications    Pertinent Medications Reviewed  reviewed, pertinent     Pertinent Medications Comments  Wellbutrin, Novolog, Levemir, Deltasone         Physical Findings     Row Name 09/22/20 0956          Physical Findings    Overall Physical Appearance  obese           Nutrition Prescription Ordered     Row Name 09/22/20 0956          Nutrition Prescription PO    Current PO Diet  NPO         Evaluation of Received Nutrient/Fluid Intake     Row Name 09/22/20 0957          PO Evaluation    Number of Days PO Intake Evaluated  3 days     Number of Meals  7     % PO Intake  96               Problem/Interventions:  Problem 1     Row Name 09/22/20 0958 09/22/20 0957       Nutrition Diagnoses Problem 1    Problem 1  Inadequate Intake/Infusion  --    Inadequate Intake Type  Oral  --    Macronutrient  Kcal;Fiber;Protein;Carbohydrate;Fat  --    Micronutrient  Vitamin;Mineral  --    Etiology (related to)  MNT for Treatment/Condition  --    Endocrine  --  --     Signs/Symptoms (evidenced by)  NPO  --    Specific Labs Noted  --  --        Problem 2     Row Name 09/22/20 0957          Nutrition Diagnoses Problem 2    Problem 2  Overweight/Obesity     Etiology (related to)  Factors Affecting Nutrition     Food Habit/Preferences  Large Meals     Signs/Symptoms (evidenced by)  BMI     BMI  35 - 39.9         Problem 3     Row Name 09/22/20 0958          Nutrition Diagnoses Problem 3    Problem 3  Impaired Nutrient Utilization     Etiology (related to)  Medical Diagnosis     Endocrine  DM Type 2     Signs/Symptoms (evidenced by)  Biochemical     Specific Labs Noted  HgbA1C           Intervention Goal     Row Name 09/22/20 0959          Intervention Goal    General  Meet nutritional needs for age/condition     PO  Meet estimated needs;Initiate feeding;Advance diet     Weight  No significant weight loss         Nutrition Intervention     Row Name 09/22/20 0959          Nutrition Intervention    RD/Tech Action  Follow Tx progress;Recommend/ordered     Recommended/Ordered  Diet         Nutrition Prescription     Row Name 09/22/20 0959          Nutrition Prescription PO    PO Prescription  Begin/change diet     Begin/Change Diet to  Regular     Common Modifiers  GI Soft/[place];Consistent Carbohydrate;Gluten Free         Education/Evaluation     Row Name 09/22/20 0959          Education    Education  Will Instruct as appropriate        Monitor/Evaluation    Monitor  Per protocol;I&O;Pertinent labs;Weight;Skin status           Electronically signed by:  Caridad Watkins RD  09/22/20 10:00 EDT

## 2020-09-23 ENCOUNTER — READMISSION MANAGEMENT (OUTPATIENT)
Dept: CALL CENTER | Facility: HOSPITAL | Age: 35
End: 2020-09-23

## 2020-09-23 VITALS
OXYGEN SATURATION: 94 % | HEIGHT: 63 IN | HEART RATE: 98 BPM | SYSTOLIC BLOOD PRESSURE: 119 MMHG | TEMPERATURE: 98.4 F | DIASTOLIC BLOOD PRESSURE: 92 MMHG | WEIGHT: 208.56 LBS | BODY MASS INDEX: 36.95 KG/M2 | RESPIRATION RATE: 20 BRPM

## 2020-09-23 LAB
GLUCOSE BLDC GLUCOMTR-MCNC: 195 MG/DL (ref 70–130)
GLUCOSE BLDC GLUCOMTR-MCNC: 214 MG/DL (ref 70–130)
LAB AP CASE REPORT: NORMAL
PATH REPORT.FINAL DX SPEC: NORMAL

## 2020-09-23 PROCEDURE — 63710000001 INSULIN DETEMIR PER 5 UNITS: Performed by: INTERNAL MEDICINE

## 2020-09-23 PROCEDURE — 63710000001 INSULIN ASPART PER 5 UNITS: Performed by: INTERNAL MEDICINE

## 2020-09-23 PROCEDURE — 25010000002 HYDROMORPHONE 1 MG/ML SOLUTION: Performed by: INTERNAL MEDICINE

## 2020-09-23 PROCEDURE — 63710000001 DIPHENHYDRAMINE PER 50 MG: Performed by: INTERNAL MEDICINE

## 2020-09-23 PROCEDURE — 99238 HOSP IP/OBS DSCHRG MGMT 30/<: CPT | Performed by: INTERNAL MEDICINE

## 2020-09-23 PROCEDURE — 63710000001 PREDNISONE PER 1 MG: Performed by: INTERNAL MEDICINE

## 2020-09-23 PROCEDURE — 82962 GLUCOSE BLOOD TEST: CPT

## 2020-09-23 RX ORDER — PREDNISONE 10 MG/1
TABLET ORAL
Qty: 46 TABLET | Refills: 0 | Status: SHIPPED | OUTPATIENT
Start: 2020-09-23 | End: 2021-05-19 | Stop reason: HOSPADM

## 2020-09-23 RX ORDER — HYDROCODONE BITARTRATE AND ACETAMINOPHEN 5; 325 MG/1; MG/1
1 TABLET ORAL EVERY 6 HOURS PRN
Qty: 20 TABLET | Refills: 0 | Status: SHIPPED | OUTPATIENT
Start: 2020-09-23 | End: 2020-10-01 | Stop reason: SDUPTHER

## 2020-09-23 RX ADMIN — SODIUM CHLORIDE, PRESERVATIVE FREE 10 ML: 5 INJECTION INTRAVENOUS at 08:40

## 2020-09-23 RX ADMIN — INSULIN DETEMIR 20 UNITS: 100 INJECTION, SOLUTION SUBCUTANEOUS at 08:40

## 2020-09-23 RX ADMIN — BUPROPION HYDROCHLORIDE 100 MG: 100 TABLET, FILM COATED, EXTENDED RELEASE ORAL at 06:33

## 2020-09-23 RX ADMIN — PREDNISONE 40 MG: 20 TABLET ORAL at 08:39

## 2020-09-23 RX ADMIN — DIPHENHYDRAMINE HYDROCHLORIDE 25 MG: 25 CAPSULE ORAL at 10:54

## 2020-09-23 RX ADMIN — GABAPENTIN 800 MG: 400 CAPSULE ORAL at 05:06

## 2020-09-23 RX ADMIN — HYDROCODONE BITARTRATE AND ACETAMINOPHEN 1 TABLET: 5; 325 TABLET ORAL at 01:42

## 2020-09-23 RX ADMIN — PANTOPRAZOLE SODIUM 40 MG: 40 TABLET, DELAYED RELEASE ORAL at 06:34

## 2020-09-23 RX ADMIN — HYDROMORPHONE HYDROCHLORIDE 0.5 MG: 1 INJECTION, SOLUTION INTRAMUSCULAR; INTRAVENOUS; SUBCUTANEOUS at 01:42

## 2020-09-23 RX ADMIN — HYDROCODONE BITARTRATE AND ACETAMINOPHEN 1 TABLET: 5; 325 TABLET ORAL at 08:52

## 2020-09-23 RX ADMIN — HYDROMORPHONE HYDROCHLORIDE 0.5 MG: 1 INJECTION, SOLUTION INTRAMUSCULAR; INTRAVENOUS; SUBCUTANEOUS at 10:54

## 2020-09-23 RX ADMIN — DIPHENHYDRAMINE HYDROCHLORIDE 25 MG: 25 CAPSULE ORAL at 01:42

## 2020-09-23 RX ADMIN — HYDROMORPHONE HYDROCHLORIDE 0.5 MG: 1 INJECTION, SOLUTION INTRAMUSCULAR; INTRAVENOUS; SUBCUTANEOUS at 05:45

## 2020-09-23 RX ADMIN — INSULIN ASPART 4 UNITS: 100 INJECTION, SOLUTION INTRAVENOUS; SUBCUTANEOUS at 06:34

## 2020-09-23 NOTE — OUTREACH NOTE
Prep Survey      Responses   [facility] patient discharged from?  [place]   Is LACE score < 7 ?  No   Eligibility  [facility]   Date of Admission  09/16/20   Date of Discharge  09/23/20   Discharge Disposition  Home or Self Care   Discharge diagnosis  Acute ulcerative colitis flare   COVID-19 Test Status  Negative   Does the patient have one of the following disease processes/diagnoses(primary or secondary)?  Other   Does the patient have Home health ordered?  No   Is there a DME ordered?  No   Prep survey completed?  Yes          Maureen Le RN

## 2020-09-23 NOTE — DISCHARGE SUMMARY
[facility]   DISCHARGE SUMMARY      Name:  hTais Hobson   Age:  35 y.o.  Sex:  female  :  1985  MRN:  [id number]   Visit Number:  [id number]    Admission Date:  2020  Date of Discharge:  2020  Primary Care Physician:  Lv Sanchez DO    Important issues to note:    1.  Continue slow taper of prednisone as prescribed with 30 mg daily for 1 week, 20 mg daily for 1 week, 10 mg daily for 1 week, 5 mg daily for 1 week.  2.  Follow-up with Dr. He in 3 to 4 weeks for initiation of Entyvio and Imuran for ulcerative colitis.  3.  Patient was given 20 pills of Lortab 5 mg for abdominal pain.  Her PINO Martínez report was reviewed.  4.  Follow-up with primary care physician in 1 week.    Discharge Diagnoses:     1.  Acute ulcerative colitis flare, present on admission.   2.  Abdominal pain with diarrhea.  3.  Dehydration, POA, resolved.  4.  Diabetes type 2 with hyperglycemia, POA.  5.  Obesity with BMI of 37.  6.  History of PTSD.  7.  Bipolar disorder.  8.  GERD.  9.  Chronic dysphagia with no evidence of stricture on EGD.    Problem List:       Ulcerative colitis, acute (CMS/[facility])    Bipolar disorder (CMS/[facility])    Type 2 diabetes mellitus treated with insulin (CMS/[facility])    Esophageal dysphagia    History of esophageal stricture    Presenting Problem:    Ulcerative colitis, acute (CMS/[facility]) [K51.90]     Consults:     Consulting Physician(s)     Provider Relationship Specialty    Alvin Angel MD Consulting Physician Internal Medicine    Alonzo He MD Consulting Physician Gastroenterology        Procedures Performed:    1.  ESOPHAGOGASTRODUODENOSCOPY WITH DILATATION AND BIOPSIES on 2020.    History of presenting illness:    The patient is a 35-year-old female with history of ulcerative colitis who had complained of 1 week history of worsening abdominal pain, bloody stools, and dehydration.  She has been followed with gastroenterology by Dr. He.   Patient had actually went to the emergency room in outlying facility yesterday was given IV fluids and discharged at that time.  She had a tele-visit with her PCP today, with ongoing symptoms that have failed to improve.    Patient was subsequently admitted directly to the hospitalist service.     Of note, patient has been diagnosed with ulcerative colitis since roughly 23 years of age.  She states that she has been on multiple medications for this including immunomodulators, however has either failed the treatment or had allergic reactions to these medications.  Most recently had been on Xeljanz.  She states that she is still having 15 or so diarrhea like bloody stools daily.  Has low-grade fevers at home.  Has not really ate or drink much in the last 4 to 5 days.  She lives with her mother and her 2 children.  She denies any recent medication changes otherwise.    Hospital Course:    Ms. Hobson was admitted to the medical floor and was started on IV Solu-Medrol as per the advice of Dr. He.  Dr. He daily consult on the patient and recommended slow taper off prednisone once her abdominal pain improved.  She was maintained on IV fluids.  She does have history of longstanding ulcerative colitis with failure of multiple treatment modalities due to allergic reactions, who has been followed by Dr. He.  She has been placed on IV steroids and was seen by Dr. He.  Patient was planned for discharge with steroid taper last weekend but unfortunately developed symptoms of food getting stuck in the throat.  She does have prior history of esophageal stricture and states that it was dilated about a year ago by Dr. Cuevas.    Patient did undergo upper GI endoscopy on 9/22/2020.  No significant stricture was noted but the esophagus was dilated by Dr. He.  Patient felt better and was able to swallow without any difficulty.  She is currently feeling better and will be discharged home with prednisone taper  as advised by Dr. He.  She is advised to follow-up with him in 3 to 4 weeks to initiate treatment with Entyvio and Imuran for her ulcerative colitis.  Patient was prescribed Lortab 20 pills for pain control.  Her Mauricio report was reviewed.  Her diarrhea has improved.    Vital Signs:    Temp:  [96.6 °F (35.9 °C)-98.9 °F (37.2 °C)] 98.4 °F (36.9 °C)  Heart Rate:  [] 98  Resp:  [11-20] 20  BP: (119-141)/(82-97) 119/92    Physical Exam:    General Appearance:  Alert and cooperative, not in any acute distress.   Head:  Atraumatic and normocephalic, without obvious abnormality.   Eyes:          PERRLA, conjunctivae and sclerae normal, no Icterus. No pallor. Extraocular movements are within normal limits.   Ears:  Ears appear intact with no abnormalities noted.   Throat: No oral lesions, no thrush, oral mucosa moist.   Neck: Supple, trachea midline, no thyromegaly, no carotid bruit.   Back:   No kyphoscoliosis present. No tenderness to palpation,   range of motion normal.   Lungs:   Chest shape is normal. Breath sounds heard bilaterally equally.  No crackles or wheezing. No Pleural rub or bronchial breathing.   Heart:  Normal S1 and S2, no murmur, no gallop, no rub. No JVD.   Abdomen:   Normal bowel sounds, no masses, no organomegaly. Soft, non-tender, non-distended, no guarding, no rebound tenderness.   Extremities: Moves all extremities, no edema, no cyanosis, no clubbing.   Pulses: Pulses palpable and equal bilaterally.   Skin: No bleeding, bruising or rash.   Neurologic: Alert and oriented x 3. Moves all four limbs equally. No tremors. No facial asymmetry.     Pertinent Lab Results:     Results from last 7 days   Lab Units 09/22/20  0602 09/20/20  0713 09/18/20  0620 09/17/20  0733 09/16/20  1655   SODIUM mmol/L 138 141 136 134* 132*   POTASSIUM mmol/L 3.7 3.0* 4.1 4.0 3.5   CHLORIDE mmol/L 104 103 103 99 96*   CO2 mmol/L 23.5 26.4 21.5* 20.6* 17.5*   BUN mg/dL 11 12 6 6 6   CREATININE mg/dL 0.55* 0.62  0.55* 0.54* 0.74   CALCIUM mg/dL 9.0 8.9 8.9 9.1 9.9   BILIRUBIN mg/dL  --   --   --  0.3 0.4   ALK PHOS U/L  --   --   --  174* 231*   ALT (SGPT) U/L  --   --   --  62* 88*   AST (SGOT) U/L  --   --   --  26 49*   GLUCOSE mg/dL 131* 84 367* 415* 446*     Results from last 7 days   Lab Units 09/22/20  0602 09/20/20  0713 09/18/20  0620   WBC 10*3/mm3 14.74* 19.59* 13.22*   HEMOGLOBIN g/dL 13.4 13.6 12.4   HEMATOCRIT % 40.3 40.3 37.5   PLATELETS 10*3/mm3 290 315 268       Results from last 7 days   Lab Units 09/16/20  1655   LIPASE U/L 14     Pertinent Radiology Results:    Imaging Results (All)     Procedure Component Value Units Date/Time    MRI Abdomen With & Without Contrast [[id number]] Collected: 09/18/20 1041     Updated: 09/18/20 1046    Narrative:      PROCEDURE: MRI ABDOMEN W WO CONTRAST-     HISTORY: Chronic Ulcerative pancolitis, with elevated LFTS suspecious  for PSC. MRI abdomen w/wo with MRCP     PROCEDURE: Multiplanar multisequence imaging of the abdomen was  performed both before and following the administration of 15 mL  MultiHance intravenous contrast. 3-D MRCP images were obtained.     COMPARISON: None.     FINDINGS: MRCP images demonstrate prior cholecystectomy. The common duct  is normal in caliber measuring 4 mm. There is no intrahepatic biliary  dilatation.. There is no evidence of a filling defect within the common  duct to suggest choledocholithiasis. The pancreatic duct is normal.     There is signal dropout within the liver on out of phase imaging  consistent with fatty infiltration. No focal liver lesions are  identified. The adrenals, kidneys, spleen and pancreas are unremarkable.  There is no ascites.       Impression:      1. Unremarkable MRCP.  2. Fatty infiltration of the liver.     This report was finalized on 9/18/2020 10:43 AM by South Das M.D..        Condition on Discharge:      Stable.    Code status during the hospital stay:    Code Status and Medical Interventions:      Ordered at: 09/16/20 1629     Code Status:    CPR     Medical Interventions (Level of Support Prior to Arrest):    Full     Discharge Disposition:    Home or Self Care    Discharge Medications:       Discharge Medications      New Medications      Instructions Start Date   HYDROcodone-acetaminophen 5-325 MG per tablet  Commonly known as: NORCO   1 tablet, Oral, Every 6 Hours PRN         Changes to Medications      Instructions Start Date   predniSONE 10 MG tablet  Commonly known as: DELTASONE  What changed: additional instructions   Take 3 tablets by mouth daily X 1 week; then 2 tablets daily X 1 week; then 1 tablet daily X 1 week; then 1/2 tablet daily X 1 week, then STOP         Continue These Medications      Instructions Start Date   buPROPion  MG 12 hr tablet  Commonly known as: WELLBUTRIN SR   100 mg, Oral, Every Morning      Continuous Glucose Monitor kit   Use as directed      dicyclomine 20 MG tablet  Commonly known as: BENTYL   20 mg, Oral, 3 Times Daily PRN      dimenhyDRINATE 50 MG tablet  Commonly known as: Dramamine   50 mg, Oral, Every 8 Hours PRN      estradiol 0.05 MG/24HR patch  Commonly known as: ASHLI JIANG   APPLY 1 PATCH TO SKIN EVERY 3.5 DAYS(TWICE A WEEK)      gabapentin 800 MG tablet  Commonly known as: NEURONTIN   TAKE 1 TABLET BY MOUTH THREE TIMES DAILY      HumaLOG Mix 75/25 KwikPen (75-25) 100 UNIT/ML suspension pen-injector pen  Generic drug: Insulin Lispro Prot & Lispro   25 Units, Subcutaneous, 2 Times Daily With Meals      loperamide 2 MG tablet  Commonly known as: Imodium A-D   2 mg, Oral, 2 Times Daily PRN      promethazine 25 MG tablet  Commonly known as: PHENERGAN   TAKE 1 TABLET BY MOUTH EVERY 6 HOURS AS NEEDED FOR NAUSEA OR VOMITING      rizatriptan 10 MG tablet  Commonly known as: MAXALT   10 mg, Oral, Once As Needed, May repeat in 2 hours if needed      topiramate 25 MG capsule (sprinkle)  Commonly known as: TOPAMAX   TAKE 1 CAPSULE BY MOUTH EVERY NIGHT AT  BEDTIME, THEN INCREASE TO 2 CAPSULES BY MOUTH EVERY NIGHT AT BEDTIME THEREAFTER      [employer] Allergy 25 mg capsule  Generic drug: diphenhydrAMINE   TAKE 1 CAPSULE BY MOUTH EVERY 8 HOURS         Stop These Medications    acetaminophen-codeine 300-30 MG per tablet  Commonly known as: TYLENOL #3     lisinopril 2.5 MG tablet  Commonly known as: Zestril          Discharge Diet:     Diet Instructions     Diet: Specialty Diet; Thin Liquids, No Restrictions; Gluten Free, Lactose Reduced      Discharge Diet: Specialty Diet    Fluid Consistency: Thin Liquids, No Restrictions    Specialty Diets:  Gluten Free  Lactose Reduced           Activity at Discharge:     Activity Instructions     Activity as Tolerated          Follow-up Appointments:    Follow-up Information     Lv Sanchez,  Follow up in 1 week(s).    Specialty: Family Medicine  Contact information:  [address] [place] [address] [phone]             Alonzo He MD Follow up in 4 week(s).    Specialty: Gastroenterology  Contact information:  [address]  [facility] [address] [phone]                 Test Results Pending at Discharge:    Pending Labs     Order Current Status    Calprotectin, Fecal - Stool, Per Rectum In process    TPMT Genetics In process    Thiopurine Methyltransferase In process    Tissue Pathology Exam In process           Alvin Angel MD  09/23/20  10:13 EDT    Time: I spent 25 minutes on this discharge activity which included: face-to-face encounter with the patient, reviewing the data in the system, coordination of the care with the nursing staff as well as consultants, documentation, and entering orders.     Dictated utilizing Dragon dictation.

## 2020-09-23 NOTE — PROGRESS NOTES
Case Management Discharge Note      Final Note: Discharged home    Provided Post Acute Provider List?: N/A  Provided Post Acute Provider Quality & Resource List?: N/A    Selected Continued Care - Admitted Since 9/16/2020     Destination    No services have been selected for the patient.              Durable Medical Equipment    No services have been selected for the patient.              Dialysis/Infusion    No services have been selected for the patient.              Home Medical Care    No services have been selected for the patient.              Therapy    No services have been selected for the patient.              Community Resources    No services have been selected for the patient.                  Transportation Services  Private: Car    Final Discharge Disposition Code: 01 - home or self-care

## 2020-09-24 ENCOUNTER — TRANSITIONAL CARE MANAGEMENT TELEPHONE ENCOUNTER (OUTPATIENT)
Dept: CALL CENTER | Facility: HOSPITAL | Age: 35
End: 2020-09-24

## 2020-09-24 NOTE — OUTREACH NOTE
Call Center TCM Note      Responses   [facility] patient discharged from?  [facility]   COVID-19 Test Status  Negative   Does the patient have one of the following disease processes/diagnoses(primary or secondary)?  Other   TCM attempt successful?  Yes   Call start time  0956   Call end time  0957   Discharge diagnosis  Acute ulcerative colitis flare   Meds reviewed with patient/caregiver?  Yes   Is the patient having any side effects they believe may be caused by any medication additions or changes?  No   Does the patient have all medications ordered at discharge?  Yes   Is the patient taking all medications as directed (includes completed medication regime)?  Yes   Does the patient have a primary care provider?   Yes   Does the patient have an appointment with their PCP within 7 days of discharge?  Yes   Comments regarding PCP  f/u with EMELYN Seymour on 9/30   Has the patient kept scheduled appointments due by today?  N/A   Pulse Ox monitoring  None   Psychosocial issues?  No   Did the patient receive a copy of their discharge instructions?  Yes   Nursing interventions  Reviewed instructions with patient   What is the patient's perception of their health status since discharge?  Improving   Is the patient/caregiver able to teach back the hierarchy of who to call/visit for symptoms/problems? PCP, Specialist, Home health nurse, Urgent Care, ED, 911  Yes   TCM call completed?  Yes   Wrap up additional comments  Patient says she is doing well, no questions or concerns at this time.          Princess Hogan RN    9/24/2020, 09:57 EDT

## 2020-09-24 NOTE — PROGRESS NOTES
Case Management Discharge Note      Final Note: Discharged home    Provided Post Acute Provider List?: N/A  Provided Post Acute Provider Quality & Resource List?: N/A    Selected Continued Care - Discharged on 9/23/2020 Admission date: 9/16/2020 - Discharge disposition: Home or Self Care    Destination    No services have been selected for the patient.              Durable Medical Equipment    No services have been selected for the patient.              Dialysis/Infusion    No services have been selected for the patient.              Home Medical Care    No services have been selected for the patient.              Therapy    No services have been selected for the patient.              Community Resources    No services have been selected for the patient.                  Transportation Services  Private: Car    Final Discharge Disposition Code: 01 - home or self-care

## 2020-09-26 LAB
DIAGNOSTIC IMP SPEC-IMP: NORMAL
LABORATORY COMMENT REPORT: NORMAL
REF LAB TEST METHOD: NORMAL
TPMT GENE MUT ANL BLD/T: NORMAL
TPMT GENE MUT ANL BLD/T: NORMAL
TPMT GENE PROD MET ACT IMP BLD/T-IMP: NORMAL
TPMT RBC-CCNC: 23.7 UNITS/ML RBC

## 2020-09-28 DIAGNOSIS — F41.0 PANIC DISORDER WITHOUT AGORAPHOBIA WITH MODERATE PANIC ATTACKS: Primary | ICD-10-CM

## 2020-09-29 LAB — CALPROTECTIN STL-MCNT: 1049 UG/G (ref 0–120)

## 2020-09-30 ENCOUNTER — READMISSION MANAGEMENT (OUTPATIENT)
Dept: CALL CENTER | Facility: HOSPITAL | Age: 35
End: 2020-09-30

## 2020-09-30 NOTE — OUTREACH NOTE
Medical Week 2 Survey      Responses   [facility] patient discharged from?  [facility]   COVID-19 Test Status  Negative   Does the patient have one of the following disease processes/diagnoses(primary or secondary)?  Other   Week 2 attempt successful?  No   Unsuccessful attempts  Attempt 1          Pedro Cutler RN

## 2020-10-01 ENCOUNTER — TELEPHONE (OUTPATIENT)
Dept: FAMILY MEDICINE CLINIC | Facility: CLINIC | Age: 35
End: 2020-10-01

## 2020-10-01 ENCOUNTER — OFFICE VISIT (OUTPATIENT)
Dept: FAMILY MEDICINE CLINIC | Facility: CLINIC | Age: 35
End: 2020-10-01

## 2020-10-01 VITALS
OXYGEN SATURATION: 99 % | WEIGHT: 212 LBS | DIASTOLIC BLOOD PRESSURE: 80 MMHG | TEMPERATURE: 98.8 F | HEART RATE: 126 BPM | BODY MASS INDEX: 37.56 KG/M2 | HEIGHT: 63 IN | SYSTOLIC BLOOD PRESSURE: 124 MMHG

## 2020-10-01 DIAGNOSIS — G43.101 MIGRAINE WITH AURA AND WITH STATUS MIGRAINOSUS, NOT INTRACTABLE: ICD-10-CM

## 2020-10-01 DIAGNOSIS — Z79.4 TYPE 2 DIABETES MELLITUS TREATED WITH INSULIN (HCC): ICD-10-CM

## 2020-10-01 DIAGNOSIS — K51.90 ULCERATIVE COLITIS WITHOUT COMPLICATIONS, UNSPECIFIED LOCATION (HCC): Primary | ICD-10-CM

## 2020-10-01 DIAGNOSIS — K51.911 ACUTE ULCERATIVE COLITIS WITH RECTAL BLEEDING (HCC): ICD-10-CM

## 2020-10-01 DIAGNOSIS — R13.19 ESOPHAGEAL DYSPHAGIA: ICD-10-CM

## 2020-10-01 DIAGNOSIS — F41.8 DEPRESSION WITH ANXIETY: ICD-10-CM

## 2020-10-01 DIAGNOSIS — E11.9 TYPE 2 DIABETES MELLITUS TREATED WITH INSULIN (HCC): ICD-10-CM

## 2020-10-01 DIAGNOSIS — K21.9 GASTROESOPHAGEAL REFLUX DISEASE WITHOUT ESOPHAGITIS: Chronic | ICD-10-CM

## 2020-10-01 DIAGNOSIS — E11.44 DIABETIC AMYOTROPHY ASSOCIATED WITH TYPE 2 DIABETES MELLITUS (HCC): ICD-10-CM

## 2020-10-01 DIAGNOSIS — Z51.81 ENCOUNTER FOR THERAPEUTIC DRUG MONITORING: ICD-10-CM

## 2020-10-01 PROCEDURE — 99214 OFFICE O/P EST MOD 30 MIN: CPT | Performed by: NURSE PRACTITIONER

## 2020-10-01 RX ORDER — INSULIN LISPRO 100 [IU]/ML
25 INJECTION, SUSPENSION SUBCUTANEOUS 2 TIMES DAILY WITH MEALS
Qty: 5 PEN | Refills: 3 | Status: SHIPPED | OUTPATIENT
Start: 2020-10-01 | End: 2020-12-19 | Stop reason: SDUPTHER

## 2020-10-01 RX ORDER — AZATHIOPRINE 50 MG/1
100 TABLET ORAL DAILY
Qty: 60 TABLET | Refills: 5 | Status: SHIPPED | OUTPATIENT
Start: 2020-10-01 | End: 2021-04-06

## 2020-10-01 RX ORDER — BUPROPION HYDROCHLORIDE 100 MG/1
100 TABLET, EXTENDED RELEASE ORAL EVERY MORNING
Qty: 30 TABLET | Refills: 1 | Status: ON HOLD | OUTPATIENT
Start: 2020-10-01 | End: 2021-05-14

## 2020-10-01 RX ORDER — TOPIRAMATE 25 MG/1
CAPSULE, COATED PELLETS ORAL
Qty: 60 CAPSULE | Refills: 1 | Status: SHIPPED | OUTPATIENT
Start: 2020-10-01 | End: 2020-12-19 | Stop reason: SDUPTHER

## 2020-10-01 RX ORDER — HYDROCODONE BITARTRATE AND ACETAMINOPHEN 5; 325 MG/1; MG/1
1 TABLET ORAL EVERY 6 HOURS PRN
Qty: 25 TABLET | Refills: 0 | Status: ON HOLD | OUTPATIENT
Start: 2020-10-01 | End: 2021-07-06 | Stop reason: SDUPTHER

## 2020-10-01 RX ORDER — RIZATRIPTAN BENZOATE 10 MG/1
10 TABLET ORAL ONCE AS NEEDED
Qty: 8 TABLET | Refills: 1 | Status: SHIPPED | OUTPATIENT
Start: 2020-10-01 | End: 2020-12-19 | Stop reason: SDUPTHER

## 2020-10-01 RX ORDER — PROMETHAZINE HYDROCHLORIDE 25 MG/1
25 TABLET ORAL EVERY 6 HOURS PRN
Qty: 30 TABLET | Refills: 1 | Status: SHIPPED | OUTPATIENT
Start: 2020-10-01 | End: 2020-11-02 | Stop reason: SDUPTHER

## 2020-10-01 RX ORDER — GABAPENTIN 800 MG/1
800 TABLET ORAL 3 TIMES DAILY
Qty: 90 TABLET | Refills: 0 | Status: SHIPPED | OUTPATIENT
Start: 2020-10-01 | End: 2020-10-27 | Stop reason: SDUPTHER

## 2020-10-01 NOTE — PROGRESS NOTES
Transitional Care Follow Up Visit  Subjective     Thais Hobson is a 35 y.o. female who presents for a transitional care management visit.    Within 48 business hours after discharge our office contacted her via telephone to coordinate her care and needs.      I reviewed and discussed the details of that call along with the discharge summary, hospital problems, inpatient lab results, inpatient diagnostic studies, and consultation reports with Thais.     Current outpatient and discharge medications have been reconciled for the patient.  Reviewed by: EMELYN Seymour      Date of TCM Phone Call 9/23/2020   [facility]   Date of Admission 9/16/2020   Date of Discharge 9/23/2020   Discharge Disposition Home or Self Care     Risk for Readmission (LACE) No data recorded      History of Present Illness   Feels better.   Glucose high due to sterids (500)  4 BM per day.  This is drastic improvement for her.   Cramping still bad, needs more norco      Course During Hospital Stay:   The patient is a 35-year-old female with history of ulcerative colitis who had complained of 1 week history of worsening abdominal pain, bloody stools, and dehydration.  She has been followed with gastroenterology by Dr. He.  Patient had actually went to the emergency room in outlying facility yesterday was given IV fluids and discharged at that time.  She had a tele-visit with her PCP today, with ongoing symptoms that have failed to improve.    Patient was subsequently admitted directly to the hospitalist service.     Of note, patient has been diagnosed with ulcerative colitis since roughly 23 years of age.  She states that she has been on multiple medications for this including immunomodulators, however has either failed the treatment or had allergic reactions to these medications.  Most recently had been on Xeljanz.  She states that she is still having 15 or so diarrhea like bloody stools daily.  Has  low-grade fevers at home.  Has not really ate or drink much in the last 4 to 5 days.  She lives with her mother and her 2 children.  She denies any recent medication changes otherwise.     Hospital Course:     Ms. Hobson was admitted to the medical floor and was started on IV Solu-Medrol as per the advice of Dr. He.  Dr. He daily consult on the patient and recommended slow taper off prednisone once her abdominal pain improved.  She was maintained on IV fluids.  She does have history of longstanding ulcerative colitis with failure of multiple treatment modalities due to allergic reactions, who has been followed by Dr. He.  She has been placed on IV steroids and was seen by Dr. He.  Patient was planned for discharge with steroid taper last weekend but unfortunately developed symptoms of food getting stuck in the throat.  She does have prior history of esophageal stricture and states that it was dilated about a year ago by Dr. Cuevas.     Patient did undergo upper GI endoscopy on 9/22/2020.  No significant stricture was noted but the esophagus was dilated by Dr. He.  Patient felt better and was able to swallow without any difficulty.  She is currently feeling better and will be discharged home with prednisone taper as advised by Dr. He.  She is advised to follow-up with him in 3 to 4 weeks to initiate treatment with Entyvio and Imuran for her ulcerative colitis.  Patient was prescribed Lortab 20 pills for pain control.  Her Mauricio report was reviewed.  Her diarrhea has improved.   The following portions of the patient's history were reviewed and updated as appropriate: allergies, current medications, past family history, past medical history, past social history, past surgical history and problem list.    Review of Systems  Gen- No fevers, chills  CV- No chest pain, palpitations  Resp- No cough, dyspnea  GI- No N/V/D, abd pain  Neuro-No dizziness, headaches    Objective   Physical  Exam  Vitals signs and nursing note reviewed.   Constitutional:       Appearance: She is well-developed.      Comments: Sickly appearance   HENT:      Head: Normocephalic and atraumatic.   Eyes:      Pupils: Pupils are equal, round, and reactive to light.   Neck:      Musculoskeletal: Neck supple.   Cardiovascular:      Rate and Rhythm: Normal rate and regular rhythm.      Heart sounds: Normal heart sounds.   Pulmonary:      Effort: Pulmonary effort is normal.      Breath sounds: Normal breath sounds.   Abdominal:      General: Bowel sounds are normal. There is no distension.      Palpations: Abdomen is soft.      Tenderness: There is no abdominal tenderness.   Skin:     General: Skin is warm and dry.   Neurological:      General: No focal deficit present.      Mental Status: She is alert and oriented to person, place, and time.   Psychiatric:         Mood and Affect: Mood normal.         Behavior: Behavior normal.         Assessment/Plan   Diagnoses and all orders for this visit:    1. Ulcerative colitis without complications, unspecified location (CMS/[facility]) (Primary)  -     promethazine (PHENERGAN) 25 MG tablet; Take 1 tablet by mouth Every 6 (Six) Hours As Needed for Nausea or Vomiting.  Dispense: 30 tablet; Refill: 1  -     Magnesium  -     Sedimentation Rate    2. Depression with anxiety  -     buPROPion SR (WELLBUTRIN SR) 100 MG 12 hr tablet; Take 1 tablet by mouth Every Morning.  Dispense: 30 tablet; Refill: 1    3. Diabetic amyotrophy associated with type 2 diabetes mellitus (CMS/[facility])  -     gabapentin (NEURONTIN) 800 MG tablet; Take 1 tablet by mouth 3 (Three) Times a Day.  Dispense: 90 tablet; Refill: 0    4. Type 2 diabetes mellitus treated with insulin (CMS/[facility])  -     HumaLOG Mix 75/25 KwikPen (75-25) 100 UNIT/ML suspension pen-injector pen; Inject 25 Units under the skin into the appropriate area as directed 2 (Two) Times a Day With Meals.  Dispense: 5 pen; Refill: 3  -     HYDROcodone-acetaminophen  (NORCO) 5-325 MG per tablet; Take 1 tablet by mouth Every 6 (Six) Hours As Needed for Moderate Pain .  Dispense: 25 tablet; Refill: 0  -     Comprehensive Metabolic Panel  -     CBC Auto Differential  -     TSH    5. Gastroesophageal reflux disease without esophagitis  -     promethazine (PHENERGAN) 25 MG tablet; Take 1 tablet by mouth Every 6 (Six) Hours As Needed for Nausea or Vomiting.  Dispense: 30 tablet; Refill: 1    6. Migraine with aura and with status migrainosus, not intractable  -     rizatriptan (MAXALT) 10 MG tablet; Take 1 tablet by mouth 1 (One) Time As Needed for Migraine for up to 1 dose. May repeat in 2 hours if needed  Dispense: 8 tablet; Refill: 1  -     topiramate (TOPAMAX) 25 MG capsule (sprinkle); TAKE 1 CAPSULE BY MOUTH EVERY NIGHT AT BEDTIME, THEN INCREASE TO 2 CAPSULES BY MOUTH EVERY NIGHT AT BEDTIME THEREAFTER  Dispense: 60 capsule; Refill: 1    7. Encounter for therapeutic drug monitoring  -     POC Urine Drug Screen, Triage    8. Esophageal dysphagia  -     HYDROcodone-acetaminophen (NORCO) 5-325 MG per tablet; Take 1 tablet by mouth Every 6 (Six) Hours As Needed for Moderate Pain .  Dispense: 25 tablet; Refill: 0    9. Acute ulcerative colitis with rectal bleeding (CMS/HCC)  -     HYDROcodone-acetaminophen (NORCO) 5-325 MG per tablet; Take 1 tablet by mouth Every 6 (Six) Hours As Needed for Moderate Pain .  Dispense: 25 tablet; Refill: 0    Other orders  -     insulin aspart (NovoLOG) 100 UNIT/ML injection; Inject per sliding scale  Dispense: 10 mL; Refill: 0  -     glucose blood test strip; Check glucose TID, One Touch Reveal  Dispense: 90 each; Refill: 11      --Appears to be convalescing well from most recent exacerbation of her known Crohn's disease.  Continue steroids.  However these are worsening her diabetes.  Will add pre-meal insulin.  Discussed sliding scale.  Continue to stay hydrated.  Continue close follow-up with GI.  Short course hydrocodone provided.  Refilled her  chronic medications that are due.  Lab work as outlined above.  She is aware of signs and symptoms that warrant emergency evaluation.

## 2020-10-02 ENCOUNTER — TELEPHONE (OUTPATIENT)
Dept: FAMILY MEDICINE CLINIC | Facility: CLINIC | Age: 35
End: 2020-10-02

## 2020-10-02 ENCOUNTER — READMISSION MANAGEMENT (OUTPATIENT)
Dept: CALL CENTER | Facility: HOSPITAL | Age: 35
End: 2020-10-02

## 2020-10-02 NOTE — OUTREACH NOTE
Medical Week 2 Survey      Responses   [facility] patient discharged from?  [facility]   Does the patient have one of the following disease processes/diagnoses(primary or secondary)?  Other   Week 2 attempt successful?  Yes   Call start time  1626   Discharge diagnosis  Acute ulcerative colitis flare   Call end time  1627   Meds reviewed with patient/caregiver?  Yes   Is the patient having any side effects they believe may be caused by any medication additions or changes?  No   Does the patient have all medications ordered at discharge?  Yes   Is the patient taking all medications as directed (includes completed medication regime)?  Yes   Does the patient have a primary care provider?   Yes   Does the patient have an appointment with their PCP within 7 days of discharge?  Yes   Has the patient kept scheduled appointments due by today?  Yes   Psychosocial issues?  No   Did the patient receive a copy of their discharge instructions?  Yes   Nursing interventions  Reviewed instructions with patient   What is the patient's perception of their health status since discharge?  Improving   Is the patient/caregiver able to teach back signs and symptoms related to disease process for when to call PCP?  Yes   Is the patient/caregiver able to teach back signs and symptoms related to disease process for when to call 911?  Yes   Is the patient/caregiver able to teach back the hierarchy of who to call/visit for symptoms/problems? PCP, Specialist, Home health nurse, Urgent Care, ED, 911  Yes   Week 2 Call Completed?  Yes   Wrap up additional comments  Patient says she is doing well, no questions or concerns at this time.          Alicia Dc, RN

## 2020-10-02 NOTE — TELEPHONE ENCOUNTER
Caller: Thais Hobson    Relationship: Self    Best call back number: [phone]    What medication are you requesting: DIFLUCAN    What are your current symptoms: INFECTION/DISCHARGE    How long have you been experiencing symptoms: 1 WEEK    Have you had these symptoms before:    [x] Yes  [] No    Have you been treated for these symptoms before:   [x] Yes  [] No    If a prescription is needed, what is your preferred pharmacy and phone number:      [id number] - [place], [place] - [address] - [phone] [facility] [phone] FX            Additional notes: PATIENT ALSO WANTED TO INFORM DR DE LA VEGA THAT INSURANCE HAS DENIED HER INSULIN ORDER

## 2020-10-05 RX ORDER — FLUCONAZOLE 100 MG/1
100 TABLET ORAL DAILY
Qty: 5 TABLET | Refills: 0 | Status: SHIPPED | OUTPATIENT
Start: 2020-10-05 | End: 2020-10-10

## 2020-10-12 ENCOUNTER — READMISSION MANAGEMENT (OUTPATIENT)
Dept: CALL CENTER | Facility: HOSPITAL | Age: 35
End: 2020-10-12

## 2020-10-12 NOTE — OUTREACH NOTE
Medical Week 3 Survey      Responses   [facility] patient discharged from?  [facility]   Does the patient have one of the following disease processes/diagnoses(primary or secondary)?  Other   Week 3 attempt successful?  No   Unsuccessful attempts  Attempt 1          Jessica Jeff RN

## 2020-10-15 ENCOUNTER — READMISSION MANAGEMENT (OUTPATIENT)
Dept: CALL CENTER | Facility: HOSPITAL | Age: 35
End: 2020-10-15

## 2020-10-15 NOTE — OUTREACH NOTE
Medical Week 3 Survey      Responses   [facility] patient discharged from?  [facility]   Does the patient have one of the following disease processes/diagnoses(primary or secondary)?  Other   Week 3 attempt successful?  Yes   Call start time  0746   Call end time  0748   Meds reviewed with patient/caregiver?  Yes   Is the patient taking all medications as directed (includes completed medication regime)?  Yes   Has the patient kept scheduled appointments due by today?  Yes   What is the patient's perception of their health status since discharge?  Same   Week 3 Call Completed?  Yes   Wrap up additional comments  Patient states that she is about the same this morning.  No questions at this time.          Kayla Dixon RN

## 2020-10-22 ENCOUNTER — READMISSION MANAGEMENT (OUTPATIENT)
Dept: CALL CENTER | Facility: HOSPITAL | Age: 35
End: 2020-10-22

## 2020-10-22 NOTE — OUTREACH NOTE
Medical Week 4 Survey      Responses   [facility] patient discharged from?  [facility]   Does the patient have one of the following disease processes/diagnoses(primary or secondary)?  Other   Week 4 attempt successful?  Yes   Call start time  1548   Call end time  1552   Discharge diagnosis  Acute ulcerative colitis flare   Is patient permission given to speak with other caregiver?  No   Meds reviewed with patient/caregiver?  Yes   Is the patient having any side effects they believe may be caused by any medication additions or changes?  No   Is the patient taking all medications as directed (includes completed medication regime)?  Yes   Has the patient kept scheduled appointments due by today?  Yes   Comments  Follow up with Alonzo He MD, Thursday Oct 29, 2020 @ 1:30 PM   Is the patient still receiving Home Health Services?  N/A   Psychosocial issues?  No   What is the patient's perception of their health status since discharge?  Same   Is the patient/caregiver able to teach back signs and symptoms related to disease process for when to call PCP?  Yes   Is the patient/caregiver able to teach back the hierarchy of who to call/visit for symptoms/problems? PCP, Specialist, Home health nurse, Urgent Care, ED, 911  Yes   If the patient is a current smoker, are they able to teach back resources for cessation?  Not a smoker   Week 4 Call Completed?  Yes   Would the patient like one additional call?  No   Graduated  Yes   Did the patient feel the follow up calls were helpful during their recovery period?  Yes   Was the number of calls appropriate?  Yes          Leesa Mariscal RN

## 2020-10-26 RX ORDER — DIPHENHYDRAMINE HYDROCHLORIDE 25 MG/1
CAPSULE ORAL
Qty: 90 CAPSULE | Refills: 0 | OUTPATIENT
Start: 2020-10-26

## 2020-10-27 ENCOUNTER — TELEPHONE (OUTPATIENT)
Dept: FAMILY MEDICINE CLINIC | Facility: CLINIC | Age: 35
End: 2020-10-27

## 2020-10-27 DIAGNOSIS — E11.44 DIABETIC AMYOTROPHY ASSOCIATED WITH TYPE 2 DIABETES MELLITUS (HCC): ICD-10-CM

## 2020-10-27 NOTE — TELEPHONE ENCOUNTER
Caller: Thais Hobson    Relationship: Self    Best call back number: [phone]    Medication needed:   Requested Prescriptions     Pending Prescriptions Disp Refills   • gabapentin (NEURONTIN) 800 MG tablet 90 tablet 0     Sig: Take 1 tablet by mouth 3 (Three) Times a Day.       When do you need the refill by: ASAP    What details did the patient provide when requesting the medication: PATIENT HAS TWO DAYS LEFT    Does the patient have less than a 3 day supply:  [x] Yes  [] No    What is the patient's preferred pharmacy: [id number], [place] - [address] - [phone] [facility] [phone] FX         PATIENT ALSO WANTED TO KNOW WHAT REFILL NOT APPROPRIATE MEANS ON HER BANOPHEN.

## 2020-10-28 RX ORDER — DIPHENHYDRAMINE HCL 25 MG
25 CAPSULE ORAL EVERY 8 HOURS PRN
Qty: 90 CAPSULE | Refills: 1 | Status: SHIPPED | OUTPATIENT
Start: 2020-10-28 | End: 2020-12-23 | Stop reason: SDUPTHER

## 2020-10-28 RX ORDER — GABAPENTIN 800 MG/1
800 TABLET ORAL 3 TIMES DAILY
Qty: 90 TABLET | Refills: 0 | Status: SHIPPED | OUTPATIENT
Start: 2020-10-28 | End: 2020-12-01

## 2020-10-28 NOTE — TELEPHONE ENCOUNTER
April 15, 2020     Patient: Juliana Mora   YOB: 1980               To Whom it May Concern:        Please excuse Juliana for her absence from work. She may return to work 04/20/2020.                Sincerely,       Dr. Nithya Culver    Medical information is confidential and cannot be disclosed without the written consent of the patient or her representative.       PHONE CALL FROM PATIENT QUESTIONING REFUSAL OF BANOPHEN (DIPHENHYDRAMINE) 25MG CP].  PATIENT STATES SHE CALLED YESTERDAY AND NOBODY CALLED HER BACK.     BEST CALL NUMBER [phone]

## 2020-10-29 ENCOUNTER — TELEMEDICINE (OUTPATIENT)
Dept: GASTROENTEROLOGY | Facility: CLINIC | Age: 35
End: 2020-10-29

## 2020-10-29 DIAGNOSIS — K51.00 ULCERATIVE PANCOLITIS (HCC): Primary | ICD-10-CM

## 2020-10-29 PROCEDURE — 99213 OFFICE O/P EST LOW 20 MIN: CPT | Performed by: INTERNAL MEDICINE

## 2020-10-29 NOTE — PROGRESS NOTES
Follow Up Note     Date: 10/29/2020   Patient Name: Thais Hobson  MRN: [id number]  : 1985     Referring Physician: Lv Sanchez DO    Chief Complaint: Ulcerative colitis with diarrhea    Interval History:   10/29/2020  Thais Hobson is a 35 y.o. female who is here today for follow up for her inflammatory bowel disease.   Since she was started on a steroid tapering and Entyvio Imuran she is doing well.  She is having bowel movement 3 to 4/day compared to 10 to 12/day.  No abdominal pain.  She states that she lost some weight recently.     2020  Diarrhea improved today. Had 4 BM yesterday. Had three three BM today. Still has tenesmus. No fever. Abdominal pain improved.      2020  This is a 35-year-old female patient with a prior history of ulcerative pan colitis, known to me was sent from primary care physician's office for worsening abdominal pain, nausea and diarrhea.   She was diagnosed with IBD about 12 years ago.  He was followed by Dr. Langston at that time subsequently she changed her physician and started following at [facility] Dr. Cuevas.      She had multiple treatment regimens in the past with various reactions.  He initially states she was treated with mesalamine which caused her a severe reaction to the bronchospasm as per patient.  Subsequently she had a Remicade that also caused a reaction.  She was put on Humira which did not help her much and was.  She was also put on the Stelara which caused the throat swelling.  She was on vedolizumab for the last 3 years.  She continued to have a multiple flareups and recent colonoscopy revealed a significant colitis despite on Entyvio for which Entyvio was discontinued.  She was started on Xeljanz recently however she again developed multiple blistering lesions in the oral cavity face and the hands for which Zosyn was discontinued.  Currently patient is not on any medication for the past 3 to 4 weeks time.   She had a few  episodes of C. difficile colitis last year in 2019 and 18.      She is here again with continued worsening diarrhea symptoms.  Associated nausea and abdominal pain diffuse without any fever and chills.  She had a telephone visit with the PCP and was advised to come to the hospital for further management    Subjective      Past Medical History:   Past Medical History:   Diagnosis Date   • Abdominal pain     periumbilicul   • Abdominal tenderness     Periumbil   • Alopecia    • Anemia    • Anxiety    • Arthritis    • Asthma     as a child   • Back pain    • Bipolar disorder (CMS/HCC)    • Blood in stool    • Body piercing     ears   • Colitis    • Colitis    • Colon polyps    • Depression    • Diabetes mellitus (CMS/HCC)    • Diarrhea    • Dysphagia     Patient reported he has trouble from time to time and that her throat has to be dilated   • Elevated cholesterol     Reported slightly elevated - taking no medication    • Fractures     Right pinky toe - no surgery required   • GERD (gastroesophageal reflux disease)    • H/O colonoscopy 2013    Terminal ileal biopsies negative. Cecal&ascending biopsie revealed chronic active colitis w/ features cons. w/ inflammatory bowel disease. Transevere colon biopsies revealved chronic active colitis. Desc. colon biopsies revealed chronic active colitis. Rect colon biopsie revealed chronic active colitis. Negative for dysplasia   • Hematemesis    • History of Clostridium difficile infection    • History of transfusion     No reaction to transfusion reported   • Migraine headache    • Nausea     alone   • Pancreatitis    • Restless leg    • Seizures (CMS/HCC)     pt reports x1    • Tattoos     x9   • Universal ulcerative colitis (CMS/HCC)    • UTI (urinary tract infection)    • Weight loss      Past Surgical History:   Past Surgical History:   Procedure Laterality Date   • BREAST SURGERY Left     breast lump removed benign   •  SECTION     • CHOLECYSTECTOMY       for stone disease   • COLONOSCOPY  2009   • COLONOSCOPY N/A 6/12/2020    Procedure: COLONOSCOPY;  Surgeon: Alonzo He MD;  Location: [facility] ENDOSCOPY;  Service: Gastroenterology;  Laterality: N/A;   • DILATATION AND CURETTAGE     • ENDOSCOPY  07/30/2013    Erosive esophagitis, grade 2; erosive gastritis; small sliding hiatal hernia less than 3 cm, erosive deodenitis duodenal polyp.No gan mucosa.Second portion duedenal biopsy neg. Second postion of dudoenal biopsy revealed polypoid intestinal mucosa w/ lymphoid aggregate. 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 no helicobacter pylori. Negative for mateaplasia, dysplasia   • ENDOSCOPY N/A 9/22/2020    Procedure: ESOPHAGOGASTRODUODENOSCOPY WITH DILATATION AND BIOPSIES;  Surgeon: Alonzo He MD;  Location: [facility] ENDOSCOPY;  Service: Gastroenterology;  Laterality: N/A;   • HIP SURGERY Right    • HYSTERECTOMY     • LEEP         Family History:   Family History   Problem Relation Age of Onset   • Heart failure Father        Social History:   Social History     Socioeconomic History   • Marital status: Single     Spouse name: Not on file   • Number of children: Not on file   • Years of education: Not on file   • Highest education level: Not on file   Tobacco Use   • Smoking status: Never Smoker   • Smokeless tobacco: Never Used   Substance and Sexual Activity   • Alcohol use: No   • Drug use: No   • Sexual activity: Defer       Medications:     Current Outpatient Medications:   •  azaTHIOprine (IMURAN) 50 MG tablet, Take 2 tablets by mouth Daily., Disp: 60 tablet, Rfl: 5  •  buPROPion SR (WELLBUTRIN SR) 100 MG 12 hr tablet, Take 1 tablet by mouth Every Morning., Disp: 30 tablet, Rfl: 1  •  Continuous Glucose Monitor kit, Use as directed, Disp: 1 each, Rfl: 11  •  diphenhydrAMINE (Banophen) 25 mg capsule, Take 1 capsule by mouth Every 8 (Eight) Hours As Needed for Itching or Allergies., Disp: 90 capsule, Rfl: 1  •   "estradiol (MINIVELLE, VIVELLE-DOT) 0.05 MG/24HR patch, APPLY 1 PATCH TO SKIN EVERY 3.5 DAYS(TWICE A WEEK), Disp: 8 patch, Rfl: 2  •  gabapentin (NEURONTIN) 800 MG tablet, Take 1 tablet by mouth 3 (Three) Times a Day., Disp: 90 tablet, Rfl: 0  •  glucose blood test strip, Check glucose TID, One Touch Reveal, Disp: 90 each, Rfl: 11  •  HumaLOG Mix 75/25 KwikPen (75-25) 100 UNIT/ML suspension pen-injector pen, Inject 25 Units under the skin into the appropriate area as directed 2 (Two) Times a Day With Meals., Disp: 5 pen, Rfl: 3  •  HYDROcodone-acetaminophen (NORCO) 5-325 MG per tablet, Take 1 tablet by mouth Every 6 (Six) Hours As Needed for Moderate Pain ., Disp: 25 tablet, Rfl: 0  •  insulin aspart (NovoLOG) 100 UNIT/ML injection, Inject per sliding scale, Disp: 10 mL, Rfl: 0  •  predniSONE (DELTASONE) 10 MG tablet, Take 3 tablets by mouth daily X 1 week; then 2 tablets daily X 1 week; then 1 tablet daily X 1 week; then 1/2 tablet daily X 1 week, then STOP, Disp: 46 tablet, Rfl: 0  •  promethazine (PHENERGAN) 25 MG tablet, Take 1 tablet by mouth Every 6 (Six) Hours As Needed for Nausea or Vomiting., Disp: 30 tablet, Rfl: 1  •  rizatriptan (MAXALT) 10 MG tablet, Take 1 tablet by mouth 1 (One) Time As Needed for Migraine for up to 1 dose. May repeat in 2 hours if needed, Disp: 8 tablet, Rfl: 1  •  topiramate (TOPAMAX) 25 MG capsule (sprinkle), TAKE 1 CAPSULE BY MOUTH EVERY NIGHT AT BEDTIME, THEN INCREASE TO 2 CAPSULES BY MOUTH EVERY NIGHT AT BEDTIME THEREAFTER, Disp: 60 capsule, Rfl: 1    Allergies:   Allergies   Allergen Reactions   • Hydrochlorothiazide Hives   • Penicillins Hives   • Remicade [Infliximab] Anaphylaxis   • Soybean-Containing Drug Products Swelling     \"Soy sauce\"   • Xeljanz [Tofacitinib Citrate] Other (See Comments)     BLISTERS IN THROAT & ON ARMS   • Zofran [Ondansetron Hcl] Headache     migraines   • Reglan [Metoclopramide] Other (See Comments)     States feels weird when takes it       Review " of Systems:   Review of Systems   Constitutional: Positive for fatigue and unexpected weight loss. Negative for appetite change.   Gastrointestinal: Positive for diarrhea. Negative for abdominal distention, abdominal pain, anal bleeding, blood in stool, constipation, nausea, rectal pain, vomiting, GERD and indigestion.       The following portions of the patient's history were reviewed and updated as appropriate: allergies, current medications, past family history, past medical history, past social history, past surgical history and problem list.    Objective     Physical Exam:  Vital Signs: There were no vitals filed for this visit.    Physical Exam ; not done as it is a virtual visit    Results Review:   I reviewed the patient's new clinical results.    No results displayed because visit has over 200 results.         Mri Abdomen With & Without Contrast    Result Date: 9/18/2020  1. Unremarkable MRCP. 2. Fatty infiltration of the liver.    This report was finalized on 9/18/2020 10:43 AM by South Das M.D..      Assessment / Plan        This was an audio and video enabled telemedicine encounter.     1. Ulcerative pancolitis without complication (CMS/HCC)  10/29/2020  As there was a multiple flare on Entyvio, she was taken off from interview and started on Xeljanz.  However within 2 weeks after starting the Xeljanz patient developed acute allergic reaction with bronchospasm aphthous ulcerations and skin ulcerations and that had to be stopped.  She developed subsequently UC flare and was admitted in the hospital.  She had a colonoscopy done on 6/12/2020 which revealed a moderately active pancolitis with a Cheng score 2.  She was treated with a steroid tapering.   As there are only very few medications left to try  We decided on starting back on Entyvio along with Imuran.  She is doing fairly well with the regimen since her 4 weeks now.  Having 3-4 bowel movements still without any blood.  Because she feels  better  She had an MRI MRCP done for elevated alkaline phosphatase and that did not reveal any signs of PSC.   We will continue current regimen follow-up in 3 months time      3/2/2020  Pancolitis diagnosed in 2013.  Colonoscopy revealed a colitis involving the ascending colon transverse colon descending colon and biopsies were consistent with ulcerative colitis.  Abdominal ileal biopsies were negative.  She was initially treated with mesalamine which did not help.  Subsequently she was on Remicade which caused throat swelling and stopped.  She was on Humira for about 6 months or so did not improve her symptoms.  She is now on Entyvio for the last 3 years or so.  She was followed at [facility] with Dr. Irwin.  Doing reasonably well with the vedolizumab.  Recently 3 months ago she was admitted in [facility] with the diarrhea and she was noted to have a C. difficile colitis.  Per patient she was treated with Vanco and Flagyl and discharged with a p.o. Vanco.  This discharged her diarrhea transiently got better but since last few weeks diarrhea has gotten worse.  Watery stool with blood.  Went to ED and had a blood work done which did not reveal any significant anemia and her CT scan abdomen pelvis was unremarkable.   I suspect patient probably had a relapse of C. Difficile  To rule out nonresponse to entyvio.  We will get the stool studies including the stool C. Difficile stool calprotectin and will schedule for her a colonoscopy for further evaluation  We will start her on Bentyl as needed for significant abdominal cramps  Last dose of Entyvio few days ago and due for later this month, has been already arranged at [facility]  No significant signs of any extraintestinal manifestations     3. Elevated LFTs  Could be related to fatty liver possible Mackenzie.  Other etiology to rule out including PSC.   Her hepatitis panel were negative.  RAQUEL anti-smooth muscle antibody antimitochondrial antibody were negative.  Alpha-1 antitrypsin  level, ceruloplasmin level were normal.  Transferrin saturation was normal  Recommended to lose at least a 5 pounds weight  will work-up for any possibility of sclerosing cholangitis with elevated alkaline phosphatase  We will schedule for MRCP and once her EGD and colonoscopy was performed     Previous history     4. Esophageal dysphagia  Per patient she did have a esophageal stricture and was dilated a year ago.  Suspect she may have some element of esophageal dysmotility secondary to diabetes mellitus  Will schedule for EGD and dilatation     5. Family hx of colon cancer  Grandma had a colon cancer.  Patient had her last colonoscopy in 2013, few polyps removed  Schedule her for colonoscopy now     6. Class 2 severe obesity due to excess calories with serious comorbidity and body mass index (BMI) of 39.0 to 39.9 in adult (CMS/HCC)  Advised regular exercise half hour every day at least 3 days in a week.   Reduced calorie intake  and lifestyle and dietary changes  have been discussed  Wt reduction of at least 5-7% of the current body weight has been discussed.   Advised to avoid alcohol and smoking cigarette        Addendum     This patient had a severe anaphylactic reaction to mesalamine preparation many years ago as per patient.  She was allergic to Remicade.  As per patient she lost response to Humira after some time and that need to be stopped.   She is on Entyvio for almost 3 years now.  She had a severe flare in March April 2019, and had a 1 more flare earlier this year in January, and had a third flare last month within 1 year.  Patient was on Entyvio high-dose every 4 weeks. This time her Entyvio drug levels was 16 which was within the therapeutic range and no Entyvio antibody detected, indicating lost the response to vedolizumab  Her colonoscopy again showed a moderate Cheng score 2 extensive colitis.  Stool studies including the C. difficile bacterial and viral studies were negative.  I have discussed with  her discontinuing the Entyvio and starting  on  for now low molecule Xeljang 10mg po BID for now   We will reduce the dose to 5 mg twice daily after induction  I have also discussed the risk and adverse reactions, including, increased risk for infection,  Hematological neoplastic process, increased risk for DVT, PE.  Etc.  She agrees on continuing with Xeljang for now          Follow Up:   No follow-ups on file.    Alonzo He MD  [facility]  10/29/2020  17:21 EDT     Please note that portions of this note may have been completed with a voice recognition program.

## 2020-11-02 DIAGNOSIS — K51.90 ULCERATIVE COLITIS WITHOUT COMPLICATIONS, UNSPECIFIED LOCATION (HCC): ICD-10-CM

## 2020-11-02 DIAGNOSIS — K21.9 GASTROESOPHAGEAL REFLUX DISEASE WITHOUT ESOPHAGITIS: Chronic | ICD-10-CM

## 2020-11-02 RX ORDER — PROMETHAZINE HYDROCHLORIDE 25 MG/1
25 TABLET ORAL EVERY 6 HOURS PRN
Qty: 30 TABLET | Refills: 1 | Status: SHIPPED | OUTPATIENT
Start: 2020-11-02 | End: 2020-12-23 | Stop reason: SDUPTHER

## 2020-11-25 ENCOUNTER — TELEPHONE (OUTPATIENT)
Dept: FAMILY MEDICINE CLINIC | Facility: CLINIC | Age: 35
End: 2020-11-25

## 2020-11-25 RX ORDER — FLUCONAZOLE 100 MG/1
100 TABLET ORAL DAILY
Qty: 7 TABLET | Refills: 0 | Status: SHIPPED | OUTPATIENT
Start: 2020-11-25 | End: 2021-02-01 | Stop reason: SDUPTHER

## 2020-11-25 NOTE — TELEPHONE ENCOUNTER
l     Caller: Thais Hobson    Relationship: Self    Best call back number: [phone]    What medication are you requesting: DISFLUCAN    What are your current symptoms: ITCHING AND RASH UNDER BREAST    How long have you been experiencing symptoms: 3 DAYS    Have you had these symptoms before:    [x] Yes  [] No    Have you been treated for these symptoms before:   [x] Yes  [] No    If a prescription is needed, what is your preferred pharmacy and phone number:     [id number] - [place], [place] - [address] - [phone] [facility] [phone]          Additional notes:

## 2020-12-01 DIAGNOSIS — E11.44 DIABETIC AMYOTROPHY ASSOCIATED WITH TYPE 2 DIABETES MELLITUS (HCC): ICD-10-CM

## 2020-12-01 RX ORDER — GABAPENTIN 800 MG/1
TABLET ORAL
Qty: 90 TABLET | Refills: 0 | Status: SHIPPED | OUTPATIENT
Start: 2020-12-01 | End: 2021-03-11 | Stop reason: SDUPTHER

## 2020-12-19 DIAGNOSIS — Z79.4 TYPE 2 DIABETES MELLITUS TREATED WITH INSULIN (HCC): ICD-10-CM

## 2020-12-19 DIAGNOSIS — G43.101 MIGRAINE WITH AURA AND WITH STATUS MIGRAINOSUS, NOT INTRACTABLE: ICD-10-CM

## 2020-12-19 DIAGNOSIS — E11.9 TYPE 2 DIABETES MELLITUS TREATED WITH INSULIN (HCC): ICD-10-CM

## 2020-12-21 RX ORDER — TOPIRAMATE 25 MG/1
CAPSULE, COATED PELLETS ORAL
Qty: 60 CAPSULE | Refills: 1 | Status: SHIPPED | OUTPATIENT
Start: 2020-12-21 | End: 2021-02-25

## 2020-12-21 RX ORDER — ESTRADIOL 0.05 MG/D
1 FILM, EXTENDED RELEASE TRANSDERMAL WEEKLY
Qty: 8 PATCH | Refills: 2 | Status: SHIPPED | OUTPATIENT
Start: 2020-12-21 | End: 2021-07-06 | Stop reason: HOSPADM

## 2020-12-21 RX ORDER — RIZATRIPTAN BENZOATE 10 MG/1
10 TABLET ORAL ONCE AS NEEDED
Qty: 8 TABLET | Refills: 1 | Status: SHIPPED | OUTPATIENT
Start: 2020-12-21 | End: 2021-03-11 | Stop reason: SDUPTHER

## 2020-12-21 RX ORDER — INSULIN LISPRO 100 [IU]/ML
25 INJECTION, SUSPENSION SUBCUTANEOUS 2 TIMES DAILY WITH MEALS
Qty: 5 PEN | Refills: 3 | Status: SHIPPED | OUTPATIENT
Start: 2020-12-21 | End: 2021-07-06 | Stop reason: HOSPADM

## 2020-12-23 ENCOUNTER — TELEPHONE (OUTPATIENT)
Dept: FAMILY MEDICINE CLINIC | Facility: CLINIC | Age: 35
End: 2020-12-23

## 2020-12-23 DIAGNOSIS — K51.90 ULCERATIVE COLITIS WITHOUT COMPLICATIONS, UNSPECIFIED LOCATION (HCC): ICD-10-CM

## 2020-12-23 DIAGNOSIS — K21.9 GASTROESOPHAGEAL REFLUX DISEASE WITHOUT ESOPHAGITIS: Chronic | ICD-10-CM

## 2020-12-23 RX ORDER — DIPHENHYDRAMINE HCL 25 MG
25 CAPSULE ORAL EVERY 8 HOURS PRN
Qty: 90 CAPSULE | Refills: 1 | Status: SHIPPED | OUTPATIENT
Start: 2020-12-23 | End: 2021-03-11 | Stop reason: SDUPTHER

## 2020-12-23 RX ORDER — PROMETHAZINE HYDROCHLORIDE 25 MG/1
25 TABLET ORAL EVERY 6 HOURS PRN
Qty: 30 TABLET | Refills: 1 | Status: SHIPPED | OUTPATIENT
Start: 2020-12-23 | End: 2021-01-21 | Stop reason: SDUPTHER

## 2020-12-23 NOTE — TELEPHONE ENCOUNTER
PATIENT CALLED AND STATED THAT HER INSURANCE IS NO LONGER COVERING   HumaLOG Mix 75/25 KwikPen (75-25) 100 UNIT/ML suspension pen-injector pen AND HER insulin aspart (NovoLOG) 100 UNIT/ML injection.      SHE STATED SHE HAS BEEN WITHOUT INSULIN FOR A WHILE.  IS THERE ANOTHER INSULIN THAT WOULD BE COVERED BY HER INSURANCE?    PLEASE CONTACT PATIENT TO ADVISE.    CALLBACK:  [phone]

## 2020-12-23 NOTE — TELEPHONE ENCOUNTER
Caller: Thais Hobson    Relationship: Self    Best call back number: [phone]    Medication needed:   Requested Prescriptions     Pending Prescriptions Disp Refills   • promethazine (PHENERGAN) 25 MG tablet 30 tablet 1     Sig: Take 1 tablet by mouth Every 6 (Six) Hours As Needed for Nausea or Vomiting.   • diphenhydrAMINE (Banophen) 25 mg capsule 90 capsule 1     Sig: Take 1 capsule by mouth Every 8 (Eight) Hours As Needed for Itching or Allergies.       When do you need the refill by: TODAY    What details did the patient provide when requesting the medication: PATIENT IS COMPLETELY OUT OF MEDICATION.    Does the patient have less than a 3 day supply:  [x] Yes  [] No    What is the patient's preferred pharmacy: [facility] #[id number] [address] - [phone] [facility] [phone] FX

## 2021-01-04 DIAGNOSIS — E11.44 DIABETIC AMYOTROPHY ASSOCIATED WITH TYPE 2 DIABETES MELLITUS (HCC): ICD-10-CM

## 2021-01-04 RX ORDER — GABAPENTIN 600 MG/1
TABLET ORAL
Qty: 90 TABLET | Refills: 1 | Status: SHIPPED | OUTPATIENT
Start: 2021-01-04 | End: 2021-03-11 | Stop reason: SDUPTHER

## 2021-01-04 NOTE — TELEPHONE ENCOUNTER
Caller: [id number] - [place] - [address] [facility] - [phone] [facility] [phone] FX    Relationship:     Best call back number: [phone]  Medication needed:   Requested Prescriptions     Pending Prescriptions Disp Refills   • gabapentin (NEURONTIN) 600 MG tablet [Pharmacy Med Name: GABAPENTIN 600MG TABLETS] 90 tablet      Sig: TAKE 1 TABLET BY MOUTH THREE TIMES DAILY   • insulin aspart (NovoLOG) 100 UNIT/ML injection 10 mL 0     Sig: Inject per sliding scale       When do you need the refill by: ASAP    What details did the patient provide when requesting the medication:       Does the patient have less than a 3 day supply:  [x] Yes  [] No    What is the patient's preferred pharmacy[id number] - [place] - [address] [facility] - [phone]  - [phone] FX

## 2021-01-07 DIAGNOSIS — E11.9 TYPE 2 DIABETES MELLITUS TREATED WITH INSULIN (HCC): Primary | ICD-10-CM

## 2021-01-07 DIAGNOSIS — Z79.4 TYPE 2 DIABETES MELLITUS TREATED WITH INSULIN (HCC): Primary | ICD-10-CM

## 2021-01-07 RX ORDER — NAPROXEN SODIUM 220 MG
1 TABLET ORAL 3 TIMES DAILY
Qty: 100 EACH | Refills: 2 | Status: SHIPPED | OUTPATIENT
Start: 2021-01-07 | End: 2021-04-29 | Stop reason: SDUPTHER

## 2021-01-07 NOTE — TELEPHONE ENCOUNTER
Caller: Thais Hobson    Relationship: Self    Best call back number: [phone]    Medication needed:   NEEDLES  When do you need the refill by: ASAP    What details did the patient provide when requesting the medication: PATIENT STATES SHE HAS A VIAL OF INSULIN AND NEEDS THE NEEDLES.  Does the patient have less than a 3 day supply:  [x] Yes  [] No    What is the patient's preferred pharmacy: [facility] #[id number] [facility], [place] - [facility] [address] - [phone] [facility] [phone] FX

## 2021-01-08 RX ORDER — LANCETS 30 GAUGE
EACH MISCELLANEOUS
Qty: 100 EACH | Refills: 3 | Status: SHIPPED | OUTPATIENT
Start: 2021-01-08

## 2021-01-12 ENCOUNTER — TELEPHONE (OUTPATIENT)
Dept: FAMILY MEDICINE CLINIC | Facility: CLINIC | Age: 36
End: 2021-01-12

## 2021-01-12 NOTE — TELEPHONE ENCOUNTER
----- Message from EMELYN Seymour sent at 1/12/2021 12:42 PM EST -----  Regarding: FW: Non-Urgent Medical Question  Contact: [phone]      ----- Message -----  From: Ольга Cole MA  Sent: 1/12/2021   8:04 AM EST  To: EMELYN Seymour  Subject: FW: Non-Urgent Medical Question                    ----- Message -----  From: Thais Hobson  Sent: 1/11/2021   9:10 PM EST  To: [facility]  Subject: Non-Urgent Medical Question                      It didn't give me an option for an urgent medical question but I am really sick again I'm not really able to eat anything I've been stuck in bed for a few days I have been in a lot of pain Also I think I really need to be admitted to the hospital for a couple of days to get some fluids I have ulcers in my throat and mouth also I am completely miserable.

## 2021-01-21 DIAGNOSIS — K21.9 GASTROESOPHAGEAL REFLUX DISEASE WITHOUT ESOPHAGITIS: Chronic | ICD-10-CM

## 2021-01-21 DIAGNOSIS — K51.90 ULCERATIVE COLITIS WITHOUT COMPLICATIONS, UNSPECIFIED LOCATION (HCC): ICD-10-CM

## 2021-01-21 RX ORDER — PROMETHAZINE HYDROCHLORIDE 25 MG/1
TABLET ORAL
Qty: 30 TABLET | Refills: 1 | Status: SHIPPED | OUTPATIENT
Start: 2021-01-21 | End: 2021-03-09 | Stop reason: SDUPTHER

## 2021-02-02 RX ORDER — FLUCONAZOLE 100 MG/1
100 TABLET ORAL DAILY
Qty: 7 TABLET | Refills: 0 | Status: SHIPPED | OUTPATIENT
Start: 2021-02-02 | End: 2021-04-05 | Stop reason: SDUPTHER

## 2021-02-05 ENCOUNTER — TELEPHONE (OUTPATIENT)
Dept: FAMILY MEDICINE CLINIC | Facility: CLINIC | Age: 36
End: 2021-02-05

## 2021-02-05 RX ORDER — HYDROCORTISONE 25 MG/G
CREAM TOPICAL 2 TIMES DAILY
Qty: 28 G | Refills: 1 | Status: SHIPPED | OUTPATIENT
Start: 2021-02-05 | End: 2021-05-14 | Stop reason: SDUPTHER

## 2021-02-05 NOTE — TELEPHONE ENCOUNTER
Sounds like a hemorrhoid. I sent some cream to the pharmacy. She can sit in the bath with some epsom salt also

## 2021-02-05 NOTE — TELEPHONE ENCOUNTER
Patient requested a call back. Patient stated she has ulcerative colitis and has developed a painful knot/lump on her anus. Patient stated this is causing pain when she sits and there is blood when she has a bowel movement. This was first noticed 3-4 days ago and continues to get worse. Patient would like to know what can be done to help with this.    Please call and advise. Patient call back [phone]

## 2021-02-24 DIAGNOSIS — G43.101 MIGRAINE WITH AURA AND WITH STATUS MIGRAINOSUS, NOT INTRACTABLE: ICD-10-CM

## 2021-02-25 RX ORDER — TOPIRAMATE 25 MG/1
CAPSULE, COATED PELLETS ORAL
Qty: 60 CAPSULE | Refills: 1 | Status: SHIPPED | OUTPATIENT
Start: 2021-02-25 | End: 2021-03-11 | Stop reason: SDUPTHER

## 2021-03-09 DIAGNOSIS — K21.9 GASTROESOPHAGEAL REFLUX DISEASE WITHOUT ESOPHAGITIS: Chronic | ICD-10-CM

## 2021-03-09 DIAGNOSIS — K51.90 ULCERATIVE COLITIS WITHOUT COMPLICATIONS, UNSPECIFIED LOCATION (HCC): ICD-10-CM

## 2021-03-09 RX ORDER — PROMETHAZINE HYDROCHLORIDE 25 MG/1
25 TABLET ORAL EVERY 6 HOURS PRN
Qty: 30 TABLET | Refills: 1 | Status: SHIPPED | OUTPATIENT
Start: 2021-03-09 | End: 2021-03-11 | Stop reason: SDUPTHER

## 2021-03-09 NOTE — TELEPHONE ENCOUNTER
LOV 10/01/2020    PATIENT GOT UPSET WHEN I EXPLAINED THAT FOR HER CONTROLED SHE NEEDS A UDS AND AN UPDATED CSA,    I MADE HER AN APT THIS Thursday BUT SHE SAID SHE DOUBT SHE WILL MAKE IT.     BUT SHE IS REQUESTING THIS MEDICINE

## 2021-03-11 ENCOUNTER — OFFICE VISIT (OUTPATIENT)
Dept: FAMILY MEDICINE CLINIC | Facility: CLINIC | Age: 36
End: 2021-03-11

## 2021-03-11 VITALS
TEMPERATURE: 97.1 F | WEIGHT: 204 LBS | HEART RATE: 125 BPM | DIASTOLIC BLOOD PRESSURE: 70 MMHG | HEIGHT: 63 IN | BODY MASS INDEX: 36.14 KG/M2 | SYSTOLIC BLOOD PRESSURE: 110 MMHG | OXYGEN SATURATION: 98 %

## 2021-03-11 DIAGNOSIS — K51.90 ULCERATIVE COLITIS WITHOUT COMPLICATIONS, UNSPECIFIED LOCATION (HCC): ICD-10-CM

## 2021-03-11 DIAGNOSIS — G43.101 MIGRAINE WITH AURA AND WITH STATUS MIGRAINOSUS, NOT INTRACTABLE: ICD-10-CM

## 2021-03-11 DIAGNOSIS — F41.8 DEPRESSION WITH ANXIETY: ICD-10-CM

## 2021-03-11 DIAGNOSIS — Z51.81 ENCOUNTER FOR THERAPEUTIC DRUG MONITORING: ICD-10-CM

## 2021-03-11 DIAGNOSIS — E11.44 DIABETIC AMYOTROPHY ASSOCIATED WITH TYPE 2 DIABETES MELLITUS (HCC): Primary | ICD-10-CM

## 2021-03-11 DIAGNOSIS — K21.9 GASTROESOPHAGEAL REFLUX DISEASE WITHOUT ESOPHAGITIS: Chronic | ICD-10-CM

## 2021-03-11 PROCEDURE — 99214 OFFICE O/P EST MOD 30 MIN: CPT | Performed by: FAMILY MEDICINE

## 2021-03-11 RX ORDER — TOPIRAMATE 25 MG/1
CAPSULE, COATED PELLETS ORAL
Qty: 60 CAPSULE | Refills: 1 | Status: SHIPPED | OUTPATIENT
Start: 2021-03-11 | End: 2021-05-11 | Stop reason: DRUGHIGH

## 2021-03-11 RX ORDER — HYDROXYZINE PAMOATE 25 MG/1
25 CAPSULE ORAL 3 TIMES DAILY PRN
Qty: 60 CAPSULE | Refills: 2 | Status: SHIPPED | OUTPATIENT
Start: 2021-03-11 | End: 2021-05-11 | Stop reason: SDUPTHER

## 2021-03-11 RX ORDER — GABAPENTIN 800 MG/1
800 TABLET ORAL 3 TIMES DAILY
Qty: 90 TABLET | Refills: 0 | Status: SHIPPED | OUTPATIENT
Start: 2021-03-11 | End: 2021-04-07 | Stop reason: SDUPTHER

## 2021-03-11 RX ORDER — PROMETHAZINE HYDROCHLORIDE 25 MG/1
25 TABLET ORAL EVERY 6 HOURS PRN
Qty: 30 TABLET | Refills: 1 | Status: SHIPPED | OUTPATIENT
Start: 2021-03-11 | End: 2021-04-07 | Stop reason: SDUPTHER

## 2021-03-11 RX ORDER — DIPHENHYDRAMINE HCL 25 MG
25 CAPSULE ORAL EVERY 8 HOURS PRN
Qty: 90 CAPSULE | Refills: 1 | Status: SHIPPED | OUTPATIENT
Start: 2021-03-11 | End: 2021-08-20 | Stop reason: SDUPTHER

## 2021-03-11 RX ORDER — RIZATRIPTAN BENZOATE 10 MG/1
10 TABLET ORAL ONCE AS NEEDED
Qty: 8 TABLET | Refills: 1 | Status: SHIPPED | OUTPATIENT
Start: 2021-03-11 | End: 2021-04-29 | Stop reason: SDUPTHER

## 2021-03-11 NOTE — PROGRESS NOTES
"    Established Patient        Chief Complaint:   Chief Complaint   Patient presents with   • Follow-up     med refills; c/o anxiety/depression        Thais Hobson is a 35 y.o. female    History of Present Illness:   Here today for scheduled follow-up visit concerning her mood disorder, inflammatory bowel, migraine headache disorder, diabetes mellitus and diabetic amyotrophy.    Patient needs a refill on her gabapentin.  Denies any bright red blood per the rectum, no fever, chills or night sweats.  Continues to be followed by gastroenterology.  She denies any aspiration or dysphagia.    Patient denies any cyclical/persistent hypoglycemic episodes.  Continues to be followed by endocrinology.    Denies any SI/HI.  She does report an increase in the frequency of her migraine headaches.    Subjective     The following portions of the patient's history were reviewed and updated as appropriate: allergies, current medications, past family history, past medical history, past social history, past surgical history and problem list.    Allergies   Allergen Reactions   • Hydrochlorothiazide Hives   • Penicillins Hives   • Remicade [Infliximab] Anaphylaxis   • Soybean-Containing Drug Products Swelling     \"Soy sauce\"   • Xeljanz [Tofacitinib Citrate] Other (See Comments)     BLISTERS IN THROAT & ON ARMS   • Zofran [Ondansetron Hcl] Headache     migraines   • Reglan [Metoclopramide] Other (See Comments)     States feels weird when takes it       Review of Systems  1. Constitutional: Negative for fever. Negative for chills, diaphoresis, fatigue and unexpected weight change.   2. HENT: No dysphagia; no changes to vision/hearing/smell/taste; no epistaxis  3. Eyes: Negative for redness and visual disturbance.   4. Respiratory: negative for shortness of breath. Negative for chest pain . Negative for cough and chest tightness.   5. Cardiovascular: Negative for chest pain and palpitations.   6. Gastrointestinal: Negative for " "abdominal distention, abdominal pain and blood in stool.   7. Endocrine: Negative for cold intolerance and heat intolerance.   8. Genitourinary: Negative for difficulty urinating, dysuria and frequency.   9. Musculoskeletal: Negative for arthralgias, back pain and myalgias.   10. Skin: Negative for color change, rash and wound.   11. Neurological: Negative for syncope, weakness.  Headaches as per above.  12. Hematological: Negative for adenopathy. Does not bruise/bleed easily.   13. Psychiatric/Behavioral: Negative for confusion. The patient is not nervous/anxious.    Objective     Physical Exam   Vital Signs: /70   Pulse (!) 125   Temp 97.1 °F (36.2 °C)   Ht 160 cm (63\")   Wt 92.5 kg (204 lb)   LMP  (LMP Unknown)   SpO2 98%   BMI 36.14 kg/m²     General Appearance: alert, oriented x 3, no acute distress.  Skin: warm and dry.   HEENT: Atraumatic.  pupils round and reactive to light and accommodation, oral mucosa pink and moist.  Nares patent without epistaxis.  External auditory canals are patent tympanic membranes intact.  Neck: supple, no JVD, trachea midline.  No thyromegaly  Lungs: CTA, unlabored breathing effort.  Heart: RRR, normal S1 and S2, no S3, no rub.  Abdomen: soft, non-tender, no palpable bladder, present bowel sounds to auscultation ×4.  No guarding or rigidity.  Extremities: no clubbing, cyanosis or edema.  Good range of motion actively and passively.  Symmetric muscle strength and development  Neuro: normal speech and mental status.  Cranial nerves II through XII intact.  No anosmia. DTR 2+; proprioception intact.  No focal motor/sensory deficits.    Assessment and Plan      Assessment:   Diagnoses and all orders for this visit:    1. Diabetic amyotrophy associated with type 2 diabetes mellitus (CMS/HCC) (Primary)  -     gabapentin (NEURONTIN) 800 MG tablet; Take 1 tablet by mouth 3 (Three) Times a Day.  Dispense: 90 tablet; Refill: 0  -     Compliance Drug Analysis, Ur - Urine, Clean " Catch    2. Gastroesophageal reflux disease without esophagitis  -     promethazine (PHENERGAN) 25 MG tablet; Take 1 tablet by mouth Every 6 (Six) Hours As Needed for Nausea or Vomiting.  Dispense: 30 tablet; Refill: 1    3. Ulcerative colitis without complications, unspecified location (CMS/HCC)  -     promethazine (PHENERGAN) 25 MG tablet; Take 1 tablet by mouth Every 6 (Six) Hours As Needed for Nausea or Vomiting.  Dispense: 30 tablet; Refill: 1    4. Migraine with aura and with status migrainosus, not intractable  -     rizatriptan (MAXALT) 10 MG tablet; Take 1 tablet by mouth 1 (One) Time As Needed for Migraine for up to 1 dose. May repeat in 2 hours if needed  Dispense: 8 tablet; Refill: 1  -     topiramate (TOPAMAX) 25 MG capsule (sprinkle); TAKE 1 CAPSULE BY MOUTH EVERY NIGHT AT BEDTIME. INCREASE TO 2 CAPSULES BY MOUTH EVERY NIGHT AT BEDTIME THEREAFTER  Dispense: 60 capsule; Refill: 1    5. Encounter for therapeutic drug monitoring  -     Compliance Drug Analysis, Ur - Urine, Clean Catch    6. Depression with anxiety  -     hydrOXYzine pamoate (VISTARIL) 25 MG capsule; Take 1 capsule by mouth 3 (Three) Times a Day As Needed for Anxiety.  Dispense: 60 capsule; Refill: 2        Plan:  Vital signs demonstrate hemodynamic stability.    Refill provided for Phenergan, as needed use.  Keep scheduled follow-up appointment with gastroenterology.    Urine drug screen ordered today for compliance.    Refilled patient's gabapentin.    Continue as needed use of Maxalt, as well as begin use of topiramate.  Plan titration of dosing as clinically needed.    Discussion Summary:    I spent 30 minutes caring for Thais on this date of service. This time includes time spent by me in the following activities:preparing for the visit, performing a medically appropriate examination and/or evaluation , counseling and educating the patient/family/caregiver, ordering medications, tests, or procedures, documenting information in the  medical record and care coordination     Discussed plan of care in detail with pt today; pt verb understanding and agrees.  Follow up:  Return in about 2 months (around 5/11/2021) for Recheck, Med Change/New Meds.     There are no Patient Instructions on file for this visit.    Lv Sanchez DO  03/17/21  19:04 EDT          Please note that portions of this note may have been completed with a voice recognition program. Efforts were made to edit the dictations, but occasionally words are mistranscribed.

## 2021-03-17 LAB — DRUGS UR: NORMAL

## 2021-04-05 RX ORDER — FLUCONAZOLE 100 MG/1
100 TABLET ORAL DAILY
Qty: 7 TABLET | Refills: 0 | Status: SHIPPED | OUTPATIENT
Start: 2021-04-05 | End: 2021-05-12 | Stop reason: SDUPTHER

## 2021-04-06 RX ORDER — AZATHIOPRINE 50 MG/1
TABLET ORAL
Qty: 60 TABLET | Refills: 1 | Status: SHIPPED | OUTPATIENT
Start: 2021-04-06 | End: 2021-07-06 | Stop reason: HOSPADM

## 2021-04-07 DIAGNOSIS — E11.44 DIABETIC AMYOTROPHY ASSOCIATED WITH TYPE 2 DIABETES MELLITUS (HCC): ICD-10-CM

## 2021-04-07 DIAGNOSIS — K21.9 GASTROESOPHAGEAL REFLUX DISEASE WITHOUT ESOPHAGITIS: Chronic | ICD-10-CM

## 2021-04-07 DIAGNOSIS — K51.90 ULCERATIVE COLITIS WITHOUT COMPLICATIONS, UNSPECIFIED LOCATION (HCC): ICD-10-CM

## 2021-04-08 RX ORDER — GABAPENTIN 800 MG/1
800 TABLET ORAL 3 TIMES DAILY
Qty: 90 TABLET | Refills: 0 | Status: SHIPPED | OUTPATIENT
Start: 2021-04-08 | End: 2021-05-11 | Stop reason: SDUPTHER

## 2021-04-08 RX ORDER — PROMETHAZINE HYDROCHLORIDE 25 MG/1
25 TABLET ORAL EVERY 6 HOURS PRN
Qty: 30 TABLET | Refills: 1 | Status: SHIPPED | OUTPATIENT
Start: 2021-04-08 | End: 2021-05-11 | Stop reason: SDUPTHER

## 2021-04-29 DIAGNOSIS — E11.9 TYPE 2 DIABETES MELLITUS TREATED WITH INSULIN (HCC): ICD-10-CM

## 2021-04-29 DIAGNOSIS — G43.101 MIGRAINE WITH AURA AND WITH STATUS MIGRAINOSUS, NOT INTRACTABLE: ICD-10-CM

## 2021-04-29 DIAGNOSIS — Z79.4 TYPE 2 DIABETES MELLITUS TREATED WITH INSULIN (HCC): ICD-10-CM

## 2021-04-29 RX ORDER — NAPROXEN SODIUM 220 MG
1 TABLET ORAL 3 TIMES DAILY
Qty: 100 EACH | Refills: 2 | Status: SHIPPED | OUTPATIENT
Start: 2021-04-29

## 2021-04-29 RX ORDER — RIZATRIPTAN BENZOATE 10 MG/1
10 TABLET ORAL ONCE AS NEEDED
Qty: 8 TABLET | Refills: 1 | Status: SHIPPED | OUTPATIENT
Start: 2021-04-29 | End: 2021-05-11 | Stop reason: ALTCHOICE

## 2021-05-11 ENCOUNTER — TELEMEDICINE (OUTPATIENT)
Dept: FAMILY MEDICINE CLINIC | Facility: CLINIC | Age: 36
End: 2021-05-11

## 2021-05-11 DIAGNOSIS — Z79.4 TYPE 2 DIABETES MELLITUS TREATED WITH INSULIN (HCC): Primary | ICD-10-CM

## 2021-05-11 DIAGNOSIS — K51.00 ULCERATIVE PANCOLITIS WITHOUT COMPLICATION (HCC): ICD-10-CM

## 2021-05-11 DIAGNOSIS — G43.101 MIGRAINE WITH AURA AND WITH STATUS MIGRAINOSUS, NOT INTRACTABLE: ICD-10-CM

## 2021-05-11 DIAGNOSIS — K21.9 GASTROESOPHAGEAL REFLUX DISEASE WITHOUT ESOPHAGITIS: Chronic | ICD-10-CM

## 2021-05-11 DIAGNOSIS — E11.9 TYPE 2 DIABETES MELLITUS TREATED WITH INSULIN (HCC): Primary | ICD-10-CM

## 2021-05-11 DIAGNOSIS — F41.8 DEPRESSION WITH ANXIETY: ICD-10-CM

## 2021-05-11 DIAGNOSIS — E11.44 DIABETIC AMYOTROPHY ASSOCIATED WITH TYPE 2 DIABETES MELLITUS (HCC): ICD-10-CM

## 2021-05-11 DIAGNOSIS — K51.90 ULCERATIVE COLITIS WITHOUT COMPLICATIONS, UNSPECIFIED LOCATION (HCC): ICD-10-CM

## 2021-05-11 PROCEDURE — 99214 OFFICE O/P EST MOD 30 MIN: CPT | Performed by: FAMILY MEDICINE

## 2021-05-11 RX ORDER — HYDROXYZINE PAMOATE 25 MG/1
25 CAPSULE ORAL 3 TIMES DAILY PRN
Qty: 60 CAPSULE | Refills: 2 | Status: ON HOLD | OUTPATIENT
Start: 2021-05-11 | End: 2021-07-06 | Stop reason: SDUPTHER

## 2021-05-11 RX ORDER — GABAPENTIN 800 MG/1
800 TABLET ORAL 3 TIMES DAILY
Qty: 90 TABLET | Refills: 0 | Status: SHIPPED | OUTPATIENT
Start: 2021-05-11 | End: 2021-06-10 | Stop reason: SDUPTHER

## 2021-05-11 RX ORDER — PROMETHAZINE HYDROCHLORIDE 25 MG/1
25 TABLET ORAL EVERY 6 HOURS PRN
Qty: 40 TABLET | Refills: 2 | Status: SHIPPED | OUTPATIENT
Start: 2021-05-11 | End: 2021-08-20

## 2021-05-11 RX ORDER — INSULIN GLARGINE 100 [IU]/ML
40 INJECTION, SOLUTION SUBCUTANEOUS NIGHTLY
Qty: 5 PEN | Refills: 3 | Status: ON HOLD | OUTPATIENT
Start: 2021-05-11 | End: 2021-07-06 | Stop reason: SDUPTHER

## 2021-05-11 RX ORDER — TOPIRAMATE 50 MG/1
50 TABLET, FILM COATED ORAL 2 TIMES DAILY
Qty: 60 TABLET | Refills: 2 | Status: SHIPPED | OUTPATIENT
Start: 2021-05-11 | End: 2021-07-15

## 2021-05-11 RX ORDER — SUMATRIPTAN 50 MG/1
TABLET, FILM COATED ORAL
Qty: 8 TABLET | Refills: 2 | Status: SHIPPED | OUTPATIENT
Start: 2021-05-11

## 2021-05-11 NOTE — PROGRESS NOTES
"    Established Patient        Chief Complaint:   No chief complaint on file.       Thais Hobson is a 35 y.o. female    History of Present Illness:   Here today for requested video visit utilizing both audio and video components in follow-up of diabetes mellitus, diabetic amyotrophy, ulcerative colitis, GERD, mood disorder and migraine headaches.    Patient reports an increase in frequency of her migraine headaches, not responsive to as needed Maxalt use.  She denies any significant dietary changes to account for this.  She has not yet been notified of a referral to see gastroenterology, she is requested to be seen by 1 closer to her home in the [facility].  She has had a recent exacerbation, prompted IV fluid resuscitation in the emergency room.  Chronic findings on CT scan.    She denies any cyclical/persistent hypoglycemic episodes.  Blood glucose maximum has been around 300 on occasion.  Insurance is no longer covering her Humalog 75/25 mix, she does need a refill on her sliding scale insulin coverage additionally.    No SI/HI.  Mood relatively stable with current treatment regimen.    Subjective     The following portions of the patient's history were reviewed and updated as appropriate: allergies, current medications, past family history, past medical history, past social history, past surgical history and problem list.    Allergies   Allergen Reactions   • Hydrochlorothiazide Hives   • Penicillins Hives   • Remicade [Infliximab] Anaphylaxis   • Soybean-Containing Drug Products Swelling     \"Soy sauce\"   • Xeljanz [Tofacitinib Citrate] Other (See Comments)     BLISTERS IN THROAT & ON ARMS   • Zofran [Ondansetron Hcl] Headache     migraines   • Reglan [Metoclopramide] Other (See Comments)     States feels weird when takes it       Review of Systems  1. Constitutional: Negative for fever. Negative for chills, diaphoresis, fatigue and unexpected weight change.   2. HENT: No " dysphagia; no changes to vision/hearing/smell/taste; no epistaxis  3. Eyes: Negative for redness and visual disturbance.   4. Respiratory: negative for shortness of breath. Negative for chest pain . Negative for cough and chest tightness.   5. Cardiovascular: Negative for chest pain and palpitations.   6. Gastrointestinal: Negative for abdominal distention, abdominal pain and blood in stool.   7. Endocrine: Negative for cold intolerance and heat intolerance.   8. Genitourinary: Negative for difficulty urinating, dysuria and frequency.   9. Musculoskeletal: Negative for arthralgias, back pain and myalgias.   10. Skin: Negative for color change, rash and wound.   11. Neurological: Negative for syncope, weakness.  Headaches as per above.  12. Hematological: Negative for adenopathy. Does not bruise/bleed easily.   13. Psychiatric/Behavioral: Negative for confusion. The patient is not nervous/anxious.    Objective     Physical Exam   Vital Signs: LMP  (LMP Unknown)     General Appearance: alert, oriented x 3, no acute distress.  Skin: Without jaundice.  HEENT: Atraumatic.  pupils round and reactive to light and accommodation, oral mucosa pink and moist.  Nares patent without epistaxis.   Neck: Without stridor.  Lungs: unlabored breathing effort.  Extremities: Good range of motion actively and passively.  Moves all 4 extremities.  Neuro: normal speech and mental status.      Assessment and Plan      Assessment:   Diagnoses and all orders for this visit:    1. Type 2 diabetes mellitus treated with insulin (CMS/[facility]) (Primary)  -     insulin aspart (NovoLOG) 100 UNIT/ML injection; TID dosing with meals, as per sliding scale coverage, up to 12 units TID dosing available  Dispense: 10 mL; Refill: 3  -     Insulin Glargine (BASAGLAR KWIKPEN) 100 UNIT/ML injection pen; Inject 40 Units under the skin into the appropriate area as directed Every Night.  Dispense: 5 pen; Refill: 3    2. Diabetic amyotrophy associated with type 2  diabetes mellitus (CMS/[facility])  -     gabapentin (NEURONTIN) 800 MG tablet; Take 1 tablet by mouth 3 (Three) Times a Day.  Dispense: 90 tablet; Refill: 0    3. Ulcerative pancolitis without complication (CMS/[facility])    4. Gastroesophageal reflux disease without esophagitis  -     promethazine (PHENERGAN) 25 MG tablet; Take 1 tablet by mouth Every 6 (Six) Hours As Needed for Nausea or Vomiting.  Dispense: 40 tablet; Refill: 2    5. Ulcerative colitis without complications, unspecified location (CMS/[facility])  -     promethazine (PHENERGAN) 25 MG tablet; Take 1 tablet by mouth Every 6 (Six) Hours As Needed for Nausea or Vomiting.  Dispense: 40 tablet; Refill: 2  -     Ambulatory Referral to Gastroenterology    6. Depression with anxiety  -     hydrOXYzine pamoate (VISTARIL) 25 MG capsule; Take 1 capsule by mouth 3 (Three) Times a Day As Needed for Anxiety.  Dispense: 60 capsule; Refill: 2    7. Migraine with aura and with status migrainosus, not intractable  -     topiramate (TOPAMAX) 50 MG tablet; Take 1 tablet by mouth 2 (Two) Times a Day.  Dispense: 60 tablet; Refill: 2  -     SUMAtriptan (Imitrex) 50 MG tablet; Take one tablet at onset of headache. May repeat dose one time in 2 hours if headache not relieved.  Dispense: 8 tablet; Refill: 2        Plan:  Plan changes to her insulin regimen include transition away from Humalog 75/25 mixture due to insurance no longer covering, instead we will utilize Basaglar and begin at 40 units once daily.  I have also provided sliding scale insulin coverage with meals 3 times daily, up to 12 units as per sliding scale.  I have encouraged her to continue good hydration habits, avoid prolonged fasting periods as best able.    Patient has requested referral to a gastroenterologist closer to her home.  She prefers [facility] or [facility] if able.    Antiemetics refilled today.    I recommended an increase in her topiramate dosing to 50 mg twice daily.  I have also transitioned  to sumatriptan, discontinue use of Maxalt as this has become less effective in treating her headaches.    Discussion Summary:    Discussed plan of care in detail with pt today; pt verb understanding and agrees.    I spent 30 minutes caring for Thais on this date of service. This time includes time spent by me in the following activities:preparing for the visit, performing a medically appropriate examination and/or evaluation , counseling and educating the patient/family/caregiver, ordering medications, tests, or procedures, documenting information in the medical record and care coordination    I have reviewed and updated all copied forward information, as appropriate.  I attest to the accuracy and relevance of any unchanged information.    Follow up:  No follow-ups on file.     There are no Patient Instructions on file for this visit.    Lv Sanchez DO  05/11/21  15:26 EDT          Please note that portions of this note may have been completed with a voice recognition program. Efforts were made to edit the dictations, but occasionally words are mistranscribed.

## 2021-05-12 RX ORDER — FLUCONAZOLE 100 MG/1
100 TABLET ORAL DAILY
Qty: 7 TABLET | Refills: 0 | Status: SHIPPED | OUTPATIENT
Start: 2021-05-12 | End: 2021-06-10 | Stop reason: SDUPTHER

## 2021-05-12 NOTE — TELEPHONE ENCOUNTER
Pt called back about this request. Sts that she has yeast under her breasts and it is painful. Sts that she had asked for this referral on 5/6, but it has never been filled for her - and her condition has gotten worse. Would like someone to try to get this sent in for her today if at all possible.

## 2021-05-12 NOTE — TELEPHONE ENCOUNTER
Caller: Thais Hobson    Relationship: Self    Best call back number: [phone]    Medication needed:   Requested Prescriptions     Pending Prescriptions Disp Refills   • fluconazole (Diflucan) 100 MG tablet 7 tablet 0     Sig: Take 1 tablet by mouth Daily.       When do you need the refill by: 05/12    What additional details did the patient provide when requesting the medication: PATIENT IS OUT OF THE MEDICATION    Does the patient have less than a 3 day supply:  [x] Yes  [] No    What is the patient's preferred pharmacy: [id number] [place], [place] - [address] - [phone] [facility] [phone] FX

## 2021-05-14 ENCOUNTER — TELEMEDICINE (OUTPATIENT)
Dept: FAMILY MEDICINE CLINIC | Facility: CLINIC | Age: 36
End: 2021-05-14

## 2021-05-14 ENCOUNTER — HOSPITAL ENCOUNTER (INPATIENT)
Facility: HOSPITAL | Age: 36
LOS: 5 days | Discharge: HOME | End: 2021-05-19
Attending: FAMILY MEDICINE | Admitting: FAMILY MEDICINE

## 2021-05-14 DIAGNOSIS — R19.7 BLOODY DIARRHEA: ICD-10-CM

## 2021-05-14 DIAGNOSIS — K51.011 ULCERATIVE PANCOLITIS WITH RECTAL BLEEDING (HCC): Primary | ICD-10-CM

## 2021-05-14 DIAGNOSIS — Z86.19 HISTORY OF CLOSTRIDIOIDES DIFFICILE COLITIS: ICD-10-CM

## 2021-05-14 DIAGNOSIS — R13.10 DYSPHAGIA, UNSPECIFIED TYPE: ICD-10-CM

## 2021-05-14 DIAGNOSIS — R10.9 INTRACTABLE ABDOMINAL PAIN: ICD-10-CM

## 2021-05-14 DIAGNOSIS — K29.70 GASTRITIS: ICD-10-CM

## 2021-05-14 DIAGNOSIS — Z87.19 HISTORY OF ESOPHAGEAL STRICTURE: ICD-10-CM

## 2021-05-14 DIAGNOSIS — K51.911 ACUTE ULCERATIVE COLITIS WITH RECTAL BLEEDING (HCC): ICD-10-CM

## 2021-05-14 PROBLEM — K64.9 HEMORRHOIDS: Status: ACTIVE | Noted: 2021-05-14

## 2021-05-14 PROBLEM — K51.90 ULCERATIVE COLITIS (HCC): Status: ACTIVE | Noted: 2021-05-14

## 2021-05-14 LAB
ABO GROUP BLD: NORMAL
ALBUMIN SERPL-MCNC: 3.7 G/DL (ref 3.5–5.2)
ALBUMIN/GLOB SERPL: 1 G/DL
ALP SERPL-CCNC: 161 U/L (ref 39–117)
ALT SERPL W P-5'-P-CCNC: 20 U/L (ref 1–33)
ANION GAP SERPL CALCULATED.3IONS-SCNC: 15 MMOL/L (ref 5–15)
AST SERPL-CCNC: 19 U/L (ref 1–32)
BASOPHILS # BLD AUTO: 0.06 10*3/MM3 (ref 0–0.2)
BASOPHILS NFR BLD AUTO: 0.5 % (ref 0–1.5)
BILIRUB SERPL-MCNC: 0.2 MG/DL (ref 0–1.2)
BLD GP AB SCN SERPL QL: NEGATIVE
BUN SERPL-MCNC: 6 MG/DL (ref 6–20)
BUN/CREAT SERPL: 9.5 (ref 7–25)
CALCIUM SPEC-SCNC: 9.4 MG/DL (ref 8.6–10.5)
CHLORIDE SERPL-SCNC: 101 MMOL/L (ref 98–107)
CO2 SERPL-SCNC: 17 MMOL/L (ref 22–29)
CREAT SERPL-MCNC: 0.63 MG/DL (ref 0.57–1)
D-LACTATE SERPL-SCNC: 1.5 MMOL/L (ref 0.5–2)
DEPRECATED RDW RBC AUTO: 41.6 FL (ref 37–54)
EOSINOPHIL # BLD AUTO: 0.36 10*3/MM3 (ref 0–0.4)
EOSINOPHIL NFR BLD AUTO: 3 % (ref 0.3–6.2)
ERYTHROCYTE [DISTWIDTH] IN BLOOD BY AUTOMATED COUNT: 12.8 % (ref 12.3–15.4)
FLUAV RNA RESP QL NAA+PROBE: NOT DETECTED
FLUBV RNA RESP QL NAA+PROBE: NOT DETECTED
GFR SERPL CREATININE-BSD FRML MDRD: 108 ML/MIN/1.73
GFR SERPL CREATININE-BSD FRML MDRD: 131 ML/MIN/1.73
GLOBULIN UR ELPH-MCNC: 3.6 GM/DL
GLUCOSE BLDC GLUCOMTR-MCNC: 198 MG/DL (ref 70–130)
GLUCOSE BLDC GLUCOMTR-MCNC: 326 MG/DL (ref 70–130)
GLUCOSE SERPL-MCNC: 342 MG/DL (ref 65–99)
HCT VFR BLD AUTO: 43.2 % (ref 34–46.6)
HGB BLD-MCNC: 13.3 G/DL (ref 12–15.9)
IMM GRANULOCYTES # BLD AUTO: 0.04 10*3/MM3 (ref 0–0.05)
IMM GRANULOCYTES NFR BLD AUTO: 0.3 % (ref 0–0.5)
LYMPHOCYTES # BLD AUTO: 5.34 10*3/MM3 (ref 0.7–3.1)
LYMPHOCYTES NFR BLD AUTO: 44.4 % (ref 19.6–45.3)
MAGNESIUM SERPL-MCNC: 1.7 MG/DL (ref 1.6–2.6)
MCH RBC QN AUTO: 27.6 PG (ref 26.6–33)
MCHC RBC AUTO-ENTMCNC: 30.8 G/DL (ref 31.5–35.7)
MCV RBC AUTO: 89.6 FL (ref 79–97)
MONOCYTES # BLD AUTO: 0.44 10*3/MM3 (ref 0.1–0.9)
MONOCYTES NFR BLD AUTO: 3.7 % (ref 5–12)
NEUTROPHILS NFR BLD AUTO: 48.1 % (ref 42.7–76)
NEUTROPHILS NFR BLD AUTO: 5.79 10*3/MM3 (ref 1.7–7)
NRBC BLD AUTO-RTO: 0 /100 WBC (ref 0–0.2)
PLAT MORPH BLD: NORMAL
PLATELET # BLD AUTO: 346 10*3/MM3 (ref 140–450)
PMV BLD AUTO: 9.8 FL (ref 6–12)
POTASSIUM SERPL-SCNC: 4.1 MMOL/L (ref 3.5–5.2)
PROCALCITONIN SERPL-MCNC: 0.04 NG/ML (ref 0–0.25)
PROT SERPL-MCNC: 7.3 G/DL (ref 6–8.5)
RBC # BLD AUTO: 4.82 10*6/MM3 (ref 3.77–5.28)
RBC MORPH BLD: NORMAL
RH BLD: POSITIVE
SARS-COV-2 RNA RESP QL NAA+PROBE: NOT DETECTED
SODIUM SERPL-SCNC: 133 MMOL/L (ref 136–145)
T&S EXPIRATION DATE: NORMAL
WBC # BLD AUTO: 12.03 10*3/MM3 (ref 3.4–10.8)
WBC MORPH BLD: NORMAL

## 2021-05-14 PROCEDURE — G0378 HOSPITAL OBSERVATION PER HR: HCPCS

## 2021-05-14 PROCEDURE — 86901 BLOOD TYPING SEROLOGIC RH(D): CPT | Performed by: INTERNAL MEDICINE

## 2021-05-14 PROCEDURE — 80053 COMPREHEN METABOLIC PANEL: CPT | Performed by: INTERNAL MEDICINE

## 2021-05-14 PROCEDURE — 85025 COMPLETE CBC W/AUTO DIFF WBC: CPT | Performed by: INTERNAL MEDICINE

## 2021-05-14 PROCEDURE — 63710000001 INSULIN DETEMIR PER 5 UNITS: Performed by: NURSE PRACTITIONER

## 2021-05-14 PROCEDURE — 63710000001 INSULIN LISPRO (HUMAN) PER 5 UNITS: Performed by: INTERNAL MEDICINE

## 2021-05-14 PROCEDURE — 25010000002 METHYLPREDNISOLONE PER 40 MG: Performed by: INTERNAL MEDICINE

## 2021-05-14 PROCEDURE — 84145 PROCALCITONIN (PCT): CPT | Performed by: INTERNAL MEDICINE

## 2021-05-14 PROCEDURE — 99215 OFFICE O/P EST HI 40 MIN: CPT | Performed by: NURSE PRACTITIONER

## 2021-05-14 PROCEDURE — 86900 BLOOD TYPING SEROLOGIC ABO: CPT | Performed by: INTERNAL MEDICINE

## 2021-05-14 PROCEDURE — 85007 BL SMEAR W/DIFF WBC COUNT: CPT | Performed by: INTERNAL MEDICINE

## 2021-05-14 PROCEDURE — 99223 1ST HOSP IP/OBS HIGH 75: CPT | Performed by: INTERNAL MEDICINE

## 2021-05-14 PROCEDURE — 83605 ASSAY OF LACTIC ACID: CPT | Performed by: INTERNAL MEDICINE

## 2021-05-14 PROCEDURE — 0DB68ZX EXCISION OF STOMACH, VIA NATURAL OR ARTIFICIAL OPENING ENDOSCOPIC, DIAGNOSTIC: ICD-10-PCS | Performed by: INTERNAL MEDICINE

## 2021-05-14 PROCEDURE — 82962 GLUCOSE BLOOD TEST: CPT

## 2021-05-14 PROCEDURE — 63710000001 INSULIN LISPRO (HUMAN) PER 5 UNITS: Performed by: NURSE PRACTITIONER

## 2021-05-14 PROCEDURE — 83735 ASSAY OF MAGNESIUM: CPT | Performed by: INTERNAL MEDICINE

## 2021-05-14 PROCEDURE — 86850 RBC ANTIBODY SCREEN: CPT | Performed by: INTERNAL MEDICINE

## 2021-05-14 PROCEDURE — 25010000002 HYDROMORPHONE PER 4 MG: Performed by: INTERNAL MEDICINE

## 2021-05-14 PROCEDURE — 87636 SARSCOV2 & INF A&B AMP PRB: CPT | Performed by: INTERNAL MEDICINE

## 2021-05-14 RX ORDER — DEXTROSE MONOHYDRATE 25 G/50ML
25 INJECTION, SOLUTION INTRAVENOUS
Status: DISCONTINUED | OUTPATIENT
Start: 2021-05-14 | End: 2021-05-14 | Stop reason: SDUPTHER

## 2021-05-14 RX ORDER — METHYLPREDNISOLONE SODIUM SUCCINATE 40 MG/ML
20 INJECTION, POWDER, LYOPHILIZED, FOR SOLUTION INTRAMUSCULAR; INTRAVENOUS EVERY 8 HOURS
Status: COMPLETED | OUTPATIENT
Start: 2021-05-14 | End: 2021-05-17

## 2021-05-14 RX ORDER — NICOTINE POLACRILEX 4 MG
15 LOZENGE BUCCAL
Status: DISCONTINUED | OUTPATIENT
Start: 2021-05-14 | End: 2021-05-14 | Stop reason: SDUPTHER

## 2021-05-14 RX ORDER — NICOTINE POLACRILEX 4 MG
15 LOZENGE BUCCAL
Status: DISCONTINUED | OUTPATIENT
Start: 2021-05-14 | End: 2021-05-19 | Stop reason: HOSPADM

## 2021-05-14 RX ORDER — HYDROCORTISONE 25 MG/G
CREAM TOPICAL 2 TIMES DAILY
Qty: 28 G | Refills: 1 | Status: ON HOLD | OUTPATIENT
Start: 2021-05-14 | End: 2021-07-06 | Stop reason: SDUPTHER

## 2021-05-14 RX ORDER — GABAPENTIN 400 MG/1
800 CAPSULE ORAL EVERY 12 HOURS SCHEDULED
Status: DISCONTINUED | OUTPATIENT
Start: 2021-05-14 | End: 2021-05-19 | Stop reason: HOSPADM

## 2021-05-14 RX ORDER — HYDROMORPHONE HYDROCHLORIDE 1 MG/ML
0.5 INJECTION, SOLUTION INTRAMUSCULAR; INTRAVENOUS; SUBCUTANEOUS
Status: DISCONTINUED | OUTPATIENT
Start: 2021-05-14 | End: 2021-05-17

## 2021-05-14 RX ORDER — SODIUM CHLORIDE 9 MG/ML
100 INJECTION, SOLUTION INTRAVENOUS CONTINUOUS
Status: DISCONTINUED | OUTPATIENT
Start: 2021-05-14 | End: 2021-05-19 | Stop reason: HOSPADM

## 2021-05-14 RX ORDER — ACETAMINOPHEN 325 MG/1
650 TABLET ORAL EVERY 4 HOURS PRN
Status: DISCONTINUED | OUTPATIENT
Start: 2021-05-14 | End: 2021-05-19 | Stop reason: HOSPADM

## 2021-05-14 RX ORDER — ONDANSETRON 2 MG/ML
4 INJECTION INTRAMUSCULAR; INTRAVENOUS EVERY 6 HOURS PRN
Status: DISCONTINUED | OUTPATIENT
Start: 2021-05-14 | End: 2021-05-19 | Stop reason: HOSPADM

## 2021-05-14 RX ORDER — HYDROCORTISONE 25 MG/G
CREAM TOPICAL 2 TIMES DAILY
Status: DISCONTINUED | OUTPATIENT
Start: 2021-05-14 | End: 2021-05-19 | Stop reason: HOSPADM

## 2021-05-14 RX ORDER — SODIUM CHLORIDE 0.9 % (FLUSH) 0.9 %
10 SYRINGE (ML) INJECTION AS NEEDED
Status: DISCONTINUED | OUTPATIENT
Start: 2021-05-14 | End: 2021-05-19 | Stop reason: HOSPADM

## 2021-05-14 RX ORDER — ACETAMINOPHEN 650 MG/1
650 SUPPOSITORY RECTAL EVERY 4 HOURS PRN
Status: DISCONTINUED | OUTPATIENT
Start: 2021-05-14 | End: 2021-05-19 | Stop reason: HOSPADM

## 2021-05-14 RX ORDER — DEXTROSE MONOHYDRATE 25 G/50ML
25 INJECTION, SOLUTION INTRAVENOUS
Status: DISCONTINUED | OUTPATIENT
Start: 2021-05-14 | End: 2021-05-19 | Stop reason: HOSPADM

## 2021-05-14 RX ORDER — FAMOTIDINE 20 MG/1
20 TABLET, FILM COATED ORAL
Status: DISCONTINUED | OUTPATIENT
Start: 2021-05-14 | End: 2021-05-19 | Stop reason: HOSPADM

## 2021-05-14 RX ORDER — ESTRADIOL 0.05 MG/D
1 FILM, EXTENDED RELEASE TRANSDERMAL WEEKLY
Status: DISCONTINUED | OUTPATIENT
Start: 2021-05-21 | End: 2021-05-19 | Stop reason: HOSPADM

## 2021-05-14 RX ORDER — HYDROCODONE BITARTRATE AND ACETAMINOPHEN 5; 325 MG/1; MG/1
1 TABLET ORAL EVERY 6 HOURS PRN
Status: DISCONTINUED | OUTPATIENT
Start: 2021-05-14 | End: 2021-05-19 | Stop reason: HOSPADM

## 2021-05-14 RX ORDER — SODIUM CHLORIDE 0.9 % (FLUSH) 0.9 %
10 SYRINGE (ML) INJECTION EVERY 12 HOURS SCHEDULED
Status: DISCONTINUED | OUTPATIENT
Start: 2021-05-14 | End: 2021-05-19 | Stop reason: HOSPADM

## 2021-05-14 RX ORDER — DIPHENHYDRAMINE HCL 25 MG
25 CAPSULE ORAL EVERY 8 HOURS PRN
Status: DISCONTINUED | OUTPATIENT
Start: 2021-05-14 | End: 2021-05-19 | Stop reason: HOSPADM

## 2021-05-14 RX ORDER — ACETAMINOPHEN 160 MG/5ML
650 SOLUTION ORAL EVERY 4 HOURS PRN
Status: DISCONTINUED | OUTPATIENT
Start: 2021-05-14 | End: 2021-05-19 | Stop reason: HOSPADM

## 2021-05-14 RX ADMIN — METHYLPREDNISOLONE SODIUM SUCCINATE 20 MG: 40 INJECTION, POWDER, LYOPHILIZED, FOR SOLUTION INTRAMUSCULAR; INTRAVENOUS at 22:10

## 2021-05-14 RX ADMIN — INSULIN LISPRO 5 UNITS: 100 INJECTION, SOLUTION INTRAVENOUS; SUBCUTANEOUS at 17:39

## 2021-05-14 RX ADMIN — FAMOTIDINE 20 MG: 20 TABLET ORAL at 22:11

## 2021-05-14 RX ADMIN — HYDROCORTISONE 2.5%: 25 CREAM TOPICAL at 22:11

## 2021-05-14 RX ADMIN — HYDROMORPHONE HYDROCHLORIDE 0.5 MG: 1 INJECTION, SOLUTION INTRAMUSCULAR; INTRAVENOUS; SUBCUTANEOUS at 22:22

## 2021-05-14 RX ADMIN — SODIUM CHLORIDE, PRESERVATIVE FREE 10 ML: 5 INJECTION INTRAVENOUS at 22:11

## 2021-05-14 RX ADMIN — INSULIN LISPRO 8 UNITS: 100 INJECTION, SOLUTION INTRAVENOUS; SUBCUTANEOUS at 18:44

## 2021-05-14 RX ADMIN — GABAPENTIN 800 MG: 400 CAPSULE ORAL at 22:10

## 2021-05-14 RX ADMIN — SODIUM CHLORIDE 100 ML/HR: 9 INJECTION, SOLUTION INTRAVENOUS at 22:17

## 2021-05-14 RX ADMIN — INSULIN DETEMIR 25 UNITS: 100 INJECTION, SOLUTION SUBCUTANEOUS at 22:15

## 2021-05-14 NOTE — PROGRESS NOTES
Subjective     Chief Complaint:  Abdominal pain    History of Present Illness:   She notes worsening abdominal pain. She cannot eat or drink anything due to pain. Pain is mostly at top of abdomen. Sharp and burning. Pain is constant. Worse with food intake. Worse with water intake. She is having diarrhea. Blood. 6-7 episodes last night. Sometimes diarrhea will be white. + mucous. She has low grade fever. She has UC. She is on Entyvio. Last given on 5/4/21. She was seeing Dr. He. Feels like she may need colectomy. She is not making much headway with medical management anymore. She has pending appt with Dr. Mendoza on June 2021.   She reports external hemorrhoids.   She tells me this currently flare has been ongoing for the 3 weeks. She notes worsening symptoms over the past week. She went to ED at [facility] on 5/11/21. CT showed fatty liver and inflammed bowels consistent with UC.  Was given IV fluids and sent home.      Review of Systems  Gen- No fevers, chills  CV- No chest pain, palpitations  Resp- No cough, dyspnea      I have reviewed and/or updated the patient's past medical, surgical, family, social history and problem list as appropriate.     Medications:    Current Outpatient Medications:   •  azaTHIOprine (IMURAN) 50 MG tablet, TAKE 2 TABLETS BY MOUTH EVERY DAY, Disp: 60 tablet, Rfl: 1  •  buPROPion SR (WELLBUTRIN SR) 100 MG 12 hr tablet, Take 1 tablet by mouth Every Morning., Disp: 30 tablet, Rfl: 1  •  Continuous Glucose Monitor kit, Use as directed, Disp: 1 each, Rfl: 11  •  diphenhydrAMINE (Banophen) 25 mg capsule, Take 1 capsule by mouth Every 8 (Eight) Hours As Needed for Itching or Allergies., Disp: 90 capsule, Rfl: 1  •  estradiol (MINIVELLE, VIVELLE-DOT) 0.05 MG/24HR patch, Place 1 patch on the skin as directed by provider 1 (One) Time Per Week., Disp: 8 patch, Rfl: 2  •  fluconazole (Diflucan) 100 MG tablet, Take 1 tablet by mouth Daily., Disp: 7 tablet, Rfl: 0  •  gabapentin (NEURONTIN) 800 MG  "tablet, Take 1 tablet by mouth 3 (Three) Times a Day., Disp: 90 tablet, Rfl: 0  •  glucose blood test strip, Check glucose TID, One Touch Reveal, Disp: 90 each, Rfl: 11  •  glucose blood test strip, ONE TOUCH VERIO FLEX TEST STRIPS TO CHECK GLUCOSE 3 TIMES DAILY FOR DM E11.9, Disp: 100 each, Rfl: 12  •  HumaLOG Mix 75/25 KwikPen (75-25) 100 UNIT/ML suspension pen-injector pen, Inject 25 Units under the skin into the appropriate area as directed 2 (Two) Times a Day With Meals., Disp: 5 pen, Rfl: 3  •  HYDROcodone-acetaminophen (NORCO) 5-325 MG per tablet, Take 1 tablet by mouth Every 6 (Six) Hours As Needed for Moderate Pain ., Disp: 25 tablet, Rfl: 0  •  Hydrocortisone, Perianal, (Proctozone-HC) 2.5 % rectal cream, Insert  into the rectum 2 (Two) Times a Day., Disp: 28 g, Rfl: 1  •  hydrOXYzine pamoate (VISTARIL) 25 MG capsule, Take 1 capsule by mouth 3 (Three) Times a Day As Needed for Anxiety., Disp: 60 capsule, Rfl: 2  •  insulin aspart (NovoLOG) 100 UNIT/ML injection, TID dosing with meals, as per sliding scale coverage, up to 12 units TID dosing available, Disp: 10 mL, Rfl: 3  •  Insulin Glargine (BASAGLAR KWIKPEN) 100 UNIT/ML injection pen, Inject 40 Units under the skin into the appropriate area as directed Every Night., Disp: 5 pen, Rfl: 3  •  Insulin Syringe 30G X 5/16\" 1 ML misc, 1 syringe 3 (Three) Times a Day., Disp: 100 each, Rfl: 2  •  Lancets misc, USE AS DIRECTED TO CHECK GLUCOSE 3 TIMES DAILY FOR DM E11.9, Disp: 100 each, Rfl: 3  •  predniSONE (DELTASONE) 10 MG tablet, Take 3 tablets by mouth daily X 1 week; then 2 tablets daily X 1 week; then 1 tablet daily X 1 week; then 1/2 tablet daily X 1 week, then STOP, Disp: 46 tablet, Rfl: 0  •  promethazine (PHENERGAN) 25 MG tablet, Take 1 tablet by mouth Every 6 (Six) Hours As Needed for Nausea or Vomiting., Disp: 40 tablet, Rfl: 2  •  SUMAtriptan (Imitrex) 50 MG tablet, Take one tablet at onset of headache. May repeat dose one time in 2 hours if headache " "not relieved., Disp: 8 tablet, Rfl: 2  •  topiramate (TOPAMAX) 50 MG tablet, Take 1 tablet by mouth 2 (Two) Times a Day., Disp: 60 tablet, Rfl: 2    Allergies:  Allergies   Allergen Reactions   • Hydrochlorothiazide Hives   • Penicillins Hives   • Remicade [Infliximab] Anaphylaxis   • Soybean-Containing Drug Products Swelling     \"Soy sauce\"   • Xeljanz [Tofacitinib Citrate] Other (See Comments)     BLISTERS IN THROAT & ON ARMS   • Zofran [Ondansetron Hcl] Headache     migraines   • Reglan [Metoclopramide] Other (See Comments)     States feels weird when takes it       Objective     Vital Signs: There were no vitals filed for this visit.    Physical Exam:  Gen-no acute distress, video visit  Resp- normal WOB, on RA  Neuro-A&Ox3, face symmetrical, speech clear  Skin-no overt rashes noted  Psych-appropriate mood, cooperative         Assessment / Plan     Assessment/Plan:   Problem List Items Addressed This Visit        Gastrointestinal Abdominal     Ulcerative pancolitis without complication (CMS/[facility]) - Primary    Overview     Added automatically from request for surgery [id number]           Other Visit Diagnoses     History of Clostridioides difficile colitis        Intractable abdominal pain            --Due to intractable abdominal pain, inability to eat and drink I recommend inpatient admission for IV fluids, steroids, labs, further imaging, consideration of CRS consult.  I did discuss her case with 3 different hospitals including [facility] (no beds), [facility] and [facility] all whom refused admission due to lack of GI coverage.  Spoke with hospitalist at [facility] who graciously accepted patient for admission.  --Of note it has been mentioned previously that patient will likely need colectomy.  She has tried and failed multiple disease modifiers.  She has not seen gastroenterology since October 2020.  She does have known history of poor medical " compliance.    Follow up:  As needed    Total Time of encounter was 50 minutes.  This included video visit with patient, multiple phone calls to facilities, hospitalist, back-and-forth with patient.    Electronically signed by EMELYN Seymour   05/14/2021 11:14 EDT      Please note that portions of this note may have been completed with a voice recognition program. Efforts were made to edit the dictations, but occasionally words are mistranscribed.

## 2021-05-15 PROBLEM — E43 SEVERE MALNUTRITION: Status: ACTIVE | Noted: 2021-05-15

## 2021-05-15 LAB
ANION GAP SERPL CALCULATED.3IONS-SCNC: 15 MMOL/L (ref 5–15)
BASOPHILS # BLD AUTO: 0.02 10*3/MM3 (ref 0–0.2)
BASOPHILS NFR BLD AUTO: 0.1 % (ref 0–1.5)
BUN SERPL-MCNC: 7 MG/DL (ref 6–20)
BUN/CREAT SERPL: 12.1 (ref 7–25)
CALCIUM SPEC-SCNC: 9.2 MG/DL (ref 8.6–10.5)
CHLORIDE SERPL-SCNC: 99 MMOL/L (ref 98–107)
CO2 SERPL-SCNC: 18 MMOL/L (ref 22–29)
CREAT SERPL-MCNC: 0.58 MG/DL (ref 0.57–1)
DEPRECATED RDW RBC AUTO: 41.5 FL (ref 37–54)
EOSINOPHIL # BLD AUTO: 0 10*3/MM3 (ref 0–0.4)
EOSINOPHIL NFR BLD AUTO: 0 % (ref 0.3–6.2)
ERYTHROCYTE [DISTWIDTH] IN BLOOD BY AUTOMATED COUNT: 12.8 % (ref 12.3–15.4)
GFR SERPL CREATININE-BSD FRML MDRD: 119 ML/MIN/1.73
GFR SERPL CREATININE-BSD FRML MDRD: 144 ML/MIN/1.73
GLUCOSE BLDC GLUCOMTR-MCNC: 266 MG/DL (ref 70–130)
GLUCOSE BLDC GLUCOMTR-MCNC: 272 MG/DL (ref 70–130)
GLUCOSE BLDC GLUCOMTR-MCNC: 350 MG/DL (ref 70–130)
GLUCOSE BLDC GLUCOMTR-MCNC: 399 MG/DL (ref 70–130)
GLUCOSE BLDC GLUCOMTR-MCNC: 414 MG/DL (ref 70–130)
GLUCOSE SERPL-MCNC: 375 MG/DL (ref 65–99)
HBA1C MFR BLD: 12 % (ref 4.8–5.6)
HCT VFR BLD AUTO: 40.7 % (ref 34–46.6)
HGB BLD-MCNC: 12.9 G/DL (ref 12–15.9)
IMM GRANULOCYTES # BLD AUTO: 0.06 10*3/MM3 (ref 0–0.05)
IMM GRANULOCYTES NFR BLD AUTO: 0.4 % (ref 0–0.5)
LIPASE SERPL-CCNC: 11 U/L (ref 13–60)
LYMPHOCYTES # BLD AUTO: 1.78 10*3/MM3 (ref 0.7–3.1)
LYMPHOCYTES NFR BLD AUTO: 12.5 % (ref 19.6–45.3)
MCH RBC QN AUTO: 28.5 PG (ref 26.6–33)
MCHC RBC AUTO-ENTMCNC: 31.7 G/DL (ref 31.5–35.7)
MCV RBC AUTO: 89.8 FL (ref 79–97)
MONOCYTES # BLD AUTO: 0.06 10*3/MM3 (ref 0.1–0.9)
MONOCYTES NFR BLD AUTO: 0.4 % (ref 5–12)
NEUTROPHILS NFR BLD AUTO: 12.35 10*3/MM3 (ref 1.7–7)
NEUTROPHILS NFR BLD AUTO: 86.6 % (ref 42.7–76)
NRBC BLD AUTO-RTO: 0 /100 WBC (ref 0–0.2)
PLATELET # BLD AUTO: 237 10*3/MM3 (ref 140–450)
PMV BLD AUTO: 10.4 FL (ref 6–12)
POTASSIUM SERPL-SCNC: 4 MMOL/L (ref 3.5–5.2)
RBC # BLD AUTO: 4.53 10*6/MM3 (ref 3.77–5.28)
SODIUM SERPL-SCNC: 132 MMOL/L (ref 136–145)
TSH SERPL DL<=0.05 MIU/L-ACNC: 0.3 UIU/ML (ref 0.27–4.2)
WBC # BLD AUTO: 14.27 10*3/MM3 (ref 3.4–10.8)

## 2021-05-15 PROCEDURE — 25010000002 METHYLPREDNISOLONE PER 40 MG: Performed by: INTERNAL MEDICINE

## 2021-05-15 PROCEDURE — 63710000001 INSULIN LISPRO (HUMAN) PER 5 UNITS: Performed by: NURSE PRACTITIONER

## 2021-05-15 PROCEDURE — 63710000001 PROMETHAZINE PER 12.5 MG: Performed by: NURSE PRACTITIONER

## 2021-05-15 PROCEDURE — 25010000002 ONDANSETRON PER 1 MG: Performed by: INTERNAL MEDICINE

## 2021-05-15 PROCEDURE — 99233 SBSQ HOSP IP/OBS HIGH 50: CPT | Performed by: INTERNAL MEDICINE

## 2021-05-15 PROCEDURE — 25010000002 HYDROMORPHONE PER 4 MG: Performed by: INTERNAL MEDICINE

## 2021-05-15 PROCEDURE — 83036 HEMOGLOBIN GLYCOSYLATED A1C: CPT | Performed by: NURSE PRACTITIONER

## 2021-05-15 PROCEDURE — 63710000001 INSULIN LISPRO (HUMAN) PER 5 UNITS: Performed by: INTERNAL MEDICINE

## 2021-05-15 PROCEDURE — 63710000001 INSULIN DETEMIR PER 5 UNITS: Performed by: NURSE PRACTITIONER

## 2021-05-15 PROCEDURE — 85025 COMPLETE CBC W/AUTO DIFF WBC: CPT | Performed by: NURSE PRACTITIONER

## 2021-05-15 PROCEDURE — 84443 ASSAY THYROID STIM HORMONE: CPT | Performed by: NURSE PRACTITIONER

## 2021-05-15 PROCEDURE — G0378 HOSPITAL OBSERVATION PER HR: HCPCS

## 2021-05-15 PROCEDURE — 80048 BASIC METABOLIC PNL TOTAL CA: CPT | Performed by: NURSE PRACTITIONER

## 2021-05-15 PROCEDURE — 82962 GLUCOSE BLOOD TEST: CPT

## 2021-05-15 PROCEDURE — 83690 ASSAY OF LIPASE: CPT | Performed by: INTERNAL MEDICINE

## 2021-05-15 RX ORDER — SUCRALFATE 1 G/1
1 TABLET ORAL
Status: DISCONTINUED | OUTPATIENT
Start: 2021-05-15 | End: 2021-05-19 | Stop reason: HOSPADM

## 2021-05-15 RX ORDER — PROMETHAZINE HYDROCHLORIDE 12.5 MG/1
12.5 TABLET ORAL EVERY 6 HOURS PRN
Status: DISCONTINUED | OUTPATIENT
Start: 2021-05-15 | End: 2021-05-19 | Stop reason: HOSPADM

## 2021-05-15 RX ORDER — DICYCLOMINE HYDROCHLORIDE 10 MG/1
10 CAPSULE ORAL 4 TIMES DAILY PRN
Status: DISCONTINUED | OUTPATIENT
Start: 2021-05-15 | End: 2021-05-19 | Stop reason: HOSPADM

## 2021-05-15 RX ADMIN — INSULIN LISPRO 10 UNITS: 100 INJECTION, SOLUTION INTRAVENOUS; SUBCUTANEOUS at 16:44

## 2021-05-15 RX ADMIN — INSULIN LISPRO 8 UNITS: 100 INJECTION, SOLUTION INTRAVENOUS; SUBCUTANEOUS at 08:03

## 2021-05-15 RX ADMIN — FAMOTIDINE 20 MG: 20 TABLET ORAL at 08:02

## 2021-05-15 RX ADMIN — FAMOTIDINE 20 MG: 20 TABLET ORAL at 16:49

## 2021-05-15 RX ADMIN — INSULIN LISPRO 6 UNITS: 100 INJECTION, SOLUTION INTRAVENOUS; SUBCUTANEOUS at 21:29

## 2021-05-15 RX ADMIN — GABAPENTIN 800 MG: 400 CAPSULE ORAL at 21:28

## 2021-05-15 RX ADMIN — HYDROMORPHONE HYDROCHLORIDE 0.5 MG: 1 INJECTION, SOLUTION INTRAMUSCULAR; INTRAVENOUS; SUBCUTANEOUS at 10:36

## 2021-05-15 RX ADMIN — METHYLPREDNISOLONE SODIUM SUCCINATE 20 MG: 40 INJECTION, POWDER, LYOPHILIZED, FOR SOLUTION INTRAMUSCULAR; INTRAVENOUS at 10:36

## 2021-05-15 RX ADMIN — PROMETHAZINE HYDROCHLORIDE 12.5 MG: 12.5 TABLET ORAL at 00:21

## 2021-05-15 RX ADMIN — HYDROMORPHONE HYDROCHLORIDE 0.5 MG: 1 INJECTION, SOLUTION INTRAMUSCULAR; INTRAVENOUS; SUBCUTANEOUS at 22:17

## 2021-05-15 RX ADMIN — METHYLPREDNISOLONE SODIUM SUCCINATE 20 MG: 40 INJECTION, POWDER, LYOPHILIZED, FOR SOLUTION INTRAMUSCULAR; INTRAVENOUS at 18:34

## 2021-05-15 RX ADMIN — INSULIN LISPRO 6 UNITS: 100 INJECTION, SOLUTION INTRAVENOUS; SUBCUTANEOUS at 16:44

## 2021-05-15 RX ADMIN — METHYLPREDNISOLONE SODIUM SUCCINATE 20 MG: 40 INJECTION, POWDER, LYOPHILIZED, FOR SOLUTION INTRAMUSCULAR; INTRAVENOUS at 03:52

## 2021-05-15 RX ADMIN — SUCRALFATE 1 G: 1 TABLET ORAL at 21:28

## 2021-05-15 RX ADMIN — SODIUM CHLORIDE 100 ML/HR: 9 INJECTION, SOLUTION INTRAVENOUS at 08:04

## 2021-05-15 RX ADMIN — INSULIN LISPRO 8 UNITS: 100 INJECTION, SOLUTION INTRAVENOUS; SUBCUTANEOUS at 11:52

## 2021-05-15 RX ADMIN — INSULIN DETEMIR 25 UNITS: 100 INJECTION, SOLUTION SUBCUTANEOUS at 21:30

## 2021-05-15 RX ADMIN — HYDROCODONE BITARTRATE AND ACETAMINOPHEN 1 TABLET: 5; 325 TABLET ORAL at 08:02

## 2021-05-15 RX ADMIN — HYDROCORTISONE 2.5%: 25 CREAM TOPICAL at 21:29

## 2021-05-15 RX ADMIN — HYDROCORTISONE 2.5% 1 APPLICATION: 25 CREAM TOPICAL at 08:03

## 2021-05-15 RX ADMIN — HYDROCODONE BITARTRATE AND ACETAMINOPHEN 1 TABLET: 5; 325 TABLET ORAL at 21:28

## 2021-05-15 RX ADMIN — HYDROMORPHONE HYDROCHLORIDE 0.5 MG: 1 INJECTION, SOLUTION INTRAMUSCULAR; INTRAVENOUS; SUBCUTANEOUS at 03:56

## 2021-05-15 RX ADMIN — SUCRALFATE 1 G: 1 TABLET ORAL at 16:42

## 2021-05-15 RX ADMIN — HYDROMORPHONE HYDROCHLORIDE 0.5 MG: 1 INJECTION, SOLUTION INTRAMUSCULAR; INTRAVENOUS; SUBCUTANEOUS at 16:42

## 2021-05-15 RX ADMIN — GABAPENTIN 800 MG: 400 CAPSULE ORAL at 08:02

## 2021-05-16 LAB
ANION GAP SERPL CALCULATED.3IONS-SCNC: 14 MMOL/L (ref 5–15)
BUN SERPL-MCNC: 4 MG/DL (ref 6–20)
BUN/CREAT SERPL: 7.1 (ref 7–25)
CALCIUM SPEC-SCNC: 9.3 MG/DL (ref 8.6–10.5)
CHLORIDE SERPL-SCNC: 102 MMOL/L (ref 98–107)
CO2 SERPL-SCNC: 21 MMOL/L (ref 22–29)
CREAT SERPL-MCNC: 0.56 MG/DL (ref 0.57–1)
DEPRECATED RDW RBC AUTO: 40.1 FL (ref 37–54)
ERYTHROCYTE [DISTWIDTH] IN BLOOD BY AUTOMATED COUNT: 12.9 % (ref 12.3–15.4)
GFR SERPL CREATININE-BSD FRML MDRD: 124 ML/MIN/1.73
GFR SERPL CREATININE-BSD FRML MDRD: 150 ML/MIN/1.73
GLUCOSE BLDC GLUCOMTR-MCNC: 157 MG/DL (ref 70–130)
GLUCOSE BLDC GLUCOMTR-MCNC: 246 MG/DL (ref 70–130)
GLUCOSE BLDC GLUCOMTR-MCNC: 252 MG/DL (ref 70–130)
GLUCOSE BLDC GLUCOMTR-MCNC: 301 MG/DL (ref 70–130)
GLUCOSE SERPL-MCNC: 254 MG/DL (ref 65–99)
HCT VFR BLD AUTO: 39.6 % (ref 34–46.6)
HGB BLD-MCNC: 12.8 G/DL (ref 12–15.9)
MCH RBC QN AUTO: 27.9 PG (ref 26.6–33)
MCHC RBC AUTO-ENTMCNC: 32.3 G/DL (ref 31.5–35.7)
MCV RBC AUTO: 86.5 FL (ref 79–97)
PLATELET # BLD AUTO: 351 10*3/MM3 (ref 140–450)
PMV BLD AUTO: 10 FL (ref 6–12)
POTASSIUM SERPL-SCNC: 3.6 MMOL/L (ref 3.5–5.2)
RBC # BLD AUTO: 4.58 10*6/MM3 (ref 3.77–5.28)
SODIUM SERPL-SCNC: 137 MMOL/L (ref 136–145)
WBC # BLD AUTO: 17.32 10*3/MM3 (ref 3.4–10.8)

## 2021-05-16 PROCEDURE — 99218 PR INITIAL OBSERVATION CARE/DAY 30 MINUTES: CPT | Performed by: NURSE PRACTITIONER

## 2021-05-16 PROCEDURE — 80048 BASIC METABOLIC PNL TOTAL CA: CPT | Performed by: INTERNAL MEDICINE

## 2021-05-16 PROCEDURE — 25010000002 HYDROMORPHONE PER 4 MG: Performed by: INTERNAL MEDICINE

## 2021-05-16 PROCEDURE — 99232 SBSQ HOSP IP/OBS MODERATE 35: CPT | Performed by: INTERNAL MEDICINE

## 2021-05-16 PROCEDURE — 63710000001 INSULIN LISPRO (HUMAN) PER 5 UNITS: Performed by: NURSE PRACTITIONER

## 2021-05-16 PROCEDURE — G0378 HOSPITAL OBSERVATION PER HR: HCPCS

## 2021-05-16 PROCEDURE — 80280 DRUG ASSAY VEDOLIZUMAB: CPT | Performed by: NURSE PRACTITIONER

## 2021-05-16 PROCEDURE — 63710000001 INSULIN DETEMIR PER 5 UNITS: Performed by: INTERNAL MEDICINE

## 2021-05-16 PROCEDURE — 25010000002 METHYLPREDNISOLONE PER 40 MG: Performed by: INTERNAL MEDICINE

## 2021-05-16 PROCEDURE — 82962 GLUCOSE BLOOD TEST: CPT

## 2021-05-16 PROCEDURE — 63710000001 INSULIN LISPRO (HUMAN) PER 5 UNITS: Performed by: INTERNAL MEDICINE

## 2021-05-16 PROCEDURE — 82397 CHEMILUMINESCENT ASSAY: CPT | Performed by: NURSE PRACTITIONER

## 2021-05-16 PROCEDURE — 85027 COMPLETE CBC AUTOMATED: CPT | Performed by: INTERNAL MEDICINE

## 2021-05-16 PROCEDURE — 80299 QUANTITATIVE ASSAY DRUG: CPT | Performed by: NURSE PRACTITIONER

## 2021-05-16 RX ADMIN — HYDROMORPHONE HYDROCHLORIDE 0.5 MG: 1 INJECTION, SOLUTION INTRAMUSCULAR; INTRAVENOUS; SUBCUTANEOUS at 01:39

## 2021-05-16 RX ADMIN — SODIUM CHLORIDE, PRESERVATIVE FREE 10 ML: 5 INJECTION INTRAVENOUS at 20:16

## 2021-05-16 RX ADMIN — SODIUM CHLORIDE 100 ML/HR: 9 INJECTION, SOLUTION INTRAVENOUS at 08:46

## 2021-05-16 RX ADMIN — METHYLPREDNISOLONE SODIUM SUCCINATE 20 MG: 40 INJECTION, POWDER, LYOPHILIZED, FOR SOLUTION INTRAMUSCULAR; INTRAVENOUS at 01:34

## 2021-05-16 RX ADMIN — INSULIN LISPRO 7 UNITS: 100 INJECTION, SOLUTION INTRAVENOUS; SUBCUTANEOUS at 07:41

## 2021-05-16 RX ADMIN — DICYCLOMINE HYDROCHLORIDE 10 MG: 10 CAPSULE ORAL at 07:42

## 2021-05-16 RX ADMIN — INSULIN LISPRO 12 UNITS: 100 INJECTION, SOLUTION INTRAVENOUS; SUBCUTANEOUS at 11:33

## 2021-05-16 RX ADMIN — METHYLPREDNISOLONE SODIUM SUCCINATE 20 MG: 40 INJECTION, POWDER, LYOPHILIZED, FOR SOLUTION INTRAMUSCULAR; INTRAVENOUS at 08:58

## 2021-05-16 RX ADMIN — HYDROCORTISONE 2.5%: 25 CREAM TOPICAL at 08:46

## 2021-05-16 RX ADMIN — INSULIN LISPRO 4 UNITS: 100 INJECTION, SOLUTION INTRAVENOUS; SUBCUTANEOUS at 20:16

## 2021-05-16 RX ADMIN — HYDROMORPHONE HYDROCHLORIDE 0.5 MG: 1 INJECTION, SOLUTION INTRAMUSCULAR; INTRAVENOUS; SUBCUTANEOUS at 11:33

## 2021-05-16 RX ADMIN — GABAPENTIN 800 MG: 400 CAPSULE ORAL at 20:16

## 2021-05-16 RX ADMIN — INSULIN LISPRO 2 UNITS: 100 INJECTION, SOLUTION INTRAVENOUS; SUBCUTANEOUS at 17:06

## 2021-05-16 RX ADMIN — FAMOTIDINE 20 MG: 20 TABLET ORAL at 07:42

## 2021-05-16 RX ADMIN — HYDROMORPHONE HYDROCHLORIDE 0.5 MG: 1 INJECTION, SOLUTION INTRAMUSCULAR; INTRAVENOUS; SUBCUTANEOUS at 20:16

## 2021-05-16 RX ADMIN — FAMOTIDINE 20 MG: 20 TABLET ORAL at 17:06

## 2021-05-16 RX ADMIN — HYDROMORPHONE HYDROCHLORIDE 0.5 MG: 1 INJECTION, SOLUTION INTRAMUSCULAR; INTRAVENOUS; SUBCUTANEOUS at 04:35

## 2021-05-16 RX ADMIN — SUCRALFATE 1 G: 1 TABLET ORAL at 07:42

## 2021-05-16 RX ADMIN — INSULIN LISPRO 12 UNITS: 100 INJECTION, SOLUTION INTRAVENOUS; SUBCUTANEOUS at 17:06

## 2021-05-16 RX ADMIN — SODIUM CHLORIDE, PRESERVATIVE FREE 10 ML: 5 INJECTION INTRAVENOUS at 08:46

## 2021-05-16 RX ADMIN — HYDROCORTISONE 2.5%: 25 CREAM TOPICAL at 20:16

## 2021-05-16 RX ADMIN — METHYLPREDNISOLONE SODIUM SUCCINATE 20 MG: 40 INJECTION, POWDER, LYOPHILIZED, FOR SOLUTION INTRAMUSCULAR; INTRAVENOUS at 17:07

## 2021-05-16 RX ADMIN — INSULIN DETEMIR 30 UNITS: 100 INJECTION, SOLUTION SUBCUTANEOUS at 20:17

## 2021-05-16 RX ADMIN — INSULIN LISPRO 10 UNITS: 100 INJECTION, SOLUTION INTRAVENOUS; SUBCUTANEOUS at 07:41

## 2021-05-16 RX ADMIN — HYDROMORPHONE HYDROCHLORIDE 0.5 MG: 1 INJECTION, SOLUTION INTRAMUSCULAR; INTRAVENOUS; SUBCUTANEOUS at 17:07

## 2021-05-16 RX ADMIN — HYDROMORPHONE HYDROCHLORIDE 0.5 MG: 1 INJECTION, SOLUTION INTRAMUSCULAR; INTRAVENOUS; SUBCUTANEOUS at 14:49

## 2021-05-16 RX ADMIN — HYDROMORPHONE HYDROCHLORIDE 0.5 MG: 1 INJECTION, SOLUTION INTRAMUSCULAR; INTRAVENOUS; SUBCUTANEOUS at 07:42

## 2021-05-16 RX ADMIN — INSULIN LISPRO 6 UNITS: 100 INJECTION, SOLUTION INTRAVENOUS; SUBCUTANEOUS at 11:33

## 2021-05-16 RX ADMIN — SUCRALFATE 1 G: 1 TABLET ORAL at 20:16

## 2021-05-16 RX ADMIN — SUCRALFATE 1 G: 1 TABLET ORAL at 11:34

## 2021-05-16 RX ADMIN — DICYCLOMINE HYDROCHLORIDE 10 MG: 10 CAPSULE ORAL at 14:49

## 2021-05-16 RX ADMIN — HYDROMORPHONE HYDROCHLORIDE 0.5 MG: 1 INJECTION, SOLUTION INTRAMUSCULAR; INTRAVENOUS; SUBCUTANEOUS at 22:50

## 2021-05-16 RX ADMIN — GABAPENTIN 800 MG: 400 CAPSULE ORAL at 08:46

## 2021-05-16 RX ADMIN — SUCRALFATE 1 G: 1 TABLET ORAL at 17:06

## 2021-05-17 ENCOUNTER — ANESTHESIA (OUTPATIENT)
Dept: GASTROENTEROLOGY | Facility: HOSPITAL | Age: 36
End: 2021-05-17

## 2021-05-17 ENCOUNTER — ANESTHESIA EVENT (OUTPATIENT)
Dept: GASTROENTEROLOGY | Facility: HOSPITAL | Age: 36
End: 2021-05-17

## 2021-05-17 LAB
DEPRECATED RDW RBC AUTO: 42.5 FL (ref 37–54)
ERYTHROCYTE [DISTWIDTH] IN BLOOD BY AUTOMATED COUNT: 13 % (ref 12.3–15.4)
GLUCOSE BLDC GLUCOMTR-MCNC: 146 MG/DL (ref 70–130)
GLUCOSE BLDC GLUCOMTR-MCNC: 189 MG/DL (ref 70–130)
GLUCOSE BLDC GLUCOMTR-MCNC: 193 MG/DL (ref 70–130)
GLUCOSE BLDC GLUCOMTR-MCNC: 224 MG/DL (ref 70–130)
GLUCOSE BLDC GLUCOMTR-MCNC: 228 MG/DL (ref 70–130)
HCT VFR BLD AUTO: 37.6 % (ref 34–46.6)
HGB BLD-MCNC: 11.8 G/DL (ref 12–15.9)
MCH RBC QN AUTO: 28 PG (ref 26.6–33)
MCHC RBC AUTO-ENTMCNC: 31.4 G/DL (ref 31.5–35.7)
MCV RBC AUTO: 89.3 FL (ref 79–97)
PLATELET # BLD AUTO: 328 10*3/MM3 (ref 140–450)
PMV BLD AUTO: 9.7 FL (ref 6–12)
RBC # BLD AUTO: 4.21 10*6/MM3 (ref 3.77–5.28)
WBC # BLD AUTO: 13.14 10*3/MM3 (ref 3.4–10.8)

## 2021-05-17 PROCEDURE — 25010000002 HYDROMORPHONE PER 4 MG: Performed by: INTERNAL MEDICINE

## 2021-05-17 PROCEDURE — 63710000001 INSULIN LISPRO (HUMAN) PER 5 UNITS: Performed by: NURSE PRACTITIONER

## 2021-05-17 PROCEDURE — 85027 COMPLETE CBC AUTOMATED: CPT | Performed by: INTERNAL MEDICINE

## 2021-05-17 PROCEDURE — 25010000002 METHYLPREDNISOLONE PER 40 MG: Performed by: INTERNAL MEDICINE

## 2021-05-17 PROCEDURE — 25010000002 PROPOFOL 10 MG/ML EMULSION: Performed by: NURSE ANESTHETIST, CERTIFIED REGISTERED

## 2021-05-17 PROCEDURE — 88342 IMHCHEM/IMCYTCHM 1ST ANTB: CPT | Performed by: INTERNAL MEDICINE

## 2021-05-17 PROCEDURE — 63710000001 INSULIN LISPRO (HUMAN) PER 5 UNITS: Performed by: INTERNAL MEDICINE

## 2021-05-17 PROCEDURE — 82962 GLUCOSE BLOOD TEST: CPT

## 2021-05-17 PROCEDURE — 99232 SBSQ HOSP IP/OBS MODERATE 35: CPT | Performed by: INTERNAL MEDICINE

## 2021-05-17 PROCEDURE — 63710000001 INSULIN DETEMIR PER 5 UNITS: Performed by: INTERNAL MEDICINE

## 2021-05-17 PROCEDURE — 43239 EGD BIOPSY SINGLE/MULTIPLE: CPT | Performed by: INTERNAL MEDICINE

## 2021-05-17 PROCEDURE — 88305 TISSUE EXAM BY PATHOLOGIST: CPT | Performed by: INTERNAL MEDICINE

## 2021-05-17 RX ORDER — HYDROMORPHONE HYDROCHLORIDE 1 MG/ML
0.25 INJECTION, SOLUTION INTRAMUSCULAR; INTRAVENOUS; SUBCUTANEOUS EVERY 8 HOURS PRN
Status: DISCONTINUED | OUTPATIENT
Start: 2021-05-17 | End: 2021-05-19 | Stop reason: ALTCHOICE

## 2021-05-17 RX ORDER — PROPOFOL 10 MG/ML
VIAL (ML) INTRAVENOUS AS NEEDED
Status: DISCONTINUED | OUTPATIENT
Start: 2021-05-17 | End: 2021-05-17 | Stop reason: SURG

## 2021-05-17 RX ORDER — SODIUM CHLORIDE, SODIUM LACTATE, POTASSIUM CHLORIDE, CALCIUM CHLORIDE 600; 310; 30; 20 MG/100ML; MG/100ML; MG/100ML; MG/100ML
30 INJECTION, SOLUTION INTRAVENOUS CONTINUOUS PRN
Status: DISCONTINUED | OUTPATIENT
Start: 2021-05-17 | End: 2021-05-19 | Stop reason: HOSPADM

## 2021-05-17 RX ORDER — LIDOCAINE HYDROCHLORIDE 10 MG/ML
INJECTION, SOLUTION EPIDURAL; INFILTRATION; INTRACAUDAL; PERINEURAL AS NEEDED
Status: DISCONTINUED | OUTPATIENT
Start: 2021-05-17 | End: 2021-05-17 | Stop reason: SURG

## 2021-05-17 RX ORDER — HYDROMORPHONE HYDROCHLORIDE 1 MG/ML
0.5 INJECTION, SOLUTION INTRAMUSCULAR; INTRAVENOUS; SUBCUTANEOUS
Status: DISCONTINUED | OUTPATIENT
Start: 2021-05-17 | End: 2021-05-17 | Stop reason: HOSPADM

## 2021-05-17 RX ORDER — PREDNISONE 20 MG/1
40 TABLET ORAL
Status: DISCONTINUED | OUTPATIENT
Start: 2021-05-18 | End: 2021-05-18

## 2021-05-17 RX ORDER — FENTANYL CITRATE 50 UG/ML
50 INJECTION, SOLUTION INTRAMUSCULAR; INTRAVENOUS
Status: DISCONTINUED | OUTPATIENT
Start: 2021-05-17 | End: 2021-05-17 | Stop reason: HOSPADM

## 2021-05-17 RX ADMIN — INSULIN LISPRO 4 UNITS: 100 INJECTION, SOLUTION INTRAVENOUS; SUBCUTANEOUS at 20:53

## 2021-05-17 RX ADMIN — HYDROMORPHONE HYDROCHLORIDE 0.5 MG: 1 INJECTION, SOLUTION INTRAMUSCULAR; INTRAVENOUS; SUBCUTANEOUS at 03:13

## 2021-05-17 RX ADMIN — HYDROCORTISONE 2.5%: 25 CREAM TOPICAL at 09:11

## 2021-05-17 RX ADMIN — SUCRALFATE 1 G: 1 TABLET ORAL at 09:13

## 2021-05-17 RX ADMIN — HYDROMORPHONE HYDROCHLORIDE 0.5 MG: 1 INJECTION, SOLUTION INTRAMUSCULAR; INTRAVENOUS; SUBCUTANEOUS at 06:10

## 2021-05-17 RX ADMIN — PROPOFOL 200 MG: 10 INJECTION, EMULSION INTRAVENOUS at 12:39

## 2021-05-17 RX ADMIN — INSULIN LISPRO 4 UNITS: 100 INJECTION, SOLUTION INTRAVENOUS; SUBCUTANEOUS at 09:11

## 2021-05-17 RX ADMIN — LIDOCAINE HYDROCHLORIDE 100 MG: 10 INJECTION, SOLUTION EPIDURAL; INFILTRATION; INTRACAUDAL; PERINEURAL at 12:39

## 2021-05-17 RX ADMIN — FAMOTIDINE 20 MG: 20 TABLET ORAL at 17:20

## 2021-05-17 RX ADMIN — GABAPENTIN 800 MG: 400 CAPSULE ORAL at 09:10

## 2021-05-17 RX ADMIN — METHYLPREDNISOLONE SODIUM SUCCINATE 20 MG: 40 INJECTION, POWDER, LYOPHILIZED, FOR SOLUTION INTRAMUSCULAR; INTRAVENOUS at 17:20

## 2021-05-17 RX ADMIN — INSULIN LISPRO 12 UNITS: 100 INJECTION, SOLUTION INTRAVENOUS; SUBCUTANEOUS at 13:31

## 2021-05-17 RX ADMIN — SUCRALFATE 1 G: 1 TABLET ORAL at 17:19

## 2021-05-17 RX ADMIN — HYDROMORPHONE HYDROCHLORIDE 0.5 MG: 1 INJECTION, SOLUTION INTRAMUSCULAR; INTRAVENOUS; SUBCUTANEOUS at 13:32

## 2021-05-17 RX ADMIN — HYDROMORPHONE HYDROCHLORIDE 0.5 MG: 1 INJECTION, SOLUTION INTRAMUSCULAR; INTRAVENOUS; SUBCUTANEOUS at 09:13

## 2021-05-17 RX ADMIN — INSULIN LISPRO 12 UNITS: 100 INJECTION, SOLUTION INTRAVENOUS; SUBCUTANEOUS at 17:20

## 2021-05-17 RX ADMIN — INSULIN LISPRO 12 UNITS: 100 INJECTION, SOLUTION INTRAVENOUS; SUBCUTANEOUS at 09:10

## 2021-05-17 RX ADMIN — SODIUM CHLORIDE, POTASSIUM CHLORIDE, SODIUM LACTATE AND CALCIUM CHLORIDE 30 ML/HR: 600; 310; 30; 20 INJECTION, SOLUTION INTRAVENOUS at 11:33

## 2021-05-17 RX ADMIN — HYDROCORTISONE 2.5%: 25 CREAM TOPICAL at 20:54

## 2021-05-17 RX ADMIN — INSULIN DETEMIR 35 UNITS: 100 INJECTION, SOLUTION SUBCUTANEOUS at 20:53

## 2021-05-17 RX ADMIN — METHYLPREDNISOLONE SODIUM SUCCINATE 20 MG: 40 INJECTION, POWDER, LYOPHILIZED, FOR SOLUTION INTRAMUSCULAR; INTRAVENOUS at 10:16

## 2021-05-17 RX ADMIN — INSULIN LISPRO 2 UNITS: 100 INJECTION, SOLUTION INTRAVENOUS; SUBCUTANEOUS at 17:20

## 2021-05-17 RX ADMIN — HYDROCODONE BITARTRATE AND ACETAMINOPHEN 1 TABLET: 5; 325 TABLET ORAL at 17:23

## 2021-05-17 RX ADMIN — GABAPENTIN 800 MG: 400 CAPSULE ORAL at 20:53

## 2021-05-17 RX ADMIN — HYDROMORPHONE HYDROCHLORIDE 0.25 MG: 1 INJECTION, SOLUTION INTRAMUSCULAR; INTRAVENOUS; SUBCUTANEOUS at 21:51

## 2021-05-17 RX ADMIN — SUCRALFATE 1 G: 1 TABLET ORAL at 20:53

## 2021-05-17 RX ADMIN — METHYLPREDNISOLONE SODIUM SUCCINATE 20 MG: 40 INJECTION, POWDER, LYOPHILIZED, FOR SOLUTION INTRAMUSCULAR; INTRAVENOUS at 03:12

## 2021-05-17 RX ADMIN — SODIUM CHLORIDE, PRESERVATIVE FREE 10 ML: 5 INJECTION INTRAVENOUS at 20:54

## 2021-05-17 RX ADMIN — FAMOTIDINE 20 MG: 20 TABLET ORAL at 09:10

## 2021-05-17 RX ADMIN — SODIUM CHLORIDE, PRESERVATIVE FREE 10 ML: 5 INJECTION INTRAVENOUS at 09:11

## 2021-05-18 ENCOUNTER — TELEPHONE (OUTPATIENT)
Dept: FAMILY MEDICINE CLINIC | Facility: CLINIC | Age: 36
End: 2021-05-18

## 2021-05-18 LAB
GLUCOSE BLDC GLUCOMTR-MCNC: 202 MG/DL (ref 70–130)
GLUCOSE BLDC GLUCOMTR-MCNC: 256 MG/DL (ref 70–130)
GLUCOSE BLDC GLUCOMTR-MCNC: 269 MG/DL (ref 70–130)
GLUCOSE BLDC GLUCOMTR-MCNC: 99 MG/DL (ref 70–130)

## 2021-05-18 PROCEDURE — 63710000001 INSULIN LISPRO (HUMAN) PER 5 UNITS: Performed by: INTERNAL MEDICINE

## 2021-05-18 PROCEDURE — 82962 GLUCOSE BLOOD TEST: CPT

## 2021-05-18 PROCEDURE — 99232 SBSQ HOSP IP/OBS MODERATE 35: CPT | Performed by: PHYSICIAN ASSISTANT

## 2021-05-18 PROCEDURE — 25010000002 HYDROMORPHONE PER 4 MG: Performed by: INTERNAL MEDICINE

## 2021-05-18 PROCEDURE — G0108 DIAB MANAGE TRN  PER INDIV: HCPCS

## 2021-05-18 PROCEDURE — 63710000001 DIPHENHYDRAMINE PER 50 MG: Performed by: INTERNAL MEDICINE

## 2021-05-18 PROCEDURE — 63710000001 PROMETHAZINE PER 12.5 MG: Performed by: INTERNAL MEDICINE

## 2021-05-18 PROCEDURE — 63710000001 INSULIN DETEMIR PER 5 UNITS: Performed by: INTERNAL MEDICINE

## 2021-05-18 PROCEDURE — 99232 SBSQ HOSP IP/OBS MODERATE 35: CPT | Performed by: INTERNAL MEDICINE

## 2021-05-18 PROCEDURE — 63710000001 PREDNISONE PER 1 MG: Performed by: INTERNAL MEDICINE

## 2021-05-18 RX ADMIN — SUCRALFATE 1 G: 1 TABLET ORAL at 17:13

## 2021-05-18 RX ADMIN — PROMETHAZINE HYDROCHLORIDE 12.5 MG: 12.5 TABLET ORAL at 20:54

## 2021-05-18 RX ADMIN — GABAPENTIN 800 MG: 400 CAPSULE ORAL at 20:44

## 2021-05-18 RX ADMIN — INSULIN LISPRO 12 UNITS: 100 INJECTION, SOLUTION INTRAVENOUS; SUBCUTANEOUS at 12:26

## 2021-05-18 RX ADMIN — FAMOTIDINE 20 MG: 20 TABLET ORAL at 08:31

## 2021-05-18 RX ADMIN — HYDROMORPHONE HYDROCHLORIDE 0.25 MG: 1 INJECTION, SOLUTION INTRAMUSCULAR; INTRAVENOUS; SUBCUTANEOUS at 08:30

## 2021-05-18 RX ADMIN — HYDROCORTISONE 2.5%: 25 CREAM TOPICAL at 11:06

## 2021-05-18 RX ADMIN — INSULIN LISPRO 6 UNITS: 100 INJECTION, SOLUTION INTRAVENOUS; SUBCUTANEOUS at 17:13

## 2021-05-18 RX ADMIN — SODIUM CHLORIDE, PRESERVATIVE FREE 10 ML: 5 INJECTION INTRAVENOUS at 20:44

## 2021-05-18 RX ADMIN — SODIUM CHLORIDE 100 ML/HR: 9 INJECTION, SOLUTION INTRAVENOUS at 16:14

## 2021-05-18 RX ADMIN — INSULIN LISPRO 8 UNITS: 100 INJECTION, SOLUTION INTRAVENOUS; SUBCUTANEOUS at 17:13

## 2021-05-18 RX ADMIN — SUCRALFATE 1 G: 1 TABLET ORAL at 20:44

## 2021-05-18 RX ADMIN — SUCRALFATE 1 G: 1 TABLET ORAL at 08:31

## 2021-05-18 RX ADMIN — INSULIN DETEMIR 30 UNITS: 100 INJECTION, SOLUTION SUBCUTANEOUS at 20:43

## 2021-05-18 RX ADMIN — INSULIN LISPRO 4 UNITS: 100 INJECTION, SOLUTION INTRAVENOUS; SUBCUTANEOUS at 12:25

## 2021-05-18 RX ADMIN — HYDROCODONE BITARTRATE AND ACETAMINOPHEN 1 TABLET: 5; 325 TABLET ORAL at 17:19

## 2021-05-18 RX ADMIN — DIPHENHYDRAMINE HYDROCHLORIDE 25 MG: 25 CAPSULE ORAL at 20:54

## 2021-05-18 RX ADMIN — FAMOTIDINE 20 MG: 20 TABLET ORAL at 19:09

## 2021-05-18 RX ADMIN — INSULIN LISPRO 6 UNITS: 100 INJECTION, SOLUTION INTRAVENOUS; SUBCUTANEOUS at 20:43

## 2021-05-18 RX ADMIN — PREDNISONE 40 MG: 20 TABLET ORAL at 08:31

## 2021-05-18 RX ADMIN — SUCRALFATE 1 G: 1 TABLET ORAL at 12:26

## 2021-05-18 RX ADMIN — GABAPENTIN 800 MG: 400 CAPSULE ORAL at 08:30

## 2021-05-19 ENCOUNTER — READMISSION MANAGEMENT (OUTPATIENT)
Dept: CALL CENTER | Facility: HOSPITAL | Age: 36
End: 2021-05-19

## 2021-05-19 VITALS
DIASTOLIC BLOOD PRESSURE: 92 MMHG | HEIGHT: 64 IN | HEART RATE: 73 BPM | OXYGEN SATURATION: 98 % | SYSTOLIC BLOOD PRESSURE: 131 MMHG | TEMPERATURE: 98.1 F | BODY MASS INDEX: 33.97 KG/M2 | RESPIRATION RATE: 18 BRPM | WEIGHT: 199 LBS

## 2021-05-19 LAB
CYTO UR: NORMAL
GLUCOSE BLDC GLUCOMTR-MCNC: 152 MG/DL (ref 70–130)
GLUCOSE BLDC GLUCOMTR-MCNC: 74 MG/DL (ref 70–130)
LAB AP CASE REPORT: NORMAL
LAB AP CLINICAL INFORMATION: NORMAL
PATH REPORT.FINAL DX SPEC: NORMAL
PATH REPORT.GROSS SPEC: NORMAL

## 2021-05-19 PROCEDURE — 63710000001 INSULIN LISPRO (HUMAN) PER 5 UNITS: Performed by: INTERNAL MEDICINE

## 2021-05-19 PROCEDURE — 63710000001 PREDNISONE PER 1 MG: Performed by: PHYSICIAN ASSISTANT

## 2021-05-19 PROCEDURE — 99239 HOSP IP/OBS DSCHRG MGMT >30: CPT | Performed by: FAMILY MEDICINE

## 2021-05-19 PROCEDURE — 63710000001 PREDNISONE PER 5 MG: Performed by: PHYSICIAN ASSISTANT

## 2021-05-19 PROCEDURE — 82962 GLUCOSE BLOOD TEST: CPT

## 2021-05-19 RX ORDER — SUCRALFATE 1 G/1
1 TABLET ORAL
Qty: 28 TABLET | Refills: 0 | Status: SHIPPED | OUTPATIENT
Start: 2021-05-19 | End: 2021-05-26

## 2021-05-19 RX ORDER — HYDROCODONE BITARTRATE AND ACETAMINOPHEN 5; 325 MG/1; MG/1
1 TABLET ORAL EVERY 6 HOURS PRN
Qty: 12 TABLET | Refills: 0 | Status: SHIPPED | OUTPATIENT
Start: 2021-05-19 | End: 2021-05-22

## 2021-05-19 RX ORDER — PREDNISONE 10 MG/1
TABLET ORAL
Qty: 22 TABLET | Refills: 0 | Status: SHIPPED | OUTPATIENT
Start: 2021-05-20 | End: 2021-06-02

## 2021-05-19 RX ADMIN — HYDROCORTISONE 2.5%: 25 CREAM TOPICAL at 09:26

## 2021-05-19 RX ADMIN — HYDROCODONE BITARTRATE AND ACETAMINOPHEN 1 TABLET: 5; 325 TABLET ORAL at 01:51

## 2021-05-19 RX ADMIN — SODIUM CHLORIDE, PRESERVATIVE FREE 10 ML: 5 INJECTION INTRAVENOUS at 09:25

## 2021-05-19 RX ADMIN — SUCRALFATE 1 G: 1 TABLET ORAL at 11:58

## 2021-05-19 RX ADMIN — GABAPENTIN 800 MG: 400 CAPSULE ORAL at 09:23

## 2021-05-19 RX ADMIN — INSULIN LISPRO 8 UNITS: 100 INJECTION, SOLUTION INTRAVENOUS; SUBCUTANEOUS at 11:58

## 2021-05-19 RX ADMIN — PREDNISONE 30 MG: 20 TABLET ORAL at 09:24

## 2021-05-19 RX ADMIN — SUCRALFATE 1 G: 1 TABLET ORAL at 09:24

## 2021-05-19 RX ADMIN — FAMOTIDINE 20 MG: 20 TABLET ORAL at 09:23

## 2021-05-19 RX ADMIN — HYDROCODONE BITARTRATE AND ACETAMINOPHEN 1 TABLET: 5; 325 TABLET ORAL at 09:30

## 2021-05-19 NOTE — OUTREACH NOTE
Prep Survey      Responses   [facility] patient discharged from?  [place]   Is LACE score < 7 ?  No   Emergency Room discharge w/ pulse ox?  No   Eligibility  [facility]   Date of Admission  05/14/21   Date of Discharge  05/19/21   Discharge Disposition  Home or Self Care   Discharge diagnosis  Ulcerative colitis,     DM type II, uncontrolled   Does the patient have one of the following disease processes/diagnoses(primary or secondary)?  Other   Does the patient have Home health ordered?  No   Is there a DME ordered?  No   Prep survey completed?  Yes          Maureen Le RN

## 2021-05-20 ENCOUNTER — TRANSITIONAL CARE MANAGEMENT TELEPHONE ENCOUNTER (OUTPATIENT)
Dept: CALL CENTER | Facility: HOSPITAL | Age: 36
End: 2021-05-20

## 2021-05-20 NOTE — OUTREACH NOTE
Call Center TCM Note      Responses   [facility] patient discharged from?  [place]   Does the patient have one of the following disease processes/diagnoses(primary or secondary)?  Other   TCM attempt successful?  Yes [Verbal release out of date ]   Call start time  1227   Call end time  1228   Discharge diagnosis  Ulcerative colitis,     DM type II, uncontrolled   Meds reviewed with patient/caregiver?  Yes   Is the patient having any side effects they believe may be caused by any medication additions or changes?  No   Does the patient have all medications ordered at discharge?  Yes   Is the patient taking all medications as directed (includes completed medication regime)?  Yes   Does the patient have a primary care provider?   Yes   Does the patient have an appointment with their PCP within 7 days of discharge?  No   Comments regarding PCP  hosp dc [facility] on 5-28-21 at 2:15    What is preventing the patient from scheduling follow up appointments within 7 days of discharge?  Earlier appointment not available   Psychosocial issues?  No   Did the patient receive a copy of their discharge instructions?  Yes   Nursing interventions  Reviewed instructions with patient   What is the patient's perception of their health status since discharge?  Improving   Is the patient/caregiver able to teach back signs and symptoms related to disease process for when to call PCP?  Yes   Is the patient/caregiver able to teach back signs and symptoms related to disease process for when to call 911?  Yes   Is the patient/caregiver able to teach back the hierarchy of who to call/visit for symptoms/problems? PCP, Specialist, Home health nurse, Urgent Care, ED, 911  Yes   If the patient is a current smoker, are they able to teach back resources for cessation?  Not a smoker   TCM call completed?  Yes          Angle Sierra RN    5/20/2021, 12:29 EDT

## 2021-05-21 LAB
6-TGN ENTSUB RBC: <34 PMOL/8X 10E8
6MMP ENTSUB RBC: <168 PMOL/8X 10E8

## 2021-06-01 ENCOUNTER — READMISSION MANAGEMENT (OUTPATIENT)
Dept: CALL CENTER | Facility: HOSPITAL | Age: 36
End: 2021-06-01

## 2021-06-01 NOTE — OUTREACH NOTE
Medical Week 2 Survey      Responses   [facility] patient discharged from?  [facility]   Does the patient have one of the following disease processes/diagnoses(primary or secondary)?  Other   Week 2 attempt successful?  Yes   Call start time  1337   Discharge diagnosis  Ulcerative colitis,     DM type II, uncontrolled   Call end time  1338   Meds reviewed with patient/caregiver?  Yes   Is the patient taking all medications as directed (includes completed medication regime)?  Yes   Has the patient kept scheduled appointments due by today?  N/A   What is the patient's perception of their health status since discharge?  Improving   Week 2 Call Completed?  Yes   Graduated  Yes   Is the patient interested in additional calls from an ambulatory ?  NOTE:  applies to high risk patients requiring additional follow-up.  No   Did the patient feel the follow up calls were helpful during their recovery period?  Yes   Was the number of calls appropriate?  Yes   Graduated/Revoked comments  Improving.  No problems or questions at this time.          Kayla Dixon RN

## 2021-06-02 LAB
VEDOLIZUMAB AB SERPL IA-MCNC: <25 NG/ML
VEDOLIZUMAB SERPL IA-MCNC: 26 UG/ML

## 2021-06-10 DIAGNOSIS — E11.44 DIABETIC AMYOTROPHY ASSOCIATED WITH TYPE 2 DIABETES MELLITUS (HCC): ICD-10-CM

## 2021-06-10 RX ORDER — FLUCONAZOLE 100 MG/1
100 TABLET ORAL DAILY
Qty: 7 TABLET | Refills: 0 | Status: SHIPPED | OUTPATIENT
Start: 2021-06-10 | End: 2021-07-06 | Stop reason: HOSPADM

## 2021-06-10 RX ORDER — GABAPENTIN 800 MG/1
800 TABLET ORAL 3 TIMES DAILY
Qty: 90 TABLET | Refills: 0 | Status: SHIPPED | OUTPATIENT
Start: 2021-06-10 | End: 2021-07-13

## 2021-06-10 NOTE — TELEPHONE ENCOUNTER
Caller: Thais Hobson    Relationship: Self    Best call back number: [phone]    Medication needed:   Requested Prescriptions     Pending Prescriptions Disp Refills   • gabapentin (NEURONTIN) 800 MG tablet 90 tablet 0     Sig: Take 1 tablet by mouth 3 (Three) Times a Day.   • fluconazole (Diflucan) 100 MG tablet 7 tablet 0     Sig: Take 1 tablet by mouth Daily.       What additional details did the patient provide when requesting the medication: PATIENT OUT OF MEDICATION    Does the patient have less than a 3 day supply:  [x] Yes  [] No    What is the patient's preferred pharmacy: [id number] - [place], [place] - [address] - [phone] [facility] [phone] FX

## 2021-06-16 ENCOUNTER — TELEMEDICINE (OUTPATIENT)
Dept: GASTROENTEROLOGY | Facility: CLINIC | Age: 36
End: 2021-06-16

## 2021-06-16 ENCOUNTER — TELEPHONE (OUTPATIENT)
Dept: FAMILY MEDICINE CLINIC | Facility: CLINIC | Age: 36
End: 2021-06-16

## 2021-06-16 DIAGNOSIS — K51.911 EXACERBATION OF ULCERATIVE COLITIS WITH RECTAL BLEEDING (HCC): Primary | ICD-10-CM

## 2021-06-16 PROCEDURE — 99215 OFFICE O/P EST HI 40 MIN: CPT | Performed by: NURSE PRACTITIONER

## 2021-06-16 NOTE — PROGRESS NOTES
"     New Patient Consultation     Patient Name: Thais Hobson  : 1985   MRN: [id number]     Chief Complaint:    Chief Complaint   Patient presents with   • Ulcerative Colitis       History of Present Illness: Thais Hobson is a 36 y.o. female who is here today for a Gastroenterology Consultation for UC  Dx in   Location: pancolitis  Treatments tried: mesalamine (not effective), Remicaid (allergy), humira (did not work), stelara, Xeljanz, and  entyvio (lost response)- imuran added (reaction).   Hx of c.diff  Positive family history of colon cancer    Admitted to [facility] on May 14 with UC flare.  She was given oral steroids.  Colorectal surgery was consulted but due to uncontrolled diabetes, surgery is being delayed. Currently on entvyio every 4 weeks. Even the entvio causes allergic reactions requiring high doses of benadryl.    She did have an EGD on May 17 which suggested gastroparesis as well as erosive gastritis.  Currently having 20-30 diarrhea episodes a day. Some mixed with blood.  Since discharge her pain abated for a few days but has returned and is severe.  Has lost approximately 20 pounds since being home and is barely getting out of bed.  Reports nausea.  Denies fever.  Has completed her steroid course which only helped slightly.  Feels very frustrated and \"does not know what to do\".  Interested in surgical management for UC.    Subjective      Review of Systems:   Review of Systems   Constitutional: Positive for activity change, appetite change, chills, fatigue and unexpected weight loss. Negative for fever.   HENT: Negative for trouble swallowing.    Gastrointestinal: Positive for abdominal pain, anal bleeding, blood in stool, diarrhea, nausea, vomiting and indigestion. Negative for abdominal distention, constipation, rectal pain and GERD.       Past Medical History:   Past Medical History:   Diagnosis Date   • Abdominal pain     periumbilicul   • Abdominal tenderness  "    Periumbil   • Alopecia    • Anemia    • Anxiety    • Arthritis    • Asthma     as a child   • Back pain    • Bipolar disorder (CMS/HCC)    • Blood in stool    • Body piercing     ears   • Colitis    • Colitis    • Colon polyps    • Depression    • Diabetes mellitus (CMS/HCC)    • Diarrhea    • Dysphagia     Patient reported he has trouble from time to time and that her throat has to be dilated   • Elevated cholesterol     Reported slightly elevated - taking no medication    • Fractures     Right pinky toe - no surgery required   • GERD (gastroesophageal reflux disease)    • H/O colonoscopy 2013    Terminal ileal biopsies negative. Cecal&ascending biopsie revealed chronic active colitis w/ features cons. w/ inflammatory bowel disease. Transevere colon biopsies revealved chronic active colitis. Desc. colon biopsies revealed chronic active colitis. Rect colon biopsie revealed chronic active colitis. Negative for dysplasia   • Hematemesis    • History of Clostridium difficile infection    • History of transfusion     No reaction to transfusion reported   • Migraine headache    • Nausea     alone   • Pancreatitis    • Restless leg    • Seizures (CMS/HCC)     pt reports x1    • Tattoos     x9   • Universal ulcerative colitis (CMS/HCC)    • UTI (urinary tract infection)    • Weight loss        Past Surgical History:   Past Surgical History:   Procedure Laterality Date   • BREAST SURGERY Left     breast lump removed benign   •  SECTION     • CHOLECYSTECTOMY      for stone disease   • COLONOSCOPY     • COLONOSCOPY N/A 2020    Procedure: COLONOSCOPY;  Surgeon: Alonzo He MD;  Location: [facility] ENDOSCOPY;  Service: Gastroenterology;  Laterality: N/A;   • DILATATION AND CURETTAGE     • ENDOSCOPY  2013    Erosive esophagitis, grade 2; erosive gastritis; small sliding hiatal hernia less than 3 cm, erosive deodenitis duodenal polyp.No gan mucosa.Second portion duedenal biopsy neg.  Second postion of dudoenal biopsy revealed polypoid intestinal mucosa w/ lymphoid aggregate. 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 no helicobacter pylori. Negative for mateaplasia, dysplasia   • ENDOSCOPY N/A 9/22/2020    Procedure: ESOPHAGOGASTRODUODENOSCOPY WITH DILATATION AND BIOPSIES;  Surgeon: Alonzo He MD;  Location: [facility] ENDOSCOPY;  Service: Gastroenterology;  Laterality: N/A;   • ENDOSCOPY N/A 5/17/2021    Procedure: ESOPHAGOGASTRODUODENOSCOPY;  Surgeon: Brunner, Mark I, MD;  Location:  [facility];  Service: Gastroenterology;  Laterality: N/A;   • HIP SURGERY Right    • HYSTERECTOMY     • LEEP         Family History:   Family History   Problem Relation Age of Onset   • Heart failure Father    • Heart disease Father    • No Known Problems Mother    • No Known Problems Brother    • No Known Problems Son    • No Known Problems Son        Social History:   Social History     Socioeconomic History   • Marital status: Single     Spouse name: Not on file   • Number of children: Not on file   • Years of education: Not on file   • Highest education level: Not on file   Tobacco Use   • Smoking status: Never Smoker   • Smokeless tobacco: Never Used   Substance and Sexual Activity   • Alcohol use: No   • Drug use: No   • Sexual activity: Defer       Alcohol/Tobacco History:   Social History     Substance and Sexual Activity   Alcohol Use No     Social History     Tobacco Use   Smoking Status Never Smoker   Smokeless Tobacco Never Used       Medications:     Current Outpatient Medications:   •  azaTHIOprine (IMURAN) 50 MG tablet, TAKE 2 TABLETS BY MOUTH EVERY DAY, Disp: 60 tablet, Rfl: 1  •  Continuous Glucose Monitor kit, Use as directed, Disp: 1 each, Rfl: 11  •  diphenhydrAMINE (Banophen) 25 mg capsule, Take 1 capsule by mouth Every 8 (Eight) Hours As Needed for Itching or Allergies., Disp: 90 capsule, Rfl: 1  •  estradiol (MINIVELLE, VIVELLE-DOT) 0.05 MG/24HR patch,  "Place 1 patch on the skin as directed by provider 1 (One) Time Per Week., Disp: 8 patch, Rfl: 2  •  fluconazole (Diflucan) 100 MG tablet, Take 1 tablet by mouth Daily., Disp: 7 tablet, Rfl: 0  •  gabapentin (NEURONTIN) 800 MG tablet, Take 1 tablet by mouth 3 (Three) Times a Day., Disp: 90 tablet, Rfl: 0  •  glucose blood test strip, Check glucose TID, One Touch Reveal, Disp: 90 each, Rfl: 11  •  glucose blood test strip, ONE TOUCH VERIO FLEX TEST STRIPS TO CHECK GLUCOSE 3 TIMES DAILY FOR DM E11.9, Disp: 100 each, Rfl: 12  •  HumaLOG Mix 75/25 KwikPen (75-25) 100 UNIT/ML suspension pen-injector pen, Inject 25 Units under the skin into the appropriate area as directed 2 (Two) Times a Day With Meals., Disp: 5 pen, Rfl: 3  •  HYDROcodone-acetaminophen (NORCO) 5-325 MG per tablet, Take 1 tablet by mouth Every 6 (Six) Hours As Needed for Moderate Pain ., Disp: 25 tablet, Rfl: 0  •  Hydrocortisone, Perianal, (Proctozone-HC) 2.5 % rectal cream, Insert  into the rectum 2 (Two) Times a Day., Disp: 28 g, Rfl: 1  •  hydrOXYzine pamoate (VISTARIL) 25 MG capsule, Take 1 capsule by mouth 3 (Three) Times a Day As Needed for Anxiety., Disp: 60 capsule, Rfl: 2  •  insulin aspart (NovoLOG) 100 UNIT/ML injection, TID dosing with meals, as per sliding scale coverage, up to 12 units TID dosing available, Disp: 10 mL, Rfl: 3  •  Insulin Glargine (BASAGLAR KWIKPEN) 100 UNIT/ML injection pen, Inject 40 Units under the skin into the appropriate area as directed Every Night., Disp: 5 pen, Rfl: 3  •  Insulin Syringe 30G X 5/16\" 1 ML misc, 1 syringe 3 (Three) Times a Day., Disp: 100 each, Rfl: 2  •  Lancets misc, USE AS DIRECTED TO CHECK GLUCOSE 3 TIMES DAILY FOR DM E11.9, Disp: 100 each, Rfl: 3  •  promethazine (PHENERGAN) 25 MG tablet, Take 1 tablet by mouth Every 6 (Six) Hours As Needed for Nausea or Vomiting., Disp: 40 tablet, Rfl: 2  •  SUMAtriptan (Imitrex) 50 MG tablet, Take one tablet at onset of headache. May repeat dose one time in 2 " "hours if headache not relieved., Disp: 8 tablet, Rfl: 2  •  topiramate (TOPAMAX) 50 MG tablet, Take 1 tablet by mouth 2 (Two) Times a Day., Disp: 60 tablet, Rfl: 2    Allergies:   Allergies   Allergen Reactions   • Hydrochlorothiazide Hives   • Penicillins Hives   • Remicade [Infliximab] Anaphylaxis   • Soybean-Containing Drug Products Swelling     \"Soy sauce\"   • Xeljanz [Tofacitinib Citrate] Other (See Comments)     BLISTERS IN THROAT & ON ARMS   • Zofran [Ondansetron Hcl] Headache     migraines   • Reglan [Metoclopramide] Other (See Comments)     States feels weird when takes it       Objective     Physical Exam:  Vital Signs: There were no vitals filed for this visit.  There is no height or weight on file to calculate BMI.     Physical Exam  Constitutional:       General: She is not in acute distress.     Appearance: She is well-developed.   Pulmonary:      Effort: Pulmonary effort is normal. No accessory muscle usage or respiratory distress.   Skin:     Coloration: Skin is not pale.      Findings: No erythema.   Neurological:      Mental Status: She is alert and oriented to person, place, and time.   Psychiatric:         Speech: Speech normal.         Behavior: Behavior normal.         Thought Content: Thought content normal.         Judgment: Judgment normal.         Assessment / Plan      Assessment/Plan:   Diagnoses and all orders for this visit:    1. Exacerbation of ulcerative colitis with rectal bleeding (CMS/[facility]) (Primary)    Other orders  -     Cancel: Ambulatory Referral to Colorectal Surgery    Since discharge patient has had a 15 to 20 pound weight loss.  She is unable to tolerate any p.o. and is having 20-30 BMs daily mixed with blood.  Steroids have had little benefit. Plan to direct admit      Follow Up:   No follow-ups on file.    Plan of care reviewed with the patient at the conclusion of today's visit.  Education was provided regarding diagnosis, management, and any prescribed or recommended " OTC medications.  Patient verbalized understanding of and agreement with management plan.     Time Statement:   Discussed plan of care in detail with patient today. Patient verbally understands and agrees. I have spent 45 minutes reviewing available diagnostics, obtaining history, examining the patient, developing a treatment plan, and educating the patient on disease process and plan of care.    EMELYN Avila  [facility]     Please note that portions of this note may have been completed with a voice recognition program. Efforts were made to edit the dictations, but occasionally words are mistranscribed.

## 2021-06-16 NOTE — TELEPHONE ENCOUNTER
Caller: [facility]     Relationship:     Best call back number: [phone]    What medication are you requesting: DEXCOM G6 GLUCOMETER     What are your current symptoms: DIABETES     How long have you been experiencing symptoms:     Have you had these symptoms before:    [] Yes  [] No    Have you been treated for these symptoms before:   [] Yes  [] No    If a prescription is needed, what is your preferred pharmacy and phone number:      Additional notes: PATIENT'S INSURANCE STATES THE GLUCOMETER IS COVERED BY THE PATIENT'S INSURANCE. THIS MODEL ONLY IS COVERED BY THE PATIENT'S INSURANCE.

## 2021-06-22 ENCOUNTER — READMISSION MANAGEMENT (OUTPATIENT)
Dept: CALL CENTER | Facility: HOSPITAL | Age: 36
End: 2021-06-22

## 2021-06-22 ENCOUNTER — TELEPHONE (OUTPATIENT)
Dept: FAMILY MEDICINE CLINIC | Facility: CLINIC | Age: 36
End: 2021-06-22

## 2021-06-22 NOTE — OUTREACH NOTE
Prep Survey      Responses   [facility] patient discharged from?  Non-BH   Is LACE score < 7 ?  Non-BH Discharge   Emergency Room discharge w/ pulse ox?  No   Eligibility  [facility]   Date of Admission  06/16/21   Date of Discharge  06/22/21   Discharge diagnosis  Ulcerative colitis   Does the patient have one of the following disease processes/diagnoses(primary or secondary)?  Other   Prep survey completed?  Yes          Chaparrita Bolaños RN

## 2021-06-22 NOTE — TELEPHONE ENCOUNTER
Caller: [facility]     Relationship to patient: HOSPITAL     Best call back number: [phone]    New or established patient?  [] New  [x] Established    Date of discharge: 06/22/2021    Facility discharged from: [facility]    Diagnosis/Symptoms: ULCERATIVE CLOITUS  ABDOMINAL PAIN   Length of stay (If applicable): SINCE  06/16/2021    HOSPITAL ASKED TO MAKE THE APPOINTMENT 2 WEEK OUT I SCHEDULED FOR 07/2/2021 WITH JULISSA MATT NOT APPOINTMENTS AVAILABLE WITH DOCTOR DE LA VEGA WITHIN TIME FRAME

## 2021-06-23 ENCOUNTER — TRANSITIONAL CARE MANAGEMENT TELEPHONE ENCOUNTER (OUTPATIENT)
Dept: CALL CENTER | Facility: HOSPITAL | Age: 36
End: 2021-06-23

## 2021-06-23 NOTE — OUTREACH NOTE
Call Center TCM Note      Responses   [facility] patient discharged from?  Non-   Does the patient have one of the following disease processes/diagnoses(primary or secondary)?  Other   TCM attempt successful?  Yes [Verbal release is over a year old]   Call start time  1033   Call end time  1035   Discharge diagnosis  Ulcerative colitis   Meds reviewed with patient/caregiver?  Yes   Is the patient having any side effects they believe may be caused by any medication additions or changes?  No   Does the patient have all medications ordered at discharge?  Yes   Is the patient taking all medications as directed (includes completed medication regime)?  No   What is preventing the patient from taking all medications as directed?  Other [Not  at time of call]   Nursing Interventions  Nurse provided patient education   Does the patient have a primary care provider?   Yes   Does the patient have an appointment with their PCP within 7 days of discharge?  Greater than 7 days   Comments regarding PCP  Hospital d/c f/u appt is on 7/2/21 at 10:30 am    What is preventing the patient from scheduling follow up appointments within 7 days of discharge?  -- [unsure]   Nursing Interventions  Verified appointment date/time/provider   Has the patient kept scheduled appointments due by today?  N/A   What is the Home health agency?   No home health   Psychosocial issues?  No   Did the patient receive a copy of their discharge instructions?  Yes   Nursing interventions  Reviewed instructions with patient   What is the patient's perception of their health status since discharge?  Same   Is the patient/caregiver able to teach back signs and symptoms related to disease process for when to call PCP?  Yes   Is the patient/caregiver able to teach back signs and symptoms related to disease process for when to call 911?  Yes   Is the patient/caregiver able to teach back the hierarchy of who to call/visit for symptoms/problems? PCP,  Specialist, Home health nurse, Urgent Care, ED, 911  Yes   If the patient is a current smoker, are they able to teach back resources for cessation?  Not a smoker   TCM call completed?  Yes          Nina Ovalles RN    6/23/2021, 10:35 EDT

## 2021-06-25 DIAGNOSIS — Z79.4 TYPE 2 DIABETES MELLITUS TREATED WITH INSULIN (HCC): Primary | ICD-10-CM

## 2021-06-25 DIAGNOSIS — E11.9 TYPE 2 DIABETES MELLITUS TREATED WITH INSULIN (HCC): Primary | ICD-10-CM

## 2021-06-25 RX ORDER — PROCHLORPERAZINE 25 MG/1
1 SUPPOSITORY RECTAL DAILY
Qty: 1 EACH | Refills: 1 | Status: SHIPPED | OUTPATIENT
Start: 2021-06-25

## 2021-06-25 RX ORDER — PROCHLORPERAZINE 25 MG/1
SUPPOSITORY RECTAL
Qty: 3 EACH | Refills: 11 | Status: SHIPPED | OUTPATIENT
Start: 2021-06-25 | End: 2021-11-20 | Stop reason: SDUPTHER

## 2021-06-25 RX ORDER — PROCHLORPERAZINE 25 MG/1
1 SUPPOSITORY RECTAL DAILY
Qty: 1 EACH | Refills: 1 | Status: SHIPPED | OUTPATIENT
Start: 2021-06-25 | End: 2021-11-20 | Stop reason: SDUPTHER

## 2021-06-28 ENCOUNTER — TELEMEDICINE (OUTPATIENT)
Dept: FAMILY MEDICINE CLINIC | Facility: CLINIC | Age: 36
End: 2021-06-28

## 2021-06-28 ENCOUNTER — HOSPITAL ENCOUNTER (INPATIENT)
Facility: HOSPITAL | Age: 36
LOS: 8 days | Discharge: HOME | End: 2021-07-06
Attending: HOSPITALIST | Admitting: HOSPITALIST

## 2021-06-28 DIAGNOSIS — E11.9 TYPE 2 DIABETES MELLITUS TREATED WITH INSULIN (HCC): ICD-10-CM

## 2021-06-28 DIAGNOSIS — K51.911 ACUTE ULCERATIVE COLITIS WITH RECTAL BLEEDING (HCC): ICD-10-CM

## 2021-06-28 DIAGNOSIS — A04.72 C. DIFFICILE COLITIS: Primary | ICD-10-CM

## 2021-06-28 DIAGNOSIS — Z79.4 TYPE 2 DIABETES MELLITUS TREATED WITH INSULIN (HCC): ICD-10-CM

## 2021-06-28 DIAGNOSIS — R13.19 ESOPHAGEAL DYSPHAGIA: ICD-10-CM

## 2021-06-28 DIAGNOSIS — F41.8 DEPRESSION WITH ANXIETY: ICD-10-CM

## 2021-06-28 DIAGNOSIS — K51.011 ULCERATIVE PANCOLITIS WITH RECTAL BLEEDING (HCC): ICD-10-CM

## 2021-06-28 DIAGNOSIS — K51.00 ULCERATIVE PANCOLITIS WITHOUT COMPLICATION (HCC): ICD-10-CM

## 2021-06-28 DIAGNOSIS — R19.7 DIARRHEA, UNSPECIFIED TYPE: Primary | ICD-10-CM

## 2021-06-28 PROBLEM — K31.84 GASTROPARESIS: Status: ACTIVE | Noted: 2021-06-28

## 2021-06-28 LAB
ALBUMIN SERPL-MCNC: 4 G/DL (ref 3.5–5.2)
ALBUMIN/GLOB SERPL: 1.2 G/DL
ALP SERPL-CCNC: 168 U/L (ref 39–117)
ALT SERPL W P-5'-P-CCNC: 31 U/L (ref 1–33)
ANION GAP SERPL CALCULATED.3IONS-SCNC: 12 MMOL/L (ref 5–15)
AST SERPL-CCNC: 24 U/L (ref 1–32)
BASOPHILS # BLD AUTO: 0.04 10*3/MM3 (ref 0–0.2)
BASOPHILS NFR BLD AUTO: 0.4 % (ref 0–1.5)
BILIRUB SERPL-MCNC: 0.3 MG/DL (ref 0–1.2)
BUN SERPL-MCNC: 8 MG/DL (ref 6–20)
BUN/CREAT SERPL: 13.3 (ref 7–25)
CALCIUM SPEC-SCNC: 9.2 MG/DL (ref 8.6–10.5)
CHLORIDE SERPL-SCNC: 95 MMOL/L (ref 98–107)
CO2 SERPL-SCNC: 22 MMOL/L (ref 22–29)
CREAT SERPL-MCNC: 0.6 MG/DL (ref 0.57–1)
D-LACTATE SERPL-SCNC: 1.6 MMOL/L (ref 0.5–2)
DEPRECATED RDW RBC AUTO: 36.5 FL (ref 37–54)
EOSINOPHIL # BLD AUTO: 0.06 10*3/MM3 (ref 0–0.4)
EOSINOPHIL NFR BLD AUTO: 0.6 % (ref 0.3–6.2)
ERYTHROCYTE [DISTWIDTH] IN BLOOD BY AUTOMATED COUNT: 12 % (ref 12.3–15.4)
GFR SERPL CREATININE-BSD FRML MDRD: 114 ML/MIN/1.73
GFR SERPL CREATININE-BSD FRML MDRD: 138 ML/MIN/1.73
GLOBULIN UR ELPH-MCNC: 3.4 GM/DL
GLUCOSE BLDC GLUCOMTR-MCNC: 236 MG/DL (ref 70–130)
GLUCOSE BLDC GLUCOMTR-MCNC: 246 MG/DL (ref 70–130)
GLUCOSE SERPL-MCNC: 300 MG/DL (ref 65–99)
HCT VFR BLD AUTO: 40.7 % (ref 34–46.6)
HGB BLD-MCNC: 13.4 G/DL (ref 12–15.9)
IMM GRANULOCYTES # BLD AUTO: 0.02 10*3/MM3 (ref 0–0.05)
IMM GRANULOCYTES NFR BLD AUTO: 0.2 % (ref 0–0.5)
LYMPHOCYTES # BLD AUTO: 4.27 10*3/MM3 (ref 0.7–3.1)
LYMPHOCYTES NFR BLD AUTO: 43.4 % (ref 19.6–45.3)
MCH RBC QN AUTO: 27.8 PG (ref 26.6–33)
MCHC RBC AUTO-ENTMCNC: 32.9 G/DL (ref 31.5–35.7)
MCV RBC AUTO: 84.4 FL (ref 79–97)
MONOCYTES # BLD AUTO: 0.4 10*3/MM3 (ref 0.1–0.9)
MONOCYTES NFR BLD AUTO: 4.1 % (ref 5–12)
NEUTROPHILS NFR BLD AUTO: 5.05 10*3/MM3 (ref 1.7–7)
NEUTROPHILS NFR BLD AUTO: 51.3 % (ref 42.7–76)
NRBC BLD AUTO-RTO: 0 /100 WBC (ref 0–0.2)
PLATELET # BLD AUTO: 256 10*3/MM3 (ref 140–450)
PMV BLD AUTO: 9.9 FL (ref 6–12)
POTASSIUM SERPL-SCNC: 3.5 MMOL/L (ref 3.5–5.2)
PROCALCITONIN SERPL-MCNC: 0.03 NG/ML (ref 0–0.25)
PROT SERPL-MCNC: 7.4 G/DL (ref 6–8.5)
RBC # BLD AUTO: 4.82 10*6/MM3 (ref 3.77–5.28)
SODIUM SERPL-SCNC: 129 MMOL/L (ref 136–145)
WBC # BLD AUTO: 9.84 10*3/MM3 (ref 3.4–10.8)

## 2021-06-28 PROCEDURE — 84145 PROCALCITONIN (PCT): CPT | Performed by: HOSPITALIST

## 2021-06-28 PROCEDURE — 99214 OFFICE O/P EST MOD 30 MIN: CPT | Performed by: NURSE PRACTITIONER

## 2021-06-28 PROCEDURE — U0004 COV-19 TEST NON-CDC HGH THRU: HCPCS | Performed by: INTERNAL MEDICINE

## 2021-06-28 PROCEDURE — 63710000001 INSULIN LISPRO (HUMAN) PER 5 UNITS: Performed by: INTERNAL MEDICINE

## 2021-06-28 PROCEDURE — 85025 COMPLETE CBC W/AUTO DIFF WBC: CPT | Performed by: HOSPITALIST

## 2021-06-28 PROCEDURE — 63710000001 INSULIN DETEMIR PER 5 UNITS: Performed by: INTERNAL MEDICINE

## 2021-06-28 PROCEDURE — 82962 GLUCOSE BLOOD TEST: CPT

## 2021-06-28 PROCEDURE — 25010000002 HYDROMORPHONE PER 4 MG: Performed by: HOSPITALIST

## 2021-06-28 PROCEDURE — 80053 COMPREHEN METABOLIC PANEL: CPT | Performed by: HOSPITALIST

## 2021-06-28 PROCEDURE — 99223 1ST HOSP IP/OBS HIGH 75: CPT | Performed by: INTERNAL MEDICINE

## 2021-06-28 PROCEDURE — 83605 ASSAY OF LACTIC ACID: CPT | Performed by: HOSPITALIST

## 2021-06-28 RX ORDER — HYDROXYZINE HYDROCHLORIDE 25 MG/1
25 TABLET, FILM COATED ORAL 3 TIMES DAILY PRN
Status: DISCONTINUED | OUTPATIENT
Start: 2021-06-28 | End: 2021-07-06 | Stop reason: HOSPADM

## 2021-06-28 RX ORDER — GABAPENTIN 400 MG/1
400 CAPSULE ORAL EVERY 8 HOURS SCHEDULED
Status: DISCONTINUED | OUTPATIENT
Start: 2021-06-28 | End: 2021-07-06 | Stop reason: HOSPADM

## 2021-06-28 RX ORDER — TOPIRAMATE 25 MG/1
50 TABLET ORAL 2 TIMES DAILY
Status: DISCONTINUED | OUTPATIENT
Start: 2021-06-28 | End: 2021-07-06 | Stop reason: HOSPADM

## 2021-06-28 RX ORDER — VANCOMYCIN HYDROCHLORIDE 125 MG/1
125 CAPSULE ORAL EVERY 6 HOURS SCHEDULED
Status: DISCONTINUED | OUTPATIENT
Start: 2021-06-28 | End: 2021-06-30

## 2021-06-28 RX ORDER — SODIUM CHLORIDE 0.9 % (FLUSH) 0.9 %
10 SYRINGE (ML) INJECTION EVERY 12 HOURS SCHEDULED
Status: DISCONTINUED | OUTPATIENT
Start: 2021-06-28 | End: 2021-07-06 | Stop reason: HOSPADM

## 2021-06-28 RX ORDER — NALOXONE HCL 0.4 MG/ML
0.4 VIAL (ML) INJECTION
Status: DISCONTINUED | OUTPATIENT
Start: 2021-06-28 | End: 2021-06-29

## 2021-06-28 RX ORDER — SODIUM CHLORIDE 0.9 % (FLUSH) 0.9 %
10 SYRINGE (ML) INJECTION AS NEEDED
Status: DISCONTINUED | OUTPATIENT
Start: 2021-06-28 | End: 2021-07-06 | Stop reason: HOSPADM

## 2021-06-28 RX ORDER — ONDANSETRON 2 MG/ML
4 INJECTION INTRAMUSCULAR; INTRAVENOUS EVERY 6 HOURS PRN
Status: DISCONTINUED | OUTPATIENT
Start: 2021-06-28 | End: 2021-07-03

## 2021-06-28 RX ORDER — DIPHENHYDRAMINE HCL 25 MG
25 CAPSULE ORAL EVERY 8 HOURS PRN
Status: DISCONTINUED | OUTPATIENT
Start: 2021-06-28 | End: 2021-07-06 | Stop reason: HOSPADM

## 2021-06-28 RX ORDER — PROMETHAZINE HYDROCHLORIDE 25 MG/1
25 TABLET ORAL EVERY 6 HOURS PRN
Status: DISCONTINUED | OUTPATIENT
Start: 2021-06-28 | End: 2021-07-02 | Stop reason: SDUPTHER

## 2021-06-28 RX ORDER — HYDROCORTISONE 25 MG/G
CREAM TOPICAL 2 TIMES DAILY
Status: DISCONTINUED | OUTPATIENT
Start: 2021-06-28 | End: 2021-07-06 | Stop reason: HOSPADM

## 2021-06-28 RX ORDER — ONDANSETRON 4 MG/1
4 TABLET, FILM COATED ORAL EVERY 6 HOURS PRN
Status: DISCONTINUED | OUTPATIENT
Start: 2021-06-28 | End: 2021-07-03

## 2021-06-28 RX ORDER — SODIUM CHLORIDE, SODIUM LACTATE, POTASSIUM CHLORIDE, CALCIUM CHLORIDE 600; 310; 30; 20 MG/100ML; MG/100ML; MG/100ML; MG/100ML
100 INJECTION, SOLUTION INTRAVENOUS CONTINUOUS
Status: DISCONTINUED | OUTPATIENT
Start: 2021-06-28 | End: 2021-07-06 | Stop reason: HOSPADM

## 2021-06-28 RX ORDER — HYDROMORPHONE HYDROCHLORIDE 1 MG/ML
0.5 INJECTION, SOLUTION INTRAMUSCULAR; INTRAVENOUS; SUBCUTANEOUS
Status: DISCONTINUED | OUTPATIENT
Start: 2021-06-28 | End: 2021-06-29

## 2021-06-28 RX ORDER — NICOTINE POLACRILEX 4 MG
15 LOZENGE BUCCAL
Status: DISCONTINUED | OUTPATIENT
Start: 2021-06-28 | End: 2021-07-06 | Stop reason: HOSPADM

## 2021-06-28 RX ORDER — HYDROCODONE BITARTRATE AND ACETAMINOPHEN 5; 325 MG/1; MG/1
1 TABLET ORAL EVERY 6 HOURS PRN
Status: DISCONTINUED | OUTPATIENT
Start: 2021-06-28 | End: 2021-07-06 | Stop reason: HOSPADM

## 2021-06-28 RX ORDER — DEXTROSE MONOHYDRATE 25 G/50ML
25 INJECTION, SOLUTION INTRAVENOUS
Status: DISCONTINUED | OUTPATIENT
Start: 2021-06-28 | End: 2021-07-06 | Stop reason: HOSPADM

## 2021-06-28 RX ADMIN — HYDROMORPHONE HYDROCHLORIDE 0.5 MG: 1 INJECTION, SOLUTION INTRAMUSCULAR; INTRAVENOUS; SUBCUTANEOUS at 16:43

## 2021-06-28 RX ADMIN — VANCOMYCIN HYDROCHLORIDE 125 MG: 125 CAPSULE ORAL at 17:54

## 2021-06-28 RX ADMIN — METRONIDAZOLE 500 MG: 500 INJECTION, SOLUTION INTRAVENOUS at 17:54

## 2021-06-28 RX ADMIN — SODIUM CHLORIDE, PRESERVATIVE FREE 10 ML: 5 INJECTION INTRAVENOUS at 20:17

## 2021-06-28 RX ADMIN — HYDROMORPHONE HYDROCHLORIDE 0.5 MG: 1 INJECTION, SOLUTION INTRAMUSCULAR; INTRAVENOUS; SUBCUTANEOUS at 20:17

## 2021-06-28 RX ADMIN — INSULIN LISPRO 5 UNITS: 100 INJECTION, SOLUTION INTRAVENOUS; SUBCUTANEOUS at 17:54

## 2021-06-28 RX ADMIN — TOPIRAMATE 50 MG: 25 TABLET, FILM COATED ORAL at 20:17

## 2021-06-28 RX ADMIN — INSULIN DETEMIR 30 UNITS: 100 INJECTION, SOLUTION SUBCUTANEOUS at 21:57

## 2021-06-28 RX ADMIN — SODIUM CHLORIDE, POTASSIUM CHLORIDE, SODIUM LACTATE AND CALCIUM CHLORIDE 100 ML/HR: 600; 310; 30; 20 INJECTION, SOLUTION INTRAVENOUS at 16:43

## 2021-06-28 RX ADMIN — GABAPENTIN 400 MG: 400 CAPSULE ORAL at 21:57

## 2021-06-28 NOTE — PROGRESS NOTES
Subjective     Chief Complaint:  F/u c diff    History of Present Illness:   She was recently admitted again to [facility] with c diff colitis, discharged on 6/22/21. She has UC, failed multiple DMARs. She was discharged with oral vanc. She completed 7 day course. She has prior hx of c diff. She Is having 10-11 loose watery BM per day. Having to wear a diaper. She was doing better for a few days but then symptoms return. She has been out of the [facility] for a few days. Anytime she eats she has abdominal pain. She has been vomiting several times per day. She is fatigued. She feels like she has a low grade fever.   She has lost more weight. She feels like she needs to be admitted to [facility]. She stopped steroids while admitted in [facility] due to c diff. Last dose of entivyo was last month.     She was admitted at [facility] mid May with UC flare. Was seen by CRS. She needs colectomy however her diabetes was very poor controlled. It was recommended that she have agressive management of her diabetes to help lower A1c in plans for surgery. Unfortunately she has not followed up prior to today.     Review of Systems   CV- No chest pain, palpitations  Resp- No cough, dyspnea  Neuro-No dizziness, headaches      I have reviewed and/or updated the patient's past medical, surgical, family, social history and problem list as appropriate.     Medications:    Current Outpatient Medications:   •  azaTHIOprine (IMURAN) 50 MG tablet, TAKE 2 TABLETS BY MOUTH EVERY DAY, Disp: 60 tablet, Rfl: 1  •  Continuous Blood Gluc  (Dexcom G6 ) device, 1 Units Daily., Disp: 1 each, Rfl: 1  •  Continuous Blood Gluc Sensor (Dexcom G6 Sensor), Every 10 (Ten) Days., Disp: 3 each, Rfl: 11  •  Continuous Blood Gluc Transmit (Dexcom G6 Transmitter) misc, 1 Units Daily., Disp: 1 each, Rfl: 1  •  Continuous Glucose Monitor kit, Use as directed, Disp: 1 each, Rfl: 11  •  diphenhydrAMINE (Banophen) 25 mg capsule, Take 1 capsule by mouth Every 8 (Eight)  "Hours As Needed for Itching or Allergies., Disp: 90 capsule, Rfl: 1  •  estradiol (MINIVELLE, VIVELLE-DOT) 0.05 MG/24HR patch, Place 1 patch on the skin as directed by provider 1 (One) Time Per Week., Disp: 8 patch, Rfl: 2  •  fluconazole (Diflucan) 100 MG tablet, Take 1 tablet by mouth Daily., Disp: 7 tablet, Rfl: 0  •  gabapentin (NEURONTIN) 800 MG tablet, Take 1 tablet by mouth 3 (Three) Times a Day., Disp: 90 tablet, Rfl: 0  •  glucose blood test strip, Check glucose TID, One Touch Reveal, Disp: 90 each, Rfl: 11  •  glucose blood test strip, ONE TOUCH VERIO FLEX TEST STRIPS TO CHECK GLUCOSE 3 TIMES DAILY FOR DM E11.9, Disp: 100 each, Rfl: 12  •  HumaLOG Mix 75/25 KwikPen (75-25) 100 UNIT/ML suspension pen-injector pen, Inject 25 Units under the skin into the appropriate area as directed 2 (Two) Times a Day With Meals., Disp: 5 pen, Rfl: 3  •  HYDROcodone-acetaminophen (NORCO) 5-325 MG per tablet, Take 1 tablet by mouth Every 6 (Six) Hours As Needed for Moderate Pain ., Disp: 25 tablet, Rfl: 0  •  Hydrocortisone, Perianal, (Proctozone-HC) 2.5 % rectal cream, Insert  into the rectum 2 (Two) Times a Day., Disp: 28 g, Rfl: 1  •  hydrOXYzine pamoate (VISTARIL) 25 MG capsule, Take 1 capsule by mouth 3 (Three) Times a Day As Needed for Anxiety., Disp: 60 capsule, Rfl: 2  •  insulin aspart (NovoLOG) 100 UNIT/ML injection, TID dosing with meals, as per sliding scale coverage, up to 12 units TID dosing available, Disp: 10 mL, Rfl: 3  •  Insulin Glargine (BASAGLAR KWIKPEN) 100 UNIT/ML injection pen, Inject 40 Units under the skin into the appropriate area as directed Every Night., Disp: 5 pen, Rfl: 3  •  Insulin Syringe 30G X 5/16\" 1 ML misc, 1 syringe 3 (Three) Times a Day., Disp: 100 each, Rfl: 2  •  Lancets misc, USE AS DIRECTED TO CHECK GLUCOSE 3 TIMES DAILY FOR DM E11.9, Disp: 100 each, Rfl: 3  •  promethazine (PHENERGAN) 25 MG tablet, Take 1 tablet by mouth Every 6 (Six) Hours As Needed for Nausea or Vomiting., Disp: " "40 tablet, Rfl: 2  •  SUMAtriptan (Imitrex) 50 MG tablet, Take one tablet at onset of headache. May repeat dose one time in 2 hours if headache not relieved., Disp: 8 tablet, Rfl: 2  •  topiramate (TOPAMAX) 50 MG tablet, Take 1 tablet by mouth 2 (Two) Times a Day., Disp: 60 tablet, Rfl: 2    Allergies:  Allergies   Allergen Reactions   • Hydrochlorothiazide Hives   • Penicillins Hives   • Remicade [Infliximab] Anaphylaxis   • Soybean-Containing Drug Products Swelling     \"Soy sauce\"   • Xeljanz [Tofacitinib Citrate] Other (See Comments)     BLISTERS IN THROAT & ON ARMS   • Zofran [Ondansetron Hcl] Headache     migraines   • Reglan [Metoclopramide] Other (See Comments)     States feels weird when takes it       Objective     Vital Signs: There were no vitals filed for this visit.    Physical Exam:  Gen-no acute distress but is ill appearing, video visit  Resp- normal WOB, on RA  Neuro-A&Ox3, face symmetrical, speech clear  Skin-no overt rashes noted  Psych-appropriate mood, cooperative     There is no height or weight on file to calculate BMI.    Assessment / Plan     Assessment/Plan:   Problem List Items Addressed This Visit        Gastrointestinal Abdominal     Ulcerative colitis (CMS/HCC)       Other    C. difficile colitis - Primary    Overview     Added automatically from request for surgery [id number]             -- she needs admission with ID coverage due to recurrent c diff in setting of UC. Dr. Cain, hospitalist with [facility] graciously accepted her for admission.     Follow up:  As needed    Total Time of Encounter 25 minutes    Electronically signed by EMELYN Seymour   06/28/2021 08:42 EDT      Please note that portions of this note may have been completed with a voice recognition program. Efforts were made to edit the dictations, but occasionally words are mistranscribed.  "

## 2021-06-29 PROBLEM — R19.7 DIARRHEA: Status: ACTIVE | Noted: 2021-06-29

## 2021-06-29 LAB
ADV 40+41 DNA STL QL NAA+NON-PROBE: NOT DETECTED
ANION GAP SERPL CALCULATED.3IONS-SCNC: 12 MMOL/L (ref 5–15)
ASTRO TYP 1-8 RNA STL QL NAA+NON-PROBE: NOT DETECTED
BUN SERPL-MCNC: 6 MG/DL (ref 6–20)
BUN/CREAT SERPL: 12 (ref 7–25)
C CAYETANENSIS DNA STL QL NAA+NON-PROBE: NOT DETECTED
C COLI+JEJ+UPSA DNA STL QL NAA+NON-PROBE: NOT DETECTED
C DIFF TOX GENS STL QL NAA+PROBE: NOT DETECTED
CALCIUM SPEC-SCNC: 8.7 MG/DL (ref 8.6–10.5)
CHLORIDE SERPL-SCNC: 103 MMOL/L (ref 98–107)
CO2 SERPL-SCNC: 23 MMOL/L (ref 22–29)
CREAT SERPL-MCNC: 0.5 MG/DL (ref 0.57–1)
CRYPTOSP DNA STL QL NAA+NON-PROBE: NOT DETECTED
DEPRECATED RDW RBC AUTO: 40.3 FL (ref 37–54)
E HISTOLYT DNA STL QL NAA+NON-PROBE: NOT DETECTED
EAEC PAA PLAS AGGR+AATA ST NAA+NON-PRB: NOT DETECTED
EC STX1+STX2 GENES STL QL NAA+NON-PROBE: NOT DETECTED
EPEC EAE GENE STL QL NAA+NON-PROBE: NOT DETECTED
ERYTHROCYTE [DISTWIDTH] IN BLOOD BY AUTOMATED COUNT: 12.2 % (ref 12.3–15.4)
ETEC LTA+ST1A+ST1B TOX ST NAA+NON-PROBE: NOT DETECTED
G LAMBLIA DNA STL QL NAA+NON-PROBE: NOT DETECTED
GFR SERPL CREATININE-BSD FRML MDRD: 140 ML/MIN/1.73
GFR SERPL CREATININE-BSD FRML MDRD: >150 ML/MIN/1.73
GLUCOSE BLDC GLUCOMTR-MCNC: 185 MG/DL (ref 70–130)
GLUCOSE BLDC GLUCOMTR-MCNC: 203 MG/DL (ref 70–130)
GLUCOSE BLDC GLUCOMTR-MCNC: 231 MG/DL (ref 70–130)
GLUCOSE BLDC GLUCOMTR-MCNC: 269 MG/DL (ref 70–130)
GLUCOSE SERPL-MCNC: 246 MG/DL (ref 65–99)
HCT VFR BLD AUTO: 43 % (ref 34–46.6)
HGB BLD-MCNC: 13.3 G/DL (ref 12–15.9)
MCH RBC QN AUTO: 28.4 PG (ref 26.6–33)
MCHC RBC AUTO-ENTMCNC: 30.9 G/DL (ref 31.5–35.7)
MCV RBC AUTO: 91.9 FL (ref 79–97)
NOROVIRUS GI+II RNA STL QL NAA+NON-PROBE: NOT DETECTED
P SHIGELLOIDES DNA STL QL NAA+NON-PROBE: NOT DETECTED
PLATELET # BLD AUTO: 243 10*3/MM3 (ref 140–450)
PMV BLD AUTO: 10.5 FL (ref 6–12)
POTASSIUM SERPL-SCNC: 3.7 MMOL/L (ref 3.5–5.2)
RBC # BLD AUTO: 4.68 10*6/MM3 (ref 3.77–5.28)
RVA RNA STL QL NAA+NON-PROBE: NOT DETECTED
S ENT+BONG DNA STL QL NAA+NON-PROBE: NOT DETECTED
SAPO I+II+IV+V RNA STL QL NAA+NON-PROBE: NOT DETECTED
SARS-COV-2 RNA PNL SPEC NAA+PROBE: NOT DETECTED
SHIGELLA SP+EIEC IPAH ST NAA+NON-PROBE: NOT DETECTED
SODIUM SERPL-SCNC: 138 MMOL/L (ref 136–145)
V CHOL+PARA+VUL DNA STL QL NAA+NON-PROBE: NOT DETECTED
V CHOLERAE DNA STL QL NAA+NON-PROBE: NOT DETECTED
WBC # BLD AUTO: 10.25 10*3/MM3 (ref 3.4–10.8)
Y ENTEROCOL DNA STL QL NAA+NON-PROBE: NOT DETECTED

## 2021-06-29 PROCEDURE — 85027 COMPLETE CBC AUTOMATED: CPT | Performed by: INTERNAL MEDICINE

## 2021-06-29 PROCEDURE — 63710000001 INSULIN DETEMIR PER 5 UNITS: Performed by: INTERNAL MEDICINE

## 2021-06-29 PROCEDURE — G0378 HOSPITAL OBSERVATION PER HR: HCPCS

## 2021-06-29 PROCEDURE — 82962 GLUCOSE BLOOD TEST: CPT

## 2021-06-29 PROCEDURE — 25010000002 HYDROMORPHONE PER 4 MG: Performed by: HOSPITALIST

## 2021-06-29 PROCEDURE — 87493 C DIFF AMPLIFIED PROBE: CPT | Performed by: INTERNAL MEDICINE

## 2021-06-29 PROCEDURE — 0097U HC BIOFIRE FILMARRAY GI PANEL: CPT | Performed by: INTERNAL MEDICINE

## 2021-06-29 PROCEDURE — 63710000001 INSULIN LISPRO (HUMAN) PER 5 UNITS: Performed by: INTERNAL MEDICINE

## 2021-06-29 PROCEDURE — 99233 SBSQ HOSP IP/OBS HIGH 50: CPT | Performed by: INTERNAL MEDICINE

## 2021-06-29 PROCEDURE — 80048 BASIC METABOLIC PNL TOTAL CA: CPT | Performed by: INTERNAL MEDICINE

## 2021-06-29 RX ORDER — SACCHAROMYCES BOULARDII 250 MG
250 CAPSULE ORAL 2 TIMES DAILY
Status: DISCONTINUED | OUTPATIENT
Start: 2021-06-29 | End: 2021-07-06 | Stop reason: HOSPADM

## 2021-06-29 RX ADMIN — HYDROMORPHONE HYDROCHLORIDE 0.5 MG: 1 INJECTION, SOLUTION INTRAMUSCULAR; INTRAVENOUS; SUBCUTANEOUS at 08:49

## 2021-06-29 RX ADMIN — VANCOMYCIN HYDROCHLORIDE 125 MG: 125 CAPSULE ORAL at 12:41

## 2021-06-29 RX ADMIN — VANCOMYCIN HYDROCHLORIDE 125 MG: 125 CAPSULE ORAL at 05:43

## 2021-06-29 RX ADMIN — INSULIN LISPRO 8 UNITS: 100 INJECTION, SOLUTION INTRAVENOUS; SUBCUTANEOUS at 17:09

## 2021-06-29 RX ADMIN — HYDROMORPHONE HYDROCHLORIDE 0.5 MG: 1 INJECTION, SOLUTION INTRAMUSCULAR; INTRAVENOUS; SUBCUTANEOUS at 04:47

## 2021-06-29 RX ADMIN — HYDROCODONE BITARTRATE AND ACETAMINOPHEN 1 TABLET: 5; 325 TABLET ORAL at 12:41

## 2021-06-29 RX ADMIN — INSULIN LISPRO 3 UNITS: 100 INJECTION, SOLUTION INTRAVENOUS; SUBCUTANEOUS at 12:41

## 2021-06-29 RX ADMIN — SODIUM CHLORIDE, PRESERVATIVE FREE 10 ML: 5 INJECTION INTRAVENOUS at 20:24

## 2021-06-29 RX ADMIN — GABAPENTIN 400 MG: 400 CAPSULE ORAL at 20:22

## 2021-06-29 RX ADMIN — SODIUM CHLORIDE, POTASSIUM CHLORIDE, SODIUM LACTATE AND CALCIUM CHLORIDE 100 ML/HR: 600; 310; 30; 20 INJECTION, SOLUTION INTRAVENOUS at 13:30

## 2021-06-29 RX ADMIN — INSULIN DETEMIR 30 UNITS: 100 INJECTION, SOLUTION SUBCUTANEOUS at 20:24

## 2021-06-29 RX ADMIN — VANCOMYCIN HYDROCHLORIDE 125 MG: 125 CAPSULE ORAL at 17:09

## 2021-06-29 RX ADMIN — VANCOMYCIN HYDROCHLORIDE 125 MG: 125 CAPSULE ORAL at 23:24

## 2021-06-29 RX ADMIN — Medication 250 MG: at 20:22

## 2021-06-29 RX ADMIN — INSULIN LISPRO 5 UNITS: 100 INJECTION, SOLUTION INTRAVENOUS; SUBCUTANEOUS at 08:49

## 2021-06-29 RX ADMIN — METRONIDAZOLE 500 MG: 500 INJECTION, SOLUTION INTRAVENOUS at 13:30

## 2021-06-29 RX ADMIN — TOPIRAMATE 50 MG: 25 TABLET, FILM COATED ORAL at 20:23

## 2021-06-29 RX ADMIN — TOPIRAMATE 50 MG: 25 TABLET, FILM COATED ORAL at 08:48

## 2021-06-29 RX ADMIN — VANCOMYCIN HYDROCHLORIDE 125 MG: 125 CAPSULE ORAL at 00:05

## 2021-06-29 RX ADMIN — HYDROMORPHONE HYDROCHLORIDE 0.5 MG: 1 INJECTION, SOLUTION INTRAMUSCULAR; INTRAVENOUS; SUBCUTANEOUS at 02:18

## 2021-06-29 RX ADMIN — METRONIDAZOLE 500 MG: 500 INJECTION, SOLUTION INTRAVENOUS at 02:18

## 2021-06-29 RX ADMIN — SODIUM CHLORIDE, POTASSIUM CHLORIDE, SODIUM LACTATE AND CALCIUM CHLORIDE 100 ML/HR: 600; 310; 30; 20 INJECTION, SOLUTION INTRAVENOUS at 03:18

## 2021-06-29 RX ADMIN — GABAPENTIN 400 MG: 400 CAPSULE ORAL at 13:30

## 2021-06-29 RX ADMIN — SODIUM CHLORIDE, POTASSIUM CHLORIDE, SODIUM LACTATE AND CALCIUM CHLORIDE 100 ML/HR: 600; 310; 30; 20 INJECTION, SOLUTION INTRAVENOUS at 23:24

## 2021-06-29 RX ADMIN — HYDROCODONE BITARTRATE AND ACETAMINOPHEN 1 TABLET: 5; 325 TABLET ORAL at 20:23

## 2021-06-29 RX ADMIN — GABAPENTIN 400 MG: 400 CAPSULE ORAL at 05:43

## 2021-06-30 LAB
GLUCOSE BLDC GLUCOMTR-MCNC: 154 MG/DL (ref 70–130)
GLUCOSE BLDC GLUCOMTR-MCNC: 163 MG/DL (ref 70–130)
GLUCOSE BLDC GLUCOMTR-MCNC: 183 MG/DL (ref 70–130)
GLUCOSE BLDC GLUCOMTR-MCNC: 266 MG/DL (ref 70–130)

## 2021-06-30 PROCEDURE — 63710000001 INSULIN DETEMIR PER 5 UNITS: Performed by: INTERNAL MEDICINE

## 2021-06-30 PROCEDURE — G0378 HOSPITAL OBSERVATION PER HR: HCPCS

## 2021-06-30 PROCEDURE — 99232 SBSQ HOSP IP/OBS MODERATE 35: CPT | Performed by: NURSE PRACTITIONER

## 2021-06-30 PROCEDURE — 63710000001 INSULIN LISPRO (HUMAN) PER 5 UNITS: Performed by: INTERNAL MEDICINE

## 2021-06-30 PROCEDURE — 82962 GLUCOSE BLOOD TEST: CPT

## 2021-06-30 RX ADMIN — HYDROCODONE BITARTRATE AND ACETAMINOPHEN 1 TABLET: 5; 325 TABLET ORAL at 02:14

## 2021-06-30 RX ADMIN — GABAPENTIN 400 MG: 400 CAPSULE ORAL at 13:03

## 2021-06-30 RX ADMIN — SODIUM CHLORIDE, POTASSIUM CHLORIDE, SODIUM LACTATE AND CALCIUM CHLORIDE 100 ML/HR: 600; 310; 30; 20 INJECTION, SOLUTION INTRAVENOUS at 19:45

## 2021-06-30 RX ADMIN — TOPIRAMATE 50 MG: 25 TABLET, FILM COATED ORAL at 08:43

## 2021-06-30 RX ADMIN — HYDROCORTISONE 2.5%: 25 CREAM TOPICAL at 21:00

## 2021-06-30 RX ADMIN — Medication 250 MG: at 21:45

## 2021-06-30 RX ADMIN — GABAPENTIN 400 MG: 400 CAPSULE ORAL at 05:15

## 2021-06-30 RX ADMIN — GABAPENTIN 400 MG: 400 CAPSULE ORAL at 21:45

## 2021-06-30 RX ADMIN — HYDROCODONE BITARTRATE AND ACETAMINOPHEN 1 TABLET: 5; 325 TABLET ORAL at 13:06

## 2021-06-30 RX ADMIN — HYDROCODONE BITARTRATE AND ACETAMINOPHEN 1 TABLET: 5; 325 TABLET ORAL at 21:45

## 2021-06-30 RX ADMIN — Medication 250 MG: at 08:43

## 2021-06-30 RX ADMIN — INSULIN LISPRO 3 UNITS: 100 INJECTION, SOLUTION INTRAVENOUS; SUBCUTANEOUS at 11:23

## 2021-06-30 RX ADMIN — HYDROXYZINE HYDROCHLORIDE 25 MG: 25 TABLET, FILM COATED ORAL at 21:46

## 2021-06-30 RX ADMIN — VANCOMYCIN HYDROCHLORIDE 125 MG: 125 CAPSULE ORAL at 05:15

## 2021-06-30 RX ADMIN — INSULIN LISPRO 3 UNITS: 100 INJECTION, SOLUTION INTRAVENOUS; SUBCUTANEOUS at 08:43

## 2021-06-30 RX ADMIN — TOPIRAMATE 50 MG: 25 TABLET, FILM COATED ORAL at 21:45

## 2021-06-30 RX ADMIN — INSULIN DETEMIR 30 UNITS: 100 INJECTION, SOLUTION SUBCUTANEOUS at 21:00

## 2021-06-30 RX ADMIN — INSULIN LISPRO 8 UNITS: 100 INJECTION, SOLUTION INTRAVENOUS; SUBCUTANEOUS at 18:01

## 2021-07-01 LAB
GLUCOSE BLDC GLUCOMTR-MCNC: 188 MG/DL (ref 70–130)
GLUCOSE BLDC GLUCOMTR-MCNC: 203 MG/DL (ref 70–130)
GLUCOSE BLDC GLUCOMTR-MCNC: 252 MG/DL (ref 70–130)
GLUCOSE BLDC GLUCOMTR-MCNC: 298 MG/DL (ref 70–130)

## 2021-07-01 PROCEDURE — 63710000001 INSULIN LISPRO (HUMAN) PER 5 UNITS: Performed by: INTERNAL MEDICINE

## 2021-07-01 PROCEDURE — 63710000001 INSULIN DETEMIR PER 5 UNITS: Performed by: INTERNAL MEDICINE

## 2021-07-01 PROCEDURE — G0378 HOSPITAL OBSERVATION PER HR: HCPCS

## 2021-07-01 PROCEDURE — 99223 1ST HOSP IP/OBS HIGH 75: CPT | Performed by: NURSE PRACTITIONER

## 2021-07-01 PROCEDURE — 82962 GLUCOSE BLOOD TEST: CPT

## 2021-07-01 PROCEDURE — 99232 SBSQ HOSP IP/OBS MODERATE 35: CPT | Performed by: NURSE PRACTITIONER

## 2021-07-01 RX ORDER — PEG-3350, SODIUM SULFATE, SODIUM CHLORIDE, POTASSIUM CHLORIDE, SODIUM ASCORBATE AND ASCORBIC ACID 7.5-2.691G
1000 KIT ORAL
Status: COMPLETED | OUTPATIENT
Start: 2021-07-02 | End: 2021-07-02

## 2021-07-01 RX ORDER — PEG-3350, SODIUM SULFATE, SODIUM CHLORIDE, POTASSIUM CHLORIDE, SODIUM ASCORBATE AND ASCORBIC ACID 7.5-2.691G
1000 KIT ORAL
Status: COMPLETED | OUTPATIENT
Start: 2021-07-01 | End: 2021-07-01

## 2021-07-01 RX ADMIN — Medication 250 MG: at 09:04

## 2021-07-01 RX ADMIN — TOPIRAMATE 50 MG: 25 TABLET, FILM COATED ORAL at 21:37

## 2021-07-01 RX ADMIN — INSULIN LISPRO 5 UNITS: 100 INJECTION, SOLUTION INTRAVENOUS; SUBCUTANEOUS at 09:04

## 2021-07-01 RX ADMIN — GABAPENTIN 400 MG: 400 CAPSULE ORAL at 14:03

## 2021-07-01 RX ADMIN — HYDROCORTISONE 2.5%: 25 CREAM TOPICAL at 21:38

## 2021-07-01 RX ADMIN — HYDROCODONE BITARTRATE AND ACETAMINOPHEN 1 TABLET: 5; 325 TABLET ORAL at 14:03

## 2021-07-01 RX ADMIN — INSULIN DETEMIR 30 UNITS: 100 INJECTION, SOLUTION SUBCUTANEOUS at 21:38

## 2021-07-01 RX ADMIN — TOPIRAMATE 50 MG: 25 TABLET, FILM COATED ORAL at 09:04

## 2021-07-01 RX ADMIN — Medication 250 MG: at 21:38

## 2021-07-01 RX ADMIN — INSULIN LISPRO 8 UNITS: 100 INJECTION, SOLUTION INTRAVENOUS; SUBCUTANEOUS at 17:58

## 2021-07-01 RX ADMIN — HYDROXYZINE HYDROCHLORIDE 25 MG: 25 TABLET, FILM COATED ORAL at 21:38

## 2021-07-01 RX ADMIN — GABAPENTIN 400 MG: 400 CAPSULE ORAL at 21:38

## 2021-07-01 RX ADMIN — POLYETHYLENE GLYCOL 3350, SODIUM SULFATE, SODIUM CHLORIDE, POTASSIUM CHLORIDE, ASCORBIC ACID, SODIUM ASCORBATE 1000 ML: KIT at 18:59

## 2021-07-01 RX ADMIN — SODIUM CHLORIDE, PRESERVATIVE FREE 10 ML: 5 INJECTION INTRAVENOUS at 21:39

## 2021-07-01 RX ADMIN — HYDROCODONE BITARTRATE AND ACETAMINOPHEN 1 TABLET: 5; 325 TABLET ORAL at 21:38

## 2021-07-01 RX ADMIN — INSULIN LISPRO 8 UNITS: 100 INJECTION, SOLUTION INTRAVENOUS; SUBCUTANEOUS at 12:00

## 2021-07-01 RX ADMIN — SODIUM CHLORIDE, POTASSIUM CHLORIDE, SODIUM LACTATE AND CALCIUM CHLORIDE 100 ML/HR: 600; 310; 30; 20 INJECTION, SOLUTION INTRAVENOUS at 05:33

## 2021-07-01 RX ADMIN — GABAPENTIN 400 MG: 400 CAPSULE ORAL at 05:32

## 2021-07-01 NOTE — PLAN OF CARE
Goal Outcome Evaluation:         Vss. Pt resting well. C/o pain, Prn's given. Colonoscopy planned for tomorrow.

## 2021-07-01 NOTE — PROGRESS NOTES
[facility] Medicine Services  PROGRESS NOTE    Patient Name: Thais Hobson  : 1985  MRN: [id number]    Date of Admission: 2021  Primary Care Physician: Lv Sanchez DO    Subjective   Subjective     CC:  Follow-up right-sided abdominal pain    HPI:  Continues to have several BM's overnight, blood tinged. NAD. Pain is controlled, tender in epigastric area. Having nausea, no emesis, but tolerating diet well.     ROS:  Gen- No fevers, chills  CV- No chest pain, palpitations  Resp- No cough, dyspnea  GI- No N/V/D, +abd tenderness      Objective   Objective     Vital Signs:   Temp:  [97.4 °F (36.3 °C)-98.2 °F (36.8 °C)] 98 °F (36.7 °C)  Heart Rate:  [] 89  Resp:  [16-18] 17  BP: ()/(56-82) 91/56        Physical Exam:  Constitutional: No acute distress, awake, alert resting in bed   HENT: NCAT, mucous membranes moist  Respiratory: Clear to auscultation bilaterally, respiratory effort normal   Cardiovascular: RRR, no murmurs, rubs, or gallops  Gastrointestinal: Positive bowel sounds, soft, mildly tender to epigastric area, nondistended  Musculoskeletal: No bilateral ankle edema  Psychiatric: Appropriate affect, cooperative  Neurologic: Oriented x 3, strength symmetric in all extremities, Cranial Nerves grossly intact to confrontation, speech clear  Skin: No rashes     Results Reviewed:  Results from last 7 days   Lab Units 21  1321 21  1400   WBC 10*3/mm3 10.25 9.84   HEMOGLOBIN g/dL 13.3 13.4   HEMATOCRIT % 43.0 40.7   PLATELETS 10*3/mm3 243 256   PROCALCITONIN ng/mL  --  0.03     Results from last 7 days   Lab Units 21  1558 21  1400   SODIUM mmol/L 138 129*   POTASSIUM mmol/L 3.7 3.5   CHLORIDE mmol/L 103 95*   CO2 mmol/L 23.0 22.0   BUN mg/dL 6 8   CREATININE mg/dL 0.50* 0.60   GLUCOSE mg/dL 246* 300*   CALCIUM mg/dL 8.7 9.2   ALT (SGPT) U/L  --  31   AST (SGOT) U/L  --  24     Estimated Creatinine Clearance: 174.5 mL/min (A) (by  C-G formula based on SCr of 0.5 mg/dL (L)).    Microbiology Results Abnormal     Procedure Component Value - Date/Time    Clostridium Difficile Toxin - Stool, Per Rectum [[id number]]  (Normal) Collected: 06/29/21 1149    Lab Status: Final result Specimen: Stool from Per Rectum Updated: 06/29/21 1614    Narrative:      The following orders were created for panel order Clostridium Difficile Toxin - Stool, Per Rectum.  Procedure                               Abnormality         Status                     ---------                               -----------         ------                     Clostridium Difficile To...[[id number]]  Normal              Final result                 Please view results for these tests on the individual orders.    Clostridium Difficile Toxin, PCR - Stool, Per Rectum [[id number]]  (Normal) Collected: 06/29/21 1149    Lab Status: Final result Specimen: Stool from Per Rectum Updated: 06/29/21 1614     C. Difficile Toxins by PCR Not Detected    Narrative:      Performance characteristics of test not established for patients <2 years of age.  Negative for Toxigenic C. Difficile    COVID PRE-OP / PRE-PROCEDURE SCREENING ORDER (NO ISOLATION) - Swab, Nasopharynx [[id number]]  (Normal) Collected: 06/28/21 1745    Lab Status: Final result Specimen: Swab from Nasopharynx Updated: 06/29/21 1418    Narrative:      The following orders were created for panel order COVID PRE-OP / PRE-PROCEDURE SCREENING ORDER (NO ISOLATION) - Swab, Nasopharynx.  Procedure                               Abnormality         Status                     ---------                               -----------         ------                     COVID-19, M,T,W, APTIMA ...[[id number]]  Normal              Final result                 Please view results for these tests on the individual orders.    COVID-19, M,T,W, APTIMA PANTHER KATHY IN-HOUSE NP/OP SWAB IN UTM/VTM/SALINE TRANSPORT MEDIA 24HR TAT - Swab, Nasopharynx [[id number]]  (Normal)  Collected: 06/28/21 1745    Lab Status: Final result Specimen: Swab from Nasopharynx Updated: 06/29/21 1418     COVID19 Not Detected    Narrative:      Fact sheet for providers: https://www.fda.gov/media/953216/download     Fact sheet for patients: https://www.fda.gov/media/503656/download    Test performed by RT PCR.    Gastrointestinal Panel, PCR - Stool, Per Rectum [[id number]]  (Normal) Collected: 06/29/21 1149    Lab Status: Final result Specimen: Stool from Per Rectum Updated: 06/29/21 1318     Campylobacter Not Detected     Plesiomonas shigelloides Not Detected     Salmonella Not Detected     Vibrio Not Detected     Vibrio cholerae Not Detected     Yersinia enterocolitica Not Detected     Enteroaggregative E. coli (EAEC) Not Detected     Enteropathogenic E. coli (EPEC) Not Detected     Enterotoxigenic E. coli (ETEC) lt/st Not Detected     Shiga-like toxin-producing E. coli (STEC) stx1/stx2 Not Detected     Shigella/Enteroinvasive E. coli (EIEC) Not Detected     Cryptosporidium Not Detected     Cyclospora cayetanensis Not Detected     Entamoeba histolytica Not Detected     Giardia lamblia Not Detected     Adenovirus F40/41 Not Detected     Astrovirus Not Detected     Norovirus GI/GII Not Detected     Rotavirus A Not Detected     Sapovirus (I, II, IV or V) Not Detected          Imaging Results (Last 24 Hours)     ** No results found for the last 24 hours. **              I have reviewed the medications:  Scheduled Meds:gabapentin, 400 mg, Oral, Q8H  Hydrocortisone (Perianal), , Rectal, BID  insulin detemir, 30 Units, Subcutaneous, Nightly  insulin lispro, 0-14 Units, Subcutaneous, TID AC  saccharomyces boulardii, 250 mg, Oral, BID  sodium chloride, 10 mL, Intravenous, Q12H  topiramate, 50 mg, Oral, BID      Continuous Infusions:lactated ringers, 100 mL/hr, Last Rate: 100 mL/hr (07/01/21 4333)      PRN Meds:.•  dextrose  •  dextrose  •  diphenhydrAMINE  •  glucagon (human recombinant)  •   HYDROcodone-acetaminophen  •  hydrOXYzine  •  ondansetron **OR** ondansetron  •  promethazine  •  sodium chloride    Assessment/Plan   Assessment & Plan     Active Hospital Problems    Diagnosis  POA   • **C. difficile colitis [A04.72]  Yes   • Diarrhea [R19.7]  Yes   • Gastroparesis [K31.84]  Yes   • Ulcerative pancolitis without complication (CMS/[facility]) [K51.00]  Yes   • Type 2 diabetes mellitus treated with insulin (CMS/[facility]) [E11.9, Z79.4]  Not Applicable   • Bipolar disorder (CMS/[facility]) [F31.9]  Yes      Resolved Hospital Problems   No resolved problems to display.        Brief Hospital Course to date:  Thais Hobson is a 36 y.o. female With a PMH of gastroparesis, ulcerative pancolitis w/o complication, T2DM, and bipolar disorder who was admitted on June 28 for diarrhea. Has a hx of c diff and ulcerative colitis.    Patient and problems new to me today    Diarrhea  -Recurrent C diff colitis vs ulcerative colitis flare; likely a UC flare with negative CDiff panel, as well as negative GI PCR. Will stop Vancomycin and consultation to GI for management of UC, await GI recommendations   -Continue ID consult, appreciate Dr Martin input; will discontinue oral Vanc and consult GI as mentioned   -regular diet, tolerating     Hyponatremia   --resolved, now 138     T2DM, uncontrolled with gastroparesis   -Continue Levemir 30 units subcu nightly, sliding scale insulin  --continue with good control       DVT prophylaxis:  Mechanical DVT prophylaxis orders are present.       Disposition: I expect the patient to be discharged pending improvement in GI symptoms, and pending GI recommendations     CODE STATUS:   Code Status and Medical Interventions:   Ordered at: 06/28/21 1320     Code Status:    CPR     Medical Interventions (Level of Support Prior to Arrest):    Full       Eden Henry, APRN  07/01/21

## 2021-07-01 NOTE — CONSULTS
[facility] [place] Gastroenterology Consult    Referring Provider: Sohan Cain MD    PCP: Lv Sanchez DO    Reason for Consultation: Management of ulcerative colitis    Chief complaint: Abdominal pain and diarrhea    History of present illness:    Thais Hobson is a 36 y.o. female who is admitted with worsening abdominal pain and diarrhea.  Patient well-known to inpatient GI service as we have managed her recently for ulcerative colitis.  Patient has a longstanding history of UC which was diagnosed greater than 15 years ago; in that timeframe, patient has been intolerant to most biologic therapies with numerous allergies including to Remicade, Humira, Xeljanz, and is even intolerant to her current therapy of Entyvio.  Patient notes that recently she was admitted to [facility] where she was treated for C. difficile infection.  Notes the following she has had persistent diarrhea and abdominal pain.  Does not endorse any nausea, vomiting, shortness of breath, chest pain, fever/chills, or recent known sick contacts.  Patient also provides that her symptoms are worse following meals with abdominal pain and diarrhea; states that she has been having 10+ diarrheal stools with occasional blood and mucus.  Reports she missed her most recent dose of Entyvio as a result of her active C. difficile infection.  Notes that she receives Entyvio monthly; in May, she received her Entyvio on May 4 and the 2-week Entyvio level was drawn and patient was found to be nontherapeutic.  Past medical, surgical, social, and family history is reviewed for accuracy.  No documented alleviating factors and p.o. intake exacerbates symptoms.  Reports epigastric abdominal pain at time of exam.    Last Colonoscopy: June 2020 per Dr. Graf.  Last EGD: May 2021 per Dr. Brunner: EGD found mild erosive gastritis and excess fluid in stomach suggestive of gastroparesis.    Allergies:  Hydrochlorothiazide, Penicillins, Remicade [infliximab],  Soybean-containing drug products, Xeljanz [tofacitinib citrate], Zofran [ondansetron hcl], Fidaxomicin, and Reglan [metoclopramide]    Scheduled Meds:  gabapentin, 400 mg, Oral, Q8H  Hydrocortisone (Perianal), , Rectal, BID  insulin detemir, 30 Units, Subcutaneous, Nightly  insulin lispro, 0-14 Units, Subcutaneous, TID AC  saccharomyces boulardii, 250 mg, Oral, BID  sodium chloride, 10 mL, Intravenous, Q12H  topiramate, 50 mg, Oral, BID         Infusions:  lactated ringers, 100 mL/hr, Last Rate: 100 mL/hr (07/01/21 5330)        PRN Meds:  •  dextrose  •  dextrose  •  diphenhydrAMINE  •  glucagon (human recombinant)  •  HYDROcodone-acetaminophen  •  hydrOXYzine  •  ondansetron **OR** ondansetron  •  promethazine  •  sodium chloride    Home Meds:  Medications Prior to Admission   Medication Sig Dispense Refill Last Dose   • azaTHIOprine (IMURAN) 50 MG tablet TAKE 2 TABLETS BY MOUTH EVERY DAY 60 tablet 1    • Continuous Blood Gluc  (Dexcom G6 ) device 1 Units Daily. 1 each 1    • Continuous Blood Gluc Sensor (Dexcom G6 Sensor) Every 10 (Ten) Days. 3 each 11    • Continuous Blood Gluc Transmit (Dexcom G6 Transmitter) misc 1 Units Daily. 1 each 1    • Continuous Glucose Monitor kit Use as directed 1 each 11    • diphenhydrAMINE (Banophen) 25 mg capsule Take 1 capsule by mouth Every 8 (Eight) Hours As Needed for Itching or Allergies. 90 capsule 1    • estradiol (MINIVELLE, VIVELLE-DOT) 0.05 MG/24HR patch Place 1 patch on the skin as directed by provider 1 (One) Time Per Week. 8 patch 2    • fluconazole (Diflucan) 100 MG tablet Take 1 tablet by mouth Daily. 7 tablet 0    • gabapentin (NEURONTIN) 800 MG tablet Take 1 tablet by mouth 3 (Three) Times a Day. 90 tablet 0    • glucose blood test strip Check glucose TID, One Touch Reveal 90 each 11    • glucose blood test strip ONE TOUCH VERIO FLEX TEST STRIPS TO CHECK GLUCOSE 3 TIMES DAILY FOR DM E11.9 100 each 12    • HumaLOG Mix 75/25 KwikPen (75-25) 100  "UNIT/ML suspension pen-injector pen Inject 25 Units under the skin into the appropriate area as directed 2 (Two) Times a Day With Meals. 5 pen 3    • HYDROcodone-acetaminophen (NORCO) 5-325 MG per tablet Take 1 tablet by mouth Every 6 (Six) Hours As Needed for Moderate Pain . 25 tablet 0    • Hydrocortisone, Perianal, (Proctozone-HC) 2.5 % rectal cream Insert  into the rectum 2 (Two) Times a Day. 28 g 1    • hydrOXYzine pamoate (VISTARIL) 25 MG capsule Take 1 capsule by mouth 3 (Three) Times a Day As Needed for Anxiety. 60 capsule 2    • insulin aspart (NovoLOG) 100 UNIT/ML injection TID dosing with meals, as per sliding scale coverage, up to 12 units TID dosing available 10 mL 3    • Insulin Glargine (BASAGLAR KWIKPEN) 100 UNIT/ML injection pen Inject 40 Units under the skin into the appropriate area as directed Every Night. 5 pen 3    • Insulin Syringe 30G X 5/16\" 1 ML misc 1 syringe 3 (Three) Times a Day. 100 each 2    • Lancets misc USE AS DIRECTED TO CHECK GLUCOSE 3 TIMES DAILY FOR DM E11.9 100 each 3    • promethazine (PHENERGAN) 25 MG tablet Take 1 tablet by mouth Every 6 (Six) Hours As Needed for Nausea or Vomiting. 40 tablet 2    • SUMAtriptan (Imitrex) 50 MG tablet Take one tablet at onset of headache. May repeat dose one time in 2 hours if headache not relieved. 8 tablet 2    • topiramate (TOPAMAX) 50 MG tablet Take 1 tablet by mouth 2 (Two) Times a Day. 60 tablet 2        ROS: Review of Systems   Constitutional: Positive for activity change, appetite change and fatigue. Negative for chills, diaphoresis, fever and unexpected weight change.   HENT: Negative for sore throat, trouble swallowing and voice change.    Eyes: Negative.    Respiratory: Negative for apnea, cough, choking, chest tightness, shortness of breath, wheezing and stridor.    Cardiovascular: Negative for chest pain, palpitations and leg swelling.   Gastrointestinal: Positive for abdominal pain, blood in stool, diarrhea and nausea. Negative " for abdominal distention, anal bleeding, constipation, rectal pain and vomiting.   Endocrine: Negative.    Genitourinary: Negative.    Musculoskeletal: Negative.    Skin: Negative for color change, pallor, rash and wound.   Allergic/Immunologic: Negative.    Neurological: Negative.    Hematological: Negative for adenopathy. Does not bruise/bleed easily.   Psychiatric/Behavioral: Negative.    All other systems reviewed and are negative.      PAST MED HX:  Past Medical History:   Diagnosis Date   • Abdominal pain     periumbilicul   • Abdominal tenderness     Periumbil   • Alopecia    • Anemia    • Anxiety    • Arthritis    • Asthma     as a child   • Back pain    • Bipolar disorder (CMS/HCC)    • Blood in stool    • Body piercing     ears   • Colitis    • Colitis    • Colon polyps    • Depression    • Diabetes mellitus (CMS/HCC)    • Diarrhea    • Dysphagia     Patient reported he has trouble from time to time and that her throat has to be dilated   • Elevated cholesterol     Reported slightly elevated - taking no medication    • Fractures     Right pinky toe - no surgery required   • GERD (gastroesophageal reflux disease)    • H/O colonoscopy 08/01/2013    Terminal ileal biopsies negative. Cecal&ascending biopsie revealed chronic active colitis w/ features cons. w/ inflammatory bowel disease. Transevere colon biopsies revealved chronic active colitis. Desc. colon biopsies revealed chronic active colitis. Rect colon biopsie revealed chronic active colitis. Negative for dysplasia   • Hematemesis    • History of Clostridium difficile infection    • History of transfusion     No reaction to transfusion reported   • Migraine headache    • Nausea     alone   • Pancreatitis    • Restless leg    • Seizures (CMS/HCC)     pt reports x1    • Tattoos     x9   • Universal ulcerative colitis (CMS/HCC)    • UTI (urinary tract infection)    • Weight loss        PAST SURG HX:  Past Surgical History:   Procedure Laterality Date   •  "BREAST SURGERY Left     breast lump removed benign   •  SECTION     • CHOLECYSTECTOMY      for stone disease   • COLONOSCOPY     • COLONOSCOPY N/A 2020    Procedure: COLONOSCOPY;  Surgeon: Alonzo He MD;  Location: [facility] ENDOSCOPY;  Service: Gastroenterology;  Laterality: N/A;   • DILATATION AND CURETTAGE     • ENDOSCOPY  2013    Erosive esophagitis, grade 2; erosive gastritis; small sliding hiatal hernia less than 3 cm, erosive deodenitis duodenal polyp.No gan mucosa.Second portion duedenal biopsy neg. Second postion of dudoenal biopsy revealed polypoid intestinal mucosa w/ lymphoid aggregate. 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 no helicobacter pylori. Negative for mateaplasia, dysplasia   • ENDOSCOPY N/A 2020    Procedure: ESOPHAGOGASTRODUODENOSCOPY WITH DILATATION AND BIOPSIES;  Surgeon: Alonzo He MD;  Location: [facility] ENDOSCOPY;  Service: Gastroenterology;  Laterality: N/A;   • ENDOSCOPY N/A 2021    Procedure: ESOPHAGOGASTRODUODENOSCOPY;  Surgeon: Brunner, Mark I, MD;  Location: [facility] ENDOSCOPY;  Service: Gastroenterology;  Laterality: N/A;   • HIP SURGERY Right    • HYSTERECTOMY     • LEEP         FAM HX:  Family History   Problem Relation Age of Onset   • Heart failure Father    • Heart disease Father    • No Known Problems Mother    • No Known Problems Brother    • No Known Problems Son    • No Known Problems Son        SOC HX:  Social History     Socioeconomic History   • Marital status: Single     Spouse name: Not on file   • Number of children: Not on file   • Years of education: Not on file   • Highest education level: Not on file   Tobacco Use   • Smoking status: Never Smoker   • Smokeless tobacco: Never Used   Substance and Sexual Activity   • Alcohol use: No   • Drug use: No   • Sexual activity: Defer       PHYSICAL EXAM  /87   Pulse 97   Temp 98 °F (36.7 °C) (Oral)   Resp 17   Ht 160 cm (63\")  "  Wt 97.5 kg (214 lb 14.4 oz)   LMP  (LMP Unknown)   SpO2 100%   BMI 38.07 kg/m²   Wt Readings from Last 3 Encounters:   07/01/21 97.5 kg (214 lb 14.4 oz)   05/16/21 90.3 kg (199 lb)   03/11/21 92.5 kg (204 lb)   ,body mass index is 38.07 kg/m².  Physical Exam  Vitals and nursing note reviewed.   Constitutional:       General: She is not in acute distress.     Appearance: Normal appearance. She is obese. She is not ill-appearing or toxic-appearing.   HENT:      Head: Normocephalic and atraumatic.      Mouth/Throat:      Mouth: Mucous membranes are moist.      Pharynx: Oropharynx is clear. No oropharyngeal exudate.   Eyes:      General: No scleral icterus.     Extraocular Movements: Extraocular movements intact.      Conjunctiva/sclera: Conjunctivae normal.      Pupils: Pupils are equal, round, and reactive to light.   Cardiovascular:      Rate and Rhythm: Normal rate and regular rhythm.      Pulses: Normal pulses.      Heart sounds: Normal heart sounds.   Pulmonary:      Effort: Pulmonary effort is normal. No respiratory distress.      Breath sounds: Normal breath sounds.   Abdominal:      General: Abdomen is flat. Bowel sounds are normal. There is no distension.      Palpations: Abdomen is soft. There is no mass.      Tenderness: There is abdominal tenderness. There is no guarding or rebound.      Hernia: No hernia is present.   Genitourinary:     Comments: defer  Musculoskeletal:         General: Normal range of motion.      Cervical back: Normal range of motion and neck supple. No rigidity or tenderness.      Right lower leg: No edema.      Left lower leg: No edema.   Lymphadenopathy:      Cervical: No cervical adenopathy.   Skin:     General: Skin is warm and dry.      Capillary Refill: Capillary refill takes less than 2 seconds.      Coloration: Skin is not jaundiced or pale.   Neurological:      General: No focal deficit present.      Mental Status: She is alert and oriented to person, place, and time.    Psychiatric:         Mood and Affect: Mood normal.         Behavior: Behavior normal.         Thought Content: Thought content normal.         Judgment: Judgment normal.     Results Review:   I reviewed the patient's new clinical results.  I reviewed the patient's new imaging results and agree with the interpretation.  I personally viewed and interpreted the patient's EKG/Telemetry data    Lab Results   Component Value Date    WBC 10.25 06/29/2021    HGB 13.3 06/29/2021    HGB 13.4 06/28/2021    HGB 11.8 (L) 05/17/2021    HCT 43.0 06/29/2021    MCV 91.9 06/29/2021     06/29/2021       No results found for: INR    Lab Results   Component Value Date    GLUCOSE 246 (H) 06/29/2021    BUN 6 06/29/2021    CREATININE 0.50 (L) 06/29/2021    EGFRIFNONA 140 06/29/2021    EGFRIFAFRI >150 06/29/2021    BCR 12.0 06/29/2021     06/29/2021    K 3.7 06/29/2021    CO2 23.0 06/29/2021    CALCIUM 8.7 06/29/2021    PROTENTOTREF 7.9 11/20/2019    ALBUMIN 4.00 06/28/2021    ALKPHOS 168 (H) 06/28/2021    BILITOT 0.3 06/28/2021    ALT 31 06/28/2021    AST 24 06/28/2021       No results found.    COVID19   Date Value Ref Range Status   06/28/2021 Not Detected Not Detected - Ref. Range Final        Ref. Range 5/16/2021 16:24   Vedolizumab Latest Units: ug/mL 26     ASSESSMENTS/PLANS  1.  Ulcerative colitis flare  2.  Acute abdominal pain, related to above  3.  Diarrhea, related to above  4.  Type 2 diabetes mellitus, poorly controlled  5.  Gastroparesis, related to above    Thais Hobson is a 36 y.o. female admitted to hospital with worsening diarrhea and abdominal pain.  Patient known to service for management of her ulcerative colitis.  Most recently patient has been managed on Entyvio but 2 weeks following a last dose within our system, her levels were not therapeutic.  Patient's clinical presentation consistent with a UC flare.  Long-term goal is for subtotal colectomy with plans for J-pouch; however, patient's  uncontrolled diabetes hinders this plan as it would complicate wound healing.  Given use of Entyvio in fact patient on therapeutic, recommend colonoscopy tomorrow to evaluate extent of disease process.  We discussed increasing Entyvio dose and reevaluating levels pending results of colonoscopy.  Also discussed use of steroids; we will hold on this for now as historically patient has had limited benefit from steroid administration.    >>> Recommend colonoscopy tomorrow to evaluate extent of patient's ulcerative colitis.  >>> Cleared with diet now; n.p.o. at midnight  >>> Obtain informed consent for colonoscopy  >>> MoviPrep: Give 8 ounces every 15 minutes ×2 L starting at 4 PM.  Repeat dose at 5 AM tomorrow.  Must finish both doses in 2 hours.  Stool should look like lemonade when finished.  Please call ENDO at [phone] at 7:30am if not clear.  >>> We will hold on steroids at this time as patient historically has had little benefit from steroids.  >>> May use OTC Veronica Root 500 mg TID for gastroparesis    I discussed the patient's findings and my recommendations with patient, family and nursing staff    EMELYN Bradley  07/01/21  16:41 EDT

## 2021-07-01 NOTE — PLAN OF CARE
Goal Outcome Evaluation:           Progress: improving  Outcome Summary: VSS.  36 year old white female that was admitted on 6/28/2021.  Patient has been up ad del to BR. Have adinistered scheduled and prn medication.  She stated she has had multiple loose BM's today.  She  has an IV in place and patent.  Will continue to monitor patient throughout the rest of this shift.

## 2021-07-02 ENCOUNTER — ANESTHESIA EVENT (OUTPATIENT)
Dept: GASTROENTEROLOGY | Facility: HOSPITAL | Age: 36
End: 2021-07-02

## 2021-07-02 ENCOUNTER — ANESTHESIA (OUTPATIENT)
Dept: GASTROENTEROLOGY | Facility: HOSPITAL | Age: 36
End: 2021-07-02

## 2021-07-02 LAB
GLUCOSE BLDC GLUCOMTR-MCNC: 123 MG/DL (ref 70–130)
GLUCOSE BLDC GLUCOMTR-MCNC: 183 MG/DL (ref 70–130)
GLUCOSE BLDC GLUCOMTR-MCNC: 193 MG/DL (ref 70–130)
GLUCOSE BLDC GLUCOMTR-MCNC: 211 MG/DL (ref 70–130)
GLUCOSE BLDC GLUCOMTR-MCNC: 230 MG/DL (ref 70–130)

## 2021-07-02 PROCEDURE — 63710000001 INSULIN LISPRO (HUMAN) PER 5 UNITS: Performed by: INTERNAL MEDICINE

## 2021-07-02 PROCEDURE — 63710000001 INSULIN DETEMIR PER 5 UNITS: Performed by: INTERNAL MEDICINE

## 2021-07-02 PROCEDURE — 63710000001 PROMETHAZINE PER 25 MG: Performed by: INTERNAL MEDICINE

## 2021-07-02 PROCEDURE — 0DBK8ZX EXCISION OF ASCENDING COLON, VIA NATURAL OR ARTIFICIAL OPENING ENDOSCOPIC, DIAGNOSTIC: ICD-10-PCS | Performed by: INTERNAL MEDICINE

## 2021-07-02 PROCEDURE — 0DBH8ZX EXCISION OF CECUM, VIA NATURAL OR ARTIFICIAL OPENING ENDOSCOPIC, DIAGNOSTIC: ICD-10-PCS | Performed by: INTERNAL MEDICINE

## 2021-07-02 PROCEDURE — 99232 SBSQ HOSP IP/OBS MODERATE 35: CPT | Performed by: NURSE PRACTITIONER

## 2021-07-02 PROCEDURE — 82962 GLUCOSE BLOOD TEST: CPT

## 2021-07-02 PROCEDURE — 0DBL8ZX EXCISION OF TRANSVERSE COLON, VIA NATURAL OR ARTIFICIAL OPENING ENDOSCOPIC, DIAGNOSTIC: ICD-10-PCS | Performed by: INTERNAL MEDICINE

## 2021-07-02 PROCEDURE — 63710000001 INSULIN LISPRO (HUMAN) PER 5 UNITS: Performed by: NURSE PRACTITIONER

## 2021-07-02 PROCEDURE — 88305 TISSUE EXAM BY PATHOLOGIST: CPT | Performed by: INTERNAL MEDICINE

## 2021-07-02 PROCEDURE — 45380 COLONOSCOPY AND BIOPSY: CPT | Performed by: INTERNAL MEDICINE

## 2021-07-02 PROCEDURE — 63710000001 INSULIN LISPRO (HUMAN) PER 5 UNITS: Performed by: NURSE ANESTHETIST, CERTIFIED REGISTERED

## 2021-07-02 PROCEDURE — 25010000002 PROPOFOL 10 MG/ML EMULSION: Performed by: NURSE ANESTHETIST, CERTIFIED REGISTERED

## 2021-07-02 RX ORDER — CHOLESTYRAMINE LIGHT 4 G/5.7G
1 POWDER, FOR SUSPENSION ORAL EVERY 12 HOURS
Status: DISCONTINUED | OUTPATIENT
Start: 2021-07-02 | End: 2021-07-06 | Stop reason: HOSPADM

## 2021-07-02 RX ORDER — PROPOFOL 10 MG/ML
VIAL (ML) INTRAVENOUS AS NEEDED
Status: DISCONTINUED | OUTPATIENT
Start: 2021-07-02 | End: 2021-07-02

## 2021-07-02 RX ORDER — SODIUM CHLORIDE, SODIUM LACTATE, POTASSIUM CHLORIDE, CALCIUM CHLORIDE 600; 310; 30; 20 MG/100ML; MG/100ML; MG/100ML; MG/100ML
30 INJECTION, SOLUTION INTRAVENOUS CONTINUOUS PRN
Status: DISCONTINUED | OUTPATIENT
Start: 2021-07-02 | End: 2021-07-06 | Stop reason: HOSPADM

## 2021-07-02 RX ORDER — LIDOCAINE HYDROCHLORIDE 10 MG/ML
INJECTION, SOLUTION EPIDURAL; INFILTRATION; INTRACAUDAL; PERINEURAL AS NEEDED
Status: DISCONTINUED | OUTPATIENT
Start: 2021-07-02 | End: 2021-07-02 | Stop reason: SURG

## 2021-07-02 RX ORDER — PROPOFOL 10 MG/ML
VIAL (ML) INTRAVENOUS CONTINUOUS PRN
Status: DISCONTINUED | OUTPATIENT
Start: 2021-07-02 | End: 2021-07-02 | Stop reason: SURG

## 2021-07-02 RX ORDER — IPRATROPIUM BROMIDE AND ALBUTEROL SULFATE 2.5; .5 MG/3ML; MG/3ML
3 SOLUTION RESPIRATORY (INHALATION) ONCE AS NEEDED
Status: DISCONTINUED | OUTPATIENT
Start: 2021-07-02 | End: 2021-07-02 | Stop reason: HOSPADM

## 2021-07-02 RX ORDER — PROMETHAZINE HYDROCHLORIDE 25 MG/1
25 TABLET ORAL EVERY 6 HOURS PRN
Status: DISCONTINUED | OUTPATIENT
Start: 2021-07-02 | End: 2021-07-06 | Stop reason: HOSPADM

## 2021-07-02 RX ORDER — FAMOTIDINE 10 MG/ML
20 INJECTION, SOLUTION INTRAVENOUS ONCE
Status: COMPLETED | OUTPATIENT
Start: 2021-07-02 | End: 2021-07-02

## 2021-07-02 RX ORDER — PROPOFOL 10 MG/ML
VIAL (ML) INTRAVENOUS AS NEEDED
Status: DISCONTINUED | OUTPATIENT
Start: 2021-07-02 | End: 2021-07-02 | Stop reason: SURG

## 2021-07-02 RX ORDER — MESALAMINE 4 G/60ML
4 ENEMA RECTAL NIGHTLY
Status: DISCONTINUED | OUTPATIENT
Start: 2021-07-02 | End: 2021-07-06 | Stop reason: HOSPADM

## 2021-07-02 RX ORDER — ONDANSETRON 2 MG/ML
4 INJECTION INTRAMUSCULAR; INTRAVENOUS ONCE AS NEEDED
Status: DISCONTINUED | OUTPATIENT
Start: 2021-07-02 | End: 2021-07-02 | Stop reason: HOSPADM

## 2021-07-02 RX ADMIN — GABAPENTIN 400 MG: 400 CAPSULE ORAL at 21:08

## 2021-07-02 RX ADMIN — INSULIN LISPRO 5 UNITS: 100 INJECTION, SOLUTION INTRAVENOUS; SUBCUTANEOUS at 12:48

## 2021-07-02 RX ADMIN — HYDROCODONE BITARTRATE AND ACETAMINOPHEN 1 TABLET: 5; 325 TABLET ORAL at 16:33

## 2021-07-02 RX ADMIN — SODIUM CHLORIDE, POTASSIUM CHLORIDE, SODIUM LACTATE AND CALCIUM CHLORIDE 30 ML/HR: 600; 310; 30; 20 INJECTION, SOLUTION INTRAVENOUS at 08:17

## 2021-07-02 RX ADMIN — TOPIRAMATE 50 MG: 25 TABLET, FILM COATED ORAL at 10:14

## 2021-07-02 RX ADMIN — HYDROCODONE BITARTRATE AND ACETAMINOPHEN 1 TABLET: 5; 325 TABLET ORAL at 22:35

## 2021-07-02 RX ADMIN — Medication 250 MG: at 21:08

## 2021-07-02 RX ADMIN — SODIUM CHLORIDE, POTASSIUM CHLORIDE, SODIUM LACTATE AND CALCIUM CHLORIDE 250 ML: 600; 310; 30; 20 INJECTION, SOLUTION INTRAVENOUS at 09:05

## 2021-07-02 RX ADMIN — PROMETHAZINE HYDROCHLORIDE 25 MG: 25 TABLET ORAL at 10:14

## 2021-07-02 RX ADMIN — POLYETHYLENE GLYCOL 3350, SODIUM SULFATE, SODIUM CHLORIDE, POTASSIUM CHLORIDE, ASCORBIC ACID, SODIUM ASCORBATE 1000 ML: KIT at 04:01

## 2021-07-02 RX ADMIN — Medication 250 MG: at 10:14

## 2021-07-02 RX ADMIN — SODIUM CHLORIDE, PRESERVATIVE FREE 10 ML: 5 INJECTION INTRAVENOUS at 21:15

## 2021-07-02 RX ADMIN — HYDROXYZINE HYDROCHLORIDE 25 MG: 25 TABLET, FILM COATED ORAL at 21:15

## 2021-07-02 RX ADMIN — SODIUM CHLORIDE, POTASSIUM CHLORIDE, SODIUM LACTATE AND CALCIUM CHLORIDE 150 ML: 600; 310; 30; 20 INJECTION, SOLUTION INTRAVENOUS at 09:26

## 2021-07-02 RX ADMIN — SODIUM CHLORIDE, POTASSIUM CHLORIDE, SODIUM LACTATE AND CALCIUM CHLORIDE 100 ML/HR: 600; 310; 30; 20 INJECTION, SOLUTION INTRAVENOUS at 00:35

## 2021-07-02 RX ADMIN — CHOLESTYRAMINE 4 G: 4 POWDER, FOR SUSPENSION ORAL at 22:35

## 2021-07-02 RX ADMIN — FAMOTIDINE 20 MG: 10 INJECTION INTRAVENOUS at 08:17

## 2021-07-02 RX ADMIN — LIDOCAINE HYDROCHLORIDE 50 MG: 10 INJECTION, SOLUTION EPIDURAL; INFILTRATION; INTRACAUDAL; PERINEURAL at 09:05

## 2021-07-02 RX ADMIN — GABAPENTIN 400 MG: 400 CAPSULE ORAL at 15:44

## 2021-07-02 RX ADMIN — PROPOFOL 50 MG: 10 INJECTION, EMULSION INTRAVENOUS at 09:08

## 2021-07-02 RX ADMIN — TOPIRAMATE 50 MG: 25 TABLET, FILM COATED ORAL at 21:08

## 2021-07-02 RX ADMIN — PROPOFOL 100 MCG/KG/MIN: 10 INJECTION, EMULSION INTRAVENOUS at 09:05

## 2021-07-02 RX ADMIN — INSULIN DETEMIR 30 UNITS: 100 INJECTION, SOLUTION SUBCUTANEOUS at 21:09

## 2021-07-02 RX ADMIN — SODIUM CHLORIDE, PRESERVATIVE FREE 10 ML: 5 INJECTION INTRAVENOUS at 10:16

## 2021-07-02 RX ADMIN — MESALAMINE 4 G: 4 ENEMA RECTAL at 21:08

## 2021-07-02 RX ADMIN — PROPOFOL 100 MG: 10 INJECTION, EMULSION INTRAVENOUS at 09:05

## 2021-07-02 RX ADMIN — INSULIN LISPRO 3 UNITS: 100 INJECTION, SOLUTION INTRAVENOUS; SUBCUTANEOUS at 12:48

## 2021-07-02 RX ADMIN — INSULIN LISPRO 5 UNITS: 100 INJECTION, SOLUTION INTRAVENOUS; SUBCUTANEOUS at 17:44

## 2021-07-02 RX ADMIN — PROPOFOL 50 MG: 10 INJECTION, EMULSION INTRAVENOUS at 09:15

## 2021-07-02 RX ADMIN — HYDROCORTISONE 2.5%: 25 CREAM TOPICAL at 22:36

## 2021-07-02 RX ADMIN — HYDROCODONE BITARTRATE AND ACETAMINOPHEN 1 TABLET: 5; 325 TABLET ORAL at 10:18

## 2021-07-02 NOTE — POST-PROCEDURE NOTE
Colon- mild left sided colitis to 50 cm.  Bx taken    REC Cont Entyivio,  Rowasa enemas  low dose allopurinol  Increase Imuran.

## 2021-07-02 NOTE — PLAN OF CARE
Goal Outcome Evaluation:        VSS. Pt resting well. C/o pain, PRNs' given. Possible D/C tomorrow.

## 2021-07-02 NOTE — ANESTHESIA POSTPROCEDURE EVALUATION
Patient: Thais Hobson    Procedure Summary     Date: 07/02/21 Room / Location:  [facility]    Anesthesia Start: 0859 Anesthesia Stop:     Procedure: COLONOSCOPY (N/A ) Diagnosis:       Diarrhea, unspecified type      Ulcerative pancolitis without complication (CMS/HCC)      (Diarrhea, unspecified type [R19.7])      (Ulcerative pancolitis without complication (CMS/HCC) [K51.00])    Surgeons: Francisco Sainz MD Provider: Heriberto Weller Jr., MD    Anesthesia Type: general ASA Status: 3          Anesthesia Type: general    Vitals  Vitals Value Taken Time   BP 95/69 07/02/21 0931   Temp 97.2 °F (36.2 °C) 07/02/21 0930   Pulse 93 07/02/21 0932   Resp 18 07/02/21 0930   SpO2 99 % 07/02/21 0932   Vitals shown include unvalidated device data.        Post Anesthesia Care and Evaluation    Patient location during evaluation: PACU  Patient participation: complete - patient participated  Level of consciousness: sleepy but conscious  Pain management: adequate  Airway patency: patent  Anesthetic complications: No anesthetic complications  PONV Status: none  Cardiovascular status: hemodynamically stable and acceptable  Respiratory status: nonlabored ventilation, acceptable and room air  Hydration status: acceptable

## 2021-07-02 NOTE — PAYOR COMM NOTE
"Alexia Wall, RN Utilization Review [phone]  Fax # [phone]    Status is changed from OBS to IP. Please review clinicals for IP admission.         Thais Hobson (36 y.o. Female)     Date of Birth Social Security Number Address Home Phone MRN    1985  [address]   [place] [address] [phone] [id number]    Rastafarian Marital Status          Patient Refused Single       Admission Date Admission Type Admitting Provider Attending Provider Department, Room/Bed    6/28/21 Urgent Moraima Garcia MD Abbeyquaye, Sarah Kathleen, DO [facility] 5B, N540/1    Discharge Date Discharge Disposition Discharge Destination                       Attending Provider: Kandi Butterfield DO    Allergies: Hydrochlorothiazide, Penicillins, Remicade [Infliximab], Soybean-containing Drug Products, Xeljanz [Tofacitinib Citrate], Zofran [Ondansetron Hcl], Fidaxomicin, Reglan [Metoclopramide]    Isolation: None   Infection: None   Code Status: CPR    Ht: 160 cm (63\")   Wt: 94.3 kg (207 lb 14.3 oz)    Admission Cmt: None   Principal Problem: C. difficile colitis [A04.72] More...                 Active Insurance as of 6/28/2021     Primary Coverage     Payor Plan Insurance Group Employer/Plan Group    [employer] [place] [id number]     Payor Plan Address Payor Plan Phone Number Payor Plan Fax Number Effective Dates    [address] [address] [phone]  2/2/2017 - None Entered    [facility] [id number]       Subscriber Name Subscriber Birth Date Member ID       THAIS HOBSON 1985 [id number]                 Emergency Contacts      (Rel.) Home Phone Work Phone Mobile Phone    JULESIBISFRANCK (Mother) [phone] -- [phone]    JazlynKandi (Step Parent) [phone] -- --    HOBSONRENEE CHIN (Father) [phone] -- --               History & Physical      Moraima Garcia MD at 06/28/21 1909              [facility] Medicine " Services  HISTORY AND PHYSICAL    Patient Name: Thais Hobson  : 1985  MRN: [id number]  Primary Care Physician: Lv Sanchez DO  Date of admission: 2021      Subjective   Subjective     Chief Complaint:  Diarrhea    HPI:  Thais Hobson is a 36 y.o. female with ulcerative colitis and intolerance to multiple drugs currently on Entyvio.  She has a history of C. Diff.  She was recently admitted here last month due to UC flare and placed on steroids. She also had EGD showing gastroparesis. She states she followed up with GI as scheduled and was directly admitted to [facility] due to ongoing symptoms where she was treated for C. Diff with PO vancomycin x 7 days then discharged with an additional 7 days of PO vancomycin.  She says initially symptoms were better but then diarrhea worsened again.  She denies fever/chills.  She had a telemedicine appointment today with her PCP and requested admission here for further evaluation.      COVID Details:    Symptoms:    [x] NONE [] Fever []  Cough [] Shortness of breath [] Change in taste/smell      The patient qualifies to receive the vaccine, but they have not yet received it.      Review of Systems   Gen- No fevers, chills  CV- No chest pain, palpitations  Resp- No cough, dyspnea  GI- + N/V/D, abd pain    All other systems reviewed and are negative.     Personal History     Past Medical History:   Diagnosis Date   • Abdominal pain     periumbilicul   • Abdominal tenderness     Periumbil   • Alopecia    • Anemia    • Anxiety    • Arthritis    • Asthma     as a child   • Back pain    • Bipolar disorder (CMS/HCC)    • Blood in stool    • Body piercing     ears   • Colitis    • Colitis    • Colon polyps    • Depression    • Diabetes mellitus (CMS/HCC)    • Diarrhea    • Dysphagia     Patient reported he has trouble from time to time and that her throat has to be dilated   • Elevated cholesterol     Reported slightly elevated - taking no medication     • Fractures     Right pinky toe - no surgery required   • GERD (gastroesophageal reflux disease)    • H/O colonoscopy 2013    Terminal ileal biopsies negative. Cecal&ascending biopsie revealed chronic active colitis w/ features cons. w/ inflammatory bowel disease. Transevere colon biopsies revealved chronic active colitis. Desc. colon biopsies revealed chronic active colitis. Rect colon biopsie revealed chronic active colitis. Negative for dysplasia   • Hematemesis    • History of Clostridium difficile infection    • History of transfusion     No reaction to transfusion reported   • Migraine headache    • Nausea     alone   • Pancreatitis    • Restless leg    • Seizures (CMS/HCC)     pt reports x1    • Tattoos     x9   • Universal ulcerative colitis (CMS/HCC)    • UTI (urinary tract infection)    • Weight loss        Past Surgical History:   Procedure Laterality Date   • BREAST SURGERY Left     breast lump removed benign   •  SECTION     • CHOLECYSTECTOMY      for stone disease   • COLONOSCOPY     • COLONOSCOPY N/A 2020    Procedure: COLONOSCOPY;  Surgeon: Alonzo He MD;  Location: [facility] ENDOSCOPY;  Service: Gastroenterology;  Laterality: N/A;   • DILATATION AND CURETTAGE     • ENDOSCOPY  2013    Erosive esophagitis, grade 2; erosive gastritis; small sliding hiatal hernia less than 3 cm, erosive deodenitis duodenal polyp.No gan mucosa.Second portion duedenal biopsy neg. Second postion of dudoenal biopsy revealed polypoid intestinal mucosa w/ lymphoid aggregate. 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 no helicobacter pylori. Negative for mateaplasia, dysplasia   • ENDOSCOPY N/A 2020    Procedure: ESOPHAGOGASTRODUODENOSCOPY WITH DILATATION AND BIOPSIES;  Surgeon: Alonzo He MD;  Location: [facility] ENDOSCOPY;  Service: Gastroenterology;  Laterality: N/A;   • ENDOSCOPY N/A 2021    Procedure: ESOPHAGOGASTRODUODENOSCOPY;   Surgeon: Brunner, Mark I, MD;  Location: [facility] ENDOSCOPY;  Service: Gastroenterology;  Laterality: N/A;   • HIP SURGERY Right    • HYSTERECTOMY     • LEEP         Family History:  family history includes Heart disease in her father; Heart failure in her father; No Known Problems in her brother, mother, son, and son. Otherwise pertinent FHx was reviewed and unremarkable.     Social History:  reports that she has never smoked. She has never used smokeless tobacco. She reports that she does not drink alcohol and does not use drugs.  Social History     Social History Narrative    Single.  Lives with 2 children.  No HH or DME currently       Medications:  Available home medication information reviewed.  Medications Prior to Admission   Medication Sig Dispense Refill Last Dose   • azaTHIOprine (IMURAN) 50 MG tablet TAKE 2 TABLETS BY MOUTH EVERY DAY 60 tablet 1    • Continuous Blood Gluc  (Dexcom G6 ) device 1 Units Daily. 1 each 1    • Continuous Blood Gluc Sensor (Dexcom G6 Sensor) Every 10 (Ten) Days. 3 each 11    • Continuous Blood Gluc Transmit (Dexcom G6 Transmitter) misc 1 Units Daily. 1 each 1    • Continuous Glucose Monitor kit Use as directed 1 each 11    • diphenhydrAMINE (Banophen) 25 mg capsule Take 1 capsule by mouth Every 8 (Eight) Hours As Needed for Itching or Allergies. 90 capsule 1    • estradiol (MINIVELLE, VIVELLE-DOT) 0.05 MG/24HR patch Place 1 patch on the skin as directed by provider 1 (One) Time Per Week. 8 patch 2    • fluconazole (Diflucan) 100 MG tablet Take 1 tablet by mouth Daily. 7 tablet 0    • gabapentin (NEURONTIN) 800 MG tablet Take 1 tablet by mouth 3 (Three) Times a Day. 90 tablet 0    • glucose blood test strip Check glucose TID, One Touch Reveal 90 each 11    • glucose blood test strip ONE TOUCH VERIO FLEX TEST STRIPS TO CHECK GLUCOSE 3 TIMES DAILY FOR DM E11.9 100 each 12    • HumaLOG Mix 75/25 KwikPen (75-25) 100 UNIT/ML suspension pen-injector pen Inject 25 Units  "under the skin into the appropriate area as directed 2 (Two) Times a Day With Meals. 5 pen 3    • HYDROcodone-acetaminophen (NORCO) 5-325 MG per tablet Take 1 tablet by mouth Every 6 (Six) Hours As Needed for Moderate Pain . 25 tablet 0    • Hydrocortisone, Perianal, (Proctozone-HC) 2.5 % rectal cream Insert  into the rectum 2 (Two) Times a Day. 28 g 1    • hydrOXYzine pamoate (VISTARIL) 25 MG capsule Take 1 capsule by mouth 3 (Three) Times a Day As Needed for Anxiety. 60 capsule 2    • insulin aspart (NovoLOG) 100 UNIT/ML injection TID dosing with meals, as per sliding scale coverage, up to 12 units TID dosing available 10 mL 3    • Insulin Glargine (BASAGLAR KWIKPEN) 100 UNIT/ML injection pen Inject 40 Units under the skin into the appropriate area as directed Every Night. 5 pen 3    • Insulin Syringe 30G X 5/16\" 1 ML misc 1 syringe 3 (Three) Times a Day. 100 each 2    • Lancets misc USE AS DIRECTED TO CHECK GLUCOSE 3 TIMES DAILY FOR DM E11.9 100 each 3    • promethazine (PHENERGAN) 25 MG tablet Take 1 tablet by mouth Every 6 (Six) Hours As Needed for Nausea or Vomiting. 40 tablet 2    • SUMAtriptan (Imitrex) 50 MG tablet Take one tablet at onset of headache. May repeat dose one time in 2 hours if headache not relieved. 8 tablet 2    • topiramate (TOPAMAX) 50 MG tablet Take 1 tablet by mouth 2 (Two) Times a Day. 60 tablet 2        Allergies   Allergen Reactions   • Hydrochlorothiazide Hives   • Penicillins Hives   • Remicade [Infliximab] Anaphylaxis   • Soybean-Containing Drug Products Swelling     \"Soy sauce\"   • Xeljanz [Tofacitinib Citrate] Other (See Comments)     BLISTERS IN THROAT & ON ARMS   • Zofran [Ondansetron Hcl] Headache     migraines   • Reglan [Metoclopramide] Other (See Comments)     States feels weird when takes it       Objective   Objective     Vital Signs:   Temp:  [97.9 °F (36.6 °C)-98.1 °F (36.7 °C)] 97.9 °F (36.6 °C)  Heart Rate:  [114-119] 114  Resp:  [17-18] 18  BP: (109-110)/(78-83) " 109/78       Physical Exam   Constitutional: Awake, alert, sitting up in bed  Eyes: Sclerae anicteric, no conjunctival injection  HENT: NCAT, mucous membranes moist  Neck: Supple, trachea midline  Respiratory: Clear to auscultation bilaterally, nonlabored respirations   Cardiovascular: RRR, no murmurs, rubs, or gallops  Gastrointestinal: Positive bowel sounds, soft, diffuse tenderness to palpation, nondistended  Musculoskeletal: No bilateral ankle edema, no clubbing or cyanosis to extremities  Psychiatric: Appropriate affect, cooperative  Neurologic: Cranial Nerves grossly intact to confrontation, speech clear  Skin: No rashes on exposed surfaces    Result Review:  I have personally reviewed the results from the time of this admission to 6/28/2021 19:10 EDT and agree with these findings:  [x]  Laboratory  []  Microbiology  []  Radiology  []  EKG/Telemetry   []  Cardiology/Vascular   []  Pathology  [x]  Old records  []  Other:  Most notable findings include:  Normal WBC.  Normal creatinine.  BUN 8.      LAB RESULTS:      Lab 06/28/21  1400   WBC 9.84   HEMOGLOBIN 13.4   HEMATOCRIT 40.7   PLATELETS 256   NEUTROS ABS 5.05   IMMATURE GRANS (ABS) 0.02   LYMPHS ABS 4.27*   MONOS ABS 0.40   EOS ABS 0.06   MCV 84.4   PROCALCITONIN 0.03   LACTATE 1.6         Lab 06/28/21  1400   SODIUM 129*   POTASSIUM 3.5   CHLORIDE 95*   CO2 22.0   ANION GAP 12.0   BUN 8   CREATININE 0.60   GLUCOSE 300*   CALCIUM 9.2         Lab 06/28/21  1400   TOTAL PROTEIN 7.4   ALBUMIN 4.00   GLOBULIN 3.4   ALT (SGPT) 31   AST (SGOT) 24   BILIRUBIN 0.3   ALK PHOS 168*                         Microbiology Results (last 10 days)     ** No results found for the last 240 hours. **          No radiology results from the last 24 hrs        Assessment/Plan   Assessment & Plan     Active Hospital Problems    Diagnosis  POA   • **C. difficile colitis [A04.72]  Yes     Added automatically from request for surgery [id number]     • Gastroparesis [K31.84]  Yes   •  Ulcerative pancolitis without complication (CMS/[facility]) [K51.00]  Yes     Added automatically from request for surgery [id number]     • Type 2 diabetes mellitus treated with insulin (CMS/[facility]) [E11.9, Z79.4]  Not Applicable   • Bipolar disorder (CMS/[facility]) [F31.9]  Yes     Ongoing diarrhea with history of C. Diff  -Difficult to assess if this is active C. Diff or just her uncontrolled UC.  She has normal WBC, is afebrile and no electrolyte derangements or evidence of hypovolemia on labs  -Obtain records from [facility]  -Continue PO vancomycin  -Add IV flagyl  -ID consult in am, has followed with Dr. Martin before she thinks  -She reports unintentional weight loss-get daily weights    Ulcerative colitis  -Reported intolerance to multiple medications  -She desires colectomy but this will not be considered until diabetes control improves-seen by Dr. Talavera last admission    Uncontrolled DM2 with gastroparesis  -Basal/bolus insulin  -Last admission plan was for weekly telemedicine visits with her PCP which they were willing to accommodate to help with insulin titration for tighter glucose control but she no-showed appointment  -Clear liquid diet, advance as tolerated    DVT prophylaxis:  SCDs      CODE STATUS:   Code Status and Medical Interventions:   Ordered at: 06/28/21 1320     Code Status:    CPR     Medical Interventions (Level of Support Prior to Arrest):    Full       Admission Status:  I believe this patient meets INPATIENT status due to failure of outpatient treatment of C. diff.  I feel patient’s risk for adverse outcomes and need for care warrant INPATIENT evaluation and I predict the patient’s care encounter to likely last beyond 2 midnights.    Moraima Garcia MD  06/28/21    Electronically signed by Moraima Garcia MD at 06/28/21 2230       Emergency Department Notes    No notes of this type exist for this encounter.         Vital Signs (last day)     Date/Time   Temp   Temp src   Pulse   Resp   BP   Patient  Position   SpO2    07/02/21 1010   97.6 (36.4)   Oral   92   17   127/86   --   98    07/02/21 0950   --   --   94   --   90/75   --   97    07/02/21 0945   --   --   81   --   106/77   --   98    07/02/21 0940   --   --   93   --   106/79   --   100    07/02/21 0935   --   --   89   --   100/71   --   100    07/02/21 0930   97.2 (36.2)   Temporal   95   18   95/69   Lying   99    07/02/21 0804   97.2 (36.2)   Tympanic   89   18   121/92   --   100    07/02/21 0700   97.8 (36.6)   Oral   88   18   113/84   --   96    07/02/21 0312   98.2 (36.8)   Oral   84   16   101/71   --   --    07/01/21 2224   98 (36.7)   Oral   91   20   118/87   --   98    07/01/21 1849   98.2 (36.8)   Oral   96   20   115/84   --   98    07/01/21 1700   98.2 (36.8)   Oral   97   17   142/92   --   97    07/01/21 1100   98 (36.7)   Oral   97   17   120/87   --   100    07/01/21 0700   98 (36.7)   Oral   89   17   91/56   --   94    07/01/21 0300   98.1 (36.7)   Oral   95   16   96/62   Lying   96                Current Facility-Administered Medications   Medication Dose Route Frequency Provider Last Rate Last Admin   • cholestyramine light packet 4 g  1 packet Oral Q12H Francisco Sainz MD       • dextrose (D50W) 25 g/ 50mL Intravenous Solution 25 g  25 g Intravenous Q15 Min PRN Moraima Garcia MD       • dextrose (GLUTOSE) oral gel 15 g  15 g Oral Q15 Min PRN Moraima Garcia MD       • diphenhydrAMINE (BENADRYL) capsule 25 mg  25 mg Oral Q8H PRN Moraima Garcia MD       • gabapentin (NEURONTIN) capsule 400 mg  400 mg Oral Q8H Moraima Garcia MD   Stopped at 07/02/21 0550   • glucagon (human recombinant) (GLUCAGEN DIAGNOSTIC) injection 1 mg  1 mg Subcutaneous Q15 Min PRN Moraima Garcia MD       • HYDROcodone-acetaminophen (NORCO) 5-325 MG per tablet 1 tablet  1 tablet Oral Q6H PRN Moraima Garcia MD   1 tablet at 07/02/21 1018   • Hydrocortisone (Perianal) (ANUSOL-HC) 2.5 % rectal cream   Rectal BID Moraima Garcia MD   Given at 07/01/21 8311   •  hydrOXYzine (ATARAX) tablet 25 mg  25 mg Oral TID PRN Moraima Garcia MD   25 mg at 07/01/21 2138   • insulin detemir (LEVEMIR) injection 30 Units  30 Units Subcutaneous Nightly Moraima Garcia MD   30 Units at 07/01/21 2138   • insulin lispro (humaLOG) injection 0-14 Units  0-14 Units Subcutaneous TID AC Moraima Garcia MD   8 Units at 07/01/21 1758   • insulin lispro (humaLOG) injection 5 Units  5 Units Subcutaneous TID With Meals Letha Sheridan APRN       • lactated ringers infusion  100 mL/hr Intravenous Continuous Sohan Cain  mL/hr at 07/02/21 0035 100 mL/hr at 07/02/21 0035   • lactated ringers infusion  30 mL/hr Intravenous Continuous PRN Ricci Barone APRN 30 mL/hr at 07/02/21 0817 30 mL/hr at 07/02/21 0817   • mesalamine (ROWASA) enema 4 g  4 g Rectal Nightly Francisco Sainz MD       • ondansetron (ZOFRAN) tablet 4 mg  4 mg Oral Q6H PRN Sohan Cain MD        Or   • ondansetron (ZOFRAN) injection 4 mg  4 mg Intravenous Q6H PRN Sohan Cain MD       • promethazine (PHENERGAN) tablet 25 mg  25 mg Oral Q6H PRN Francisco Sainz MD   25 mg at 07/02/21 1014   • saccharomyces boulardii (FLORASTOR) capsule 250 mg  250 mg Oral BID Frank Martin MD   250 mg at 07/02/21 1014   • sodium chloride 0.9 % flush 10 mL  10 mL Intravenous Q12H Sohan Cain MD   10 mL at 07/02/21 1016   • sodium chloride 0.9 % flush 10 mL  10 mL Intravenous PRN Sohan Cain MD       • topiramate (TOPAMAX) tablet 50 mg  50 mg Oral BID Moraima Garcia MD   50 mg at 07/02/21 1014     Orders (active)      Start     Ordered    07/02/21 2200  cholestyramine light packet 4 g  Every 12 Hours      07/02/21 0956    07/02/21 2100  mesalamine (ROWASA) enema 4 g  Nightly      07/02/21 0955    07/02/21 0955  promethazine (PHENERGAN) tablet 25 mg  Every 6 Hours PRN      07/02/21 0955    07/02/21 0945  Diet Regular; Consistent Carbohydrate, Low Fiber / Low Residue  Diet Effective Now      07/02/21 0944    07/02/21  0914  Tissue Pathology Exam  RELEASE UPON ORDERING      07/02/21 0914    07/02/21 0845  insulin lispro (humaLOG) injection 5 Units  3 Times Daily With Meals      07/02/21 0758    07/02/21 0840  Oxygen Therapy- Nasal Cannula; Titrate for SPO2: equal to or greater than, per policy, 90%  Continuous      07/02/21 0839    07/02/21 0840  Pulse Oximetry, Continuous  Continuous      07/02/21 0839    07/02/21 0814  Implement Anesthesia Orders Day of Procedure  Once      07/02/21 0813    07/02/21 0814  Obtain Informed Consent  Once      07/02/21 0813    07/02/21 0814  Verify Bowel Prep Was Successful  Once      07/02/21 0813    07/02/21 0813  lactated ringers infusion  Continuous PRN      07/02/21 0813    06/30/21 1556  DIET MESSAGE Pt request chicken tenders and mashed potatoes for dinner tonight  Once     Comments: Pt request chicken tenders and mashed potatoes for dinner tonight    06/30/21 1556    06/30/21 1457  Patient May Shower  Once      06/30/21 1457    06/30/21 1125  DIET MESSAGE Please bring cheese burger and baked potatoe for lunch today  Once     Comments: Please bring cheese burger and baked potatoe for lunch today    06/30/21 1125    06/29/21 2100  saccharomyces boulardii (FLORASTOR) capsule 250 mg  2 Times Daily      06/29/21 1535    06/29/21 1541  DIET MESSAGE Patient would like grilled chicken and mash potato for dinner  Once     Comments: Patient would like grilled chicken and mash potato for dinner    06/29/21 1542    06/29/21 1335  DIET MESSAGE Please send lunch tray 540  Once     Comments: Please send lunch tray 540    06/29/21 1334    06/29/21 0924  DIET MESSAGE Please send patient kee, she did not get with breakfast  Once     Comments: Please send patient trevorwns, she did not get with breakfast    06/29/21 0924    06/28/21 2216  Daily Weights  Daily      06/28/21 2215    06/28/21 2200  gabapentin (NEURONTIN) capsule 400 mg  Every 8 Hours Scheduled      06/28/21 1706    06/28/21 2200  POC  Glucose 4x Daily AC & at Bedtime  4 Times Daily Before Meals & at Bedtime     Comments: If bedtime blood glucose is greater than 350 mg/dl, call MD.      06/28/21 1706 06/28/21 2100  Hydrocortisone (Perianal) (ANUSOL-HC) 2.5 % rectal cream  2 Times Daily      06/28/21 1706 06/28/21 2100  topiramate (TOPAMAX) tablet 50 mg  2 Times Daily      06/28/21 1706 06/28/21 2100  insulin detemir (LEVEMIR) injection 30 Units  Nightly      06/28/21 1706 06/28/21 1800  insulin lispro (humaLOG) injection 0-14 Units  3 Times Daily Before Meals      06/28/21 1706 06/28/21 1708  Obtain Medical Records  Until Discontinued     Comments: [facility] recent hospitalization    06/28/21 1707 06/28/21 1706  Do NOT Hold Basal or Correction Scale Insulin When Patient is NPO, Hold Scheduled Mealtime (Bolus) Insulin if NPO  Continuous      06/28/21 1706 06/28/21 1706  Follow [facility] Hypoglycemia Standing Orders For Blood Glucose Less Than 70 mg/dL  Until Discontinued     Comments: ALERT PATIENT - NOT NPO & CAN SAFELY SWALLOW  Administer 4 oz Fruit Juice OR 4 oz Regular Soda OR 8 oz Milk OR 15-30 grams (1 tube) of Glucose Gel.  Recheck Blood Glucose Approximately 15 Minutes After Ingestion, Repeat Treatment & Continue to Recheck Blood Sugar Approximately Every 15 Minutes Until Blood Glucose is 70 or Higher.  Once Blood Glucose is 70 or Higher & if It Will Be More Than 60 Minutes Until Next Meal, Provide Appropriate Snack (Including Carbohydrate Food) Based on Meal Plan Order. Give Meal Tray As Soon As Possible.    PATIENT HAS IV ACCESS - UNRESPONSIVE, NPO OR UNABLE TO SAFELY SWALLOW  Administer 25g (50ml) D50W IV Push.  Recheck Blood Glucose Approximately 15 Minutes After Administration, if Blood Glucose Remains Less Than 70, Repeat Treatment   Recheck Blood Glucose Approximately 15 Minutes After 2nd Administration, if Blood Glucose Remains Less Than 70 After 2nd Dose of D50W, Contact Provider for Further Treatment Orders &  Consider Adding IVF With D5W for Maintenance    PATIENT WITHOUT IV ACCESS - UNRESPONSIVE, NPO OR UNABLE TO SAFELY SWALLOW  Administer 1mg Glucagon SQ & Establish IV Access.  Turn Patient on Side - Nausea / Vomiting May Occur.  Recheck Blood Glucose Approximately 15 Minutes After Administration.  If Blood Glucose Remains Less Than 70, Administer 25g D50W IV Push (50ml).  Recheck Blood Glucose Approximately 15 Minutes After Administration of D50W, if Blood Glucose Remains Less Than 70, Contact Provider for Further Treatment Orders & Consider Adding IVF With D5 for Maintenance    Document Event & Patient Response to Interventions in EMR, Document Medications on MAR  Notify Provider if Hypoglycemia Treatment Needed    06/28/21 1706    06/28/21 1705  dextrose (GLUTOSE) oral gel 15 g  Every 15 Minutes PRN      06/28/21 1706    06/28/21 1705  dextrose (D50W) 25 g/ 50mL Intravenous Solution 25 g  Every 15 Minutes PRN      06/28/21 1706    06/28/21 1705  glucagon (human recombinant) (GLUCAGEN DIAGNOSTIC) injection 1 mg  Every 15 Minutes PRN      06/28/21 1706    06/28/21 1705  Patient Isolation Contact Spore  Continuous     Comments: Isolation Precautions Per Clostridium Difficile (CDI) Infection Control Policy / Process    06/28/21 1706    06/28/21 1703  hydrOXYzine (ATARAX) tablet 25 mg  3 Times Daily PRN      06/28/21 1706    06/28/21 1702  HYDROcodone-acetaminophen (NORCO) 5-325 MG per tablet 1 tablet  Every 6 Hours PRN      06/28/21 1706    06/28/21 1701  diphenhydrAMINE (BENADRYL) capsule 25 mg  Every 8 Hours PRN      06/28/21 1706    06/28/21 1700  POC Glucose Finger 4x Daily AC & at Bedtime  4 Times Daily Before Meals & at Bedtime      06/28/21 1641    06/28/21 1600  Vital Signs  Every 4 Hours      06/28/21 1320    06/28/21 1534  DIET MESSAGE Patient has new diet order & will need dinner tray  Once     Comments: Patient has new diet order & will need dinner tray    06/28/21 1533    06/28/21 1415  sodium chloride 0.9  % flush 10 mL  Every 12 Hours Scheduled      21 1320    21 1415  lactated ringers infusion  Continuous      21 1320    21 1320  ondansetron (ZOFRAN) tablet 4 mg  Every 6 Hours PRN      21 1320    21 1320  ondansetron (ZOFRAN) injection 4 mg  Every 6 Hours PRN      21 1320    21 1320  Intake & Output  Every Shift      21 1320    21 1320  Weigh Patient  Once      21 1320    21 1320  Insert Peripheral IV  Once      21 1320    21 1320  Saline Lock & Maintain IV Access  Continuous      21 1320    21 1320  Code Status and Medical Interventions:  Continuous      21 1320    21 1320  Maintain Sequential Compression Device  Continuous      21 1320    21 1320  Activity - Ad Nereida  Until Discontinued      21 1320    21 1319  sodium chloride 0.9 % flush 10 mL  As Needed      21 1320                   Operative/Procedure Notes (all)      Procedures signed by Francisco Sainz MD at 21 0938   Version 1 of 1     Procedure Orders    1. COLONOSCOPY [[id number]] ordered by Francisco Sainz MD at 21 0843          Scan on 2021 0843 by Francisco Sainz MD: COLON          Electronically signed by Francisco Sainz MD at 21 0938          Physician Progress Notes (all)      Eden Henry APRN at 21 0857              [facility] Medicine Services  PROGRESS NOTE    Patient Name: Thais Hobson  : 1985  MRN: [id number]    Date of Admission: 2021  Primary Care Physician: Lv Sanchez DO    Subjective   Subjective     CC:  Follow-up right-sided abdominal pain    HPI:  Continues to have several BM's overnight, blood tinged. NAD. Pain is controlled, tender in epigastric area. Having nausea, no emesis, but tolerating diet well.     ROS:  Gen- No fevers, chills  CV- No chest pain, palpitations  Resp- No cough, dyspnea  GI- No N/V/D, +abd  tenderness      Objective   Objective     Vital Signs:   Temp:  [97.4 °F (36.3 °C)-98.2 °F (36.8 °C)] 98 °F (36.7 °C)  Heart Rate:  [] 89  Resp:  [16-18] 17  BP: ()/(56-82) 91/56        Physical Exam:  Constitutional: No acute distress, awake, alert resting in bed   HENT: NCAT, mucous membranes moist  Respiratory: Clear to auscultation bilaterally, respiratory effort normal   Cardiovascular: RRR, no murmurs, rubs, or gallops  Gastrointestinal: Positive bowel sounds, soft, mildly tender to epigastric area, nondistended  Musculoskeletal: No bilateral ankle edema  Psychiatric: Appropriate affect, cooperative  Neurologic: Oriented x 3, strength symmetric in all extremities, Cranial Nerves grossly intact to confrontation, speech clear  Skin: No rashes     Results Reviewed:  Results from last 7 days   Lab Units 06/29/21  1321 06/28/21  1400   WBC 10*3/mm3 10.25 9.84   HEMOGLOBIN g/dL 13.3 13.4   HEMATOCRIT % 43.0 40.7   PLATELETS 10*3/mm3 243 256   PROCALCITONIN ng/mL  --  0.03     Results from last 7 days   Lab Units 06/29/21  1558 06/28/21  1400   SODIUM mmol/L 138 129*   POTASSIUM mmol/L 3.7 3.5   CHLORIDE mmol/L 103 95*   CO2 mmol/L 23.0 22.0   BUN mg/dL 6 8   CREATININE mg/dL 0.50* 0.60   GLUCOSE mg/dL 246* 300*   CALCIUM mg/dL 8.7 9.2   ALT (SGPT) U/L  --  31   AST (SGOT) U/L  --  24     Estimated Creatinine Clearance: 174.5 mL/min (A) (by C-G formula based on SCr of 0.5 mg/dL (L)).    Microbiology Results Abnormal     Procedure Component Value - Date/Time    Clostridium Difficile Toxin - Stool, Per Rectum [[id number]]  (Normal) Collected: 06/29/21 1149    Lab Status: Final result Specimen: Stool from Per Rectum Updated: 06/29/21 7887    Narrative:      The following orders were created for panel order Clostridium Difficile Toxin - Stool, Per Rectum.  Procedure                               Abnormality         Status                     ---------                               -----------         ------                      Clostridium Difficile To...[[id number]]  Normal              Final result                 Please view results for these tests on the individual orders.    Clostridium Difficile Toxin, PCR - Stool, Per Rectum [[id number]]  (Normal) Collected: 06/29/21 1149    Lab Status: Final result Specimen: Stool from Per Rectum Updated: 06/29/21 1614     C. Difficile Toxins by PCR Not Detected    Narrative:      Performance characteristics of test not established for patients <2 years of age.  Negative for Toxigenic C. Difficile    COVID PRE-OP / PRE-PROCEDURE SCREENING ORDER (NO ISOLATION) - Swab, Nasopharynx [[id number]]  (Normal) Collected: 06/28/21 1745    Lab Status: Final result Specimen: Swab from Nasopharynx Updated: 06/29/21 1418    Narrative:      The following orders were created for panel order COVID PRE-OP / PRE-PROCEDURE SCREENING ORDER (NO ISOLATION) - Swab, Nasopharynx.  Procedure                               Abnormality         Status                     ---------                               -----------         ------                     COVID-19, M,T,W, APTIMA ...[[id number]]  Normal              Final result                 Please view results for these tests on the individual orders.    COVID-19, M,T,W, APTIMA PANTHER KATHY IN-HOUSE NP/OP SWAB IN UTM/VTM/SALINE TRANSPORT MEDIA 24HR TAT - Swab, Nasopharynx [[id number]]  (Normal) Collected: 06/28/21 1745    Lab Status: Final result Specimen: Swab from Nasopharynx Updated: 06/29/21 1418     COVID19 Not Detected    Narrative:      Fact sheet for providers: https://www.fda.gov/media/675409/download     Fact sheet for patients: https://www.fda.gov/media/542788/download    Test performed by RT PCR.    Gastrointestinal Panel, PCR - Stool, Per Rectum [[id number]]  (Normal) Collected: 06/29/21 1149    Lab Status: Final result Specimen: Stool from Per Rectum Updated: 06/29/21 1318     Campylobacter Not Detected     Plesiomonas shigelloides Not Detected      Salmonella Not Detected     Vibrio Not Detected     Vibrio cholerae Not Detected     Yersinia enterocolitica Not Detected     Enteroaggregative E. coli (EAEC) Not Detected     Enteropathogenic E. coli (EPEC) Not Detected     Enterotoxigenic E. coli (ETEC) lt/st Not Detected     Shiga-like toxin-producing E. coli (STEC) stx1/stx2 Not Detected     Shigella/Enteroinvasive E. coli (EIEC) Not Detected     Cryptosporidium Not Detected     Cyclospora cayetanensis Not Detected     Entamoeba histolytica Not Detected     Giardia lamblia Not Detected     Adenovirus F40/41 Not Detected     Astrovirus Not Detected     Norovirus GI/GII Not Detected     Rotavirus A Not Detected     Sapovirus (I, II, IV or V) Not Detected          Imaging Results (Last 24 Hours)     ** No results found for the last 24 hours. **              I have reviewed the medications:  Scheduled Meds:gabapentin, 400 mg, Oral, Q8H  Hydrocortisone (Perianal), , Rectal, BID  insulin detemir, 30 Units, Subcutaneous, Nightly  insulin lispro, 0-14 Units, Subcutaneous, TID AC  saccharomyces boulardii, 250 mg, Oral, BID  sodium chloride, 10 mL, Intravenous, Q12H  topiramate, 50 mg, Oral, BID      Continuous Infusions:lactated ringers, 100 mL/hr, Last Rate: 100 mL/hr (07/01/21 0533)      PRN Meds:.•  dextrose  •  dextrose  •  diphenhydrAMINE  •  glucagon (human recombinant)  •  HYDROcodone-acetaminophen  •  hydrOXYzine  •  ondansetron **OR** ondansetron  •  promethazine  •  sodium chloride    Assessment/Plan   Assessment & Plan     Active Hospital Problems    Diagnosis  POA   • **C. difficile colitis [A04.72]  Yes   • Diarrhea [R19.7]  Yes   • Gastroparesis [K31.84]  Yes   • Ulcerative pancolitis without complication (CMS/[facility]) [K51.00]  Yes   • Type 2 diabetes mellitus treated with insulin (CMS/[facility]) [E11.9, Z79.4]  Not Applicable   • Bipolar disorder (CMS/[facility]) [F31.9]  Yes      Resolved Hospital Problems   No resolved problems to display.        Brief Hospital Course  to date:  Thais Hobson is a 36 y.o. female With a PMH of gastroparesis, ulcerative pancolitis w/o complication, T2DM, and bipolar disorder who was admitted on  for diarrhea. Has a hx of c diff and ulcerative colitis.    Patient and problems new to me today    Diarrhea  -Recurrent C diff colitis vs ulcerative colitis flare; likely a UC flare with negative CDiff panel, as well as negative GI PCR. Will stop Vancomycin and consultation to GI for management of UC, await GI recommendations   -Continue ID consult, appreciate Dr Martin input; will discontinue oral Vanc and consult GI as mentioned   -regular diet, tolerating     Hyponatremia   --resolved, now 138     T2DM, uncontrolled with gastroparesis   -Continue Levemir 30 units subcu nightly, sliding scale insulin  --continue with good control       DVT prophylaxis:  Mechanical DVT prophylaxis orders are present.       Disposition: I expect the patient to be discharged pending improvement in GI symptoms, and pending GI recommendations     CODE STATUS:   Code Status and Medical Interventions:   Ordered at: 21 1320     Code Status:    CPR     Medical Interventions (Level of Support Prior to Arrest):    Full       EMELYN Porter  21                Electronically signed by Eden Henry APRN at 21 0901     Eden Henry APRN at 21 1537              [facility] Medicine Services  PROGRESS NOTE    Patient Name: Thais Hobson  : 1985  MRN: [id number]    Date of Admission: 2021  Primary Care Physician: Lv Sanchez DO    Subjective   Subjective     CC:  Follow-up right-sided abdominal pain    HPI:  Feels better today, pain is improving. Had a bloody BM today. Tolerating diet at this time.     ROS:  Gen- No fevers, chills  CV- No chest pain, palpitations  Resp- No cough, dyspnea  GI- No N/V/D, abd pain     Objective   Objective     Vital Signs:   Temp:  [97.5 °F (36.4 °C)-98.3 °F  (36.8 °C)] 98 °F (36.7 °C)  Heart Rate:  [] 92  Resp:  [16-17] 17  BP: ()/(60-86) 108/74        Physical Exam:  Constitutional: No acute distress, awake, alert resting in bed eating lunch   HENT: NCAT, mucous membranes moist  Respiratory: Clear to auscultation bilaterally, respiratory effort normal   Cardiovascular: RRR, no murmurs, rubs, or gallops  Gastrointestinal: Positive bowel sounds, soft, nontender, nondistended  Musculoskeletal: No bilateral ankle edema  Psychiatric: Appropriate affect, cooperative  Neurologic: Oriented x 3, strength symmetric in all extremities, Cranial Nerves grossly intact to confrontation, speech clear  Skin: No rashes     Results Reviewed:  Results from last 7 days   Lab Units 06/29/21  1321 06/28/21  1400   WBC 10*3/mm3 10.25 9.84   HEMOGLOBIN g/dL 13.3 13.4   HEMATOCRIT % 43.0 40.7   PLATELETS 10*3/mm3 243 256   PROCALCITONIN ng/mL  --  0.03     Results from last 7 days   Lab Units 06/29/21  1558 06/28/21  1400   SODIUM mmol/L 138 129*   POTASSIUM mmol/L 3.7 3.5   CHLORIDE mmol/L 103 95*   CO2 mmol/L 23.0 22.0   BUN mg/dL 6 8   CREATININE mg/dL 0.50* 0.60   GLUCOSE mg/dL 246* 300*   CALCIUM mg/dL 8.7 9.2   ALT (SGPT) U/L  --  31   AST (SGOT) U/L  --  24     Estimated Creatinine Clearance: 174 mL/min (A) (by C-G formula based on SCr of 0.5 mg/dL (L)).    Microbiology Results Abnormal     Procedure Component Value - Date/Time    Clostridium Difficile Toxin - Stool, Per Rectum [[id number]]  (Normal) Collected: 06/29/21 1149    Lab Status: Final result Specimen: Stool from Per Rectum Updated: 06/29/21 1614    Narrative:      The following orders were created for panel order Clostridium Difficile Toxin - Stool, Per Rectum.  Procedure                               Abnormality         Status                     ---------                               -----------         ------                     Clostridium Difficile To...[[id number]]  Normal              Final result                  Please view results for these tests on the individual orders.    Clostridium Difficile Toxin, PCR - Stool, Per Rectum [[id number]]  (Normal) Collected: 06/29/21 1149    Lab Status: Final result Specimen: Stool from Per Rectum Updated: 06/29/21 1614     C. Difficile Toxins by PCR Not Detected    Narrative:      Performance characteristics of test not established for patients <2 years of age.  Negative for Toxigenic C. Difficile    COVID PRE-OP / PRE-PROCEDURE SCREENING ORDER (NO ISOLATION) - Swab, Nasopharynx [[id number]]  (Normal) Collected: 06/28/21 1745    Lab Status: Final result Specimen: Swab from Nasopharynx Updated: 06/29/21 1418    Narrative:      The following orders were created for panel order COVID PRE-OP / PRE-PROCEDURE SCREENING ORDER (NO ISOLATION) - Swab, Nasopharynx.  Procedure                               Abnormality         Status                     ---------                               -----------         ------                     COVID-19, M,T,W, APTIMA ...[[id number]]  Normal              Final result                 Please view results for these tests on the individual orders.    COVID-19, M,T,W, APTIMA PANTHER KATHY IN-HOUSE NP/OP SWAB IN UTM/VTM/SALINE TRANSPORT MEDIA 24HR TAT - Swab, Nasopharynx [[id number]]  (Normal) Collected: 06/28/21 1745    Lab Status: Final result Specimen: Swab from Nasopharynx Updated: 06/29/21 1418     COVID19 Not Detected    Narrative:      Fact sheet for providers: https://www.fda.gov/media/181694/download     Fact sheet for patients: https://www.fda.gov/media/832650/download    Test performed by RT PCR.    Gastrointestinal Panel, PCR - Stool, Per Rectum [[id number]]  (Normal) Collected: 06/29/21 1149    Lab Status: Final result Specimen: Stool from Per Rectum Updated: 06/29/21 1318     Campylobacter Not Detected     Plesiomonas shigelloides Not Detected     Salmonella Not Detected     Vibrio Not Detected     Vibrio cholerae Not Detected     Yersinia  enterocolitica Not Detected     Enteroaggregative E. coli (EAEC) Not Detected     Enteropathogenic E. coli (EPEC) Not Detected     Enterotoxigenic E. coli (ETEC) lt/st Not Detected     Shiga-like toxin-producing E. coli (STEC) stx1/stx2 Not Detected     Shigella/Enteroinvasive E. coli (EIEC) Not Detected     Cryptosporidium Not Detected     Cyclospora cayetanensis Not Detected     Entamoeba histolytica Not Detected     Giardia lamblia Not Detected     Adenovirus F40/41 Not Detected     Astrovirus Not Detected     Norovirus GI/GII Not Detected     Rotavirus A Not Detected     Sapovirus (I, II, IV or V) Not Detected          Imaging Results (Last 24 Hours)     ** No results found for the last 24 hours. **              I have reviewed the medications:  Scheduled Meds:gabapentin, 400 mg, Oral, Q8H  Hydrocortisone (Perianal), , Rectal, BID  insulin detemir, 30 Units, Subcutaneous, Nightly  insulin lispro, 0-14 Units, Subcutaneous, TID AC  saccharomyces boulardii, 250 mg, Oral, BID  sodium chloride, 10 mL, Intravenous, Q12H  topiramate, 50 mg, Oral, BID      Continuous Infusions:lactated ringers, 100 mL/hr, Last Rate: 100 mL/hr (06/30/21 1457)      PRN Meds:.•  dextrose  •  dextrose  •  diphenhydrAMINE  •  glucagon (human recombinant)  •  HYDROcodone-acetaminophen  •  hydrOXYzine  •  ondansetron **OR** ondansetron  •  promethazine  •  sodium chloride    Assessment/Plan   Assessment & Plan     Active Hospital Problems    Diagnosis  POA   • **C. difficile colitis [A04.72]  Yes   • Diarrhea [R19.7]  Yes   • Gastroparesis [K31.84]  Yes   • Ulcerative pancolitis without complication (CMS/[facility]) [K51.00]  Yes   • Type 2 diabetes mellitus treated with insulin (CMS/[facility]) [E11.9, Z79.4]  Not Applicable   • Bipolar disorder (CMS/[facility]) [F31.9]  Yes      Resolved Hospital Problems   No resolved problems to display.        Brief Hospital Course to date:  Thais Hobson is a 36 y.o. female With a PMH of gastroparesis, ulcerative  pancolitis w/o complication, T2DM, and bipolar disorder who was admitted on  for diarrhea. Has a hx of c diff and ulcerative colitis.    Patient and problems new to me today    Diarrhea -has not had any bowel movements this morning  -Recurrent C diff colitis vs ulcerative colitis flare; likely a UC flare with negative CDiff panel, as well as negative GI PCR. Will stop Vancomycin and consultation to GI for management of UC.   -Continue ID consult, appreciate Dr Martin input; will discontinue oral Vanc and consult GI as mentioned   -Clear liquid diet    Hyponatremia   --resolved, now 138     T2DM, uncontrolled with gastroparesis   -Continue Levemir 30 units subcu nightly, sliding scale insulin  --continue with good control       DVT prophylaxis:  Mechanical DVT prophylaxis orders are present.       Disposition: I expect the patient to be discharged pending improvement in GI symptoms, and pending GI recommendations     CODE STATUS:   Code Status and Medical Interventions:   Ordered at: 21 1320     Code Status:    CPR     Medical Interventions (Level of Support Prior to Arrest):    Full       EMELYN Porter  21                Electronically signed by dEen Henry APRN at 21 1548     Frank Martin MD at 21 1506          [facility]    INPATIENT PROGRESS NOTE  2021      PATIENT NAME: Thais Hobson  :  1985  MRN:  [id number]  Date of Admission:  2021      Antimicrobials:  Anti-Infectives (From admission, onward)    None          MAR reviewed.      Reason for consultation: History of recurrent C. difficile infection    Interval history:  C. difficile testing yesterday came back negative.  Oral vancomycin stopped.  She had more a bloody bowel movement today.  GI has been consulted for likely UC flare.    ROS:  No fevers/chills overnight.  No new rashes.  No SOB or chest pain.    Objective:  Temp (24hrs), Av.9 °F (36.6 °C),  "Min:97.5 °F (36.4 °C), Max:98.3 °F (36.8 °C)    /74   Pulse 92   Temp 98 °F (36.7 °C) (Oral)   Resp 17   Ht 160 cm (63\")   Wt 96.9 kg (213 lb 9.6 oz)   LMP  (LMP Unknown)   SpO2 98%   BMI 37.84 kg/m²     Physical Examination:  GENERAL: Awake and alert, in no acute distress.   HEENT: Normocephalic, atraumatic.  EOMI. No conjunctival injection or subconjunctival hemorrhage.  LUNGS: Breathing comfortably. Normal respiratory effort.  SKIN: Warm and dry without rash or ulcer.  NEURO: A&Ox4. No focal deficits.  Face symmetric.  Speech fluent.  Moves all extremities well.   PSYCHIATRIC: Normal insight and judgement.  Cooperative.  Normal affect.       Laboratory Data:    Results from last 7 days   Lab Units 06/29/21  1321 06/28/21  1400   WBC 10*3/mm3 10.25 9.84   HEMOGLOBIN g/dL 13.3 13.4   HEMATOCRIT % 43.0 40.7   PLATELETS 10*3/mm3 243 256     Results from last 7 days   Lab Units 06/29/21  1558   SODIUM mmol/L 138   POTASSIUM mmol/L 3.7   CHLORIDE mmol/L 103   CO2 mmol/L 23.0   BUN mg/dL 6   CREATININE mg/dL 0.50*   GLUCOSE mg/dL 246*   CALCIUM mg/dL 8.7     Results from last 7 days   Lab Units 06/28/21  1400   ALK PHOS U/L 168*   BILIRUBIN mg/dL 0.3   ALT (SGPT) U/L 31   AST (SGOT) U/L 24             Estimated Creatinine Clearance: 174 mL/min (A) (by C-G formula based on SCr of 0.5 mg/dL (L)).  Results from last 7 days   Lab Units 06/28/21  1400   LACTATE mmol/L 1.6                   Microbiology:  Microbiology Results (last 10 days)     Procedure Component Value - Date/Time    Gastrointestinal Panel, PCR - Stool, Per Rectum [[id number]]  (Normal) Collected: 06/29/21 1149    Lab Status: Final result Specimen: Stool from Per Rectum Updated: 06/29/21 1318     Campylobacter Not Detected     Plesiomonas shigelloides Not Detected     Salmonella Not Detected     Vibrio Not Detected     Vibrio cholerae Not Detected     Yersinia enterocolitica Not Detected     Enteroaggregative E. coli (EAEC) Not Detected     " Enteropathogenic E. coli (EPEC) Not Detected     Enterotoxigenic E. coli (ETEC) lt/st Not Detected     Shiga-like toxin-producing E. coli (STEC) stx1/stx2 Not Detected     Shigella/Enteroinvasive E. coli (EIEC) Not Detected     Cryptosporidium Not Detected     Cyclospora cayetanensis Not Detected     Entamoeba histolytica Not Detected     Giardia lamblia Not Detected     Adenovirus F40/41 Not Detected     Astrovirus Not Detected     Norovirus GI/GII Not Detected     Rotavirus A Not Detected     Sapovirus (I, II, IV or V) Not Detected    Clostridium Difficile Toxin - Stool, Per Rectum [[id number]]  (Normal) Collected: 06/29/21 1149    Lab Status: Final result Specimen: Stool from Per Rectum Updated: 06/29/21 1614    Narrative:      The following orders were created for panel order Clostridium Difficile Toxin - Stool, Per Rectum.  Procedure                               Abnormality         Status                     ---------                               -----------         ------                     Clostridium Difficile To...[[id number]]  Normal              Final result                 Please view results for these tests on the individual orders.    Clostridium Difficile Toxin, PCR - Stool, Per Rectum [[id number]]  (Normal) Collected: 06/29/21 1149    Lab Status: Final result Specimen: Stool from Per Rectum Updated: 06/29/21 1614     C. Difficile Toxins by PCR Not Detected    Narrative:      Performance characteristics of test not established for patients <2 years of age.  Negative for Toxigenic C. Difficile    COVID PRE-OP / PRE-PROCEDURE SCREENING ORDER (NO ISOLATION) - Swab, Nasopharynx [[id number]]  (Normal) Collected: 06/28/21 1745    Lab Status: Final result Specimen: Swab from Nasopharynx Updated: 06/29/21 1418    Narrative:      The following orders were created for panel order COVID PRE-OP / PRE-PROCEDURE SCREENING ORDER (NO ISOLATION) - Swab, Nasopharynx.  Procedure                               Abnormality          Status                     ---------                               -----------         ------                     COVID-19, M,T,W, APTIMA ...[[id number]]  Normal              Final result                 Please view results for these tests on the individual orders.    COVID-19, M,T,W, APTIMA PANTHER KATHY IN-HOUSE NP/OP SWAB IN UTM/VTM/SALINE TRANSPORT MEDIA 24HR TAT - Swab, Nasopharynx [[id number]]  (Normal) Collected: 06/28/21 1745    Lab Status: Final result Specimen: Swab from Nasopharynx Updated: 06/29/21 1418     COVID19 Not Detected    Narrative:      Fact sheet for providers: https://www.fda.gov/media/025652/download     Fact sheet for patients: https://www.fda.gov/media/363119/download    Test performed by RT PCR.            Radiology:  Imaging Results (Last 72 Hours)     ** No results found for the last 72 hours. **            DISCUSSION:  36 y.o. female with history of ulcerative colitis and recurrent C. difficile infection who is admitted with persistent diarrhea.   I previously treated the patient in 2019 with oral vancomycin followed by fidaxomicin.  She developed a rash with fidaxomicin.  Subsequent C. difficile testing was negative.  She reportedly was admitted at [facility] a couple of weeks ago and was C. difficile positive.  Diarrhea transiently improved with oral vancomycin.  However, she has now continued to have watery diarrhea with blood and mucus along with abdominal pain and cramping.  Her white blood cell count is normal.  She is without fever.  She does have some mild tenderness and bloating.       GI PCR panel.  C. difficile PCR negative.  This is likely an ulcerative colitis flare.  She is a few days late on taking her UC medication due to recent C. difficile infection.    PROBLEM LIST:   --Persistent diarrhea, bloody.  Lack of leukocytosis going against C. Difficile.  GI PCR panel negative.  C. difficile PCR negative.  Likely UC flare.  --History of recurrent C. difficile  infection.  Previous episodes in 2019.  Recent admission at [facility].  --Ulcerative colitis, therapy currently on hold due to recent C. difficile infection.  --History of rash with fidaxomicin         PLAN:  --No antibiotics indicated at this time.  Infectious work-up is negative.  --Gastroenterology consultation for ongoing therapy of her ulcerative colitis.  Okay to proceed with any immunosuppressive therapy as needed from ID perspective.    Plan discussed with patient.    I will sign off at this time.  Please call back if I can be of further assistance.      Frank Martin MD  2021  15:06 EDT      Electronically signed by Frank Martin MD at 21 1510     Krystle Arora MD at 21 1115              [facility] Medicine Services  PROGRESS NOTE    Patient Name: Thais Hobson  : 1985  MRN: [id number]    Date of Admission: 2021  Primary Care Physician: Lv Sanchez DO    Subjective   Subjective     CC:  Follow-up right-sided abdominal pain    HPI:  Still having right-sided abdominal pain, denies any nausea or vomiting.  Has not had a bowel movement since she has been here.  She says she took an antidiarrheal medication so she can make it to the hospital last night.    ROS:  Gen- No fevers, chills  CV- No chest pain, palpitations  Resp- No cough, dyspnea    Objective   Objective     Vital Signs:   Temp:  [97.7 °F (36.5 °C)-98.3 °F (36.8 °C)] 97.7 °F (36.5 °C)  Heart Rate:  [] 90  Resp:  [17-18] 18  BP: ()/(62-83) 84/62        Physical Exam:  Constitutional: No acute distress, awake, alert  HENT: NCAT, mucous membranes moist  Respiratory: Clear to auscultation bilaterally, respiratory effort normal on room air  Cardiovascular: RRR, no murmurs  Gastrointestinal: Positive bowel sounds, soft, tender to palpation in right upper quadrant, nondistended  Psychiatric: Appropriate affect, cooperative  Neurologic: Oriented x 3, moving extremities  equally, cranial Nerves grossly intact to confrontation, speech clear  Skin: No rashes    Results Reviewed:  Results from last 7 days   Lab Units 06/28/21  1400   WBC 10*3/mm3 9.84   HEMOGLOBIN g/dL 13.4   HEMATOCRIT % 40.7   PLATELETS 10*3/mm3 256   PROCALCITONIN ng/mL 0.03     Results from last 7 days   Lab Units 06/28/21  1400   SODIUM mmol/L 129*   POTASSIUM mmol/L 3.5   CHLORIDE mmol/L 95*   CO2 mmol/L 22.0   BUN mg/dL 8   CREATININE mg/dL 0.60   GLUCOSE mg/dL 300*   CALCIUM mg/dL 9.2   ALT (SGPT) U/L 31   AST (SGOT) U/L 24     Estimated Creatinine Clearance: 144.8 mL/min (by C-G formula based on SCr of 0.6 mg/dL).    Microbiology Results Abnormal     None          Imaging Results (Last 24 Hours)     ** No results found for the last 24 hours. **              I have reviewed the medications:  Scheduled Meds:gabapentin, 400 mg, Oral, Q8H  Hydrocortisone (Perianal), , Rectal, BID  insulin detemir, 30 Units, Subcutaneous, Nightly  insulin lispro, 0-14 Units, Subcutaneous, TID AC  metroNIDAZOLE, 500 mg, Intravenous, Q8H  sodium chloride, 10 mL, Intravenous, Q12H  topiramate, 50 mg, Oral, BID  vancomycin, 125 mg, Oral, Q6H      Continuous Infusions:Pharmacy Consult,   lactated ringers, 100 mL/hr, Last Rate: 100 mL/hr (06/29/21 0318)      PRN Meds:.•  Pharmacy Consult  •  dextrose  •  dextrose  •  diphenhydrAMINE  •  glucagon (human recombinant)  •  HYDROcodone-acetaminophen  •  hydrOXYzine  •  ondansetron **OR** ondansetron  •  promethazine  •  sodium chloride    Assessment/Plan   Assessment & Plan     Active Hospital Problems    Diagnosis  POA   • **C. difficile colitis [A04.72]  Yes   • Diarrhea [R19.7]  Yes   • Gastroparesis [K31.84]  Yes   • Ulcerative pancolitis without complication (CMS/[facility]) [K51.00]  Yes   • Type 2 diabetes mellitus treated with insulin (CMS/[facility]) [E11.9, Z79.4]  Not Applicable   • Bipolar disorder (CMS/[facility]) [F31.9]  Yes      Resolved Hospital Problems   No resolved problems to display.         Brief Hospital Course to date:  Thais Hobson is a 36 y.o. female With a PMH of gastroparesis, ulcerative pancolitis w/o complication, T2DM, and bipolar disorder who was admitted on June 28 for diarrhea. Has a hx of c diff and ulcerative colitis.    Patient and problems new to me today    Diarrhea -has not had any bowel movements this morning  -Recurrent C diff colitis vs ulcerative colitis flare   -Completed vancomycin po x 14 days (was at [facility]), has not been on Entyvio for ulcerative colitis  -Continue ID consult   -Continue Flagyl and vancomycin   -Clear liquid diet    Hyponatremia   -BMP still pending from this morning    T2DM, uncontrolled with gastroparesis   -Continue Levemir 30 units subcu nightly, sliding scale insulin      DVT prophylaxis:  Mechanical DVT prophylaxis orders are present.       Disposition: I expect the patient to be discharged pending improvement in abdominal pain and diarrhea.    CODE STATUS:   Code Status and Medical Interventions:   Ordered at: 06/28/21 1320     Code Status:    CPR     Medical Interventions (Level of Support Prior to Arrest):    Full       Krystle Arora MD  06/29/21                Electronically signed by Krystle Arora MD at 06/29/21 1119          Consult Notes (all)      Ricci Barone APRN at 07/01/21 1641      Consult Orders    1. Inpatient Gastroenterology Consult [[id number]] ordered by Eden Henry APRN at 06/30/21 1327               [facility] Gastroenterology Consult    Referring Provider: Sohan Cain MD    PCP: Lv Sanchez DO    Reason for Consultation: Management of ulcerative colitis    Chief complaint: Abdominal pain and diarrhea    History of present illness:    Thais Hobson is a 36 y.o. female who is admitted with worsening abdominal pain and diarrhea.  Patient well-known to inpatient GI service as we have managed her recently for ulcerative colitis.  Patient has a longstanding history of UC which was diagnosed  greater than 15 years ago; in that timeframe, patient has been intolerant to most biologic therapies with numerous allergies including to Remicade, Humira, Xeljanz, and is even intolerant to her current therapy of Entyvio.  Patient notes that recently she was admitted to [facility] where she was treated for C. difficile infection.  Notes the following she has had persistent diarrhea and abdominal pain.  Does not endorse any nausea, vomiting, shortness of breath, chest pain, fever/chills, or recent known sick contacts.  Patient also provides that her symptoms are worse following meals with abdominal pain and diarrhea; states that she has been having 10+ diarrheal stools with occasional blood and mucus.  Reports she missed her most recent dose of Entyvio as a result of her active C. difficile infection.  Notes that she receives Entyvio monthly; in May, she received her Entyvio on May 4 and the 2-week Entyvio level was drawn and patient was found to be nontherapeutic.  Past medical, surgical, social, and family history is reviewed for accuracy.  No documented alleviating factors and p.o. intake exacerbates symptoms.  Reports epigastric abdominal pain at time of exam.    Last Colonoscopy: June 2020 per Dr. Graf.  Last EGD: May 2021 per Dr. Brunner: EGD found mild erosive gastritis and excess fluid in stomach suggestive of gastroparesis.    Allergies:  Hydrochlorothiazide, Penicillins, Remicade [infliximab], Soybean-containing drug products, Xeljanz [tofacitinib citrate], Zofran [ondansetron hcl], Fidaxomicin, and Reglan [metoclopramide]    Scheduled Meds:  gabapentin, 400 mg, Oral, Q8H  Hydrocortisone (Perianal), , Rectal, BID  insulin detemir, 30 Units, Subcutaneous, Nightly  insulin lispro, 0-14 Units, Subcutaneous, TID AC  saccharomyces boulardii, 250 mg, Oral, BID  sodium chloride, 10 mL, Intravenous, Q12H  topiramate, 50 mg, Oral, BID         Infusions:  lactated ringers, 100 mL/hr, Last Rate: 100 mL/hr  (07/01/21 6613)        PRN Meds:  •  dextrose  •  dextrose  •  diphenhydrAMINE  •  glucagon (human recombinant)  •  HYDROcodone-acetaminophen  •  hydrOXYzine  •  ondansetron **OR** ondansetron  •  promethazine  •  sodium chloride    Home Meds:  Medications Prior to Admission   Medication Sig Dispense Refill Last Dose   • azaTHIOprine (IMURAN) 50 MG tablet TAKE 2 TABLETS BY MOUTH EVERY DAY 60 tablet 1    • Continuous Blood Gluc  (Dexcom G6 ) device 1 Units Daily. 1 each 1    • Continuous Blood Gluc Sensor (Dexcom G6 Sensor) Every 10 (Ten) Days. 3 each 11    • Continuous Blood Gluc Transmit (Dexcom G6 Transmitter) misc 1 Units Daily. 1 each 1    • Continuous Glucose Monitor kit Use as directed 1 each 11    • diphenhydrAMINE (Banophen) 25 mg capsule Take 1 capsule by mouth Every 8 (Eight) Hours As Needed for Itching or Allergies. 90 capsule 1    • estradiol (MINIVELLE, VIVELLE-DOT) 0.05 MG/24HR patch Place 1 patch on the skin as directed by provider 1 (One) Time Per Week. 8 patch 2    • fluconazole (Diflucan) 100 MG tablet Take 1 tablet by mouth Daily. 7 tablet 0    • gabapentin (NEURONTIN) 800 MG tablet Take 1 tablet by mouth 3 (Three) Times a Day. 90 tablet 0    • glucose blood test strip Check glucose TID, One Touch Reveal 90 each 11    • glucose blood test strip ONE TOUCH VERIO FLEX TEST STRIPS TO CHECK GLUCOSE 3 TIMES DAILY FOR DM E11.9 100 each 12    • HumaLOG Mix 75/25 KwikPen (75-25) 100 UNIT/ML suspension pen-injector pen Inject 25 Units under the skin into the appropriate area as directed 2 (Two) Times a Day With Meals. 5 pen 3    • HYDROcodone-acetaminophen (NORCO) 5-325 MG per tablet Take 1 tablet by mouth Every 6 (Six) Hours As Needed for Moderate Pain . 25 tablet 0    • Hydrocortisone, Perianal, (Proctozone-HC) 2.5 % rectal cream Insert  into the rectum 2 (Two) Times a Day. 28 g 1    • hydrOXYzine pamoate (VISTARIL) 25 MG capsule Take 1 capsule by mouth 3 (Three) Times a Day As Needed for  "Anxiety. 60 capsule 2    • insulin aspart (NovoLOG) 100 UNIT/ML injection TID dosing with meals, as per sliding scale coverage, up to 12 units TID dosing available 10 mL 3    • Insulin Glargine (BASAGLAR KWIKPEN) 100 UNIT/ML injection pen Inject 40 Units under the skin into the appropriate area as directed Every Night. 5 pen 3    • Insulin Syringe 30G X 5/16\" 1 ML misc 1 syringe 3 (Three) Times a Day. 100 each 2    • Lancets misc USE AS DIRECTED TO CHECK GLUCOSE 3 TIMES DAILY FOR DM E11.9 100 each 3    • promethazine (PHENERGAN) 25 MG tablet Take 1 tablet by mouth Every 6 (Six) Hours As Needed for Nausea or Vomiting. 40 tablet 2    • SUMAtriptan (Imitrex) 50 MG tablet Take one tablet at onset of headache. May repeat dose one time in 2 hours if headache not relieved. 8 tablet 2    • topiramate (TOPAMAX) 50 MG tablet Take 1 tablet by mouth 2 (Two) Times a Day. 60 tablet 2        ROS: Review of Systems   Constitutional: Positive for activity change, appetite change and fatigue. Negative for chills, diaphoresis, fever and unexpected weight change.   HENT: Negative for sore throat, trouble swallowing and voice change.    Eyes: Negative.    Respiratory: Negative for apnea, cough, choking, chest tightness, shortness of breath, wheezing and stridor.    Cardiovascular: Negative for chest pain, palpitations and leg swelling.   Gastrointestinal: Positive for abdominal pain, blood in stool, diarrhea and nausea. Negative for abdominal distention, anal bleeding, constipation, rectal pain and vomiting.   Endocrine: Negative.    Genitourinary: Negative.    Musculoskeletal: Negative.    Skin: Negative for color change, pallor, rash and wound.   Allergic/Immunologic: Negative.    Neurological: Negative.    Hematological: Negative for adenopathy. Does not bruise/bleed easily.   Psychiatric/Behavioral: Negative.    All other systems reviewed and are negative.      PAST MED HX:  Past Medical History:   Diagnosis Date   • Abdominal pain  "    periumbilicul   • Abdominal tenderness     Periumbil   • Alopecia    • Anemia    • Anxiety    • Arthritis    • Asthma     as a child   • Back pain    • Bipolar disorder (CMS/HCC)    • Blood in stool    • Body piercing     ears   • Colitis    • Colitis    • Colon polyps    • Depression    • Diabetes mellitus (CMS/HCC)    • Diarrhea    • Dysphagia     Patient reported he has trouble from time to time and that her throat has to be dilated   • Elevated cholesterol     Reported slightly elevated - taking no medication    • Fractures     Right pinky toe - no surgery required   • GERD (gastroesophageal reflux disease)    • H/O colonoscopy 2013    Terminal ileal biopsies negative. Cecal&ascending biopsie revealed chronic active colitis w/ features cons. w/ inflammatory bowel disease. Transevere colon biopsies revealved chronic active colitis. Desc. colon biopsies revealed chronic active colitis. Rect colon biopsie revealed chronic active colitis. Negative for dysplasia   • Hematemesis    • History of Clostridium difficile infection    • History of transfusion     No reaction to transfusion reported   • Migraine headache    • Nausea     alone   • Pancreatitis    • Restless leg    • Seizures (CMS/HCC)     pt reports x1    • Tattoos     x9   • Universal ulcerative colitis (CMS/HCC)    • UTI (urinary tract infection)    • Weight loss        PAST SURG HX:  Past Surgical History:   Procedure Laterality Date   • BREAST SURGERY Left     breast lump removed benign   •  SECTION     • CHOLECYSTECTOMY      for stone disease   • COLONOSCOPY     • COLONOSCOPY N/A 2020    Procedure: COLONOSCOPY;  Surgeon: Alonzo He MD;  Location: [facility] ENDOSCOPY;  Service: Gastroenterology;  Laterality: N/A;   • DILATATION AND CURETTAGE     • ENDOSCOPY  2013    Erosive esophagitis, grade 2; erosive gastritis; small sliding hiatal hernia less than 3 cm, erosive deodenitis duodenal polyp.No gan  "mucosa.Second portion duedenal biopsy neg. Second postion of dudoenal biopsy revealed polypoid intestinal mucosa w/ lymphoid aggregate. 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 no helicobacter pylori. Negative for mateaplasia, dysplasia   • ENDOSCOPY N/A 9/22/2020    Procedure: ESOPHAGOGASTRODUODENOSCOPY WITH DILATATION AND BIOPSIES;  Surgeon: Alonzo He MD;  Location: [facility] ENDOSCOPY;  Service: Gastroenterology;  Laterality: N/A;   • ENDOSCOPY N/A 5/17/2021    Procedure: ESOPHAGOGASTRODUODENOSCOPY;  Surgeon: Brunner, Mark I, MD;  Location: [facility] ENDOSCOPY;  Service: Gastroenterology;  Laterality: N/A;   • HIP SURGERY Right    • HYSTERECTOMY     • LEEP         FAM HX:  Family History   Problem Relation Age of Onset   • Heart failure Father    • Heart disease Father    • No Known Problems Mother    • No Known Problems Brother    • No Known Problems Son    • No Known Problems Son        SOC HX:  Social History     Socioeconomic History   • Marital status: Single     Spouse name: Not on file   • Number of children: Not on file   • Years of education: Not on file   • Highest education level: Not on file   Tobacco Use   • Smoking status: Never Smoker   • Smokeless tobacco: Never Used   Substance and Sexual Activity   • Alcohol use: No   • Drug use: No   • Sexual activity: Defer       PHYSICAL EXAM  /87   Pulse 97   Temp 98 °F (36.7 °C) (Oral)   Resp 17   Ht 160 cm (63\")   Wt 97.5 kg (214 lb 14.4 oz)   LMP  (LMP Unknown)   SpO2 100%   BMI 38.07 kg/m²   Wt Readings from Last 3 Encounters:   07/01/21 97.5 kg (214 lb 14.4 oz)   05/16/21 90.3 kg (199 lb)   03/11/21 92.5 kg (204 lb)   ,body mass index is 38.07 kg/m².  Physical Exam  Vitals and nursing note reviewed.   Constitutional:       General: She is not in acute distress.     Appearance: Normal appearance. She is obese. She is not ill-appearing or toxic-appearing.   HENT:      Head: Normocephalic and atraumatic.      " Mouth/Throat:      Mouth: Mucous membranes are moist.      Pharynx: Oropharynx is clear. No oropharyngeal exudate.   Eyes:      General: No scleral icterus.     Extraocular Movements: Extraocular movements intact.      Conjunctiva/sclera: Conjunctivae normal.      Pupils: Pupils are equal, round, and reactive to light.   Cardiovascular:      Rate and Rhythm: Normal rate and regular rhythm.      Pulses: Normal pulses.      Heart sounds: Normal heart sounds.   Pulmonary:      Effort: Pulmonary effort is normal. No respiratory distress.      Breath sounds: Normal breath sounds.   Abdominal:      General: Abdomen is flat. Bowel sounds are normal. There is no distension.      Palpations: Abdomen is soft. There is no mass.      Tenderness: There is abdominal tenderness. There is no guarding or rebound.      Hernia: No hernia is present.   Genitourinary:     Comments: defer  Musculoskeletal:         General: Normal range of motion.      Cervical back: Normal range of motion and neck supple. No rigidity or tenderness.      Right lower leg: No edema.      Left lower leg: No edema.   Lymphadenopathy:      Cervical: No cervical adenopathy.   Skin:     General: Skin is warm and dry.      Capillary Refill: Capillary refill takes less than 2 seconds.      Coloration: Skin is not jaundiced or pale.   Neurological:      General: No focal deficit present.      Mental Status: She is alert and oriented to person, place, and time.   Psychiatric:         Mood and Affect: Mood normal.         Behavior: Behavior normal.         Thought Content: Thought content normal.         Judgment: Judgment normal.     Results Review:   I reviewed the patient's new clinical results.  I reviewed the patient's new imaging results and agree with the interpretation.  I personally viewed and interpreted the patient's EKG/Telemetry data    Lab Results   Component Value Date    WBC 10.25 06/29/2021    HGB 13.3 06/29/2021    HGB 13.4 06/28/2021    HGB 11.8  (L) 05/17/2021    HCT 43.0 06/29/2021    MCV 91.9 06/29/2021     06/29/2021       No results found for: INR    Lab Results   Component Value Date    GLUCOSE 246 (H) 06/29/2021    BUN 6 06/29/2021    CREATININE 0.50 (L) 06/29/2021    EGFRIFNONA 140 06/29/2021    EGFRIFAFRI >150 06/29/2021    BCR 12.0 06/29/2021     06/29/2021    K 3.7 06/29/2021    CO2 23.0 06/29/2021    CALCIUM 8.7 06/29/2021    PROTENTOTREF 7.9 11/20/2019    ALBUMIN 4.00 06/28/2021    ALKPHOS 168 (H) 06/28/2021    BILITOT 0.3 06/28/2021    ALT 31 06/28/2021    AST 24 06/28/2021       No results found.    COVID19   Date Value Ref Range Status   06/28/2021 Not Detected Not Detected - Ref. Range Final        Ref. Range 5/16/2021 16:24   Vedolizumab Latest Units: ug/mL 26     ASSESSMENTS/PLANS  1.  Ulcerative colitis flare  2.  Acute abdominal pain, related to above  3.  Diarrhea, related to above  4.  Type 2 diabetes mellitus, poorly controlled  5.  Gastroparesis, related to above    Thais Hobson is a 36 y.o. female admitted to hospital with worsening diarrhea and abdominal pain.  Patient known to service for management of her ulcerative colitis.  Most recently patient has been managed on Entyvio but 2 weeks following a last dose within our system, her levels were not therapeutic.  Patient's clinical presentation consistent with a UC flare.  Long-term goal is for subtotal colectomy with plans for J-pouch; however, patient's uncontrolled diabetes hinders this plan as it would complicate wound healing.  Given use of Entyvio in fact patient on therapeutic, recommend colonoscopy tomorrow to evaluate extent of disease process.  We discussed increasing Entyvio dose and reevaluating levels pending results of colonoscopy.  Also discussed use of steroids; we will hold on this for now as historically patient has had limited benefit from steroid administration.    >>> Recommend colonoscopy tomorrow to evaluate extent of patient's ulcerative  colitis.  >>> Cleared with diet now; n.p.o. at midnight  >>> Obtain informed consent for colonoscopy  >>> MoviPrep: Give 8 ounces every 15 minutes ×2 L starting at 4 PM.  Repeat dose at 5 AM tomorrow.  Must finish both doses in 2 hours.  Stool should look like lemonade when finished.  Please call ENDO at [phone] at 7:30am if not clear.  >>> We will hold on steroids at this time as patient historically has had little benefit from steroids.  >>> May use OTC Veronica Root 500 mg TID for gastroparesis    I discussed the patient's findings and my recommendations with patient, family and nursing staff    EMELYN Bradley  07/01/21  16:41 EDT      Electronically signed by Ricci Barone APRN at 07/01/21 1700     Frank Martin MD at 06/29/21 1523      Consult Orders    1. Inpatient Infectious Diseases Consult [[id number]] ordered by Moraima Garcia MD at 06/28/21 1706               [facility]    INPATIENT CONSULT NOTE / INITIAL HOSPITAL VISIT      PATIENT NAME:  Thais Hobson  YOB: 1985  MEDICAL RECORD NUMBER:  [id number]    Date of Admission:  6/28/2021  Date of Consult: 6/29/2021    Requesting Provider: HAM Garcia MD  Evaluating Physician: Frank Martin MD    CC:  Persistent diarrhea    Reason for Consultation:   Recurrent C diff infction    History of Present Illness:  Patient is a 36 y.o. female with a past medical history significant for ulcerative colitis and recurrent C. difficile infection who was admitted to [facility] on 6/28/2021 after presenting to primary care with complaints of persistent diarrhea.  I previously saw the patient during her multiple hospitalizations in 2019 at [facility].  At that time, she was initially treated with oral vancomycin for C. difficile infection followed by fidaxomicin.  She developed a rash with fidaxomicin.  She continued to have diarrhea and follow-up C. difficile testing was actually negative.  At that  point, it was thought that her diarrhea was due to her ulcerative colitis.  Patient states that she has been taking Entyvio for her UC.  Around 2 to 3 weeks ago, she was admitted to [facility].  She states she tested positive for C. difficile and was placed on oral vancomycin capsules.  She says that her diarrhea initially improved.  She was discharged home and continued the oral vancomycin.  However, she felt like her diarrhea was getting worse.  Positive subjective fevers.  Watery diarrhea with mucus and a bit of blood.  Positive cramping and bloating.  No nausea or vomiting.  She has been off of the Entyvio because of the C. difficile infection.  She presents primary care due to her complaints of ongoing diarrhea.  She was referred here for admission.  Her white blood cell count is within normal limits and she has been afebrile.  She is currently on oral vancomycin and IV Flagyl.  GI PCR panel is negative.  White blood cell count is normal at 9.8.    I have been consulted to assist in workup and antimicrobial management of recurrent C. difficile infection.    Past Medical History:   Diagnosis Date   • Abdominal pain     periumbilicul   • Abdominal tenderness     Periumbil   • Alopecia    • Anemia    • Anxiety    • Arthritis    • Asthma     as a child   • Back pain    • Bipolar disorder (CMS/HCC)    • Blood in stool    • Body piercing     ears   • Colitis    • Colitis    • Colon polyps    • Depression    • Diabetes mellitus (CMS/HCC)    • Diarrhea    • Dysphagia     Patient reported he has trouble from time to time and that her throat has to be dilated   • Elevated cholesterol     Reported slightly elevated - taking no medication    • Fractures     Right pinky toe - no surgery required   • GERD (gastroesophageal reflux disease)    • H/O colonoscopy 08/01/2013    Terminal ileal biopsies negative. Cecal&ascending biopsie revealed chronic active colitis w/ features cons. w/ inflammatory bowel disease.  Transevere colon biopsies revealved chronic active colitis. Desc. colon biopsies revealed chronic active colitis. Rect colon biopsie revealed chronic active colitis. Negative for dysplasia   • Hematemesis    • History of Clostridium difficile infection    • History of transfusion     No reaction to transfusion reported   • Migraine headache    • Nausea     alone   • Pancreatitis    • Restless leg    • Seizures (CMS/HCC)     pt reports x1    • Tattoos     x9   • Universal ulcerative colitis (CMS/HCC)    • UTI (urinary tract infection)    • Weight loss        Past Surgical History:   Procedure Laterality Date   • BREAST SURGERY Left     breast lump removed benign   •  SECTION     • CHOLECYSTECTOMY      for stone disease   • COLONOSCOPY     • COLONOSCOPY N/A 2020    Procedure: COLONOSCOPY;  Surgeon: Alonzo He MD;  Location: [facility] ENDOSCOPY;  Service: Gastroenterology;  Laterality: N/A;   • DILATATION AND CURETTAGE     • ENDOSCOPY  2013    Erosive esophagitis, grade 2; erosive gastritis; small sliding hiatal hernia less than 3 cm, erosive deodenitis duodenal polyp.No gan mucosa.Second portion duedenal biopsy neg. Second postion of dudoenal biopsy revealed polypoid intestinal mucosa w/ lymphoid aggregate. 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 no helicobacter pylori. Negative for mateaplasia, dysplasia   • ENDOSCOPY N/A 2020    Procedure: ESOPHAGOGASTRODUODENOSCOPY WITH DILATATION AND BIOPSIES;  Surgeon: Alonzo He MD;  Location: [facility] ENDOSCOPY;  Service: Gastroenterology;  Laterality: N/A;   • ENDOSCOPY N/A 2021    Procedure: ESOPHAGOGASTRODUODENOSCOPY;  Surgeon: Brunner, Mark I, MD;  Location: [facility] ENDOSCOPY;  Service: Gastroenterology;  Laterality: N/A;   • HIP SURGERY Right    • HYSTERECTOMY     • LEEP         Family History   Problem Relation Age of Onset   • Heart failure Father    • Heart disease Father    • No Known  "Problems Mother    • No Known Problems Brother    • No Known Problems Son    • No Known Problems Son        Social History     Socioeconomic History   • Marital status: Single     Spouse name: Not on file   • Number of children: Not on file   • Years of education: Not on file   • Highest education level: Not on file   Tobacco Use   • Smoking status: Never Smoker   • Smokeless tobacco: Never Used   Substance and Sexual Activity   • Alcohol use: No   • Drug use: No   • Sexual activity: Defer         Allergies:  Allergies   Allergen Reactions   • Hydrochlorothiazide Hives   • Penicillins Hives   • Remicade [Infliximab] Anaphylaxis   • Soybean-Containing Drug Products Swelling     \"Soy sauce\"   • Xeljanz [Tofacitinib Citrate] Other (See Comments)     BLISTERS IN THROAT & ON ARMS   • Zofran [Ondansetron Hcl] Headache     migraines   • Reglan [Metoclopramide] Other (See Comments)     States feels weird when takes it       Home Medications:  No current facility-administered medications on file prior to encounter.     Current Outpatient Medications on File Prior to Encounter   Medication Sig Dispense Refill   • azaTHIOprine (IMURAN) 50 MG tablet TAKE 2 TABLETS BY MOUTH EVERY DAY 60 tablet 1   • Continuous Blood Gluc  (Dexcom G6 ) device 1 Units Daily. 1 each 1   • Continuous Blood Gluc Sensor (Dexcom G6 Sensor) Every 10 (Ten) Days. 3 each 11   • Continuous Blood Gluc Transmit (Dexcom G6 Transmitter) misc 1 Units Daily. 1 each 1   • Continuous Glucose Monitor kit Use as directed 1 each 11   • diphenhydrAMINE (Banophen) 25 mg capsule Take 1 capsule by mouth Every 8 (Eight) Hours As Needed for Itching or Allergies. 90 capsule 1   • estradiol (MINIVELLE, VIVELLE-DOT) 0.05 MG/24HR patch Place 1 patch on the skin as directed by provider 1 (One) Time Per Week. 8 patch 2   • fluconazole (Diflucan) 100 MG tablet Take 1 tablet by mouth Daily. 7 tablet 0   • gabapentin (NEURONTIN) 800 MG tablet Take 1 tablet by mouth 3 " "(Three) Times a Day. 90 tablet 0   • glucose blood test strip Check glucose TID, One Touch Reveal 90 each 11   • glucose blood test strip ONE TOUCH VERIO FLEX TEST STRIPS TO CHECK GLUCOSE 3 TIMES DAILY FOR DM E11.9 100 each 12   • HumaLOG Mix 75/25 KwikPen (75-25) 100 UNIT/ML suspension pen-injector pen Inject 25 Units under the skin into the appropriate area as directed 2 (Two) Times a Day With Meals. 5 pen 3   • HYDROcodone-acetaminophen (NORCO) 5-325 MG per tablet Take 1 tablet by mouth Every 6 (Six) Hours As Needed for Moderate Pain . 25 tablet 0   • Hydrocortisone, Perianal, (Proctozone-HC) 2.5 % rectal cream Insert  into the rectum 2 (Two) Times a Day. 28 g 1   • hydrOXYzine pamoate (VISTARIL) 25 MG capsule Take 1 capsule by mouth 3 (Three) Times a Day As Needed for Anxiety. 60 capsule 2   • insulin aspart (NovoLOG) 100 UNIT/ML injection TID dosing with meals, as per sliding scale coverage, up to 12 units TID dosing available 10 mL 3   • Insulin Glargine (BASAGLAR KWIKPEN) 100 UNIT/ML injection pen Inject 40 Units under the skin into the appropriate area as directed Every Night. 5 pen 3   • Insulin Syringe 30G X 5/16\" 1 ML misc 1 syringe 3 (Three) Times a Day. 100 each 2   • Lancets misc USE AS DIRECTED TO CHECK GLUCOSE 3 TIMES DAILY FOR DM E11.9 100 each 3   • promethazine (PHENERGAN) 25 MG tablet Take 1 tablet by mouth Every 6 (Six) Hours As Needed for Nausea or Vomiting. 40 tablet 2   • SUMAtriptan (Imitrex) 50 MG tablet Take one tablet at onset of headache. May repeat dose one time in 2 hours if headache not relieved. 8 tablet 2   • topiramate (TOPAMAX) 50 MG tablet Take 1 tablet by mouth 2 (Two) Times a Day. 60 tablet 2   • [DISCONTINUED] promethazine (PHENERGAN) 25 MG tablet Take 1 tablet by mouth Every 6 (Six) Hours As Needed for Nausea or Vomiting. 30 tablet 1       Current Hospital Medications:  Current Facility-Administered Medications   Medication Dose Route Frequency Provider Last Rate Last Admin "   • ! Change PPI to Famotidine per [facility] Tx protocol <<<   Does not apply Continuous PRN Moraima Garcia MD       • dextrose (D50W) 25 g/ 50mL Intravenous Solution 25 g  25 g Intravenous Q15 Min PRN Moraima Garica MD       • dextrose (GLUTOSE) oral gel 15 g  15 g Oral Q15 Min PRN Moraima Garcia MD       • diphenhydrAMINE (BENADRYL) capsule 25 mg  25 mg Oral Q8H PRN Moraima Garcia MD       • gabapentin (NEURONTIN) capsule 400 mg  400 mg Oral Q8H Moraima Garcia MD   400 mg at 06/29/21 1330   • glucagon (human recombinant) (GLUCAGEN DIAGNOSTIC) injection 1 mg  1 mg Subcutaneous Q15 Min PRN Moraima Garcia MD       • HYDROcodone-acetaminophen (NORCO) 5-325 MG per tablet 1 tablet  1 tablet Oral Q6H PRN Moraima Garcia MD   1 tablet at 06/29/21 1241   • Hydrocortisone (Perianal) (ANUSOL-HC) 2.5 % rectal cream   Rectal BID Moraima Garcia MD       • hydrOXYzine (ATARAX) tablet 25 mg  25 mg Oral TID PRN Moraima Garcia MD       • insulin detemir (LEVEMIR) injection 30 Units  30 Units Subcutaneous Nightly Moraima Garcia MD   30 Units at 06/28/21 2157   • insulin lispro (humaLOG) injection 0-14 Units  0-14 Units Subcutaneous TID AC Moraima Garcia MD   3 Units at 06/29/21 1241   • lactated ringers infusion  100 mL/hr Intravenous Continuous Sohan Cain  mL/hr at 06/29/21 1330 100 mL/hr at 06/29/21 1330   • metroNIDAZOLE (FLAGYL) 500 mg/100mL IVPB  500 mg Intravenous Q8H Moraima Garcia MD   500 mg at 06/29/21 1330   • ondansetron (ZOFRAN) tablet 4 mg  4 mg Oral Q6H PRN Sohan Cain MD        Or   • ondansetron (ZOFRAN) injection 4 mg  4 mg Intravenous Q6H PRN Sohan Cain MD       • promethazine (PHENERGAN) tablet 25 mg  25 mg Oral Q6H PRN Moraima Garcia MD       • sodium chloride 0.9 % flush 10 mL  10 mL Intravenous Q12H Sohan Cain MD   10 mL at 06/28/21 2017   • sodium chloride 0.9 % flush 10 mL  10 mL Intravenous PRN Sohan Cain MD       • topiramate (TOPAMAX) tablet 50 mg  50 mg Oral BID Moraima Garcia,  MD   50 mg at 21 0848   • vancomycin (VANCOCIN) capsule 125 mg  125 mg Oral Q6H Moraima Garcia MD   125 mg at 21 1241       Antimicrobials:  Anti-Infectives (From admission, onward)    Ordered     Dose/Rate Route Frequency Start Stop    21 1706  vancomycin (VANCOCIN) capsule 125 mg     Ordering Provider: Moraima Garcia MD    125 mg Oral Every 6 Hours Scheduled 21 1800 21 1759    21 1706  metroNIDAZOLE (FLAGYL) 500 mg/100mL IVPB     Ordering Provider: Moraima Garcia MD    500 mg  over 30 Minutes Intravenous Every 8 Hours 21 1800 21 1359          Review of Systems:   Constitutional: Subjective fevers.  Appetite good.  No fatigue. No weight loss.  HEENT:  No new vision, hearing or throat complaints.  No oral sores.  No headache, photophobia or neck stiffness.  CV:  No chest pain, palpitations, or syncope.  Respiratory:  No SOB, cough, or hemoptysis.  GI:  No nausea or vomiting; positive watery diarrhea with mucus and blood.  Positive abdominal cramping and bloating.  :  No dysuria, hematuria, urgency, or flank pain.  Lymph:  No swollen lymph nodes in neck, axilla, or groin.   Hematologic:  No easy bruising or bleeding.  Musculoskeletal:  No new swelling or pain of joints.  No new back pain.  Neurologic:  No acute focal weakness or numbness.  No seizures.  Skin:  No rashes, ulcers, or lesions.       Vital Signs:  Temp (24hrs), Av.1 °F (36.7 °C), Min:97.7 °F (36.5 °C), Max:98.5 °F (36.9 °C)    BP 93/72 (BP Location: Right arm, Patient Position: Lying)   Pulse 90   Temp 98.5 °F (36.9 °C) (Oral)   Resp 18   Wt 93.2 kg (205 lb 8 oz)   LMP  (LMP Unknown)   SpO2 97%   BMI 35.27 kg/m²     Physical Examination:  GENERAL: Awake and alert, in no acute distress.   HEENT: Normocephalic, atraumatic.  EOMI. No conjunctival injection or subconjunctival hemorrhage.  NECK: Supple without nuchal rigidity.  CV: RRR on monitor.  LUNGS: Breathing comfortably. Normal respiratory  effort.  ABDOMEN: Soft, mild distention, mild diffuse tenderness to palpation.  No rebound or guarding.  EXT:  No clubbing, cyanosis, or edema.  MSK: No joint effusions or inflammation noted.  SKIN: Warm and dry without rash or ulcer.  NEURO: A&Ox4. No focal deficits.  Face symmetric.  Speech fluent.  Moves all extremities well.   PSYCHIATRIC: Normal insight and judgement.  Cooperative.  Normal affect.      Laboratory Data:    Results from last 7 days   Lab Units 06/29/21  1321 06/28/21  1400   WBC 10*3/mm3 10.25 9.84   HEMOGLOBIN g/dL 13.3 13.4   HEMATOCRIT % 43.0 40.7   PLATELETS 10*3/mm3 243 256     Results from last 7 days   Lab Units 06/28/21  1400   SODIUM mmol/L 129*   POTASSIUM mmol/L 3.5   CHLORIDE mmol/L 95*   CO2 mmol/L 22.0   BUN mg/dL 8   CREATININE mg/dL 0.60   GLUCOSE mg/dL 300*   CALCIUM mg/dL 9.2     Results from last 7 days   Lab Units 06/28/21  1400   ALK PHOS U/L 168*   BILIRUBIN mg/dL 0.3   ALT (SGPT) U/L 31   AST (SGOT) U/L 24             Estimated Creatinine Clearance: 144.8 mL/min (by C-G formula based on SCr of 0.6 mg/dL).  Results from last 7 days   Lab Units 06/28/21  1400   LACTATE mmol/L 1.6                   Microbiology:  Microbiology Results (last 10 days)     Procedure Component Value - Date/Time    Gastrointestinal Panel, PCR - Stool, Per Rectum [[id number]]  (Normal) Collected: 06/29/21 1149    Lab Status: Final result Specimen: Stool from Per Rectum Updated: 06/29/21 1318     Campylobacter Not Detected     Plesiomonas shigelloides Not Detected     Salmonella Not Detected     Vibrio Not Detected     Vibrio cholerae Not Detected     Yersinia enterocolitica Not Detected     Enteroaggregative E. coli (EAEC) Not Detected     Enteropathogenic E. coli (EPEC) Not Detected     Enterotoxigenic E. coli (ETEC) lt/st Not Detected     Shiga-like toxin-producing E. coli (STEC) stx1/stx2 Not Detected     Shigella/Enteroinvasive E. coli (EIEC) Not Detected     Cryptosporidium Not Detected      Cyclospora cayetanensis Not Detected     Entamoeba histolytica Not Detected     Giardia lamblia Not Detected     Adenovirus F40/41 Not Detected     Astrovirus Not Detected     Norovirus GI/GII Not Detected     Rotavirus A Not Detected     Sapovirus (I, II, IV or V) Not Detected    COVID PRE-OP / PRE-PROCEDURE SCREENING ORDER (NO ISOLATION) - Swab, Nasopharynx [[id number]]  (Normal) Collected: 06/28/21 1745    Lab Status: Final result Specimen: Swab from Nasopharynx Updated: 06/29/21 1418    Narrative:      The following orders were created for panel order COVID PRE-OP / PRE-PROCEDURE SCREENING ORDER (NO ISOLATION) - Swab, Nasopharynx.  Procedure                               Abnormality         Status                     ---------                               -----------         ------                     COVID-19, M,T,W, APTIMA ...[[id number]]  Normal              Final result                 Please view results for these tests on the individual orders.    COVID-19, M,T,W, APTIMA PANTHER KATHY IN-HOUSE NP/OP SWAB IN UTM/VTM/SALINE TRANSPORT MEDIA 24HR TAT - Swab, Nasopharynx [[id number]]  (Normal) Collected: 06/28/21 1745    Lab Status: Final result Specimen: Swab from Nasopharynx Updated: 06/29/21 1418     COVID19 Not Detected    Narrative:      Fact sheet for providers: https://www.fda.gov/media/784848/download     Fact sheet for patients: https://www.fda.gov/media/361330/download    Test performed by RT PCR.              Radiology:  Imaging Results (Last 72 Hours)     ** No results found for the last 72 hours. **              IMPRESSION:  36 y.o. female with history of ulcerative colitis and recurrent C. difficile infection who is admitted with persistent diarrhea.   I previously treated the patient in 2019 with oral vancomycin followed by fidaxomicin.  She developed a rash with fidaxomicin.  Subsequent C. difficile testing was negative.  She reportedly was admitted at [facility] a couple of weeks ago and was  C. difficile positive.  Diarrhea transiently improved with oral vancomycin.  However, she has now continued to have watery diarrhea with blood and mucus along with abdominal pain and cramping.  Her white blood cell count is normal.  She is without fever.  She does have some mild tenderness and bloating.      At this point, I would recheck for C. Difficile.  Her ongoing diarrhea may be due to UC flare versus persistent C. difficile infection.  I see no need for the IV Flagyl at this time.  If C. difficile testing is again positive, will increase oral vancomycin dose and do a prolonged taper.  Could try fidaxomicin again but she seemed to have developed a rash with that previously.      PROBLEM LIST:   --Persistent diarrhea.  Consider persistent C. difficile infection versus UC flare.  Lack of leukocytosis going against C. difficile.  --History of recurrent C. difficile infection.  Previous episodes in 2019.  Recent admission at [facility].  --Ulcerative colitis, therapy currently on hold due to recent C. difficile infection.  --History of rash with fidaxomicin       PLAN:  --Check stool for C. difficile  --Stop IV Flagyl  --Continue vancomycin 125 mg p.o. 4 times daily for now  --Add probiotics  --Advance diet  --If C. difficile testing is negative, would discontinue oral vancomycin and consult gastroenterology for further treatment of her ulcerative colitis  --If C. difficile testing is positive, she will need a prolonged oral vancomycin taper and will use higher dose    Thank you for the consultation.  I will continue to follow along with you.  Plan discussed with patient.  Discussed with micro lab.    Frank Martin MD  6/29/2021  15:23 EDT      Electronically signed by Frank Martin MD at 06/29/21 2137

## 2021-07-02 NOTE — PLAN OF CARE
Goal Outcome Evaluation:  Plan of Care Reviewed With: patient        Progress: improving  Outcome Summary: VSS.  36 year old white female was aditted on 6/28/2021.  Patient has been up ad del.  She has completed bowel prep with clear stool noted.  She has an IV in place and patent.  Have administered scheduled and prn medication.  Will continue to monitor patient throughout the rest of this shift.  Patient is scheduled to have colonoscopy in AM.

## 2021-07-02 NOTE — ANESTHESIA PREPROCEDURE EVALUATION
Anesthesia Evaluation     Patient summary reviewed and Nursing notes reviewed   no history of anesthetic complications:  NPO Solid Status: > 8 hours  NPO Liquid Status: > 2 hours           Airway   Mallampati: II  TM distance: >3 FB  Neck ROM: full  No difficulty expected  Dental    (+) poor dentition    Pulmonary - negative pulmonary ROS and normal exam   Cardiovascular - normal exam    ECG reviewed    (+) hyperlipidemia,       Neuro/Psych  (+) psychiatric history Bipolar and Anxiety,     GI/Hepatic/Renal/Endo    (+) obesity,  GERD,  diabetes mellitus (Gastroparesis, A1c is 12) type 2 poorly controlled,     ROS Comment: UC  Ongoing diarrhea with c. diff    Musculoskeletal     (+) back pain,   Abdominal    Substance History      OB/GYN          Other   arthritis,                    Anesthesia Plan    ASA 3     general   (PROPOFOL)  intravenous induction     Anesthetic plan, all risks, benefits, and alternatives have been provided, discussed and informed consent has been obtained with: patient.    Plan discussed with CRNA.

## 2021-07-02 NOTE — CASE MANAGEMENT/SOCIAL WORK
Continued Stay Note  [facility]     Patient Name: Thais oHbson  MRN: [id number]  Today's Date: 7/2/2021    Admit Date: 6/28/2021    Discharge Plan     Row Name 07/02/21 1101       Plan    Plan  home    Patient/Family in Agreement with Plan  yes    Plan Comments  SW met with pt at bedside.  She had a colonoscopy this morning. Pt believes she'll be ready for discharge on Saturday, 7/3.  She stated she may need a LYFT home.  SW confirmed that pt's home address on her face sheet is correct.  SW has approved LYFT for the weekend if needed. Pt denies having any other discharge needs.  KAMALJIT Camp @ [phone]    Final Discharge Disposition Code  01 - home or self-care        Discharge Codes    No documentation.       Expected Discharge Date and Time     Expected Discharge Date Expected Discharge Time    Jul 3, 2021             KAMALJIT Solis

## 2021-07-03 LAB
ANION GAP SERPL CALCULATED.3IONS-SCNC: 13 MMOL/L (ref 5–15)
BUN SERPL-MCNC: 5 MG/DL (ref 6–20)
BUN/CREAT SERPL: 10.9 (ref 7–25)
CALCIUM SPEC-SCNC: 9 MG/DL (ref 8.6–10.5)
CHLORIDE SERPL-SCNC: 105 MMOL/L (ref 98–107)
CO2 SERPL-SCNC: 20 MMOL/L (ref 22–29)
CREAT SERPL-MCNC: 0.46 MG/DL (ref 0.57–1)
CYTO UR: NORMAL
DEPRECATED RDW RBC AUTO: 38.4 FL (ref 37–54)
ERYTHROCYTE [DISTWIDTH] IN BLOOD BY AUTOMATED COUNT: 12.1 % (ref 12.3–15.4)
GFR SERPL CREATININE-BSD FRML MDRD: >150 ML/MIN/1.73
GFR SERPL CREATININE-BSD FRML MDRD: >150 ML/MIN/1.73
GLUCOSE BLDC GLUCOMTR-MCNC: 113 MG/DL (ref 70–130)
GLUCOSE BLDC GLUCOMTR-MCNC: 128 MG/DL (ref 70–130)
GLUCOSE BLDC GLUCOMTR-MCNC: 152 MG/DL (ref 70–130)
GLUCOSE BLDC GLUCOMTR-MCNC: 181 MG/DL (ref 70–130)
GLUCOSE SERPL-MCNC: 190 MG/DL (ref 65–99)
HCT VFR BLD AUTO: 41.1 % (ref 34–46.6)
HGB BLD-MCNC: 12.9 G/DL (ref 12–15.9)
LAB AP CASE REPORT: NORMAL
LAB AP CLINICAL INFORMATION: NORMAL
LAB AP DIAGNOSIS COMMENT: NORMAL
MCH RBC QN AUTO: 27.1 PG (ref 26.6–33)
MCHC RBC AUTO-ENTMCNC: 31.4 G/DL (ref 31.5–35.7)
MCV RBC AUTO: 86.3 FL (ref 79–97)
PATH REPORT.FINAL DX SPEC: NORMAL
PATH REPORT.GROSS SPEC: NORMAL
PLATELET # BLD AUTO: 250 10*3/MM3 (ref 140–450)
PMV BLD AUTO: 10 FL (ref 6–12)
POTASSIUM SERPL-SCNC: 4 MMOL/L (ref 3.5–5.2)
RBC # BLD AUTO: 4.76 10*6/MM3 (ref 3.77–5.28)
SODIUM SERPL-SCNC: 138 MMOL/L (ref 136–145)
WBC # BLD AUTO: 10.4 10*3/MM3 (ref 3.4–10.8)

## 2021-07-03 PROCEDURE — 63710000001 PROMETHAZINE PER 25 MG: Performed by: INTERNAL MEDICINE

## 2021-07-03 PROCEDURE — 80048 BASIC METABOLIC PNL TOTAL CA: CPT | Performed by: NURSE PRACTITIONER

## 2021-07-03 PROCEDURE — 85027 COMPLETE CBC AUTOMATED: CPT | Performed by: NURSE PRACTITIONER

## 2021-07-03 PROCEDURE — 82962 GLUCOSE BLOOD TEST: CPT

## 2021-07-03 PROCEDURE — 99232 SBSQ HOSP IP/OBS MODERATE 35: CPT | Performed by: NURSE PRACTITIONER

## 2021-07-03 PROCEDURE — 63710000001 INSULIN LISPRO (HUMAN) PER 5 UNITS: Performed by: NURSE PRACTITIONER

## 2021-07-03 PROCEDURE — 25010000002 MORPHINE PER 10 MG: Performed by: NURSE PRACTITIONER

## 2021-07-03 PROCEDURE — 63710000001 INSULIN LISPRO (HUMAN) PER 5 UNITS: Performed by: INTERNAL MEDICINE

## 2021-07-03 PROCEDURE — 63710000001 INSULIN DETEMIR PER 5 UNITS: Performed by: INTERNAL MEDICINE

## 2021-07-03 RX ORDER — VENLAFAXINE 37.5 MG/1
37.5 TABLET ORAL
Status: DISCONTINUED | OUTPATIENT
Start: 2021-07-04 | End: 2021-07-06 | Stop reason: HOSPADM

## 2021-07-03 RX ORDER — MORPHINE SULFATE 2 MG/ML
2 INJECTION, SOLUTION INTRAMUSCULAR; INTRAVENOUS EVERY 4 HOURS PRN
Status: DISCONTINUED | OUTPATIENT
Start: 2021-07-03 | End: 2021-07-06 | Stop reason: HOSPADM

## 2021-07-03 RX ADMIN — Medication 250 MG: at 09:32

## 2021-07-03 RX ADMIN — Medication 250 MG: at 21:53

## 2021-07-03 RX ADMIN — MORPHINE SULFATE 2 MG: 2 INJECTION, SOLUTION INTRAMUSCULAR; INTRAVENOUS at 19:29

## 2021-07-03 RX ADMIN — GABAPENTIN 400 MG: 400 CAPSULE ORAL at 13:08

## 2021-07-03 RX ADMIN — INSULIN LISPRO 7 UNITS: 100 INJECTION, SOLUTION INTRAVENOUS; SUBCUTANEOUS at 17:31

## 2021-07-03 RX ADMIN — GABAPENTIN 400 MG: 400 CAPSULE ORAL at 06:38

## 2021-07-03 RX ADMIN — PROMETHAZINE HYDROCHLORIDE 25 MG: 25 TABLET ORAL at 09:31

## 2021-07-03 RX ADMIN — GABAPENTIN 400 MG: 400 CAPSULE ORAL at 21:53

## 2021-07-03 RX ADMIN — SODIUM CHLORIDE, POTASSIUM CHLORIDE, SODIUM LACTATE AND CALCIUM CHLORIDE 100 ML/HR: 600; 310; 30; 20 INJECTION, SOLUTION INTRAVENOUS at 23:33

## 2021-07-03 RX ADMIN — CHOLESTYRAMINE 4 G: 4 POWDER, FOR SUSPENSION ORAL at 09:32

## 2021-07-03 RX ADMIN — MESALAMINE 4 G: 4 ENEMA RECTAL at 23:33

## 2021-07-03 RX ADMIN — TOPIRAMATE 50 MG: 25 TABLET, FILM COATED ORAL at 21:53

## 2021-07-03 RX ADMIN — CHOLESTYRAMINE 4 G: 4 POWDER, FOR SUSPENSION ORAL at 22:00

## 2021-07-03 RX ADMIN — INSULIN DETEMIR 30 UNITS: 100 INJECTION, SOLUTION SUBCUTANEOUS at 21:56

## 2021-07-03 RX ADMIN — SODIUM CHLORIDE, PRESERVATIVE FREE 10 ML: 5 INJECTION INTRAVENOUS at 09:33

## 2021-07-03 RX ADMIN — SODIUM CHLORIDE, PRESERVATIVE FREE 10 ML: 5 INJECTION INTRAVENOUS at 21:55

## 2021-07-03 RX ADMIN — TOPIRAMATE 50 MG: 25 TABLET, FILM COATED ORAL at 09:31

## 2021-07-03 RX ADMIN — HYDROCODONE BITARTRATE AND ACETAMINOPHEN 1 TABLET: 5; 325 TABLET ORAL at 13:27

## 2021-07-03 RX ADMIN — MORPHINE SULFATE 2 MG: 2 INJECTION, SOLUTION INTRAMUSCULAR; INTRAVENOUS at 09:31

## 2021-07-03 RX ADMIN — SODIUM CHLORIDE, POTASSIUM CHLORIDE, SODIUM LACTATE AND CALCIUM CHLORIDE 100 ML/HR: 600; 310; 30; 20 INJECTION, SOLUTION INTRAVENOUS at 06:38

## 2021-07-03 RX ADMIN — INSULIN LISPRO 3 UNITS: 100 INJECTION, SOLUTION INTRAVENOUS; SUBCUTANEOUS at 08:40

## 2021-07-03 RX ADMIN — INSULIN LISPRO 7 UNITS: 100 INJECTION, SOLUTION INTRAVENOUS; SUBCUTANEOUS at 13:08

## 2021-07-03 RX ADMIN — HYDROCODONE BITARTRATE AND ACETAMINOPHEN 1 TABLET: 5; 325 TABLET ORAL at 23:32

## 2021-07-03 RX ADMIN — HYDROCORTISONE 2.5%: 25 CREAM TOPICAL at 21:54

## 2021-07-03 NOTE — PROGRESS NOTES
[facility] Medicine Services  PROGRESS NOTE    Patient Name: Thais Hobson  : 1985  MRN: [id number]    Date of Admission: 2021  Primary Care Physician: Lv Sanchez DO    Subjective   Subjective     CC:  Follow-up right-sided abdominal pain    HPI:  Seen resting up in bed awake and alert.  In no acute distress.  States her abdominal pain is about the same currently rated 6/10 scale.  Having a little increased bloody stools overnight.  Mild nausea.  No vomiting.  No appetite this morning.    ROS:  Gen- No fevers, chills  CV- No chest pain, palpitations  Resp- No cough, dyspnea  GI-positive for nausea and diarrhea.  +abd tenderness (stable). As above      Objective   Objective     Vital Signs:   Temp:  [97.5 °F (36.4 °C)-98 °F (36.7 °C)] 97.6 °F (36.4 °C)  Heart Rate:  [] 90  Resp:  [16-17] 16  BP: (101-118)/(70-90) 118/90        Physical Exam:  Constitutional: No acute distress, awake, alert resting in bed   HENT: NCAT, mucous membranes moist  Respiratory: Clear to auscultation bilaterally, respiratory effort normal   Cardiovascular: RRR, no murmurs, rubs, or gallops  Gastrointestinal: Positive bowel sounds, soft, tender to epigastric area (stable), nondistended  Musculoskeletal: No bilateral ankle edema. WOODARD spont   Psychiatric: Appropriate affect, cooperative  Neurologic: Oriented x 3, strength symmetric in all extremities, Cranial Nerves grossly intact to confrontation, speech clear and appropriate.  Follows commands   Skin: No rashes     Results Reviewed:  Results from last 7 days   Lab Units 21  0747 21  1321 21  1400   WBC 10*3/mm3 10.40 10.25 9.84   HEMOGLOBIN g/dL 12.9 13.3 13.4   HEMATOCRIT % 41.1 43.0 40.7   PLATELETS 10*3/mm3 250 243 256   PROCALCITONIN ng/mL  --   --  0.03     Results from last 7 days   Lab Units 21  0941 21  1558 21  1400   SODIUM mmol/L 138 138 129*   POTASSIUM mmol/L 4.0 3.7 3.5   CHLORIDE  mmol/L 105 103 95*   CO2 mmol/L 20.0* 23.0 22.0   BUN mg/dL 5* 6 8   CREATININE mg/dL 0.46* 0.50* 0.60   GLUCOSE mg/dL 190* 246* 300*   CALCIUM mg/dL 9.0 8.7 9.2   ALT (SGPT) U/L  --   --  31   AST (SGOT) U/L  --   --  24     Estimated Creatinine Clearance: 186.5 mL/min (A) (by C-G formula based on SCr of 0.46 mg/dL (L)).    Microbiology Results Abnormal     Procedure Component Value - Date/Time    Clostridium Difficile Toxin - Stool, Per Rectum [[id number]]  (Normal) Collected: 06/29/21 1149    Lab Status: Final result Specimen: Stool from Per Rectum Updated: 06/29/21 1614    Narrative:      The following orders were created for panel order Clostridium Difficile Toxin - Stool, Per Rectum.  Procedure                               Abnormality         Status                     ---------                               -----------         ------                     Clostridium Difficile To...[[id number]]  Normal              Final result                 Please view results for these tests on the individual orders.    Clostridium Difficile Toxin, PCR - Stool, Per Rectum [[id number]]  (Normal) Collected: 06/29/21 1149    Lab Status: Final result Specimen: Stool from Per Rectum Updated: 06/29/21 1614     C. Difficile Toxins by PCR Not Detected    Narrative:      Performance characteristics of test not established for patients <2 years of age.  Negative for Toxigenic C. Difficile    COVID PRE-OP / PRE-PROCEDURE SCREENING ORDER (NO ISOLATION) - Swab, Nasopharynx [[id number]]  (Normal) Collected: 06/28/21 1745    Lab Status: Final result Specimen: Swab from Nasopharynx Updated: 06/29/21 1418    Narrative:      The following orders were created for panel order COVID PRE-OP / PRE-PROCEDURE SCREENING ORDER (NO ISOLATION) - Swab, Nasopharynx.  Procedure                               Abnormality         Status                     ---------                               -----------         ------                     COVID-19, M,T,W,  APTIMA ...[[id number]]  Normal              Final result                 Please view results for these tests on the individual orders.    COVID-19, M,T,W, APTIMA PANTHER KATHY IN-HOUSE NP/OP SWAB IN UTM/VTM/SALINE TRANSPORT MEDIA 24HR TAT - Swab, Nasopharynx [[id number]]  (Normal) Collected: 06/28/21 1745    Lab Status: Final result Specimen: Swab from Nasopharynx Updated: 06/29/21 1418     COVID19 Not Detected    Narrative:      Fact sheet for providers: https://www.fda.gov/media/370651/download     Fact sheet for patients: https://www.fda.gov/media/564193/download    Test performed by RT PCR.    Gastrointestinal Panel, PCR - Stool, Per Rectum [[id number]]  (Normal) Collected: 06/29/21 1149    Lab Status: Final result Specimen: Stool from Per Rectum Updated: 06/29/21 1318     Campylobacter Not Detected     Plesiomonas shigelloides Not Detected     Salmonella Not Detected     Vibrio Not Detected     Vibrio cholerae Not Detected     Yersinia enterocolitica Not Detected     Enteroaggregative E. coli (EAEC) Not Detected     Enteropathogenic E. coli (EPEC) Not Detected     Enterotoxigenic E. coli (ETEC) lt/st Not Detected     Shiga-like toxin-producing E. coli (STEC) stx1/stx2 Not Detected     Shigella/Enteroinvasive E. coli (EIEC) Not Detected     Cryptosporidium Not Detected     Cyclospora cayetanensis Not Detected     Entamoeba histolytica Not Detected     Giardia lamblia Not Detected     Adenovirus F40/41 Not Detected     Astrovirus Not Detected     Norovirus GI/GII Not Detected     Rotavirus A Not Detected     Sapovirus (I, II, IV or V) Not Detected          Imaging Results (Last 24 Hours)     ** No results found for the last 24 hours. **              I have reviewed the medications:  Scheduled Meds:cholestyramine light, 1 packet, Oral, Q12H  gabapentin, 400 mg, Oral, Q8H  Hydrocortisone (Perianal), , Rectal, BID  insulin detemir, 30 Units, Subcutaneous, Nightly  insulin lispro, 0-14 Units, Subcutaneous, TID AC  insulin  lispro, 7 Units, Subcutaneous, TID With Meals  mesalamine, 4 g, Rectal, Nightly  saccharomyces boulardii, 250 mg, Oral, BID  sodium chloride, 10 mL, Intravenous, Q12H  topiramate, 50 mg, Oral, BID      Continuous Infusions:lactated ringers, 100 mL/hr, Last Rate: 100 mL/hr (07/03/21 0638)  lactated ringers, 30 mL/hr, Last Rate: 30 mL/hr (07/02/21 0817)      PRN Meds:.dextrose  •  dextrose  •  diphenhydrAMINE  •  glucagon (human recombinant)  •  HYDROcodone-acetaminophen  •  hydrOXYzine  •  lactated ringers  •  Morphine  •  promethazine  •  sodium chloride    Assessment/Plan   Assessment & Plan     Active Hospital Problems    Diagnosis  POA   • **C. difficile colitis [A04.72]  Yes   • Diarrhea [R19.7]  Yes   • Gastroparesis [K31.84]  Yes   • Ulcerative colitis (CMS/[facility]) [K51.90]  Yes   • Ulcerative pancolitis without complication (CMS/[facility]) [K51.00]  Yes   • Type 2 diabetes mellitus treated with insulin (CMS/[facility]) [E11.9, Z79.4]  Not Applicable   • Bipolar disorder (CMS/[facility]) [F31.9]  Yes      Resolved Hospital Problems   No resolved problems to display.        Brief Hospital Course to date:  Thais Hobson is a 36 y.o. female With a PMH of gastroparesis, ulcerative pancolitis w/o complication, T2DM, and bipolar disorder who was admitted on June 28 for diarrhea. Has a hx of c diff and ulcerative colitis.      This patient's problems and plans were partially entered by my partner and updated as appropriate by me 07/03/21.    Assessment/Plan:    Diarrhea  -Recurrent C-diff colitis vs ulcerative colitis flare; likely a UC flare with negative CDiff panel, as well as negative GI PCR. Prior stopped Vancomycin and consultation to GI for management of UC  --pt underwent endoscopy yesterday 7/2.  Found colitis and biopsies taken.  meds per GI   -Continue ID consult, appreciate Dr Martin input; we discontinued oral Vanc 7/1  -regular diet as tolerated.  More nausea overnight and continues to have bloody stools.  No appetite  this morning.  --A.m. labs    Hyponatremia   --resolved, now 138     T2DM, uncontrolled with gastroparesis   -Continue Levemir 30 units subcu nightly, sliding scale insulin  --running as high as 230 last night.  This a.m. fasting, 190.  Will slightly increase mealtime insulin to 7 units 3 times daily with hold parameters.    --continue to monitor and adjust     Bipolar disorder  --stable     DVT prophylaxis:  Mechanical DVT prophylaxis orders are present.       Disposition: I expect the patient to be discharged pending improvement in GI symptoms, and pending medically improved and per GI final recommendations     CODE STATUS:   Code Status and Medical Interventions:   Ordered at: 06/28/21 1320     Code Status:    CPR     Medical Interventions (Level of Support Prior to Arrest):    Full       Letha Sheridan, APRN  07/03/21

## 2021-07-03 NOTE — NURSING NOTE
PT up at [facility]. Plan is home today via [facility]. Pain managed with PRN pain medication.Dificulty falling asleep. Will CTM and provide care.

## 2021-07-04 LAB
ANION GAP SERPL CALCULATED.3IONS-SCNC: 13 MMOL/L (ref 5–15)
BUN SERPL-MCNC: 5 MG/DL (ref 6–20)
BUN/CREAT SERPL: 10 (ref 7–25)
CALCIUM SPEC-SCNC: 9.2 MG/DL (ref 8.6–10.5)
CHLORIDE SERPL-SCNC: 106 MMOL/L (ref 98–107)
CO2 SERPL-SCNC: 21 MMOL/L (ref 22–29)
CREAT SERPL-MCNC: 0.5 MG/DL (ref 0.57–1)
DEPRECATED RDW RBC AUTO: 35.7 FL (ref 37–54)
ERYTHROCYTE [DISTWIDTH] IN BLOOD BY AUTOMATED COUNT: 12.1 % (ref 12.3–15.4)
GFR SERPL CREATININE-BSD FRML MDRD: 140 ML/MIN/1.73
GFR SERPL CREATININE-BSD FRML MDRD: >150 ML/MIN/1.73
GLUCOSE BLDC GLUCOMTR-MCNC: 102 MG/DL (ref 70–130)
GLUCOSE BLDC GLUCOMTR-MCNC: 106 MG/DL (ref 70–130)
GLUCOSE BLDC GLUCOMTR-MCNC: 110 MG/DL (ref 70–130)
GLUCOSE BLDC GLUCOMTR-MCNC: 132 MG/DL (ref 70–130)
GLUCOSE BLDC GLUCOMTR-MCNC: 97 MG/DL (ref 70–130)
GLUCOSE SERPL-MCNC: 100 MG/DL (ref 65–99)
HCT VFR BLD AUTO: 39.2 % (ref 34–46.6)
HGB BLD-MCNC: 13.3 G/DL (ref 12–15.9)
MCH RBC QN AUTO: 27.6 PG (ref 26.6–33)
MCHC RBC AUTO-ENTMCNC: 33.9 G/DL (ref 31.5–35.7)
MCV RBC AUTO: 81.3 FL (ref 79–97)
PLATELET # BLD AUTO: 291 10*3/MM3 (ref 140–450)
PMV BLD AUTO: 9.5 FL (ref 6–12)
POTASSIUM SERPL-SCNC: 3.6 MMOL/L (ref 3.5–5.2)
RBC # BLD AUTO: 4.82 10*6/MM3 (ref 3.77–5.28)
SODIUM SERPL-SCNC: 140 MMOL/L (ref 136–145)
WBC # BLD AUTO: 11.26 10*3/MM3 (ref 3.4–10.8)

## 2021-07-04 PROCEDURE — 25010000002 MORPHINE PER 10 MG: Performed by: NURSE PRACTITIONER

## 2021-07-04 PROCEDURE — 99232 SBSQ HOSP IP/OBS MODERATE 35: CPT | Performed by: NURSE PRACTITIONER

## 2021-07-04 PROCEDURE — 85027 COMPLETE CBC AUTOMATED: CPT | Performed by: NURSE PRACTITIONER

## 2021-07-04 PROCEDURE — 80048 BASIC METABOLIC PNL TOTAL CA: CPT | Performed by: NURSE PRACTITIONER

## 2021-07-04 PROCEDURE — 63710000001 AZATHIOPRINE PER 50 MG: Performed by: NURSE PRACTITIONER

## 2021-07-04 PROCEDURE — 82962 GLUCOSE BLOOD TEST: CPT

## 2021-07-04 PROCEDURE — 63710000001 INSULIN DETEMIR PER 5 UNITS: Performed by: INTERNAL MEDICINE

## 2021-07-04 PROCEDURE — 63710000001 INSULIN LISPRO (HUMAN) PER 5 UNITS: Performed by: NURSE PRACTITIONER

## 2021-07-04 PROCEDURE — 25010000002 DIPHENHYDRAMINE PER 50 MG

## 2021-07-04 RX ORDER — DICYCLOMINE HYDROCHLORIDE 10 MG/1
10 CAPSULE ORAL 4 TIMES DAILY
Status: DISCONTINUED | OUTPATIENT
Start: 2021-07-04 | End: 2021-07-06 | Stop reason: HOSPADM

## 2021-07-04 RX ORDER — AZATHIOPRINE 50 MG/1
50 TABLET ORAL DAILY
Status: DISCONTINUED | OUTPATIENT
Start: 2021-07-04 | End: 2021-07-06 | Stop reason: HOSPADM

## 2021-07-04 RX ORDER — DIPHENHYDRAMINE HYDROCHLORIDE 50 MG/ML
INJECTION INTRAMUSCULAR; INTRAVENOUS
Status: COMPLETED
Start: 2021-07-04 | End: 2021-07-04

## 2021-07-04 RX ADMIN — MORPHINE SULFATE 2 MG: 2 INJECTION, SOLUTION INTRAMUSCULAR; INTRAVENOUS at 23:02

## 2021-07-04 RX ADMIN — HYDROCORTISONE 2.5%: 25 CREAM TOPICAL at 09:02

## 2021-07-04 RX ADMIN — VENLAFAXINE 37.5 MG: 37.5 TABLET ORAL at 08:56

## 2021-07-04 RX ADMIN — MESALAMINE 4 G: 4 ENEMA RECTAL at 20:17

## 2021-07-04 RX ADMIN — AZATHIOPRINE 50 MG: 50 TABLET ORAL at 16:05

## 2021-07-04 RX ADMIN — GABAPENTIN 400 MG: 400 CAPSULE ORAL at 05:16

## 2021-07-04 RX ADMIN — INSULIN LISPRO 7 UNITS: 100 INJECTION, SOLUTION INTRAVENOUS; SUBCUTANEOUS at 08:55

## 2021-07-04 RX ADMIN — DIPHENHYDRAMINE HYDROCHLORIDE 25 MG: 50 INJECTION INTRAMUSCULAR; INTRAVENOUS at 16:05

## 2021-07-04 RX ADMIN — HYDROXYZINE HYDROCHLORIDE 25 MG: 25 TABLET, FILM COATED ORAL at 09:01

## 2021-07-04 RX ADMIN — Medication 250 MG: at 20:15

## 2021-07-04 RX ADMIN — TOPIRAMATE 50 MG: 25 TABLET, FILM COATED ORAL at 08:56

## 2021-07-04 RX ADMIN — Medication 250 MG: at 08:56

## 2021-07-04 RX ADMIN — TOPIRAMATE 50 MG: 25 TABLET, FILM COATED ORAL at 20:15

## 2021-07-04 RX ADMIN — HYDROCODONE BITARTRATE AND ACETAMINOPHEN 1 TABLET: 5; 325 TABLET ORAL at 20:15

## 2021-07-04 RX ADMIN — HYDROCODONE BITARTRATE AND ACETAMINOPHEN 1 TABLET: 5; 325 TABLET ORAL at 09:01

## 2021-07-04 RX ADMIN — MORPHINE SULFATE 2 MG: 2 INJECTION, SOLUTION INTRAMUSCULAR; INTRAVENOUS at 04:19

## 2021-07-04 RX ADMIN — CHOLESTYRAMINE 4 G: 4 POWDER, FOR SUSPENSION ORAL at 09:03

## 2021-07-04 RX ADMIN — MORPHINE SULFATE 2 MG: 2 INJECTION, SOLUTION INTRAMUSCULAR; INTRAVENOUS at 13:44

## 2021-07-04 RX ADMIN — VENLAFAXINE 37.5 MG: 37.5 TABLET ORAL at 11:58

## 2021-07-04 RX ADMIN — GABAPENTIN 400 MG: 400 CAPSULE ORAL at 13:40

## 2021-07-04 RX ADMIN — HYDROCORTISONE 2.5%: 25 CREAM TOPICAL at 20:17

## 2021-07-04 RX ADMIN — SODIUM CHLORIDE, PRESERVATIVE FREE 10 ML: 5 INJECTION INTRAVENOUS at 20:18

## 2021-07-04 RX ADMIN — GABAPENTIN 400 MG: 400 CAPSULE ORAL at 20:15

## 2021-07-04 RX ADMIN — INSULIN DETEMIR 30 UNITS: 100 INJECTION, SOLUTION SUBCUTANEOUS at 23:05

## 2021-07-04 RX ADMIN — VENLAFAXINE 37.5 MG: 37.5 TABLET ORAL at 17:02

## 2021-07-04 RX ADMIN — INSULIN LISPRO 7 UNITS: 100 INJECTION, SOLUTION INTRAVENOUS; SUBCUTANEOUS at 17:01

## 2021-07-04 RX ADMIN — CHOLESTYRAMINE 4 G: 4 POWDER, FOR SUSPENSION ORAL at 22:58

## 2021-07-04 RX ADMIN — DICYCLOMINE HYDROCHLORIDE 10 MG: 10 CAPSULE ORAL at 16:04

## 2021-07-04 RX ADMIN — DICYCLOMINE HYDROCHLORIDE 10 MG: 10 CAPSULE ORAL at 20:15

## 2021-07-04 RX ADMIN — INSULIN LISPRO 7 UNITS: 100 INJECTION, SOLUTION INTRAVENOUS; SUBCUTANEOUS at 11:58

## 2021-07-04 RX ADMIN — SODIUM CHLORIDE, POTASSIUM CHLORIDE, SODIUM LACTATE AND CALCIUM CHLORIDE 100 ML/HR: 600; 310; 30; 20 INJECTION, SOLUTION INTRAVENOUS at 20:18

## 2021-07-04 NOTE — PROGRESS NOTES
"GI Daily Progress Note  Subjective:    Chief Complaint: Follow-up ulcerative colitis    Patient resting in bed no acute distress.  Notes she is feeling better following her colonoscopy yesterday but still with frequent bouts of diarrhea.  Patient notes her diarrhea is worse during periods of high stress and anxiety.  Patient also endorses that her abdominal pain improved following Rowasa enemas.  Does not endorse any new or worsening GI symptoms.  Does not endorse any pain at this time.    Objective:    /75 (BP Location: Right arm)   Pulse 93   Temp 98.1 °F (36.7 °C) (Oral)   Resp 16   Ht 160 cm (63\")   Wt 94.3 kg (207 lb 14.3 oz)   LMP  (LMP Unknown)   SpO2 97%   BMI 36.83 kg/m²     Physical Exam  Vitals and nursing note reviewed.   Constitutional:       General: She is not in acute distress.     Appearance: Normal appearance. She is obese. She is not ill-appearing or toxic-appearing.   HENT:      Head: Normocephalic and atraumatic.      Mouth/Throat:      Mouth: Mucous membranes are moist.      Pharynx: Oropharynx is clear. No oropharyngeal exudate.   Eyes:      General: No scleral icterus.     Extraocular Movements: Extraocular movements intact.      Conjunctiva/sclera: Conjunctivae normal.      Pupils: Pupils are equal, round, and reactive to light.   Cardiovascular:      Rate and Rhythm: Normal rate and regular rhythm.      Pulses: Normal pulses.      Heart sounds: Normal heart sounds.   Pulmonary:      Effort: Pulmonary effort is normal. No respiratory distress.      Breath sounds: Normal breath sounds.   Abdominal:      General: Abdomen is flat. Bowel sounds are normal. There is no distension.      Palpations: Abdomen is soft. There is no mass.      Tenderness: There is abdominal tenderness. There is no guarding or rebound.      Hernia: No hernia is present.   Skin:     General: Skin is warm and dry.      Capillary Refill: Capillary refill takes less than 2 seconds.   Neurological:      General: " No focal deficit present.      Mental Status: She is alert and oriented to person, place, and time.   Psychiatric:         Mood and Affect: Mood normal.         Behavior: Behavior normal.         Thought Content: Thought content normal.         Judgment: Judgment normal.         Lab  I have personally reviewed most recent cardiac tracings, lab results, pathology results and radiology images and interpretations and agree with findings.    Lab Results   Component Value Date    WBC 10.40 07/03/2021    HGB 12.9 07/03/2021    HGB 13.3 06/29/2021    HGB 13.4 06/28/2021    MCV 86.3 07/03/2021     07/03/2021       Lab Results   Component Value Date    GLUCOSE 190 (H) 07/03/2021    BUN 5 (L) 07/03/2021    CREATININE 0.46 (L) 07/03/2021    EGFRIFNONA >150 07/03/2021    EGFRIFAFRI >150 07/03/2021    BCR 10.9 07/03/2021     07/03/2021    K 4.0 07/03/2021    CO2 20.0 (L) 07/03/2021    CALCIUM 9.0 07/03/2021    PROTENTOTREF 7.9 11/20/2019    ALBUMIN 4.00 06/28/2021    ALKPHOS 168 (H) 06/28/2021    BILITOT 0.3 06/28/2021    ALT 31 06/28/2021    AST 24 06/28/2021     Results for FABRICIO CUMMINGS (MRN [id number]) as of 7/3/2021 20:12   Ref. Range 7/2/2021 09:13   TISSUE PATHOLOGY EXAM Unknown 1.  CECUM BIOPSY:  Colonic mucosa with no significant histopathologic change.  No histologic features of acute colitis, chronic colitis, collagenous colitis or lymphocytic colitis identified.    2.  ASCENDING COLON BIOPSY:  Colonic mucosa with no significant histopathologic change.  No histologic features of acute colitis, chronic colitis, collagenous colitis or lymphocytic colitis identified.    3.  TRANSVERSE COLON BIOPSY:  Colonic mucosa with no significant histopathologic change.  No histologic features of acute colitis, chronic colitis, collagenous colitis or lymphocytic colitis identified.    4.  SPLENIC FLEXURE AT 60 CM BIOPSY:  Colonic mucosa with nonspecific lamina propria edema without active inflammation or  significant chronic inflammation.  Negative for dysplasia.    5.  SPLENIC FLEXURE AT 50 CM BIOPSY:  Colonic mucosa with nonspecific lamina propria edema without active inflammation or significant chronic inflammation.  Focal Paneth cell metaplasia (see comment).  Negative for dysplasia.    6.  DESCENDING COLON AT 40 CM BIOPSY:  Colonic mucosa with increased lamina propria chronic inflammatory cells, mild architectural distortion, Paneth cell metaplasia, and very focal active inflammation (see comment).  Negative for dysplasia.    7.  SIGMOID COLON AT 30 CM BIOPSY:  Colonic mucosa with increased chronic inflammatory cells within the lamina propria, architectural distortion, and focal active inflammation (see comment).  Negative for dysplasia.    8.  SIGMOID COLON AT 20 CM BIOPSY:  Chronic colitis with activity (see comment).  Negative for dysplasia.    9.  RECTUM AT 10 CM BIOPSY:  Chronic active proctitis (see comment).  Negative for dysplasia.     Assessment:      C. difficile colitis    Bipolar disorder (CMS/HCC)    Type 2 diabetes mellitus treated with insulin (CMS/HCC)    Ulcerative pancolitis without complication (CMS/HCC)    Ulcerative colitis (CMS/HCC)    Gastroparesis    Diarrhea    Ulcerative colitis  Acute abdominal pain, related to above  Diarrhea, related above  Type 2 diabetes mellitus, poorly controlled  History of previous, late above    Plan:  Discussed with patient her colonoscopy findings.  Would appear patient is having a positive response to Entyvio at present.  Based on colonoscopy findings, it is likely that with Entyvio and adjuvant therapies that surgery can be avoided.  >>> Continue Rowasa enemas  >>> Trial Questran for diarrhea  >>> We will add Effexor 37.5 mg extended release nightly as patient has IBS-like symptoms  >>> Discussed with patient the benefit of adding Imuran back into her regimen.  Given her longstanding history of allergies to most pharmacotherapeutic's we will plan to  reintroduce this medication tomorrow under close observation.   -Nursing to have EpiPen and IV Benadryl at bedside immediately following administration of Imuran.    Ricci Barone, EMELYN  07/03/21  20:10 EDT

## 2021-07-04 NOTE — PROGRESS NOTES
[facility] Medicine Services  PROGRESS NOTE    Patient Name: Thais Hobson  : 1985  MRN: [id number]    Date of Admission: 2021  Primary Care Physician: Lv Sanchez DO    Subjective   Subjective     CC:  Follow-up right-sided abdominal pain    HPI:  Patient seen resting up in bed awake and alert.  No acute distress.  States she did not sleep well last night.  Still having frequent loose stools but bleeding is slowly improving.  Abdominal pain 6-9/10 scale.  Tolerating diet a little better this morning.  Awaiting initiation of Imuran later today.    ROS:  Gen- No fevers, chills  CV- No chest pain, palpitations  Resp- No cough, dyspnea  GI-positive for nausea and diarrhea.  +abd tenderness (stable). As above      Objective   Objective     Vital Signs:   Temp:  [97.5 °F (36.4 °C)-98.2 °F (36.8 °C)] 97.8 °F (36.6 °C)  Heart Rate:  [82-99] 88  Resp:  [16-18] 18  BP: ()/(64-78) 113/78        Physical Exam:  Constitutional: No acute distress, awake, alert resting in bed   HENT: NCAT, mucous membranes moist  Respiratory: Clear to auscultation bilaterally, respiratory effort normal   Cardiovascular: RRR, no murmurs, rubs, or gallops  Gastrointestinal: Positive bowel sounds, soft, tender to epigastric area (stable), nondistended  Musculoskeletal: No bilateral ankle edema. WOODARD spont   Psychiatric: Appropriate affect, cooperative  Neurologic: Oriented x 3, strength symmetric in all extremities, Cranial Nerves grossly intact to confrontation, speech clear and appropriate.  Follows commands   Skin: No rashes   --Exam unchanged compared to yesterday 7/3/2021--    Results Reviewed:  Results from last 7 days   Lab Units 21  0747 21  1321 21  1400   WBC 10*3/mm3 10.40 10.25 9.84   HEMOGLOBIN g/dL 12.9 13.3 13.4   HEMATOCRIT % 41.1 43.0 40.7   PLATELETS 10*3/mm3 250 243 256   PROCALCITONIN ng/mL  --   --  0.03     Results from last 7 days   Lab Units  07/03/21  0941 06/29/21  1558 06/28/21  1400   SODIUM mmol/L 138 138 129*   POTASSIUM mmol/L 4.0 3.7 3.5   CHLORIDE mmol/L 105 103 95*   CO2 mmol/L 20.0* 23.0 22.0   BUN mg/dL 5* 6 8   CREATININE mg/dL 0.46* 0.50* 0.60   GLUCOSE mg/dL 190* 246* 300*   CALCIUM mg/dL 9.0 8.7 9.2   ALT (SGPT) U/L  --   --  31   AST (SGOT) U/L  --   --  24     Estimated Creatinine Clearance: 185.9 mL/min (A) (by C-G formula based on SCr of 0.46 mg/dL (L)).    Microbiology Results Abnormal     Procedure Component Value - Date/Time    Clostridium Difficile Toxin - Stool, Per Rectum [[id number]]  (Normal) Collected: 06/29/21 1149    Lab Status: Final result Specimen: Stool from Per Rectum Updated: 06/29/21 1614    Narrative:      The following orders were created for panel order Clostridium Difficile Toxin - Stool, Per Rectum.  Procedure                               Abnormality         Status                     ---------                               -----------         ------                     Clostridium Difficile To...[[id number]]  Normal              Final result                 Please view results for these tests on the individual orders.    Clostridium Difficile Toxin, PCR - Stool, Per Rectum [[id number]]  (Normal) Collected: 06/29/21 1149    Lab Status: Final result Specimen: Stool from Per Rectum Updated: 06/29/21 1614     C. Difficile Toxins by PCR Not Detected    Narrative:      Performance characteristics of test not established for patients <2 years of age.  Negative for Toxigenic C. Difficile    COVID PRE-OP / PRE-PROCEDURE SCREENING ORDER (NO ISOLATION) - Swab, Nasopharynx [[id number]]  (Normal) Collected: 06/28/21 1745    Lab Status: Final result Specimen: Swab from Nasopharynx Updated: 06/29/21 1418    Narrative:      The following orders were created for panel order COVID PRE-OP / PRE-PROCEDURE SCREENING ORDER (NO ISOLATION) - Swab, Nasopharynx.  Procedure                               Abnormality         Status                      ---------                               -----------         ------                     COVID-19, M,T,W, APTIMA ...[[id number]]  Normal              Final result                 Please view results for these tests on the individual orders.    COVID-19, M,T,W, APTIMA PANTHER KATHY IN-HOUSE NP/OP SWAB IN UTM/VTM/SALINE TRANSPORT MEDIA 24HR TAT - Swab, Nasopharynx [[id number]]  (Normal) Collected: 06/28/21 1745    Lab Status: Final result Specimen: Swab from Nasopharynx Updated: 06/29/21 1418     COVID19 Not Detected    Narrative:      Fact sheet for providers: https://www.fda.gov/media/046132/download     Fact sheet for patients: https://www.fda.gov/media/791738/download    Test performed by RT PCR.    Gastrointestinal Panel, PCR - Stool, Per Rectum [[id number]]  (Normal) Collected: 06/29/21 1149    Lab Status: Final result Specimen: Stool from Per Rectum Updated: 06/29/21 1318     Campylobacter Not Detected     Plesiomonas shigelloides Not Detected     Salmonella Not Detected     Vibrio Not Detected     Vibrio cholerae Not Detected     Yersinia enterocolitica Not Detected     Enteroaggregative E. coli (EAEC) Not Detected     Enteropathogenic E. coli (EPEC) Not Detected     Enterotoxigenic E. coli (ETEC) lt/st Not Detected     Shiga-like toxin-producing E. coli (STEC) stx1/stx2 Not Detected     Shigella/Enteroinvasive E. coli (EIEC) Not Detected     Cryptosporidium Not Detected     Cyclospora cayetanensis Not Detected     Entamoeba histolytica Not Detected     Giardia lamblia Not Detected     Adenovirus F40/41 Not Detected     Astrovirus Not Detected     Norovirus GI/GII Not Detected     Rotavirus A Not Detected     Sapovirus (I, II, IV or V) Not Detected          Imaging Results (Last 24 Hours)     ** No results found for the last 24 hours. **              I have reviewed the medications:  Scheduled Meds:cholestyramine light, 1 packet, Oral, Q12H  gabapentin, 400 mg, Oral, Q8H  Hydrocortisone (Perianal), , Rectal,  BID  insulin detemir, 30 Units, Subcutaneous, Nightly  insulin lispro, 0-14 Units, Subcutaneous, TID AC  insulin lispro, 7 Units, Subcutaneous, TID With Meals  mesalamine, 4 g, Rectal, Nightly  saccharomyces boulardii, 250 mg, Oral, BID  sodium chloride, 10 mL, Intravenous, Q12H  topiramate, 50 mg, Oral, BID  venlafaxine, 37.5 mg, Oral, TID With Meals      Continuous Infusions:lactated ringers, 100 mL/hr, Last Rate: 100 mL/hr (07/03/21 2333)  lactated ringers, 30 mL/hr, Last Rate: 30 mL/hr (07/02/21 0817)      PRN Meds:.dextrose  •  dextrose  •  diphenhydrAMINE  •  glucagon (human recombinant)  •  HYDROcodone-acetaminophen  •  hydrOXYzine  •  lactated ringers  •  Morphine  •  promethazine  •  sodium chloride    Assessment/Plan   Assessment & Plan     Active Hospital Problems    Diagnosis  POA   • **C. difficile colitis [A04.72]  Yes   • Diarrhea [R19.7]  Yes   • Gastroparesis [K31.84]  Yes   • Ulcerative colitis (CMS/[facility]) [K51.90]  Yes   • Ulcerative pancolitis without complication (CMS/[facility]) [K51.00]  Yes   • Type 2 diabetes mellitus treated with insulin (CMS/[facility]) [E11.9, Z79.4]  Not Applicable   • Bipolar disorder (CMS/[facility]) [F31.9]  Yes      Resolved Hospital Problems   No resolved problems to display.        Brief Hospital Course to date:  Thais Hobson is a 36 y.o. female With a PMH of gastroparesis, ulcerative pancolitis w/o complication, T2DM, and bipolar disorder who was admitted on June 28 for diarrhea. Has a hx of c diff and ulcerative colitis.      This patient's problems and plans were partially entered by my partner and updated as appropriate by me 07/04/21.    Assessment/Plan:    Diarrhea  -Recurrent C-diff colitis vs ulcerative colitis flare; likely a UC flare with negative CDiff panel, as well as negative GI PCR. Prior stopped Vancomycin and consultation to GI for management of UC  --pt underwent endoscopy 7/2.  Found colitis and biopsies taken.  meds per GI   -Continue ID consult, appreciate   Mario input; we discontinued oral Vanc 7/1  -regular diet as tolerated.  continues to have nausea, pain and bloody stools, however reports bloody stool slightly improving.  --A.m. labs  --Per GI notes, they plan to initiate Imuran today.  --Check a.m. labs    Hyponatremia   --resolved, now 138     T2DM, uncontrolled with gastroparesis   -Continue Levemir 30 units subcu nightly, sliding scale insulin  --Increased mealtime insulin to 7 units 3 times daily with hold parameters yesterday 7/3, improved..    --Will continue current regimen for today and adjust further tomorrow if needed.    Bipolar disorder  --stable     DVT prophylaxis:  Mechanical DVT prophylaxis orders are present.       Disposition: I expect the patient to be discharged pending improvement in GI symptoms, and pending medically improved and per GI final recommendations     CODE STATUS:   Code Status and Medical Interventions:   Ordered at: 06/28/21 1320     Code Status:    CPR     Medical Interventions (Level of Support Prior to Arrest):    Full       Letha Sheridan, APRN  07/04/21

## 2021-07-04 NOTE — PROGRESS NOTES
"GI Daily Progress Note  Subjective:    Chief Complaint: Follow-up ulcerative colitis    Thais is resting in bed in no acute distress.  Notes she is still having some intermittent abdominal pains but is feeling better than prior days.  Endorses only for diarrheal stools as of this time which is an improvement from prior days she had been going 18+ times per day.  Notes she is still having abdominal cramping that is worse after meals.  No new or worsening GI symptoms reported.  Does not endorse any acute pain at time of exam.    Objective:    /78   Pulse 88   Temp 97.8 °F (36.6 °C) (Oral)   Resp 18   Ht 160 cm (63\")   Wt 93.8 kg (206 lb 12.8 oz)   LMP  (LMP Unknown)   SpO2 97%   BMI 36.63 kg/m²     Physical Exam  Vitals and nursing note reviewed.   Constitutional:       General: She is not in acute distress.     Appearance: Normal appearance. She is obese. She is not ill-appearing or toxic-appearing.      Comments: Pleasantly conversant, no acute distress   HENT:      Head: Normocephalic and atraumatic.      Mouth/Throat:      Mouth: Mucous membranes are moist.      Pharynx: Oropharynx is clear. No oropharyngeal exudate.   Eyes:      General: No scleral icterus.     Extraocular Movements: Extraocular movements intact.      Conjunctiva/sclera: Conjunctivae normal.      Pupils: Pupils are equal, round, and reactive to light.   Cardiovascular:      Rate and Rhythm: Normal rate and regular rhythm.      Pulses: Normal pulses.      Heart sounds: Normal heart sounds.   Pulmonary:      Effort: Pulmonary effort is normal. No respiratory distress.      Breath sounds: Normal breath sounds.   Abdominal:      General: Abdomen is flat. Bowel sounds are normal. There is no distension.      Palpations: Abdomen is soft. There is no mass.      Tenderness: There is abdominal tenderness. There is no guarding or rebound.      Hernia: No hernia is present.   Musculoskeletal:         General: Normal range of motion.      " Right lower leg: No edema.      Left lower leg: No edema.   Skin:     General: Skin is warm and dry.      Capillary Refill: Capillary refill takes less than 2 seconds.      Coloration: Skin is not jaundiced or pale.   Neurological:      General: No focal deficit present.      Mental Status: She is alert and oriented to person, place, and time.   Psychiatric:         Mood and Affect: Mood normal.         Behavior: Behavior normal.         Thought Content: Thought content normal.         Judgment: Judgment normal.         Lab  I have personally reviewed most recent cardiac tracings, lab results and radiology images and interpretations and agree with findings.    Lab Results   Component Value Date    WBC 11.26 (H) 07/04/2021    HGB 13.3 07/04/2021    HGB 12.9 07/03/2021    HGB 13.3 06/29/2021    MCV 81.3 07/04/2021     07/04/2021       Lab Results   Component Value Date    GLUCOSE 100 (H) 07/04/2021    BUN 5 (L) 07/04/2021    CREATININE 0.50 (L) 07/04/2021    EGFRIFNONA 140 07/04/2021    EGFRIFAFRI >150 07/04/2021    BCR 10.0 07/04/2021     07/04/2021    K 3.6 07/04/2021    CO2 21.0 (L) 07/04/2021    CALCIUM 9.2 07/04/2021    PROTENTOTREF 7.9 11/20/2019    ALBUMIN 4.00 06/28/2021    ALKPHOS 168 (H) 06/28/2021    BILITOT 0.3 06/28/2021    ALT 31 06/28/2021    AST 24 06/28/2021       Assessment:      C. difficile colitis    Bipolar disorder (CMS/[facility])    Type 2 diabetes mellitus treated with insulin (CMS/[facility])    Ulcerative pancolitis without complication (CMS/[facility])    Ulcerative colitis (CMS/[facility])    Gastroparesis    Diarrhea    Ulcerative colitis  Acute abdominal pain, related to above  Diarrhea, related above  Type 2 diabetes mellitus, poorly controlled  History of gastroparesis, related to above    Plan:  Patient continues to make daily improvements.  >>> Discussed with patient resuming Imuran.  Discussed with patient that her prior dose of Imuran was subtherapeutic based upon her weight.  We will  reintroduce Imuran at the starting dose of 50 mg daily for the next month; discussed with patient that this dose will likely need titrated up to a therapeutic dose as tolerated.   -Imuran 50 mg p.o. daily   -Given patient's history of intolerance to pharmacotherapeutic's, please have EpiPen and IV Benadryl available at bedside immediately following administration.  >>> Continue Rowasa enema  >>> Continue Effexor 37.5 mg ER nightly  >>> We will add Bentyl 10 mg p.o. 30 minutes prior to meals for postprandial symptoms.    Ricci Barone, EMELYN  07/04/21  15:25 EDT

## 2021-07-04 NOTE — NURSING NOTE
Increased c/o ABD pain radiating to back requiring more frequent PRN pain medications. She states she continues to have loose bloody stools. No othere changes noted. Will CTM and provide care.

## 2021-07-04 NOTE — PLAN OF CARE
Patient c/o throat burning after taking Imarun. Gave patient 25 mg of IV benadryl and sat with patient until symptoms resolved.

## 2021-07-05 LAB
ANION GAP SERPL CALCULATED.3IONS-SCNC: 10 MMOL/L (ref 5–15)
BUN SERPL-MCNC: 5 MG/DL (ref 6–20)
BUN/CREAT SERPL: 12.8 (ref 7–25)
CALCIUM SPEC-SCNC: 9.3 MG/DL (ref 8.6–10.5)
CHLORIDE SERPL-SCNC: 105 MMOL/L (ref 98–107)
CO2 SERPL-SCNC: 24 MMOL/L (ref 22–29)
CREAT SERPL-MCNC: 0.39 MG/DL (ref 0.57–1)
DEPRECATED RDW RBC AUTO: 37 FL (ref 37–54)
ERYTHROCYTE [DISTWIDTH] IN BLOOD BY AUTOMATED COUNT: 12 % (ref 12.3–15.4)
GFR SERPL CREATININE-BSD FRML MDRD: >150 ML/MIN/1.73
GFR SERPL CREATININE-BSD FRML MDRD: >150 ML/MIN/1.73
GLUCOSE BLDC GLUCOMTR-MCNC: 111 MG/DL (ref 70–130)
GLUCOSE BLDC GLUCOMTR-MCNC: 119 MG/DL (ref 70–130)
GLUCOSE SERPL-MCNC: 124 MG/DL (ref 65–99)
HCT VFR BLD AUTO: 38.6 % (ref 34–46.6)
HGB BLD-MCNC: 12.3 G/DL (ref 12–15.9)
MCH RBC QN AUTO: 27.3 PG (ref 26.6–33)
MCHC RBC AUTO-ENTMCNC: 31.9 G/DL (ref 31.5–35.7)
MCV RBC AUTO: 85.6 FL (ref 79–97)
PLATELET # BLD AUTO: 267 10*3/MM3 (ref 140–450)
PMV BLD AUTO: 9.3 FL (ref 6–12)
POTASSIUM SERPL-SCNC: 3.8 MMOL/L (ref 3.5–5.2)
RBC # BLD AUTO: 4.51 10*6/MM3 (ref 3.77–5.28)
SODIUM SERPL-SCNC: 139 MMOL/L (ref 136–145)
WBC # BLD AUTO: 10.24 10*3/MM3 (ref 3.4–10.8)

## 2021-07-05 PROCEDURE — 63710000001 PROMETHAZINE PER 25 MG: Performed by: INTERNAL MEDICINE

## 2021-07-05 PROCEDURE — 25010000002 MORPHINE PER 10 MG: Performed by: NURSE PRACTITIONER

## 2021-07-05 PROCEDURE — 63710000001 INSULIN LISPRO (HUMAN) PER 5 UNITS: Performed by: INTERNAL MEDICINE

## 2021-07-05 PROCEDURE — 85027 COMPLETE CBC AUTOMATED: CPT | Performed by: NURSE PRACTITIONER

## 2021-07-05 PROCEDURE — 63710000001 INSULIN LISPRO (HUMAN) PER 5 UNITS: Performed by: NURSE PRACTITIONER

## 2021-07-05 PROCEDURE — 99232 SBSQ HOSP IP/OBS MODERATE 35: CPT | Performed by: INTERNAL MEDICINE

## 2021-07-05 PROCEDURE — 80048 BASIC METABOLIC PNL TOTAL CA: CPT | Performed by: NURSE PRACTITIONER

## 2021-07-05 PROCEDURE — 99232 SBSQ HOSP IP/OBS MODERATE 35: CPT | Performed by: PHYSICIAN ASSISTANT

## 2021-07-05 PROCEDURE — 82962 GLUCOSE BLOOD TEST: CPT

## 2021-07-05 RX ADMIN — HYDROCODONE BITARTRATE AND ACETAMINOPHEN 1 TABLET: 5; 325 TABLET ORAL at 06:17

## 2021-07-05 RX ADMIN — DICYCLOMINE HYDROCHLORIDE 10 MG: 10 CAPSULE ORAL at 08:15

## 2021-07-05 RX ADMIN — VENLAFAXINE 37.5 MG: 37.5 TABLET ORAL at 12:23

## 2021-07-05 RX ADMIN — TOPIRAMATE 50 MG: 25 TABLET, FILM COATED ORAL at 08:14

## 2021-07-05 RX ADMIN — DICYCLOMINE HYDROCHLORIDE 10 MG: 10 CAPSULE ORAL at 22:03

## 2021-07-05 RX ADMIN — CHOLESTYRAMINE 4 G: 4 POWDER, FOR SUSPENSION ORAL at 10:55

## 2021-07-05 RX ADMIN — DICYCLOMINE HYDROCHLORIDE 10 MG: 10 CAPSULE ORAL at 12:23

## 2021-07-05 RX ADMIN — MORPHINE SULFATE 2 MG: 2 INJECTION, SOLUTION INTRAMUSCULAR; INTRAVENOUS at 22:03

## 2021-07-05 RX ADMIN — INSULIN LISPRO 5 UNITS: 100 INJECTION, SOLUTION INTRAVENOUS; SUBCUTANEOUS at 12:29

## 2021-07-05 RX ADMIN — SODIUM CHLORIDE, PRESERVATIVE FREE 10 ML: 5 INJECTION INTRAVENOUS at 08:28

## 2021-07-05 RX ADMIN — INSULIN LISPRO 7 UNITS: 100 INJECTION, SOLUTION INTRAVENOUS; SUBCUTANEOUS at 17:04

## 2021-07-05 RX ADMIN — VENLAFAXINE 37.5 MG: 37.5 TABLET ORAL at 17:07

## 2021-07-05 RX ADMIN — MESALAMINE 4 G: 4 ENEMA RECTAL at 22:02

## 2021-07-05 RX ADMIN — GABAPENTIN 400 MG: 400 CAPSULE ORAL at 06:14

## 2021-07-05 RX ADMIN — HYDROCORTISONE 2.5%: 25 CREAM TOPICAL at 22:03

## 2021-07-05 RX ADMIN — SODIUM CHLORIDE, POTASSIUM CHLORIDE, SODIUM LACTATE AND CALCIUM CHLORIDE 100 ML/HR: 600; 310; 30; 20 INJECTION, SOLUTION INTRAVENOUS at 06:14

## 2021-07-05 RX ADMIN — INSULIN LISPRO 5 UNITS: 100 INJECTION, SOLUTION INTRAVENOUS; SUBCUTANEOUS at 17:04

## 2021-07-05 RX ADMIN — SODIUM CHLORIDE, POTASSIUM CHLORIDE, SODIUM LACTATE AND CALCIUM CHLORIDE 100 ML/HR: 600; 310; 30; 20 INJECTION, SOLUTION INTRAVENOUS at 17:07

## 2021-07-05 RX ADMIN — VENLAFAXINE 37.5 MG: 37.5 TABLET ORAL at 08:15

## 2021-07-05 RX ADMIN — INSULIN LISPRO 7 UNITS: 100 INJECTION, SOLUTION INTRAVENOUS; SUBCUTANEOUS at 08:40

## 2021-07-05 RX ADMIN — DICYCLOMINE HYDROCHLORIDE 10 MG: 10 CAPSULE ORAL at 17:04

## 2021-07-05 RX ADMIN — GABAPENTIN 400 MG: 400 CAPSULE ORAL at 14:41

## 2021-07-05 RX ADMIN — Medication 250 MG: at 08:15

## 2021-07-05 RX ADMIN — GABAPENTIN 400 MG: 400 CAPSULE ORAL at 22:03

## 2021-07-05 RX ADMIN — PROMETHAZINE HYDROCHLORIDE 25 MG: 25 TABLET ORAL at 14:36

## 2021-07-05 RX ADMIN — INSULIN LISPRO 7 UNITS: 100 INJECTION, SOLUTION INTRAVENOUS; SUBCUTANEOUS at 12:29

## 2021-07-05 RX ADMIN — MORPHINE SULFATE 2 MG: 2 INJECTION, SOLUTION INTRAMUSCULAR; INTRAVENOUS at 14:41

## 2021-07-05 RX ADMIN — SODIUM CHLORIDE, PRESERVATIVE FREE 10 ML: 5 INJECTION INTRAVENOUS at 22:03

## 2021-07-05 RX ADMIN — TOPIRAMATE 50 MG: 25 TABLET, FILM COATED ORAL at 22:03

## 2021-07-05 RX ADMIN — Medication 250 MG: at 22:03

## 2021-07-05 RX ADMIN — MORPHINE SULFATE 2 MG: 2 INJECTION, SOLUTION INTRAMUSCULAR; INTRAVENOUS at 08:22

## 2021-07-05 RX ADMIN — INSULIN LISPRO 3 UNITS: 100 INJECTION, SOLUTION INTRAVENOUS; SUBCUTANEOUS at 08:41

## 2021-07-05 NOTE — PROGRESS NOTES
"GI Daily Progress Note  Subjective     Thais Hobson is a 36 y.o. female who was admitted with C. difficile colitis.     States her lower abdominal pain is improved.  She has some epigastric abdominal pain.  This started prior to her Imuran.  She complains of dysphagia.  States she has some burning in her throat after the Imuran.  Resolved with Benadryl.    Chief Complaint: Ulcerative colitis    Objective     /90 (BP Location: Left arm, Patient Position: Lying)   Pulse 81   Temp 98.2 °F (36.8 °C) (Oral)   Resp 18   Ht 160 cm (63\")   Wt 93.9 kg (207 lb 1.6 oz)   LMP  (LMP Unknown)   SpO2 96%   BMI 36.69 kg/m²     Intake/Output last 3 shifts:  I/O last 3 completed shifts:  In: 3764 [P.O.:764; I.V.:3000]  Out: 5300 [Urine:5300]  Intake/Output this shift:  I/O this shift:  In: 240 [P.O.:240]  Out: 1400 [Urine:1400]      Physical Exam  Wt Readings from Last 3 Encounters:   07/05/21 93.9 kg (207 lb 1.6 oz)   05/16/21 90.3 kg (199 lb)   03/11/21 92.5 kg (204 lb)   ,body mass index is 36.69 kg/m².,@FLOWAMB(6)@,@FLOWAMB(5)@,@FLOWAMB(8)@   CONSTITUTIONAL: Lying in bed no distress  Resp CTA; no rhonchi, rales, or wheezes.  Respiration effort normal  CV RRR; no M/R/G. No lower extremity edema  GI Abd soft, NT obese, ND, normal active bowel sounds.    Psych: Awake alert and oriented  DATA:    Assessment/Plan       C. difficile colitis    Bipolar disorder (CMS/HCC)    Type 2 diabetes mellitus treated with insulin (CMS/HCC)    Ulcerative pancolitis without complication (CMS/HCC)    Ulcerative colitis (CMS/HCC)    Gastroparesis    Diarrhea      Continue current therapy.  Advised her to let her left side after otherwise enema.    Plan EGD and dilation a.m.      C. difficile colitis    Bipolar disorder (CMS/HCC)    Type 2 diabetes mellitus treated with insulin (CMS/HCC)    Ulcerative pancolitis without complication (CMS/HCC)    Ulcerative colitis (CMS/HCC)    Gastroparesis    Diarrhea       LOS: 4 days     Francisco " BEATRICE Sainz MD  07/05/21  12:25 EDT

## 2021-07-05 NOTE — PLAN OF CARE
Goal Outcome Evaluation:   Patient continues to complain of pain and is tolerated with PRNs. Self monitors blood glucose. Has had multiple BM today. Monitoring.        Progress: no change

## 2021-07-05 NOTE — CASE MANAGEMENT/SOCIAL WORK
Continued Stay Note   Laney     Patient Name: Thais Hobson  MRN: [id number]  Today's Date: 7/5/2021    Admit Date: 6/28/2021    Discharge Plan     Row Name 07/05/21 1134       Plan    Plan  home    Patient/Family in Agreement with Plan  yes    Plan Comments  SUDARSHAN met with pt at bedside.  Her plan remains to return home when her pain is better controlled.  Pt denies having discharge needs, other than request for assistance with transportation.  SUDARSHAN has confirmed pt's home address and has approved LYFT for pt to have available at discharge.  KAMALJIT Camp @ [phone]    Final Discharge Disposition Code  01 - home or self-care        Discharge Codes    No documentation.       Expected Discharge Date and Time     Expected Discharge Date Expected Discharge Time    Jul 6, 2021             KAMALJIT Solis

## 2021-07-05 NOTE — NURSING NOTE
VSS with frequent c/o of ABD pain. Pt has been asleep since around 0000. Not other changes noted, will CTM and provide care.

## 2021-07-05 NOTE — PROGRESS NOTES
[facility] Medicine Services  PROGRESS NOTE    Patient Name: Thais Hobson  : 1985  MRN: [id number]    Date of Admission: 2021  Primary Care Physician: Lv Sanchez DO    Subjective   Subjective     CC:  F/u abd pain    HPI:  Ms. Hobson reports continued diarrhea x9 times per day.   She reports throat scratchy sensation yesterday when Azathioprine (Imuran) was given.  She denied shortness of breath, chest pain, dizziness, tongue to throat swelling.  She informed nurse of scratchy throat and was given benadryl.  Nurse expressed concerns about giving med today, and I advised med to be held until we can talk to GI.     ROS:  Gen- No fevers, chills  CV- No chest pain, palpitations  Resp- No cough, dyspnea  GI- No N/V/D, abd pain        Objective   Objective     Vital Signs:   Temp:  [98 °F (36.7 °C)-98.2 °F (36.8 °C)] 98.2 °F (36.8 °C)  Heart Rate:  [] 106  Resp:  [16-20] 16  BP: (117-127)/(65-90) 126/86        Physical Exam:  Constitutional: No acute distress, awake, alert  HENT: NCAT, mucous membranes moist  Respiratory: Clear to auscultation bilaterally, respiratory effort normal   Cardiovascular: RRR, no murmurs, rubs, or gallops  Gastrointestinal: Positive bowel sounds, soft, nontender, nondistended  Musculoskeletal: No bilateral ankle edema  Psychiatric: Appropriate affect, cooperative  Neurologic: Oriented x 3  Skin: No rashes      Results Reviewed:  Results from last 7 days   Lab Units 21  1332 21  0747   WBC 10*3/mm3 10.24 11.26* 10.40   HEMOGLOBIN g/dL 12.3 13.3 12.9   HEMATOCRIT % 38.6 39.2 41.1   PLATELETS 10*3/mm3 267 291 250     Results from last 7 days   Lab Units 21  0457 21  1332 21  0941   SODIUM mmol/L 139 140 138   POTASSIUM mmol/L 3.8 3.6 4.0   CHLORIDE mmol/L 105 106 105   CO2 mmol/L 24.0 21.0* 20.0*   BUN mg/dL 5* 5* 5*   CREATININE mg/dL 0.39* 0.50* 0.46*   GLUCOSE mg/dL 124* 100* 190*   CALCIUM  mg/dL 9.3 9.2 9.0     Estimated Creatinine Clearance: 219.3 mL/min (A) (by C-G formula based on SCr of 0.39 mg/dL (L)).    Microbiology Results Abnormal     Procedure Component Value - Date/Time    Clostridium Difficile Toxin - Stool, Per Rectum [[id number]]  (Normal) Collected: 06/29/21 1149    Lab Status: Final result Specimen: Stool from Per Rectum Updated: 06/29/21 1614    Narrative:      The following orders were created for panel order Clostridium Difficile Toxin - Stool, Per Rectum.  Procedure                               Abnormality         Status                     ---------                               -----------         ------                     Clostridium Difficile To...[[id number]]  Normal              Final result                 Please view results for these tests on the individual orders.    Clostridium Difficile Toxin, PCR - Stool, Per Rectum [[id number]]  (Normal) Collected: 06/29/21 1149    Lab Status: Final result Specimen: Stool from Per Rectum Updated: 06/29/21 1614     C. Difficile Toxins by PCR Not Detected    Narrative:      Performance characteristics of test not established for patients <2 years of age.  Negative for Toxigenic C. Difficile    COVID PRE-OP / PRE-PROCEDURE SCREENING ORDER (NO ISOLATION) - Swab, Nasopharynx [[id number]]  (Normal) Collected: 06/28/21 1745    Lab Status: Final result Specimen: Swab from Nasopharynx Updated: 06/29/21 1418    Narrative:      The following orders were created for panel order COVID PRE-OP / PRE-PROCEDURE SCREENING ORDER (NO ISOLATION) - Swab, Nasopharynx.  Procedure                               Abnormality         Status                     ---------                               -----------         ------                     COVID-19, M,T,W, APTIMA ...[[id number]]  Normal              Final result                 Please view results for these tests on the individual orders.    COVID-19, M,T,W, APTIMA ASHLIE CHAVEZ IN-HOUSE NP/OP SWAB IN  UTM/VTM/[facility] [employer]SPORT MEDIA 24HR TAT - Swab, Nasopharynx [[id number]]  (Normal) Collected: 06/28/21 1745    Lab Status: Final result Specimen: Swab from Nasopharynx Updated: 06/29/21 1418     COVID19 Not Detected    Narrative:      Fact sheet for providers: https://www.fda.gov/media/247986/download     Fact sheet for patients: https://www.fda.gov/media/196098/download    Test performed by RT PCR.    Gastrointestinal Panel, PCR - Stool, Per Rectum [[id number]]  (Normal) Collected: 06/29/21 1149    Lab Status: Final result Specimen: Stool from Per Rectum Updated: 06/29/21 1318     Campylobacter Not Detected     Plesiomonas shigelloides Not Detected     Salmonella Not Detected     Vibrio Not Detected     Vibrio cholerae Not Detected     Yersinia enterocolitica Not Detected     Enteroaggregative E. coli (EAEC) Not Detected     Enteropathogenic E. coli (EPEC) Not Detected     Enterotoxigenic E. coli (ETEC) lt/st Not Detected     Shiga-like toxin-producing E. coli (STEC) stx1/stx2 Not Detected     Shigella/Enteroinvasive E. coli (EIEC) Not Detected     Cryptosporidium Not Detected     Cyclospora cayetanensis Not Detected     Entamoeba histolytica Not Detected     Giardia lamblia Not Detected     Adenovirus F40/41 Not Detected     Astrovirus Not Detected     Norovirus GI/GII Not Detected     Rotavirus A Not Detected     Sapovirus (I, II, IV or V) Not Detected          Imaging Results (Last 24 Hours)     ** No results found for the last 24 hours. **              I have reviewed the medications:  Scheduled Meds:azaTHIOprine, 50 mg, Oral, Daily  cholestyramine light, 1 packet, Oral, Q12H  dicyclomine, 10 mg, Oral, 4x Daily  gabapentin, 400 mg, Oral, Q8H  Hydrocortisone (Perianal), , Rectal, BID  insulin detemir, 15 Units, Subcutaneous, Nightly  insulin lispro, 0-14 Units, Subcutaneous, TID AC  insulin lispro, 7 Units, Subcutaneous, TID With Meals  mesalamine, 4 g, Rectal, Nightly  saccharomyces boulardii, 250 mg, Oral,  BID  sodium chloride, 10 mL, Intravenous, Q12H  topiramate, 50 mg, Oral, BID  venlafaxine, 37.5 mg, Oral, TID With Meals      Continuous Infusions:lactated ringers, 100 mL/hr, Last Rate: 100 mL/hr (07/05/21 0614)  lactated ringers, 30 mL/hr, Last Rate: 30 mL/hr (07/02/21 0817)      PRN Meds:.dextrose  •  dextrose  •  diphenhydrAMINE  •  EPINEPHrine (Anaphylaxis)  •  glucagon (human recombinant)  •  HYDROcodone-acetaminophen  •  hydrOXYzine  •  lactated ringers  •  Morphine  •  promethazine  •  sodium chloride    Assessment/Plan   Assessment & Plan     Active Hospital Problems    Diagnosis  POA   • **C. difficile colitis [A04.72]  Yes   • Diarrhea [R19.7]  Yes   • Gastroparesis [K31.84]  Yes   • Ulcerative colitis (CMS/[facility]) [K51.90]  Yes   • Ulcerative pancolitis without complication (CMS/[facility]) [K51.00]  Yes   • Type 2 diabetes mellitus treated with insulin (CMS/[facility]) [E11.9, Z79.4]  Not Applicable   • Bipolar disorder (CMS/[facility]) [F31.9]  Yes      Resolved Hospital Problems   No resolved problems to display.        Brief Hospital Course to date:  Thais Hobson is a 36 y.o. female With a PMH of gastroparesis, ulcerative pancolitis w/o complication, T2DM, and bipolar disorder who was admitted on June 28 for diarrhea. Has a hx of c diff and ulcerative colitis.        This patient's problems and plans were partially entered by my partner and updated as appropriate by me 07/05/21.     Assessment/Plan:     Diarrhea  -Recurrent C-diff colitis vs ulcerative colitis flare; likely a UC flare with negative CDiff panel, as well as negative GI PCR. Prior stopped Vancomycin and consultation to GI for management of UC  --pt underwent endoscopy 7/2.  Found colitis and biopsies taken.  Meds per GI   -Continue ID consult, appreciate Dr Martin input; oral Vanc discussed with ID and previous provider  -regular diet as tolerated.  Continues to have nausea, pain and bloody stools.  9 episodes in 24 hours.  -epi pen and benadryl at  bedside due to reported intolerance of throat itching.  No other signs of anaphylaxis.      UC  - GI recommending:   -- Check a.m. labs  - Rowasa enema  - Imuran 50mg daily  - Bentyl 10mg PO 30 mins prior to meals for postprandial symptoms      Hyponatremia   --resolved, now 138      T2DM, uncontrolled with gastroparesis   -Continue Levemir 30 units subcu nightly, sliding scale insulin  --Increased mealtime insulin to 7 units 3 times daily with hold parameters yesterday 7/3, improved..    --Will continue current regimen for today and adjust further tomorrow if needed.     Bipolar disorder  --stable        DVT prophylaxis:  Mechanical DVT prophylaxis orders are present.       Disposition: I expect the patient to be discharged tomorrow.    CODE STATUS:   Code Status and Medical Interventions:   Ordered at: 06/28/21 1320     Code Status:    CPR     Medical Interventions (Level of Support Prior to Arrest):    Full       Caitlin Ross PA-C  07/05/21

## 2021-07-06 ENCOUNTER — READMISSION MANAGEMENT (OUTPATIENT)
Dept: CALL CENTER | Facility: HOSPITAL | Age: 36
End: 2021-07-06

## 2021-07-06 ENCOUNTER — ANESTHESIA EVENT (OUTPATIENT)
Dept: GASTROENTEROLOGY | Facility: HOSPITAL | Age: 36
End: 2021-07-06

## 2021-07-06 ENCOUNTER — ANESTHESIA (OUTPATIENT)
Dept: GASTROENTEROLOGY | Facility: HOSPITAL | Age: 36
End: 2021-07-06

## 2021-07-06 VITALS
BODY MASS INDEX: 36.7 KG/M2 | SYSTOLIC BLOOD PRESSURE: 129 MMHG | OXYGEN SATURATION: 97 % | HEART RATE: 95 BPM | WEIGHT: 207.1 LBS | RESPIRATION RATE: 18 BRPM | DIASTOLIC BLOOD PRESSURE: 75 MMHG | HEIGHT: 63 IN | TEMPERATURE: 98.2 F

## 2021-07-06 PROBLEM — R13.19 ESOPHAGEAL DYSPHAGIA: Status: RESOLVED | Noted: 2019-07-22 | Resolved: 2021-07-06

## 2021-07-06 PROBLEM — R19.7 DIARRHEA: Status: RESOLVED | Noted: 2021-06-29 | Resolved: 2021-07-06

## 2021-07-06 PROCEDURE — 63710000001 DIPHENHYDRAMINE PER 50 MG: Performed by: INTERNAL MEDICINE

## 2021-07-06 PROCEDURE — 43248 EGD GUIDE WIRE INSERTION: CPT | Performed by: INTERNAL MEDICINE

## 2021-07-06 PROCEDURE — 99239 HOSP IP/OBS DSCHRG MGMT >30: CPT | Performed by: PHYSICIAN ASSISTANT

## 2021-07-06 PROCEDURE — 43239 EGD BIOPSY SINGLE/MULTIPLE: CPT | Performed by: INTERNAL MEDICINE

## 2021-07-06 PROCEDURE — 63710000001 INSULIN LISPRO (HUMAN) PER 5 UNITS: Performed by: NURSE PRACTITIONER

## 2021-07-06 PROCEDURE — C1769 GUIDE WIRE: HCPCS | Performed by: INTERNAL MEDICINE

## 2021-07-06 PROCEDURE — 88305 TISSUE EXAM BY PATHOLOGIST: CPT | Performed by: INTERNAL MEDICINE

## 2021-07-06 PROCEDURE — 25010000002 MORPHINE PER 10 MG: Performed by: NURSE PRACTITIONER

## 2021-07-06 PROCEDURE — 63710000001 INSULIN LISPRO (HUMAN) PER 5 UNITS: Performed by: INTERNAL MEDICINE

## 2021-07-06 PROCEDURE — 0DB68ZX EXCISION OF STOMACH, VIA NATURAL OR ARTIFICIAL OPENING ENDOSCOPIC, DIAGNOSTIC: ICD-10-PCS | Performed by: INTERNAL MEDICINE

## 2021-07-06 PROCEDURE — 25010000002 PROPOFOL 10 MG/ML EMULSION: Performed by: NURSE ANESTHETIST, CERTIFIED REGISTERED

## 2021-07-06 PROCEDURE — 0D768DZ DILATION OF STOMACH WITH INTRALUMINAL DEVICE, VIA NATURAL OR ARTIFICIAL OPENING ENDOSCOPIC: ICD-10-PCS | Performed by: INTERNAL MEDICINE

## 2021-07-06 PROCEDURE — 63710000001 AZATHIOPRINE PER 50 MG: Performed by: NURSE PRACTITIONER

## 2021-07-06 RX ORDER — EPINEPHRINE 0.3 MG/.3ML
0.3 INJECTION SUBCUTANEOUS
Qty: 2 EACH | Refills: 0 | Status: SHIPPED | OUTPATIENT
Start: 2021-07-06

## 2021-07-06 RX ORDER — HYDROCORTISONE 25 MG/G
CREAM TOPICAL 2 TIMES DAILY
Qty: 30 G | Refills: 1 | Status: SHIPPED | OUTPATIENT
Start: 2021-07-06 | End: 2022-11-23

## 2021-07-06 RX ORDER — MESALAMINE 4 G/60ML
4 ENEMA RECTAL NIGHTLY
Qty: 840 ML | Refills: 0 | Status: SHIPPED | OUTPATIENT
Start: 2021-07-06 | End: 2021-07-20

## 2021-07-06 RX ORDER — ONDANSETRON 2 MG/ML
4 INJECTION INTRAMUSCULAR; INTRAVENOUS ONCE AS NEEDED
Status: DISCONTINUED | OUTPATIENT
Start: 2021-07-06 | End: 2021-07-06 | Stop reason: HOSPADM

## 2021-07-06 RX ORDER — HYDROCODONE BITARTRATE AND ACETAMINOPHEN 5; 325 MG/1; MG/1
1 TABLET ORAL EVERY 6 HOURS PRN
Qty: 12 TABLET | Refills: 0 | Status: SHIPPED | OUTPATIENT
Start: 2021-07-06 | End: 2021-07-09

## 2021-07-06 RX ORDER — CHOLESTYRAMINE LIGHT 4 G/5.7G
1 POWDER, FOR SUSPENSION ORAL EVERY 12 HOURS
Qty: 60 PACKET | Refills: 0 | Status: SHIPPED | OUTPATIENT
Start: 2021-07-06 | End: 2021-10-01

## 2021-07-06 RX ORDER — VENLAFAXINE 37.5 MG/1
37.5 TABLET ORAL
Qty: 90 TABLET | Refills: 0 | Status: SHIPPED | OUTPATIENT
Start: 2021-07-06 | End: 2022-01-08 | Stop reason: HOSPADM

## 2021-07-06 RX ORDER — PROPOFOL 10 MG/ML
VIAL (ML) INTRAVENOUS AS NEEDED
Status: DISCONTINUED | OUTPATIENT
Start: 2021-07-06 | End: 2021-07-06 | Stop reason: SURG

## 2021-07-06 RX ORDER — DICYCLOMINE HYDROCHLORIDE 10 MG/1
10 CAPSULE ORAL 4 TIMES DAILY
Qty: 56 CAPSULE | Refills: 0 | Status: SHIPPED | OUTPATIENT
Start: 2021-07-06 | End: 2021-07-20

## 2021-07-06 RX ORDER — HYDROXYZINE PAMOATE 25 MG/1
25 CAPSULE ORAL 3 TIMES DAILY PRN
Qty: 60 CAPSULE | Refills: 2 | Status: SHIPPED | OUTPATIENT
Start: 2021-07-06 | End: 2021-12-08

## 2021-07-06 RX ORDER — SACCHAROMYCES BOULARDII 250 MG
250 CAPSULE ORAL 2 TIMES DAILY
Qty: 60 CAPSULE | Refills: 0 | Status: SHIPPED | OUTPATIENT
Start: 2021-07-06 | End: 2021-08-05

## 2021-07-06 RX ORDER — LIDOCAINE HYDROCHLORIDE 10 MG/ML
INJECTION, SOLUTION EPIDURAL; INFILTRATION; INTRACAUDAL; PERINEURAL AS NEEDED
Status: DISCONTINUED | OUTPATIENT
Start: 2021-07-06 | End: 2021-07-06 | Stop reason: SURG

## 2021-07-06 RX ORDER — SODIUM CHLORIDE 9 MG/ML
INJECTION, SOLUTION INTRAVENOUS CONTINUOUS PRN
Status: DISCONTINUED | OUTPATIENT
Start: 2021-07-06 | End: 2021-07-06 | Stop reason: SURG

## 2021-07-06 RX ORDER — IPRATROPIUM BROMIDE AND ALBUTEROL SULFATE 2.5; .5 MG/3ML; MG/3ML
3 SOLUTION RESPIRATORY (INHALATION) ONCE AS NEEDED
Status: DISCONTINUED | OUTPATIENT
Start: 2021-07-06 | End: 2021-07-06 | Stop reason: HOSPADM

## 2021-07-06 RX ORDER — AZATHIOPRINE 50 MG/1
50 TABLET ORAL DAILY
Qty: 14 TABLET | Refills: 0 | Status: SHIPPED | OUTPATIENT
Start: 2021-07-07 | End: 2021-07-21

## 2021-07-06 RX ADMIN — PROPOFOL 20 MG: 10 INJECTION, EMULSION INTRAVENOUS at 09:19

## 2021-07-06 RX ADMIN — TOPIRAMATE 50 MG: 25 TABLET, FILM COATED ORAL at 10:07

## 2021-07-06 RX ADMIN — Medication 250 MG: at 10:06

## 2021-07-06 RX ADMIN — PROPOFOL 20 MG: 10 INJECTION, EMULSION INTRAVENOUS at 09:15

## 2021-07-06 RX ADMIN — DIPHENHYDRAMINE HYDROCHLORIDE 25 MG: 25 CAPSULE ORAL at 10:15

## 2021-07-06 RX ADMIN — MORPHINE SULFATE 2 MG: 2 INJECTION, SOLUTION INTRAMUSCULAR; INTRAVENOUS at 10:14

## 2021-07-06 RX ADMIN — GABAPENTIN 400 MG: 400 CAPSULE ORAL at 10:07

## 2021-07-06 RX ADMIN — VENLAFAXINE 37.5 MG: 37.5 TABLET ORAL at 11:50

## 2021-07-06 RX ADMIN — PROPOFOL 50 MG: 10 INJECTION, EMULSION INTRAVENOUS at 09:11

## 2021-07-06 RX ADMIN — PROPOFOL 20 MG: 10 INJECTION, EMULSION INTRAVENOUS at 09:21

## 2021-07-06 RX ADMIN — LIDOCAINE HYDROCHLORIDE 90 MG: 10 INJECTION, SOLUTION EPIDURAL; INFILTRATION; INTRACAUDAL; PERINEURAL at 09:11

## 2021-07-06 RX ADMIN — SODIUM CHLORIDE: 9 INJECTION, SOLUTION INTRAVENOUS at 09:05

## 2021-07-06 RX ADMIN — INSULIN LISPRO 8 UNITS: 100 INJECTION, SOLUTION INTRAVENOUS; SUBCUTANEOUS at 11:50

## 2021-07-06 RX ADMIN — AZATHIOPRINE 50 MG: 50 TABLET ORAL at 10:10

## 2021-07-06 RX ADMIN — DICYCLOMINE HYDROCHLORIDE 10 MG: 10 CAPSULE ORAL at 10:08

## 2021-07-06 RX ADMIN — DICYCLOMINE HYDROCHLORIDE 10 MG: 10 CAPSULE ORAL at 11:49

## 2021-07-06 RX ADMIN — MORPHINE SULFATE 2 MG: 2 INJECTION, SOLUTION INTRAMUSCULAR; INTRAVENOUS at 06:23

## 2021-07-06 RX ADMIN — HYDROCODONE BITARTRATE AND ACETAMINOPHEN 1 TABLET: 5; 325 TABLET ORAL at 13:36

## 2021-07-06 RX ADMIN — GABAPENTIN 400 MG: 400 CAPSULE ORAL at 13:36

## 2021-07-06 RX ADMIN — CHOLESTYRAMINE 4 G: 4 POWDER, FOR SUSPENSION ORAL at 10:10

## 2021-07-06 RX ADMIN — VENLAFAXINE 37.5 MG: 37.5 TABLET ORAL at 10:11

## 2021-07-06 RX ADMIN — INSULIN LISPRO 7 UNITS: 100 INJECTION, SOLUTION INTRAVENOUS; SUBCUTANEOUS at 11:51

## 2021-07-06 NOTE — ANESTHESIA PREPROCEDURE EVALUATION
Anesthesia Evaluation                  Airway   Mallampati: II  Dental      Pulmonary    Cardiovascular         Neuro/Psych  GI/Hepatic/Renal/Endo    (-)  obesity    Musculoskeletal     (+) back pain,   Abdominal    Substance History      OB/GYN          Other                        Anesthesia Plan    ASA 3     general     intravenous induction     Anesthetic plan, all risks, benefits, and alternatives have been provided, discussed and informed consent has been obtained with: patient.

## 2021-07-06 NOTE — PLAN OF CARE
Problem: Adult Inpatient Plan of Care  Goal: Plan of Care Review  Outcome: Met  Goal: Patient-Specific Goal (Individualized)  Outcome: Met  Goal: Absence of Hospital-Acquired Illness or Injury  Outcome: Met  Intervention: Identify and Manage Fall Risk  Recent Flowsheet Documentation  Taken 7/6/2021 0745 by Sallie Reed RN  Safety Promotion/Fall Prevention:   activity supervised   safety round/check completed  Intervention: Prevent Skin Injury  Recent Flowsheet Documentation  Taken 7/6/2021 0745 by Sallie Reed RN  Body Position: position changed independently  Skin Protection: adhesive use limited  Goal: Optimal Comfort and Wellbeing  Outcome: Met  Intervention: Provide Person-Centered Care  Recent Flowsheet Documentation  Taken 7/6/2021 0745 by Sallie Reed RN  Trust Relationship/Rapport:   care explained   choices provided   emotional support provided  Goal: Readiness for Transition of Care  Outcome: Met     Problem: Pain Acute  Goal: Optimal Pain Control  Outcome: Met  Intervention: Develop Pain Management Plan  Recent Flowsheet Documentation  Taken 7/6/2021 0745 by Sallie Reed RN  Pain Management Interventions:   position adjusted   pillow support provided     Problem: Skin Injury Risk Increased  Goal: Skin Health and Integrity  Outcome: Met  Intervention: Optimize Skin Protection  Recent Flowsheet Documentation  Taken 7/6/2021 0745 by Sallie Reed RN  Pressure Reduction Techniques: frequent weight shift encouraged  Head of Bed (HOB): HOB at 30-45 degrees  Pressure Reduction Devices: positioning supports utilized  Skin Protection: adhesive use limited   Goal Outcome Evaluation:

## 2021-07-06 NOTE — DISCHARGE SUMMARY
[facility] Hospital Medicine Services  DISCHARGE SUMMARY    Patient Name: Thais Hobson  : 1985  MRN: [id number]    Date of Admission: 2021  1:14 PM  Date of Discharge:  2021  Primary Care Physician: Lv Sanchez DO    Consults     Date and Time Order Name Status Description    2021  1:27 PM Inpatient Gastroenterology Consult Completed     2021 12:34 AM Inpatient Infectious Diseases Consult Completed           Hospital Course     Presenting Problem:   Clostridium difficile colitis [A04.72]  C. difficile colitis [A04.72]  Diarrhea [R19.7]  Ulcerative colitis (CMS/[facility]) [K51.90]    Active Hospital Problems    Diagnosis  POA   • **Ulcerative colitis flare [K51.90]  Yes   • Gastroparesis [K31.84]  Yes   • Ulcerative pancolitis without complication (CMS/[facility]) [K51.00]  Yes   • Type 2 diabetes mellitus treated with insulin (CMS/[facility]) [E11.9, Z79.4]  Not Applicable   • Bipolar disorder (CMS/[facility]) [F31.9]  Yes      Resolved Hospital Problems    Diagnosis Date Resolved POA   • Diarrhea [R19.7] 2021 Yes   • Esophageal dysphagia [R13.10] 2021 Yes          Hospital Course:  Thais Hobson is a 36 y.o. female with past medical history of gastroparesis, ulcerative colitis/pancolitis, type II diabetes mellitus, bipolar disorder, c.diff who was admitted to [facility] on 21 for abdominal pain and diarrhea.  Initially thought she could have recurrent C.diff due to how much diarrhea she was having.  Her C.diff test came back negative as did her GI PCR panel.  She had negative Covid 19 test.  The patient was started on metronidazole and vancomycin initially however this was stopped when tests resulted negative.  Gastroenterology was consulted for management of ulcerative colitis.  She underwent EGD on  Gastroenterology started her 2021 which showed mild left sided colitis, biopsies taken.  Gastroenterology recommended continuing Imuran,  starting low dose Rowasa enemas.  Repeat EGD on 7/6/21 showed normal EGD without mucosal changes.  Recommended continuing current inpatient medications including Rowasa enemas, Imuran, Bentyl, cholestyramine light packets.      It was initially thought she might have had a reaction to Imuran because she reported scratchy throat after dose on 7/5/21, she had no signs of anaphylaxis.  She was given an Epipen at discharge just in case she exhibited different more serious symptoms at home.      In regards to her diabetes mellitus, she was treated with levemir 30 units nightly and had hypoglycemia episodes.  I educated her on decreasing long acting and only using sliding scale insulin for now as she is at risk for hypoglycemia.  She agrees to make these changes.      Discharge Follow Up Recommendations for outpatient labs/diagnostics:  Follow up with Gastroenterology in 2-3 weeks.  Recommend decreasing insulin dose due to hypoglycemia, patient confirmed understanding on phone with me today.      Day of Discharge     HPI:   Ms. Hobson is feeling better.  Tolerating oral intake. Pain controlled.  She is ready to go home.    Review of Systems  Gen- No fevers, chills  CV- No chest pain, palpitations  Resp- No cough, dyspnea  GI- No N/V/D, abd pain        Vital Signs:   Temp:  [96.3 °F (35.7 °C)-98.2 °F (36.8 °C)] 98.2 °F (36.8 °C)  Heart Rate:  [] 95  Resp:  [16-22] 18  BP: (106-129)/(72-89) 129/75     Physical Exam:  Constitutional: No acute distress, awake, alert  HENT: NCAT, mucous membranes moist  Respiratory: Clear to auscultation bilaterally, respiratory effort normal   Cardiovascular: RRR, no murmurs, rubs, or gallops  Gastrointestinal: Positive bowel sounds, soft, nontender, nondistended  Musculoskeletal: No bilateral ankle edema  Psychiatric: Appropriate affect, cooperative  Neurologic: Oriented x 3  Skin: No rashes      Pertinent  and/or Most Recent Results     LAB RESULTS:      Lab 07/05/21  3796  07/04/21  1332 07/03/21  0747   WBC 10.24 11.26* 10.40   HEMOGLOBIN 12.3 13.3 12.9   HEMATOCRIT 38.6 39.2 41.1   PLATELETS 267 291 250   MCV 85.6 81.3 86.3         Lab 07/05/21  0457 07/04/21  1332 07/03/21  0941 06/29/21  1558   SODIUM 139 140 138 138   POTASSIUM 3.8 3.6 4.0 3.7   CHLORIDE 105 106 105 103   CO2 24.0 21.0* 20.0* 23.0   ANION GAP 10.0 13.0 13.0 12.0   BUN 5* 5* 5* 6   CREATININE 0.39* 0.50* 0.46* 0.50*   GLUCOSE 124* 100* 190* 246*   CALCIUM 9.3 9.2 9.0 8.7                         Brief Urine Lab Results  (Last result in the past 365 days)      Color   Clarity   Blood   Leuk Est   Nitrite   Protein   CREAT   Urine HCG        09/21/20 1813               Negative         Microbiology Results (last 10 days)     Procedure Component Value - Date/Time    Gastrointestinal Panel, PCR - Stool, Per Rectum [[id number]]  (Normal) Collected: 06/29/21 1149    Lab Status: Final result Specimen: Stool from Per Rectum Updated: 06/29/21 1318     Campylobacter Not Detected     Plesiomonas shigelloides Not Detected     Salmonella Not Detected     Vibrio Not Detected     Vibrio cholerae Not Detected     Yersinia enterocolitica Not Detected     Enteroaggregative E. coli (EAEC) Not Detected     Enteropathogenic E. coli (EPEC) Not Detected     Enterotoxigenic E. coli (ETEC) lt/st Not Detected     Shiga-like toxin-producing E. coli (STEC) stx1/stx2 Not Detected     Shigella/Enteroinvasive E. coli (EIEC) Not Detected     Cryptosporidium Not Detected     Cyclospora cayetanensis Not Detected     Entamoeba histolytica Not Detected     Giardia lamblia Not Detected     Adenovirus F40/41 Not Detected     Astrovirus Not Detected     Norovirus GI/GII Not Detected     Rotavirus A Not Detected     Sapovirus (I, II, IV or V) Not Detected    Clostridium Difficile Toxin - Stool, Per Rectum [[id number]]  (Normal) Collected: 06/29/21 1149    Lab Status: Final result Specimen: Stool from Per Rectum Updated: 06/29/21 1614    Narrative:      The  following orders were created for panel order Clostridium Difficile Toxin - Stool, Per Rectum.  Procedure                               Abnormality         Status                     ---------                               -----------         ------                     Clostridium Difficile To...[[id number]]  Normal              Final result                 Please view results for these tests on the individual orders.    Clostridium Difficile Toxin, PCR - Stool, Per Rectum [[id number]]  (Normal) Collected: 06/29/21 1149    Lab Status: Final result Specimen: Stool from Per Rectum Updated: 06/29/21 1614     C. Difficile Toxins by PCR Not Detected    Narrative:      Performance characteristics of test not established for patients <2 years of age.  Negative for Toxigenic C. Difficile    COVID PRE-OP / PRE-PROCEDURE SCREENING ORDER (NO ISOLATION) - Swab, Nasopharynx [[id number]]  (Normal) Collected: 06/28/21 1745    Lab Status: Final result Specimen: Swab from Nasopharynx Updated: 06/29/21 1418    Narrative:      The following orders were created for panel order COVID PRE-OP / PRE-PROCEDURE SCREENING ORDER (NO ISOLATION) - Swab, Nasopharynx.  Procedure                               Abnormality         Status                     ---------                               -----------         ------                     COVID-19, M,T,W, APTIMA ...[[id number]]  Normal              Final result                 Please view results for these tests on the individual orders.    COVID-19, M,T,W, APTIMA PANTHER [facility] IN-HOUSE NP/OP SWAB IN UTM/VTM/SALINE TRANSPORT MEDIA 24HR TAT - Swab, Nasopharynx [[id number]]  (Normal) Collected: 06/28/21 1745    Lab Status: Final result Specimen: Swab from Nasopharynx Updated: 06/29/21 1418     COVID19 Not Detected    Narrative:      Fact sheet for providers: https://www.fda.gov/media/607208/download     Fact sheet for patients: https://www.fda.gov/media/305587/download    Test performed by RT PCR.           No radiology results for the last 10 days                Plan for Follow-up of Pending Labs/Results:   Pending Labs     Order Current Status    Tissue Pathology Exam In process        Discharge Details        Discharge Medications      New Medications      Instructions Start Date   cholestyramine light 4 g packet   Mix 1 packet in 2-4 ounces of liquid Every 12 (Twelve) Hours for 30 days.      dicyclomine 10 MG capsule  Commonly known as: BENTYL  Notes to patient: Take as prescribed and directed.   10 mg, Oral, 4 Times Daily      EPINEPHrine 0.3 MG/0.3ML solution auto-injector injection  Commonly known as: EpiPen 2-Luis   0.3 mg, Intramuscular, For emergent anaphylaxis      insulin detemir 100 UNIT/ML injection  Commonly known as: LEVEMIR  Replaces: BASAGLAR KWIKPEN 100 UNIT/ML injection pen   15 Units, Subcutaneous, Nightly      mesalamine 4 g enema  Commonly known as: ROWASA  Notes to patient: Take as prescribed and directed.   4 g, Rectal, Nightly, Retain overnight, or approximately for 8 hours.      saccharomyces boulardii 250 MG capsule  Commonly known as: FLORASTOR  Notes to patient: Take as prescribed and directed.   250 mg, Oral, 2 Times Daily      venlafaxine 37.5 MG tablet  Commonly known as: EFFEXOR  Notes to patient: Take as prescribed and directed.   37.5 mg, Oral, 3 Times Daily With Meals         Changes to Medications      Instructions Start Date   azaTHIOprine 50 MG tablet  Commonly known as: IMURAN  What changed: how much to take  Notes to patient: Take as prescribed.   50 mg, Oral, Daily   Start Date: July 7, 2021        Continue These Medications      Instructions Start Date   Continuous Glucose Monitor kit  Notes to patient: Use as directed.   Use as directed      Dexcom G6  device  Notes to patient: Use as directed.   1 Units, Does not apply, Daily      Dexcom G6 Sensor  Notes to patient: Use as directed.   Does not apply, Every 10 Days      Dexcom G6 Transmitter misc  Notes to  "patient: Use as directed.   1 Units, Does not apply, Daily      diphenhydrAMINE 25 mg capsule  Commonly known as: Banophen   25 mg, Oral, Every 8 Hours PRN      gabapentin 800 MG tablet  Commonly known as: NEURONTIN   800 mg, Oral, 3 Times Daily      glucose blood test strip  Notes to patient: Use as directed.   Check glucose TID, One Touch Reveal      glucose blood test strip  Notes to patient: Use as directed.   ONE TOUCH VERIO FLEX TEST STRIPS TO CHECK GLUCOSE 3 TIMES DAILY FOR DM E11.9      HYDROcodone-acetaminophen 5-325 MG per tablet  Commonly known as: NORCO   1 tablet, Oral, Every 6 Hours PRN      hydrOXYzine pamoate 25 MG capsule  Commonly known as: VISTARIL   25 mg, Oral, 3 Times Daily PRN      insulin aspart 100 UNIT/ML injection  Commonly known as: NovoLOG   TID dosing with meals, as per sliding scale coverage, up to 12 units TID dosing available      Insulin Syringe 30G X 5/16\" 1 ML misc  Notes to patient: Use as prescribed.   1 syringe, Does not apply, 3 Times Daily      Lancets misc  Notes to patient: Use as directed.   USE AS DIRECTED TO CHECK GLUCOSE 3 TIMES DAILY FOR DM E11.9      Proctozone-HC 2.5 % rectal cream  Generic drug: Hydrocortisone (Perianal)   Rectal, 2 Times Daily      promethazine 25 MG tablet  Commonly known as: PHENERGAN   25 mg, Oral, Every 6 Hours PRN      SUMAtriptan 50 MG tablet  Commonly known as: Imitrex   Take one tablet at onset of headache. May repeat dose one time in 2 hours if headache not relieved.      topiramate 50 MG tablet  Commonly known as: TOPAMAX   50 mg, Oral, 2 Times Daily         Stop These Medications    BASAGLAR KWIKPEN 100 UNIT/ML injection pen  Replaced by: insulin detemir 100 UNIT/ML injection     estradiol 0.05 MG/24HR patch  Commonly known as: RAUL JIANG-DOT     fluconazole 100 MG tablet  Commonly known as: Diflucan     HumaLOG Mix 75/25 KwikPen (75-25) 100 UNIT/ML suspension pen-injector pen  Generic drug: Insulin Lispro Prot & Lispro      " "      Allergies   Allergen Reactions   • Hydrochlorothiazide Hives   • Penicillins Hives   • Remicade [Infliximab] Anaphylaxis   • Soybean-Containing Drug Products Swelling     \"Soy sauce\"   • Xeljanz [Tofacitinib Citrate] Other (See Comments)     BLISTERS IN THROAT & ON ARMS   • Zofran [Ondansetron Hcl] Headache     migraines   • Ondansetron Mental Status Change   • Fidaxomicin Rash   • Reglan [Metoclopramide] Other (See Comments)     States feels weird when takes it         Discharge Disposition:  Home or Self Care    Diet:  Hospital:  Diet Order   Procedures   • Diet Regular; Consistent Carbohydrate, Low Fiber / Low Residue       Activity:  as tolerated    Restrictions or Other Recommendations:  As tolerated       CODE STATUS:    Code Status and Medical Interventions:   Ordered at: 06/28/21 1320     Code Status:    CPR     Medical Interventions (Level of Support Prior to Arrest):    Full       Future Appointments   Date Time Provider Department Center   7/30/2021 12:00 PM Carolyn Woods APRN [facility]       Additional Instructions for the Follow-ups that You Need to Schedule     Discharge Follow-up with Specified Provider: Follow up with Gastroenterology in 2-3 weeks.; 2 Weeks   As directed      To: Follow up with Gastroenterology in 2-3 weeks.    Follow Up: 2 Weeks                     Caitlin Ross PA-C  07/06/21      Time Spent on Discharge:  I spent 45  minutes on this discharge activity which included: face-to-face encounter with the patient, reviewing the data in the system, coordination of the care with the nursing staff as well as consultants, documentation, and entering orders.          "

## 2021-07-06 NOTE — CASE MANAGEMENT/SOCIAL WORK
Case Management Discharge Note      Final Note: Pt will be discharged home today.  SW has arranged [facility] (with pt's consent) for pt and  will arrive at the maternity entrance at approximately 1500.  Pt denies having any other discharge needs.         Selected Continued Care - Admitted Since 6/28/2021     Destination    No services have been selected for the patient.              Durable Medical Equipment    No services have been selected for the patient.              Dialysis/Infusion    No services have been selected for the patient.              Home Medical Care    No services have been selected for the patient.              Therapy    No services have been selected for the patient.              Community Resources    No services have been selected for the patient.              Community & DME    No services have been selected for the patient.                  Transportation Services  Taxi: other ([facility])    Final Discharge Disposition Code: 01 - home or self-care

## 2021-07-06 NOTE — ANESTHESIA POSTPROCEDURE EVALUATION
Patient: Thais Hobson    Procedure Summary     Date: 07/06/21 Room / Location:  [facility]    Anesthesia Start: 0908 Anesthesia Stop: 0926    Procedure: ESOPHAGOGASTRODUODENOSCOPY WITH SAVARY DILATATION (Left ) Diagnosis:       Esophageal dysphagia      (Esophageal dysphagia [R13.10])    Surgeons: Francisco Sainz MD Provider: Tom Freeman MD    Anesthesia Type: general ASA Status: 3          Anesthesia Type: general    Vitals  Vitals Value Taken Time   /88 07/06/21 0925   Temp     Pulse 92 07/06/21 0925   Resp 22 07/06/21 0925   SpO2 95 % 07/06/21 0925   T 97.2F        Post Anesthesia Care and Evaluation    Patient location during evaluation: PACU  Patient participation: complete - patient participated  Level of consciousness: awake and alert  Pain management: adequate  Airway patency: patent  Anesthetic complications: No anesthetic complications  PONV Status: none  Cardiovascular status: hemodynamically stable and acceptable  Respiratory status: nonlabored ventilation, acceptable and nasal cannula  Hydration status: acceptable

## 2021-07-06 NOTE — POST-PROCEDURE NOTE
EGD-normal, dilated 54 German without mucosal changes.    Recommendations follow-up path.    Okay for discharge.  GI follow-up in 2 to 3 weeks

## 2021-07-06 NOTE — OUTREACH NOTE
Prep Survey      Responses   [facility] patient discharged from?  [place]   Is LACE score < 7 ?  No   Emergency Room discharge w/ pulse ox?  No   Eligibility  [facility]   Date of Admission  06/28/21   Date of Discharge  07/06/21   Discharge Disposition  Home or Self Care   Discharge diagnosis  Ulcerative colitis flare    Does the patient have one of the following disease processes/diagnoses(primary or secondary)?  Other   Does the patient have Home health ordered?  No   Is there a DME ordered?  No   Prep survey completed?  Yes          Maureen Le RN

## 2021-07-06 NOTE — PLAN OF CARE
Goal Outcome Evaluation:  Plan of Care Reviewed With: patient        Progress: no change  Outcome Summary: pt rested well, NPO for scope in AM, prn meds given for pain.

## 2021-07-07 ENCOUNTER — TRANSITIONAL CARE MANAGEMENT TELEPHONE ENCOUNTER (OUTPATIENT)
Dept: CALL CENTER | Facility: HOSPITAL | Age: 36
End: 2021-07-07

## 2021-07-07 LAB
CYTO UR: NORMAL
LAB AP CASE REPORT: NORMAL
LAB AP CLINICAL INFORMATION: NORMAL
PATH REPORT.FINAL DX SPEC: NORMAL
PATH REPORT.GROSS SPEC: NORMAL

## 2021-07-07 NOTE — OUTREACH NOTE
Call Center TCM Note      Responses   [facility] patient discharged from?  [place]   Does the patient have one of the following disease processes/diagnoses(primary or secondary)?  Other   TCM attempt successful?  No   Unsuccessful attempts  Attempt 1          Ritika Whiteside RN    7/7/2021, 12:52 EDT

## 2021-07-07 NOTE — OUTREACH NOTE
Call Center TCM Note      Responses   [facility] patient discharged from?  [place]   Does the patient have one of the following disease processes/diagnoses(primary or secondary)?  Other   TCM attempt successful?  Yes   Call start time  1416   Call end time  1423   Discharge diagnosis  Ulcerative colitis flare    Meds reviewed with patient/caregiver?  Yes   Is the patient having any side effects they believe may be caused by any medication additions or changes?  No   Does the patient have all medications ordered at discharge?  Yes   Prescription comments  Patient did not p/u Levemir as she had supply at home   Is the patient taking all medications as directed (includes completed medication regime)?  Yes   Comments regarding appointments  GI MD Friday Jul 30, 2021 12:00 PM   Does the patient have a primary care provider?   Yes   Does the patient have an appointment with their PCP within 7 days of discharge?  No   What is preventing the patient from scheduling follow up appointments within 7 days of discharge?  Waiting on return call   Nursing Interventions  -- [Notified patient that office will be notified to call her to schedule a hospital f/u,  no available appointments on  that meet TCM guidelines that can be scheduled per this RN. ]   Has the patient kept scheduled appointments due by today?  N/A   What is the Home health agency?   No home health   Psychosocial issues?  No   Did the patient receive a copy of their discharge instructions?  Yes   Nursing interventions  Reviewed instructions with patient [Diarrhea improved from 20 BMs daily to 2-3, blood glucose below 200]   What is the patient's perception of their health status since discharge?  Improving   Is the patient/caregiver able to teach back signs and symptoms related to disease process for when to call PCP?  Yes   Is the patient/caregiver able to teach back signs and symptoms related to disease process for when to call 911?  Yes   Is the  patient/caregiver able to teach back the hierarchy of who to call/visit for symptoms/problems? PCP, Specialist, Home health nurse, Urgent Care, ED, 911  Yes   If the patient is a current smoker, are they able to teach back resources for cessation?  Not a smoker   TCM call completed?  Yes          Ritika Whiteside RN    7/7/2021, 14:23 EDT

## 2021-07-08 ENCOUNTER — TELEPHONE (OUTPATIENT)
Dept: GASTROENTEROLOGY | Facility: CLINIC | Age: 36
End: 2021-07-08

## 2021-07-08 NOTE — OUTREACH NOTE
PATIENT HAS BEEN SCHEDULED FOR A MYCHART VIDEO VISIT WITH JULISSA ON 7/14/2021 AT 8:30 AM.  PATIENT REQUEST MYCHART VIDEO VISIT DUE TO LACK OF TRANSPORTATION AT THIS TIME.

## 2021-07-13 DIAGNOSIS — E11.44 DIABETIC AMYOTROPHY ASSOCIATED WITH TYPE 2 DIABETES MELLITUS (HCC): ICD-10-CM

## 2021-07-13 RX ORDER — GABAPENTIN 800 MG/1
TABLET ORAL
Qty: 90 TABLET | Refills: 0 | Status: SHIPPED | OUTPATIENT
Start: 2021-07-13 | End: 2021-08-21

## 2021-07-14 DIAGNOSIS — G43.101 MIGRAINE WITH AURA AND WITH STATUS MIGRAINOSUS, NOT INTRACTABLE: ICD-10-CM

## 2021-07-15 ENCOUNTER — READMISSION MANAGEMENT (OUTPATIENT)
Dept: CALL CENTER | Facility: HOSPITAL | Age: 36
End: 2021-07-15

## 2021-07-15 RX ORDER — TOPIRAMATE 50 MG/1
TABLET, FILM COATED ORAL
Qty: 60 TABLET | Refills: 2 | Status: SHIPPED | OUTPATIENT
Start: 2021-07-15 | End: 2021-11-05

## 2021-07-15 NOTE — OUTREACH NOTE
Medical Week 2 Survey      Responses   [facility] [place] patient discharged from?  [place]   Does the patient have one of the following disease processes/diagnoses(primary or secondary)?  Other   Week 2 attempt successful?  No   Unsuccessful attempts  Attempt 1          Celena Santos RN

## 2021-07-20 ENCOUNTER — READMISSION MANAGEMENT (OUTPATIENT)
Dept: CALL CENTER | Facility: HOSPITAL | Age: 36
End: 2021-07-20

## 2021-07-20 NOTE — OUTREACH NOTE
Medical Week 2 Survey      Responses   [facility] patient discharged from?  [place]   Does the patient have one of the following disease processes/diagnoses(primary or secondary)?  Other   Week 2 attempt successful?  No   Unsuccessful attempts  Attempt 2          Celena Santos RN

## 2021-07-28 ENCOUNTER — READMISSION MANAGEMENT (OUTPATIENT)
Dept: CALL CENTER | Facility: HOSPITAL | Age: 36
End: 2021-07-28

## 2021-07-28 NOTE — OUTREACH NOTE
Medical Week 3 Survey      Responses   [facility] [place] patient discharged from?  [place]   Does the patient have one of the following disease processes/diagnoses(primary or secondary)?  Other   Week 3 attempt successful?  No [UTR 3rd attempt]   Unsuccessful attempts  Attempt 1   Wrap up additional comments  UTR x 3 attempts          Leesa Bremudez, RN

## 2021-08-06 ENCOUNTER — TELEMEDICINE (OUTPATIENT)
Dept: GASTROENTEROLOGY | Facility: CLINIC | Age: 36
End: 2021-08-06

## 2021-08-06 DIAGNOSIS — K51.00 ULCERATIVE PANCOLITIS (HCC): Primary | ICD-10-CM

## 2021-08-06 PROCEDURE — 99214 OFFICE O/P EST MOD 30 MIN: CPT | Performed by: NURSE PRACTITIONER

## 2021-08-06 RX ORDER — AZATHIOPRINE 50 MG/1
50 TABLET ORAL DAILY
Qty: 180 TABLET | Refills: 3 | Status: SHIPPED | OUTPATIENT
Start: 2021-08-06 | End: 2022-08-19

## 2021-08-06 RX ORDER — MESALAMINE 4 G/60ML
4 ENEMA RECTAL NIGHTLY
Qty: 60 ML | Refills: 11 | Status: SHIPPED | OUTPATIENT
Start: 2021-08-06 | End: 2022-01-08 | Stop reason: HOSPADM

## 2021-08-06 NOTE — PROGRESS NOTES
"     Follow Up      Patient Name: Thais Hobson  : 1985   MRN: [id number]     Chief Complaint:  No chief complaint on file.      History of Present Illness: Thais Hobson is a 36 y.o. female who is here today for follow up on UC.    Recently admitted again in July with left sided colitis.  Rowasa enemas were added to imuran and entivyo. Has been referred to CRS, however, surgical options deferred until A1C improved.    Having some rectal bleeding which is overall improved. Due for entivyo on the .  Rowasa enemas have helped with flare but needs refill. Appetite remains poor but is \"up and down\".  Is working hard on getting A1C down so she may be eligible for colectomy.     Dx in   Location: pancolitis  Treatments tried: mesalamine (not effective), Remicaid (allergy), humira (did not work), stelara, Xeljanz, and  entyvio (lost response)- imuran added (reaction).   Hx of c.diff  Positive family history of colon cancer  Subjective      Review of Systems:   Review of Systems   Constitutional: Positive for fatigue and unexpected weight loss. Negative for activity change, appetite change, chills and fever.   HENT: Negative for trouble swallowing.    Gastrointestinal: Positive for abdominal pain, anal bleeding, blood in stool and diarrhea. Negative for abdominal distention, constipation, nausea, rectal pain, vomiting, GERD and indigestion.       Medications:     Current Outpatient Medications:   •  azaTHIOprine (IMURAN) 50 MG tablet, Take 1 tablet by mouth Daily., Disp: 180 tablet, Rfl: 3  •  cholestyramine light 4 g packet, Mix 1 packet in 2-4 ounces of liquid Every 12 (Twelve) Hours for 30 days., Disp: 60 packet, Rfl: 0  •  Continuous Blood Gluc  (Dexcom G6 ) device, 1 Units Daily., Disp: 1 each, Rfl: 1  •  Continuous Blood Gluc Sensor (Dexcom G6 Sensor), Every 10 (Ten) Days., Disp: 3 each, Rfl: 11  •  Continuous Blood Gluc Transmit (Dexcom G6 Transmitter) misc, 1 Units " "Daily., Disp: 1 each, Rfl: 1  •  Continuous Glucose Monitor kit, Use as directed, Disp: 1 each, Rfl: 11  •  diphenhydrAMINE (Banophen) 25 mg capsule, Take 1 capsule by mouth Every 8 (Eight) Hours As Needed for Itching or Allergies., Disp: 90 capsule, Rfl: 1  •  EPINEPHrine (EpiPen 2-Luis) 0.3 MG/0.3ML solution auto-injector injection, Inject 0.3 mL into the appropriate muscle as directed by prescriber. For emergent anaphylaxis, Disp: 2 each, Rfl: 0  •  gabapentin (NEURONTIN) 800 MG tablet, TAKE 1 TABLET BY MOUTH THREE TIMES DAILY, Disp: 90 tablet, Rfl: 0  •  glucose blood test strip, Check glucose TID, One Touch Reveal, Disp: 90 each, Rfl: 11  •  glucose blood test strip, ONE TOUCH VERIO FLEX TEST STRIPS TO CHECK GLUCOSE 3 TIMES DAILY FOR DM E11.9, Disp: 100 each, Rfl: 12  •  Hydrocortisone, Perianal, (Proctozone-HC) 2.5 % rectal cream, Insert  into the rectum 2 (Two) Times a Day., Disp: 30 g, Rfl: 1  •  hydrOXYzine pamoate (VISTARIL) 25 MG capsule, Take 1 capsule by mouth 3 (Three) Times a Day As Needed for Anxiety., Disp: 60 capsule, Rfl: 2  •  insulin aspart (NovoLOG) 100 UNIT/ML injection, TID dosing with meals, as per sliding scale coverage, up to 12 units TID dosing available, Disp: 10 mL, Rfl: 3  •  insulin detemir (LEVEMIR) 100 UNIT/ML injection, Inject 15 Units under the skin into the appropriate area as directed Every Night., Disp: 15 mL, Rfl: 3  •  Insulin Syringe 30G X 5/16\" 1 ML misc, 1 syringe 3 (Three) Times a Day., Disp: 100 each, Rfl: 2  •  Lancets misc, USE AS DIRECTED TO CHECK GLUCOSE 3 TIMES DAILY FOR DM E11.9, Disp: 100 each, Rfl: 3  •  mesalamine (ROWASA) 4 g enema, Insert 1 enema into the rectum Every Night. One enema in rectum nightly, try and retain overnight, Disp: 60 mL, Rfl: 11  •  promethazine (PHENERGAN) 25 MG tablet, Take 1 tablet by mouth Every 6 (Six) Hours As Needed for Nausea or Vomiting., Disp: 40 tablet, Rfl: 2  •  SUMAtriptan (Imitrex) 50 MG tablet, Take one tablet at onset of " "headache. May repeat dose one time in 2 hours if headache not relieved., Disp: 8 tablet, Rfl: 2  •  topiramate (TOPAMAX) 50 MG tablet, TAKE 1 TABLET BY MOUTH TWICE DAILY, Disp: 60 tablet, Rfl: 2  •  venlafaxine (EFFEXOR) 37.5 MG tablet, Take 1 tablet by mouth 3 (Three) Times a Day With Meals for 30 days., Disp: 90 tablet, Rfl: 0    Allergies:   Allergies   Allergen Reactions   • Hydrochlorothiazide Hives   • Penicillins Hives   • Remicade [Infliximab] Anaphylaxis   • Soybean-Containing Drug Products Swelling     \"Soy sauce\"   • Xeljanz [Tofacitinib Citrate] Other (See Comments)     BLISTERS IN THROAT & ON ARMS   • Zofran [Ondansetron Hcl] Headache     migraines   • Ondansetron Mental Status Change   • Fidaxomicin Rash   • Reglan [Metoclopramide] Other (See Comments)     States feels weird when takes it       Social History:   Social History     Socioeconomic History   • Marital status: Single     Spouse name: Not on file   • Number of children: Not on file   • Years of education: Not on file   • Highest education level: Not on file   Tobacco Use   • Smoking status: Never Smoker   • Smokeless tobacco: Never Used   Substance and Sexual Activity   • Alcohol use: No   • Drug use: No   • Sexual activity: Defer        Surgical History:   Past Surgical History:   Procedure Laterality Date   • BREAST SURGERY Left     breast lump removed benign   •  SECTION     • CHOLECYSTECTOMY      for stone disease   • COLONOSCOPY     • COLONOSCOPY N/A 2020    Procedure: COLONOSCOPY;  Surgeon: Alonzo He MD;  Location: [facility] ENDOSCOPY;  Service: Gastroenterology;  Laterality: N/A;   • COLONOSCOPY N/A 2021    Procedure: COLONOSCOPY;  Surgeon: Francisco Sainz MD;  Location: [facility] ENDOSCOPY;  Service: Gastroenterology;  Laterality: N/A;   • DILATATION AND CURETTAGE     • ENDOSCOPY  2013    Erosive esophagitis, grade 2; erosive gastritis; small sliding hiatal hernia less than 3 cm, erosive " deodenitis duodenal polyp.No gan mucosa.Second portion duedenal biopsy neg. Second postion of dudoenal biopsy revealed polypoid intestinal mucosa w/ lymphoid aggregate. 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 no helicobacter pylori. Negative for mateaplasia, dysplasia   • ENDOSCOPY N/A 9/22/2020    Procedure: ESOPHAGOGASTRODUODENOSCOPY WITH DILATATION AND BIOPSIES;  Surgeon: Alonzo He MD;  Location:  [facility];  Service: Gastroenterology;  Laterality: N/A;   • ENDOSCOPY N/A 5/17/2021    Procedure: ESOPHAGOGASTRODUODENOSCOPY;  Surgeon: Brunner, Mark I, MD;  Location:  [facility];  Service: Gastroenterology;  Laterality: N/A;   • ENDOSCOPY N/A 7/6/2021    Procedure: ESOPHAGOGASTRODUODENOSCOPY WITH SAVARY DILATATION;  Surgeon: Francisco Sainz MD;  Location:  [facility];  Service: Gastroenterology;  Laterality: N/A;   • HIP SURGERY Right    • HYSTERECTOMY     • LEEP          Medical History:   Past Medical History:   Diagnosis Date   • Abdominal pain     periumbilicul   • Abdominal tenderness     Periumbil   • Alopecia    • Anemia    • Anxiety    • Arthritis    • Asthma     as a child   • Back pain    • Bipolar disorder (CMS/HCC)    • Blood in stool    • Body piercing     ears   • Colitis    • Colitis    • Colon polyps    • Depression    • Diabetes mellitus (CMS/HCC)    • Diarrhea    • Dysphagia     Patient reported he has trouble from time to time and that her throat has to be dilated   • Elevated cholesterol     Reported slightly elevated - taking no medication    • Fractures     Right pinky toe - no surgery required   • GERD (gastroesophageal reflux disease)    • H/O colonoscopy 08/01/2013    Terminal ileal biopsies negative. Cecal&ascending biopsie revealed chronic active colitis w/ features cons. w/ inflammatory bowel disease. Transevere colon biopsies revealved chronic active colitis. Desc. colon biopsies revealed chronic active colitis. Rect colon  biopsie revealed chronic active colitis. Negative for dysplasia   • Hematemesis    • History of Clostridium difficile infection    • History of transfusion     No reaction to transfusion reported   • Migraine headache    • Nausea     alone   • Pancreatitis    • Restless leg    • Seizures (CMS/HCC)     pt reports x1    • Tattoos     x9   • Universal ulcerative colitis (CMS/HCC)    • UTI (urinary tract infection)    • Weight loss         Objective     Physical Exam:  Vital Signs: There were no vitals filed for this visit.  There is no height or weight on file to calculate BMI.     Physical Exam  Constitutional:       General: She is not in acute distress.     Appearance: She is well-developed.   Pulmonary:      Effort: Pulmonary effort is normal. No accessory muscle usage or respiratory distress.   Skin:     Coloration: Skin is not pale.      Findings: No erythema.   Neurological:      Mental Status: She is alert and oriented to person, place, and time.   Psychiatric:         Speech: Speech normal.         Behavior: Behavior normal.         Thought Content: Thought content normal.         Judgment: Judgment normal.         Assessment / Plan      Assessment/Plan:   Diagnoses and all orders for this visit:    1. Ulcerative pancolitis (CMS/HCC) (Primary)  -     azaTHIOprine (IMURAN) 50 MG tablet; Take 1 tablet by mouth Daily.  Dispense: 180 tablet; Refill: 3  -     mesalamine (ROWASA) 4 g enema; Insert 1 enema into the rectum Every Night. One enema in rectum nightly, try and retain overnight  Dispense: 60 mL; Refill: 11     Modest improvement with rowasa enemas.    Continue with entivyo and imuran  Hydration discussed  Refer back to [facility] for resection when A1C closer to 7      Follow Up:   Return in about 8 weeks (around 10/1/2021).    Plan of care reviewed with the patient at the conclusion of today's visit.  Education was provided regarding diagnosis, management, and any prescribed or recommended OTC medications.   Patient verbalized understanding of and agreement with management plan.         EMELYN Avila  [facility]

## 2021-08-20 DIAGNOSIS — K21.9 GASTROESOPHAGEAL REFLUX DISEASE WITHOUT ESOPHAGITIS: Chronic | ICD-10-CM

## 2021-08-20 DIAGNOSIS — E11.44 DIABETIC AMYOTROPHY ASSOCIATED WITH TYPE 2 DIABETES MELLITUS (HCC): ICD-10-CM

## 2021-08-20 DIAGNOSIS — K51.90 ULCERATIVE COLITIS WITHOUT COMPLICATIONS, UNSPECIFIED LOCATION (HCC): ICD-10-CM

## 2021-08-20 RX ORDER — PROMETHAZINE HYDROCHLORIDE 25 MG/1
TABLET ORAL
Qty: 40 TABLET | Refills: 2 | Status: SHIPPED | OUTPATIENT
Start: 2021-08-20 | End: 2021-10-15 | Stop reason: SDUPTHER

## 2021-08-20 RX ORDER — DIPHENHYDRAMINE HCL 25 MG
25 CAPSULE ORAL EVERY 8 HOURS PRN
Qty: 90 CAPSULE | Refills: 1 | Status: SHIPPED | OUTPATIENT
Start: 2021-08-20 | End: 2021-11-23 | Stop reason: SDUPTHER

## 2021-08-20 NOTE — TELEPHONE ENCOUNTER
PATIENT IS OUT OF THE MEDICATION AND WOULD LIKE AN UPDATE ON THIS AS SOON AS POSSIBLE.    CONTACT: [phone]

## 2021-08-20 NOTE — TELEPHONE ENCOUNTER
OK TO FILL?    LOV: 06/28/2021 VIA TELEMEDICINE WITH JULISSA.    NO FOLLOW UP SCHEDULED AT THIS TIME.

## 2021-08-20 NOTE — TELEPHONE ENCOUNTER
Caller: Thais Hobson    Relationship: Self    Best call back number: [phone]    Medication needed:   Requested Prescriptions     Pending Prescriptions Disp Refills   • diphenhydrAMINE (Banophen) 25 mg capsule 90 capsule 1     Sig: Take 1 capsule by mouth Every 8 (Eight) Hours As Needed for Itching or Allergies.       When do you need the refill by: 8/20    What additional details did the patient provide when requesting the medication:     PATIENT IS OUT.    Does the patient have less than a 3 day supply:  [x] Yes  [] No    What is the patient's preferred pharmacy: [id number] - [place], [place] - [address] - [phone] [facility] [phone] FX

## 2021-08-21 RX ORDER — GABAPENTIN 800 MG/1
TABLET ORAL
Qty: 90 TABLET | Refills: 1 | Status: SHIPPED | OUTPATIENT
Start: 2021-08-21 | End: 2021-10-15 | Stop reason: SDUPTHER

## 2021-09-07 ENCOUNTER — TELEPHONE (OUTPATIENT)
Dept: GASTROENTEROLOGY | Facility: CLINIC | Age: 36
End: 2021-09-07

## 2021-09-07 NOTE — TELEPHONE ENCOUNTER
[facility] called about pt's infusion, she is getting Entyvio there now. Pre- meds are usually Benadryl 50 and Phenergan 25 IV, pt has requested same meds as previous, gave ok to give as ordered for her in the past. RN there verified that she received this dose on her last infusion. Order for entyvio nurse giving dated 10/2020. Pt has not had recent visit with us. Had last visit with APRN at [facility] in [place]. She should get new order from there. Order should  next month.

## 2021-09-22 RX ORDER — ESTRADIOL 0.05 MG/D
FILM, EXTENDED RELEASE TRANSDERMAL
Qty: 8 PATCH | Refills: 2 | OUTPATIENT
Start: 2021-09-22

## 2021-10-01 ENCOUNTER — TELEMEDICINE (OUTPATIENT)
Dept: GASTROENTEROLOGY | Facility: CLINIC | Age: 36
End: 2021-10-01

## 2021-10-01 DIAGNOSIS — K51.00 ULCERATIVE PANCOLITIS (HCC): Primary | ICD-10-CM

## 2021-10-01 PROBLEM — K51.011 ULCERATIVE PANCOLITIS WITH RECTAL BLEEDING: Status: ACTIVE | Noted: 2021-10-01

## 2021-10-01 PROCEDURE — 99214 OFFICE O/P EST MOD 30 MIN: CPT | Performed by: NURSE PRACTITIONER

## 2021-10-01 RX ORDER — PROMETHAZINE HYDROCHLORIDE 25 MG/ML
12.5 INJECTION, SOLUTION INTRAMUSCULAR; INTRAVENOUS ONCE
Status: CANCELLED
Start: 2021-10-04 | End: 2021-10-04

## 2021-10-01 RX ORDER — CEPHALEXIN 500 MG/1
CAPSULE ORAL
COMMUNITY
Start: 2021-09-29 | End: 2021-10-01

## 2021-10-01 RX ORDER — OXYCODONE HYDROCHLORIDE AND ACETAMINOPHEN 5; 325 MG/1; MG/1
1 TABLET ORAL EVERY 6 HOURS PRN
Status: ON HOLD | COMMUNITY
Start: 2021-08-24 | End: 2022-01-08 | Stop reason: SDUPTHER

## 2021-10-01 RX ORDER — NAPROXEN 500 MG/1
500 TABLET ORAL 2 TIMES DAILY
COMMUNITY
Start: 2021-09-29 | End: 2022-01-08 | Stop reason: HOSPADM

## 2021-10-01 RX ORDER — NEOMYCIN SULFATE, POLYMYXIN B SULFATE AND HYDROCORTISONE 10; 3.5; 1 MG/ML; MG/ML; [USP'U]/ML
SUSPENSION/ DROPS AURICULAR (OTIC)
COMMUNITY
Start: 2021-09-29 | End: 2022-01-08 | Stop reason: HOSPADM

## 2021-10-01 RX ORDER — SULFAMETHOXAZOLE AND TRIMETHOPRIM 800; 160 MG/1; MG/1
1 TABLET ORAL 2 TIMES DAILY
COMMUNITY
Start: 2021-09-29 | End: 2021-10-01

## 2021-10-01 RX ORDER — DIPHENHYDRAMINE HCL 25 MG
50 CAPSULE ORAL ONCE
Status: CANCELLED | OUTPATIENT
Start: 2021-10-04 | End: 2021-10-04

## 2021-10-01 RX ORDER — SODIUM CHLORIDE 9 MG/ML
250 INJECTION, SOLUTION INTRAVENOUS ONCE
Status: CANCELLED | OUTPATIENT
Start: 2021-10-04

## 2021-10-01 RX ORDER — HYDROCODONE BITARTRATE AND ACETAMINOPHEN 7.5; 325 MG/1; MG/1
1 TABLET ORAL EVERY 6 HOURS PRN
COMMUNITY
Start: 2021-09-27 | End: 2022-01-08 | Stop reason: HOSPADM

## 2021-10-01 RX ORDER — ACETAMINOPHEN 325 MG/1
650 TABLET ORAL ONCE
Status: CANCELLED | OUTPATIENT
Start: 2021-10-04 | End: 2021-10-04

## 2021-10-01 RX ORDER — OXYCODONE AND ACETAMINOPHEN 7.5; 325 MG/1; MG/1
1 TABLET ORAL EVERY 6 HOURS PRN
COMMUNITY
Start: 2021-09-29 | End: 2021-10-01 | Stop reason: SDUPTHER

## 2021-10-01 NOTE — PROGRESS NOTES
Follow Up      Patient Name: Thais Hobson  : 1985   MRN: [id number]     Chief Complaint:    Chief Complaint   Patient presents with   • Ulcerative Colitis       History of Present Illness: Thais Hobson is a 36 y.o. female who is here today for follow up on UC.      Dx in   Location: pancolitis  Treatments tried: mesalamine (not effective), Remicaid (allergy), humira (did not work), stelara, Xeljanz, and  entyvio (lost response)- imuran added (reaction).   Hx of c.diff  Positive family history of colon cancer  Current treatment: Rowasa enemas, Imuran, Entyvio.  She has been referred to colorectal surgery, pending A1c improvement  Last colonoscopy in 2021- improved left sided colitis  Rowasa enemas help with the pain. Having some reduction in frequency (2-3 times a day- previously 20 times). She does report fever as she has an ear infection and is being treated for this. Now only having bleeding every now and then.  She is still loosing weight due to poor appetite (about 5-6 pound since out last visit).  Blood sugars have been running better.  Using Dexcom.  Plans to see Dr. Sanchez next week and will request A1c to be repeated.  She will then consult with colorectal surgery.  Subjective      Review of Systems:   Review of Systems   Constitutional: Positive for fatigue and unexpected weight loss. Negative for activity change, appetite change, chills and fever.   HENT: Negative for trouble swallowing.    Gastrointestinal: Positive for abdominal pain, anal bleeding, blood in stool and diarrhea. Negative for abdominal distention, constipation, nausea, rectal pain, vomiting, GERD and indigestion.       Medications:     Current Outpatient Medications:   •  azaTHIOprine (IMURAN) 50 MG tablet, Take 1 tablet by mouth Daily., Disp: 180 tablet, Rfl: 3  •  Continuous Blood Gluc  (Dexcom G6 ) device, 1 Units Daily., Disp: 1 each, Rfl: 1  •  Continuous Blood Gluc Sensor (Dexcom  "G6 Sensor), Every 10 (Ten) Days., Disp: 3 each, Rfl: 11  •  Continuous Blood Gluc Transmit (Dexcom G6 Transmitter) misc, 1 Units Daily., Disp: 1 each, Rfl: 1  •  Continuous Glucose Monitor kit, Use as directed, Disp: 1 each, Rfl: 11  •  diphenhydrAMINE (Banophen) 25 mg capsule, Take 1 capsule by mouth Every 8 (Eight) Hours As Needed for Itching or Allergies., Disp: 90 capsule, Rfl: 1  •  EPINEPHrine (EpiPen 2-Luis) 0.3 MG/0.3ML solution auto-injector injection, Inject 0.3 mL into the appropriate muscle as directed by prescriber. For emergent anaphylaxis, Disp: 2 each, Rfl: 0  •  gabapentin (NEURONTIN) 800 MG tablet, TAKE 1 TABLET BY MOUTH THREE TIMES DAILY, Disp: 90 tablet, Rfl: 1  •  glucose blood test strip, ONE TOUCH VERIO FLEX TEST STRIPS TO CHECK GLUCOSE 3 TIMES DAILY FOR DM E11.9, Disp: 100 each, Rfl: 12  •  HYDROcodone-acetaminophen (NORCO) 7.5-325 MG per tablet, Take 1 tablet by mouth Every 6 (Six) Hours As Needed. for pain, Disp: , Rfl:   •  Hydrocortisone, Perianal, (Proctozone-HC) 2.5 % rectal cream, Insert  into the rectum 2 (Two) Times a Day., Disp: 30 g, Rfl: 1  •  hydrOXYzine pamoate (VISTARIL) 25 MG capsule, Take 1 capsule by mouth 3 (Three) Times a Day As Needed for Anxiety., Disp: 60 capsule, Rfl: 2  •  insulin aspart (NovoLOG) 100 UNIT/ML injection, TID dosing with meals, as per sliding scale coverage, up to 12 units TID dosing available, Disp: 10 mL, Rfl: 3  •  insulin detemir (LEVEMIR) 100 UNIT/ML injection, Inject 15 Units under the skin into the appropriate area as directed Every Night., Disp: 15 mL, Rfl: 3  •  Insulin Syringe 30G X 5/16\" 1 ML misc, 1 syringe 3 (Three) Times a Day., Disp: 100 each, Rfl: 2  •  Lancets misc, USE AS DIRECTED TO CHECK GLUCOSE 3 TIMES DAILY FOR DM E11.9, Disp: 100 each, Rfl: 3  •  mesalamine (ROWASA) 4 g enema, Insert 1 enema into the rectum Every Night. One enema in rectum nightly, try and retain overnight, Disp: 60 mL, Rfl: 11  •  naproxen (NAPROSYN) 500 MG " "tablet, Take 500 mg by mouth 2 (Two) Times a Day., Disp: , Rfl:   •  neomycin-polymyxin-hydrocortisone (CORTISPORIN) 3.5-38094-0 otic suspension, INSTILL 4 DROPS IN AFFECTED EAR EVERY 6 HOURS, Disp: , Rfl:   •  oxyCODONE-acetaminophen (PERCOCET) 5-325 MG per tablet, Take 1 tablet by mouth Every 6 (Six) Hours As Needed., Disp: , Rfl:   •  promethazine (PHENERGAN) 25 MG tablet, TAKE 1 TABLET BY MOUTH EVERY 6 HOURS AS NEEDED FOR NAUSEA OR VOMITING, Disp: 40 tablet, Rfl: 2  •  SUMAtriptan (Imitrex) 50 MG tablet, Take one tablet at onset of headache. May repeat dose one time in 2 hours if headache not relieved., Disp: 8 tablet, Rfl: 2  •  topiramate (TOPAMAX) 50 MG tablet, TAKE 1 TABLET BY MOUTH TWICE DAILY, Disp: 60 tablet, Rfl: 2  •  venlafaxine (EFFEXOR) 37.5 MG tablet, Take 1 tablet by mouth 3 (Three) Times a Day With Meals for 30 days., Disp: 90 tablet, Rfl: 0    Allergies:   Allergies   Allergen Reactions   • Hydrochlorothiazide Hives   • Penicillins Hives   • Remicade [Infliximab] Anaphylaxis   • Soybean-Containing Drug Products Swelling     \"Soy sauce\"   • Xeljanz [Tofacitinib Citrate] Other (See Comments)     BLISTERS IN THROAT & ON ARMS   • Zofran [Ondansetron Hcl] Headache     migraines   • Ondansetron Mental Status Change   • Fidaxomicin Rash   • Reglan [Metoclopramide] Other (See Comments)     States feels weird when takes it       Social History:   Social History     Socioeconomic History   • Marital status: Single     Spouse name: Not on file   • Number of children: Not on file   • Years of education: Not on file   • Highest education level: Not on file   Tobacco Use   • Smoking status: Never Smoker   • Smokeless tobacco: Never Used   Substance and Sexual Activity   • Alcohol use: No   • Drug use: No   • Sexual activity: Defer        Surgical History:   Past Surgical History:   Procedure Laterality Date   • BREAST SURGERY Left     breast lump removed benign   •  SECTION     • CHOLECYSTECTOMY   "    for stone disease   • COLONOSCOPY  2009   • COLONOSCOPY N/A 6/12/2020    Procedure: COLONOSCOPY;  Surgeon: Alonzo He MD;  Location:  [facility];  Service: Gastroenterology;  Laterality: N/A;   • COLONOSCOPY N/A 7/2/2021    Procedure: COLONOSCOPY;  Surgeon: Francisco Sainz MD;  Location:  [facility];  Service: Gastroenterology;  Laterality: N/A;   • DILATATION AND CURETTAGE     • ENDOSCOPY  07/30/2013    Erosive esophagitis, grade 2; erosive gastritis; small sliding hiatal hernia less than 3 cm, erosive deodenitis duodenal polyp.No gan mucosa.Second portion duedenal biopsy neg. Second postion of dudoenal biopsy revealed polypoid intestinal mucosa w/ lymphoid aggregate. 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 no helicobacter pylori. Negative for mateaplasia, dysplasia   • ENDOSCOPY N/A 9/22/2020    Procedure: ESOPHAGOGASTRODUODENOSCOPY WITH DILATATION AND BIOPSIES;  Surgeon: Alonzo He MD;  Location:  [facility];  Service: Gastroenterology;  Laterality: N/A;   • ENDOSCOPY N/A 5/17/2021    Procedure: ESOPHAGOGASTRODUODENOSCOPY;  Surgeon: Brunner, Mark I, MD;  Location:  [facility];  Service: Gastroenterology;  Laterality: N/A;   • ENDOSCOPY N/A 7/6/2021    Procedure: ESOPHAGOGASTRODUODENOSCOPY WITH SAVARY DILATATION;  Surgeon: Francisco Sainz MD;  Location:  [facility];  Service: Gastroenterology;  Laterality: N/A;   • HIP SURGERY Right    • HYSTERECTOMY     • LEEP          Medical History:   Past Medical History:   Diagnosis Date   • Abdominal pain     periumbilicul   • Abdominal tenderness     Periumbil   • Alopecia    • Anemia    • Anxiety    • Arthritis    • Asthma     as a child   • Back pain    • Bipolar disorder (HCC)    • Blood in stool    • Body piercing     ears   • Colitis    • Colitis    • Colon polyps    • Depression    • Diabetes mellitus (HCC)    • Diarrhea    • Dysphagia     Patient reported he has trouble from time to  time and that her throat has to be dilated   • Elevated cholesterol     Reported slightly elevated - taking no medication    • Fractures     Right pinky toe - no surgery required   • GERD (gastroesophageal reflux disease)    • H/O colonoscopy 08/01/2013    Terminal ileal biopsies negative. Cecal&ascending biopsie revealed chronic active colitis w/ features cons. w/ inflammatory bowel disease. Transevere colon biopsies revealved chronic active colitis. Desc. colon biopsies revealed chronic active colitis. Rect colon biopsie revealed chronic active colitis. Negative for dysplasia   • Hematemesis    • History of Clostridium difficile infection    • History of transfusion     No reaction to transfusion reported   • Migraine headache    • Nausea     alone   • Pancreatitis    • Restless leg    • Seizures (HCC)     pt reports x1    • Tattoos     x9   • Ulcerative pancolitis with rectal bleeding (HCC) 10/1/2021   • Universal ulcerative colitis (HCC)    • UTI (urinary tract infection)    • Weight loss         Objective     Physical Exam:  Vital Signs: There were no vitals filed for this visit.  There is no height or weight on file to calculate BMI.     Physical Exam  Constitutional:       General: She is not in acute distress.     Appearance: She is well-developed.   Pulmonary:      Effort: Pulmonary effort is normal. No accessory muscle usage or respiratory distress.   Skin:     Coloration: Skin is not pale.      Findings: No erythema.   Neurological:      Mental Status: She is alert and oriented to person, place, and time.   Psychiatric:         Speech: Speech normal.         Behavior: Behavior normal.         Thought Content: Thought content normal.         Judgment: Judgment normal.         Assessment / Plan      Assessment/Plan:   Diagnoses and all orders for this visit:    1. Ulcerative pancolitis (HCC) (Primary)  -     Hepatitis B Surface Antigen; Future  -     QuantiFERON TB Plus Client Incubated; Future  -     CBC &  "Differential; Future  -     Comprehensive Metabolic Panel; Future  -     sodium chloride 0.9 % infusion 250 mL  -     acetaminophen (TYLENOL) tablet 650 mg  -     diphenhydrAMINE (BENADRYL) capsule 50 mg  -     vedolizumab (ENTYVIO) 300 mg in sodium chloride 0.9 % 255 mL IVPB  -     promethazine (PHENERGAN) injection 12.5 mg    Continue Entyvio, Imuran, Rowasa enemas.  Patient with significant improvement from last visit.  She will recheck her A1c with PCP next week, if improved, will consult with colorectal surgery to discuss J-pouch.   Encouraged patient to get flu shot and Covid shot.  She will \"think about this\".    Follow Up:   Return in about 3 months (around 1/1/2022).    Plan of care reviewed with the patient at the conclusion of today's visit.  Education was provided regarding diagnosis, management, and any prescribed or recommended OTC medications.  Patient verbalized understanding of and agreement with management plan.         EMELYN Avila  [facility]     "

## 2021-10-04 ENCOUNTER — TELEPHONE (OUTPATIENT)
Dept: GASTROENTEROLOGY | Facility: CLINIC | Age: 36
End: 2021-10-04

## 2021-10-04 DIAGNOSIS — K51.00 ULCERATIVE PANCOLITIS (HCC): Primary | ICD-10-CM

## 2021-10-04 RX ORDER — PROMETHAZINE HYDROCHLORIDE 25 MG/ML
12.5 INJECTION, SOLUTION INTRAMUSCULAR; INTRAVENOUS ONCE
Start: 2021-11-01 | End: 2021-11-01

## 2021-10-04 RX ORDER — ACETAMINOPHEN 325 MG/1
650 TABLET ORAL ONCE
OUTPATIENT
Start: 2021-11-01 | End: 2021-11-01

## 2021-10-04 RX ORDER — DIPHENHYDRAMINE HCL 25 MG
50 CAPSULE ORAL ONCE
OUTPATIENT
Start: 2021-11-01 | End: 2021-11-01

## 2021-10-04 RX ORDER — DIPHENHYDRAMINE HYDROCHLORIDE 50 MG/ML
50 INJECTION INTRAMUSCULAR; INTRAVENOUS
Start: 2021-11-01

## 2021-10-04 RX ORDER — SODIUM CHLORIDE 9 MG/ML
250 INJECTION, SOLUTION INTRAVENOUS ONCE
OUTPATIENT
Start: 2021-11-01

## 2021-10-04 NOTE — TELEPHONE ENCOUNTER
Caller: Thais Hobson    Relationship: Self      Medication requested (name and dosage): ESTRADIOL 0.05MG PATCH (TWICE WK)].         Pharmacy where request should be sent: [id number] - [place], [place] - [address] - [phone]  - [phone] FX        Additional details provided by patient: OUT O F PATCHES FOR SEVERAL DAYS    Best call back number: [phone]     Does the patient have less than a 3 day supply:  [x] Yes  [] No    Marilee Gagnon MA   10/04/21 10:02 EDT

## 2021-10-04 NOTE — TELEPHONE ENCOUNTER
Anne Leon called from [facility]. Patient is needing a new order for Entyvio. Fax number is  [phone]

## 2021-10-05 RX ORDER — ESTRADIOL 0.05 MG/D
1 PATCH TRANSDERMAL WEEKLY
Qty: 4 EACH | Refills: 5 | Status: SHIPPED | OUTPATIENT
Start: 2021-10-05 | End: 2021-10-15

## 2021-10-13 DIAGNOSIS — E11.44 DIABETIC AMYOTROPHY ASSOCIATED WITH TYPE 2 DIABETES MELLITUS (HCC): ICD-10-CM

## 2021-10-13 RX ORDER — FLUCONAZOLE 100 MG/1
100 TABLET ORAL DAILY
Qty: 7 TABLET | Refills: 0 | OUTPATIENT
Start: 2021-10-13

## 2021-10-13 RX ORDER — GABAPENTIN 800 MG/1
TABLET ORAL
Qty: 90 TABLET | Refills: 0 | OUTPATIENT
Start: 2021-10-13

## 2021-10-13 NOTE — TELEPHONE ENCOUNTER
Rx Refill Note  Requested Prescriptions     Pending Prescriptions Disp Refills   • gabapentin (NEURONTIN) 800 MG tablet [Pharmacy Med Name: GABAPENTIN 800MG TABLETS] 90 tablet 0     Sig: TAKE 1 TABLET BY MOUTH THREE TIMES DAILY   • fluconazole (DIFLUCAN) 100 MG tablet [Pharmacy Med Name: FLUCONAZOLE 100MG TABLETS] 7 tablet 0     Sig: TAKE 1 TABLET BY MOUTH DAILY      Last office visit with prescribing clinician: 3/11/2021      Next office visit with prescribing clinician: Visit date not found            Brigette Santana MA  10/13/21, 13:27 EDT

## 2021-10-15 ENCOUNTER — TELEMEDICINE (OUTPATIENT)
Dept: FAMILY MEDICINE CLINIC | Facility: CLINIC | Age: 36
End: 2021-10-15

## 2021-10-15 DIAGNOSIS — E11.9 TYPE 2 DIABETES MELLITUS TREATED WITH INSULIN (HCC): ICD-10-CM

## 2021-10-15 DIAGNOSIS — Z79.4 TYPE 2 DIABETES MELLITUS TREATED WITH INSULIN (HCC): ICD-10-CM

## 2021-10-15 DIAGNOSIS — E11.44 DIABETIC AMYOTROPHY ASSOCIATED WITH TYPE 2 DIABETES MELLITUS (HCC): ICD-10-CM

## 2021-10-15 DIAGNOSIS — K21.9 GASTROESOPHAGEAL REFLUX DISEASE WITHOUT ESOPHAGITIS: Chronic | ICD-10-CM

## 2021-10-15 DIAGNOSIS — B37.2 SKIN CANDIDIASIS: ICD-10-CM

## 2021-10-15 DIAGNOSIS — K51.90 ULCERATIVE COLITIS WITHOUT COMPLICATIONS, UNSPECIFIED LOCATION (HCC): ICD-10-CM

## 2021-10-15 DIAGNOSIS — Z79.890 HORMONE REPLACEMENT THERAPY (HRT): Primary | ICD-10-CM

## 2021-10-15 PROCEDURE — 99213 OFFICE O/P EST LOW 20 MIN: CPT | Performed by: NURSE PRACTITIONER

## 2021-10-15 RX ORDER — PROMETHAZINE HYDROCHLORIDE 25 MG/1
25 TABLET ORAL EVERY 6 HOURS PRN
Qty: 40 TABLET | Refills: 2 | Status: ON HOLD | OUTPATIENT
Start: 2021-10-15 | End: 2022-01-08 | Stop reason: SDUPTHER

## 2021-10-15 RX ORDER — ESTRADIOL 0.05 MG/D
1 FILM, EXTENDED RELEASE TRANSDERMAL
Qty: 8 PATCH | Refills: 2 | Status: ON HOLD | OUTPATIENT
Start: 2021-10-15 | End: 2022-01-07

## 2021-10-15 RX ORDER — GABAPENTIN 800 MG/1
800 TABLET ORAL 3 TIMES DAILY
Qty: 90 TABLET | Refills: 1 | Status: SHIPPED | OUTPATIENT
Start: 2021-10-15 | End: 2021-12-20

## 2021-10-15 RX ORDER — NYSTATIN 100000 [USP'U]/G
POWDER TOPICAL 3 TIMES DAILY
Qty: 60 G | Refills: 1 | Status: SHIPPED | OUTPATIENT
Start: 2021-10-15 | End: 2023-03-27

## 2021-10-15 RX ORDER — FLUCONAZOLE 150 MG/1
TABLET ORAL
Qty: 2 TABLET | Refills: 0 | Status: SHIPPED | OUTPATIENT
Start: 2021-10-15 | End: 2021-11-19 | Stop reason: SDUPTHER

## 2021-10-15 NOTE — PROGRESS NOTES
Subjective     You have chosen to receive care through a telehealth visit.  Do you consent to use a video/audio connection for your medical care today? Yes    Chief Complaint: diabetes     History of Present Illness:   She has history of poorly controlled diabetes. Glucose is running 100-200. She is taking novolog but has trouble with insurance covering.   She has neuropathy and takes gabapentin. She needs a refill. She takes it TID.   She also needs refill on promethazine. She has UC. Follows with GI.   She request refill on Vivelle dot patch.  Historically has done well with changing patch every 3 days.  Current patch falls off frequently  Reports yeast infection in skin folds    Review of Systems  Gen- No fevers, chills  CV- No chest pain, palpitations  Resp- No cough, dyspnea  GI- No N/V/D, abd pain  Neuro-No dizziness, headaches      I have reviewed and/or updated the patient's past medical, surgical, family, social history and problem list as appropriate.     Medications:    Current Outpatient Medications:   •  azaTHIOprine (IMURAN) 50 MG tablet, Take 1 tablet by mouth Daily., Disp: 180 tablet, Rfl: 3  •  Continuous Blood Gluc  (Dexcom G6 ) device, 1 Units Daily., Disp: 1 each, Rfl: 1  •  Continuous Blood Gluc Sensor (Dexcom G6 Sensor), Every 10 (Ten) Days., Disp: 3 each, Rfl: 11  •  Continuous Blood Gluc Transmit (Dexcom G6 Transmitter) misc, 1 Units Daily., Disp: 1 each, Rfl: 1  •  Continuous Glucose Monitor kit, Use as directed, Disp: 1 each, Rfl: 11  •  diphenhydrAMINE (Banophen) 25 mg capsule, Take 1 capsule by mouth Every 8 (Eight) Hours As Needed for Itching or Allergies., Disp: 90 capsule, Rfl: 1  •  EPINEPHrine (EpiPen 2-Luis) 0.3 MG/0.3ML solution auto-injector injection, Inject 0.3 mL into the appropriate muscle as directed by prescriber. For emergent anaphylaxis, Disp: 2 each, Rfl: 0  •  estradiol (MINIVELLE, VIVELLE-DOT) 0.05 MG/24HR patch, Place 1 patch on the skin as  "directed by provider Every 3 (Three) Days., Disp: 8 patch, Rfl: 2  •  fluconazole (Diflucan) 150 MG tablet, Take 1 tablet by mouth now, then repeat in 3 days if needed, Disp: 2 tablet, Rfl: 0  •  gabapentin (NEURONTIN) 800 MG tablet, Take 1 tablet by mouth 3 (Three) Times a Day., Disp: 90 tablet, Rfl: 1  •  glucose blood test strip, ONE TOUCH VERIO FLEX TEST STRIPS TO CHECK GLUCOSE 3 TIMES DAILY FOR DM E11.9, Disp: 100 each, Rfl: 12  •  HYDROcodone-acetaminophen (NORCO) 7.5-325 MG per tablet, Take 1 tablet by mouth Every 6 (Six) Hours As Needed. for pain, Disp: , Rfl:   •  Hydrocortisone, Perianal, (Proctozone-HC) 2.5 % rectal cream, Insert  into the rectum 2 (Two) Times a Day., Disp: 30 g, Rfl: 1  •  hydrOXYzine pamoate (VISTARIL) 25 MG capsule, Take 1 capsule by mouth 3 (Three) Times a Day As Needed for Anxiety., Disp: 60 capsule, Rfl: 2  •  insulin aspart (NovoLOG) 100 UNIT/ML injection, TID dosing with meals, as per sliding scale coverage, up to 12 units TID dosing available, Disp: 10 mL, Rfl: 3  •  insulin detemir (LEVEMIR) 100 UNIT/ML injection, Inject 15 Units under the skin into the appropriate area as directed Every Night., Disp: 15 mL, Rfl: 3  •  Insulin Syringe 30G X 5/16\" 1 ML misc, 1 syringe 3 (Three) Times a Day., Disp: 100 each, Rfl: 2  •  Lancets misc, USE AS DIRECTED TO CHECK GLUCOSE 3 TIMES DAILY FOR DM E11.9, Disp: 100 each, Rfl: 3  •  mesalamine (ROWASA) 4 g enema, Insert 1 enema into the rectum Every Night. One enema in rectum nightly, try and retain overnight, Disp: 60 mL, Rfl: 11  •  naproxen (NAPROSYN) 500 MG tablet, Take 500 mg by mouth 2 (Two) Times a Day., Disp: , Rfl:   •  neomycin-polymyxin-hydrocortisone (CORTISPORIN) 3.5-48369-2 otic suspension, INSTILL 4 DROPS IN AFFECTED EAR EVERY 6 HOURS, Disp: , Rfl:   •  nystatin (MYCOSTATIN) 369634 UNIT/GM powder, Apply  topically to the appropriate area as directed 3 (Three) Times a Day., Disp: 60 g, Rfl: 1  •  oxyCODONE-acetaminophen (PERCOCET) " "5-325 MG per tablet, Take 1 tablet by mouth Every 6 (Six) Hours As Needed., Disp: , Rfl:   •  promethazine (PHENERGAN) 25 MG tablet, Take 1 tablet by mouth Every 6 (Six) Hours As Needed for Nausea or Vomiting., Disp: 40 tablet, Rfl: 2  •  SUMAtriptan (Imitrex) 50 MG tablet, Take one tablet at onset of headache. May repeat dose one time in 2 hours if headache not relieved., Disp: 8 tablet, Rfl: 2  •  topiramate (TOPAMAX) 50 MG tablet, TAKE 1 TABLET BY MOUTH TWICE DAILY, Disp: 60 tablet, Rfl: 2  •  venlafaxine (EFFEXOR) 37.5 MG tablet, Take 1 tablet by mouth 3 (Three) Times a Day With Meals for 30 days., Disp: 90 tablet, Rfl: 0    Allergies:  Allergies   Allergen Reactions   • Hydrochlorothiazide Hives   • Penicillins Hives   • Remicade [Infliximab] Anaphylaxis   • Soybean-Containing Drug Products Swelling     \"Soy sauce\"   • Xeljanz [Tofacitinib Citrate] Other (See Comments)     BLISTERS IN THROAT & ON ARMS   • Zofran [Ondansetron Hcl] Headache     migraines   • Ondansetron Mental Status Change   • Fidaxomicin Rash   • Reglan [Metoclopramide] Other (See Comments)     States feels weird when takes it       Objective     Vital Signs: There were no vitals filed for this visit.    Physical Exam:  Gen-no acute distress, video visit  Resp- normal WOB, on RA  Neuro-A&Ox3, face symmetrical, speech clear  Skin-no overt rashes noted  Psych-appropriate mood, cooperative         There is no height or weight on file to calculate BMI.    Assessment / Plan     Assessment/Plan:   Problem List Items Addressed This Visit        Endocrine and Metabolic    Type 2 diabetes mellitus treated with insulin (HCC)    Relevant Medications    Insulin Syringe 30G X 5/16\" 1 ML misc    insulin aspart (NovoLOG) 100 UNIT/ML injection    Continuous Blood Gluc Transmit (Dexcom G6 Transmitter) misc    Continuous Blood Gluc  (Dexcom G6 ) device    Continuous Blood Gluc Sensor (Dexcom G6 Sensor)    insulin detemir (LEVEMIR) 100 UNIT/ML " injection       Gastrointestinal Abdominal     Gastroesophageal reflux disease without esophagitis (Chronic)    Relevant Medications    promethazine (PHENERGAN) 25 MG tablet    Ulcerative colitis flare    Relevant Medications    promethazine (PHENERGAN) 25 MG tablet       Neuro    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)    Relevant Medications    insulin aspart (NovoLOG) 100 UNIT/ML injection    insulin detemir (LEVEMIR) 100 UNIT/ML injection    gabapentin (NEURONTIN) 800 MG tablet      Other Visit Diagnoses     Hormone replacement therapy (HRT)    -  Primary    Relevant Medications    estradiol (MINIVELLE, VIVELLE-DOT) 0.05 MG/24HR patch    Skin candidiasis        Relevant Medications    fluconazole (Diflucan) 150 MG tablet    nystatin (MYCOSTATIN) 687013 UNIT/GM powder        --Gabapentin refilled  --Continue current insulin regimen  --Needs an office appointment with labs      Follow up:  1 month in office with PCP    Total Time of Encounter 20 minutes    Electronically signed by EMELYN Seymour   10/15/2021 14:49 EDT      Please note that portions of this note may have been completed with a voice recognition program. Efforts were made to edit the dictations, but occasionally words are mistranscribed.

## 2021-11-05 DIAGNOSIS — G43.101 MIGRAINE WITH AURA AND WITH STATUS MIGRAINOSUS, NOT INTRACTABLE: ICD-10-CM

## 2021-11-05 RX ORDER — TOPIRAMATE 50 MG/1
TABLET, FILM COATED ORAL
Qty: 60 TABLET | Refills: 2 | Status: SHIPPED | OUTPATIENT
Start: 2021-11-05 | End: 2022-02-10

## 2021-11-15 ENCOUNTER — TELEPHONE (OUTPATIENT)
Dept: FAMILY MEDICINE CLINIC | Facility: CLINIC | Age: 36
End: 2021-11-15

## 2021-11-19 DIAGNOSIS — E11.9 TYPE 2 DIABETES MELLITUS TREATED WITH INSULIN (HCC): ICD-10-CM

## 2021-11-19 DIAGNOSIS — B37.2 SKIN CANDIDIASIS: ICD-10-CM

## 2021-11-19 DIAGNOSIS — Z79.4 TYPE 2 DIABETES MELLITUS TREATED WITH INSULIN (HCC): ICD-10-CM

## 2021-11-19 RX ORDER — DIPHENHYDRAMINE HYDROCHLORIDE 25 MG/1
CAPSULE ORAL
Qty: 90 CAPSULE | Refills: 1 | OUTPATIENT
Start: 2021-11-19

## 2021-11-19 RX ORDER — FLUCONAZOLE 150 MG/1
TABLET ORAL
Qty: 2 TABLET | Refills: 0 | Status: SHIPPED | OUTPATIENT
Start: 2021-11-19 | End: 2022-01-08 | Stop reason: HOSPADM

## 2021-11-20 DIAGNOSIS — E11.9 TYPE 2 DIABETES MELLITUS TREATED WITH INSULIN (HCC): ICD-10-CM

## 2021-11-20 DIAGNOSIS — Z79.4 TYPE 2 DIABETES MELLITUS TREATED WITH INSULIN (HCC): ICD-10-CM

## 2021-11-22 RX ORDER — PROCHLORPERAZINE 25 MG/1
1 SUPPOSITORY RECTAL DAILY
Qty: 1 EACH | Refills: 1 | Status: SHIPPED | OUTPATIENT
Start: 2021-11-22 | End: 2022-06-06

## 2021-11-22 RX ORDER — PROCHLORPERAZINE 25 MG/1
SUPPOSITORY RECTAL
Qty: 3 EACH | Refills: 11 | Status: SHIPPED | OUTPATIENT
Start: 2021-11-22 | End: 2022-11-28

## 2021-11-23 ENCOUNTER — TELEPHONE (OUTPATIENT)
Dept: FAMILY MEDICINE CLINIC | Facility: CLINIC | Age: 36
End: 2021-11-23

## 2021-11-23 RX ORDER — DIPHENHYDRAMINE HCL 25 MG
25 CAPSULE ORAL EVERY 8 HOURS PRN
Qty: 90 CAPSULE | Refills: 1 | Status: SHIPPED | OUTPATIENT
Start: 2021-11-23 | End: 2022-01-19

## 2021-11-23 NOTE — TELEPHONE ENCOUNTER
PT CALLED AND WOULD LIKE TO KNOW WHY RX  diphenhydrAMINE (Banophen) 25 mg capsule      HAS NOT BEEN REFILLED.    PLEASE ADVISE.  CALL BACK:[phone]

## 2021-12-03 ENCOUNTER — TELEPHONE (OUTPATIENT)
Dept: GASTROENTEROLOGY | Facility: CLINIC | Age: 36
End: 2021-12-03

## 2021-12-03 DIAGNOSIS — K51.00 ULCERATIVE PANCOLITIS (HCC): ICD-10-CM

## 2021-12-03 RX ORDER — MESALAMINE 4 G/60ML
4 ENEMA RECTAL NIGHTLY
Qty: 60 ML | Refills: 11 | Status: CANCELLED | OUTPATIENT
Start: 2021-12-03

## 2021-12-03 NOTE — TELEPHONE ENCOUNTER
"Patient was in ER lastnight for \"blood in stool\" She states she passed out from loosing so much blood. ER sent patient home. There is a CT scan into media. She would like to get a telephone visit with you asap please advise?   "

## 2021-12-07 DIAGNOSIS — F41.8 DEPRESSION WITH ANXIETY: ICD-10-CM

## 2021-12-08 RX ORDER — HYDROXYZINE PAMOATE 25 MG/1
CAPSULE ORAL
Qty: 60 CAPSULE | Refills: 2 | Status: SHIPPED | OUTPATIENT
Start: 2021-12-08 | End: 2022-04-19

## 2021-12-18 DIAGNOSIS — B37.2 SKIN CANDIDIASIS: ICD-10-CM

## 2021-12-18 RX ORDER — FLUCONAZOLE 150 MG/1
TABLET ORAL
Qty: 2 TABLET | Refills: 0 | Status: CANCELLED | OUTPATIENT
Start: 2021-12-18

## 2021-12-19 DIAGNOSIS — B37.2 SKIN CANDIDIASIS: ICD-10-CM

## 2021-12-20 DIAGNOSIS — E11.44 DIABETIC AMYOTROPHY ASSOCIATED WITH TYPE 2 DIABETES MELLITUS (HCC): ICD-10-CM

## 2021-12-20 DIAGNOSIS — B37.2 SKIN CANDIDIASIS: ICD-10-CM

## 2021-12-20 RX ORDER — FLUCONAZOLE 150 MG/1
TABLET ORAL
Qty: 2 TABLET | Refills: 0 | OUTPATIENT
Start: 2021-12-20

## 2021-12-20 RX ORDER — GABAPENTIN 800 MG/1
TABLET ORAL
Qty: 90 TABLET | Refills: 0 | Status: SHIPPED | OUTPATIENT
Start: 2021-12-20 | End: 2022-01-24 | Stop reason: SDUPTHER

## 2022-01-03 ENCOUNTER — TELEPHONE (OUTPATIENT)
Dept: FAMILY MEDICINE CLINIC | Facility: CLINIC | Age: 37
End: 2022-01-03

## 2022-01-03 ENCOUNTER — HOSPITAL ENCOUNTER (INPATIENT)
Facility: HOSPITAL | Age: 37
LOS: 5 days | Discharge: HOME | End: 2022-01-08
Attending: INTERNAL MEDICINE | Admitting: INTERNAL MEDICINE

## 2022-01-03 ENCOUNTER — TELEMEDICINE (OUTPATIENT)
Dept: FAMILY MEDICINE CLINIC | Facility: CLINIC | Age: 37
End: 2022-01-03

## 2022-01-03 DIAGNOSIS — Z86.19 HISTORY OF CLOSTRIDIOIDES DIFFICILE COLITIS: ICD-10-CM

## 2022-01-03 DIAGNOSIS — K51.011 ULCERATIVE PANCOLITIS WITH RECTAL BLEEDING: Primary | ICD-10-CM

## 2022-01-03 DIAGNOSIS — K64.9 HEMORRHOIDS, UNSPECIFIED HEMORRHOID TYPE: ICD-10-CM

## 2022-01-03 DIAGNOSIS — R19.7 BLOODY DIARRHEA: ICD-10-CM

## 2022-01-03 DIAGNOSIS — K51.90 ULCERATIVE COLITIS WITHOUT COMPLICATIONS, UNSPECIFIED LOCATION: ICD-10-CM

## 2022-01-03 DIAGNOSIS — R11.2 NON-INTRACTABLE VOMITING WITH NAUSEA, UNSPECIFIED VOMITING TYPE: Primary | ICD-10-CM

## 2022-01-03 DIAGNOSIS — K51.011 ULCERATIVE PANCOLITIS WITH RECTAL BLEEDING: ICD-10-CM

## 2022-01-03 DIAGNOSIS — K21.9 GASTROESOPHAGEAL REFLUX DISEASE WITHOUT ESOPHAGITIS: Chronic | ICD-10-CM

## 2022-01-03 LAB
FLUAV RNA RESP QL NAA+PROBE: NOT DETECTED
FLUBV RNA RESP QL NAA+PROBE: NOT DETECTED
RSV RNA NPH QL NAA+NON-PROBE: NOT DETECTED
SARS-COV-2 RNA RESP QL NAA+PROBE: NOT DETECTED

## 2022-01-03 PROCEDURE — 87637 SARSCOV2&INF A&B&RSV AMP PRB: CPT | Performed by: NURSE PRACTITIONER

## 2022-01-03 PROCEDURE — G0378 HOSPITAL OBSERVATION PER HR: HCPCS

## 2022-01-03 PROCEDURE — C9803 HOPD COVID-19 SPEC COLLECT: HCPCS | Performed by: NURSE PRACTITIONER

## 2022-01-03 PROCEDURE — 99222 1ST HOSP IP/OBS MODERATE 55: CPT | Performed by: INTERNAL MEDICINE

## 2022-01-03 PROCEDURE — 99215 OFFICE O/P EST HI 40 MIN: CPT | Performed by: NURSE PRACTITIONER

## 2022-01-03 RX ORDER — NALOXONE HCL 0.4 MG/ML
0.4 VIAL (ML) INJECTION
Status: DISCONTINUED | OUTPATIENT
Start: 2022-01-03 | End: 2022-01-08 | Stop reason: HOSPADM

## 2022-01-03 RX ORDER — HYDROXYZINE HYDROCHLORIDE 25 MG/1
25 TABLET, FILM COATED ORAL 3 TIMES DAILY PRN
Status: DISCONTINUED | OUTPATIENT
Start: 2022-01-03 | End: 2022-01-08 | Stop reason: HOSPADM

## 2022-01-03 RX ORDER — SODIUM CHLORIDE 0.9 % (FLUSH) 0.9 %
10 SYRINGE (ML) INJECTION EVERY 12 HOURS SCHEDULED
Status: DISCONTINUED | OUTPATIENT
Start: 2022-01-03 | End: 2022-01-08 | Stop reason: HOSPADM

## 2022-01-03 RX ORDER — NYSTATIN 100000 [USP'U]/G
POWDER TOPICAL 3 TIMES DAILY
Status: DISCONTINUED | OUTPATIENT
Start: 2022-01-03 | End: 2022-01-08 | Stop reason: HOSPADM

## 2022-01-03 RX ORDER — ONDANSETRON 2 MG/ML
4 INJECTION INTRAMUSCULAR; INTRAVENOUS EVERY 6 HOURS PRN
Status: DISCONTINUED | OUTPATIENT
Start: 2022-01-03 | End: 2022-01-08 | Stop reason: HOSPADM

## 2022-01-03 RX ORDER — TOPIRAMATE 25 MG/1
50 TABLET ORAL 2 TIMES DAILY
Status: DISCONTINUED | OUTPATIENT
Start: 2022-01-03 | End: 2022-01-08 | Stop reason: HOSPADM

## 2022-01-03 RX ORDER — HYDROCORTISONE 25 MG/G
CREAM TOPICAL 2 TIMES DAILY
Status: DISCONTINUED | OUTPATIENT
Start: 2022-01-03 | End: 2022-01-08 | Stop reason: HOSPADM

## 2022-01-03 RX ORDER — CHOLECALCIFEROL (VITAMIN D3) 125 MCG
5 CAPSULE ORAL NIGHTLY PRN
Status: DISCONTINUED | OUTPATIENT
Start: 2022-01-03 | End: 2022-01-08 | Stop reason: HOSPADM

## 2022-01-03 RX ORDER — SODIUM CHLORIDE 0.9 % (FLUSH) 0.9 %
10 SYRINGE (ML) INJECTION AS NEEDED
Status: DISCONTINUED | OUTPATIENT
Start: 2022-01-03 | End: 2022-01-08 | Stop reason: HOSPADM

## 2022-01-03 RX ORDER — AZATHIOPRINE 50 MG/1
50 TABLET ORAL DAILY
Status: DISCONTINUED | OUTPATIENT
Start: 2022-01-04 | End: 2022-01-08 | Stop reason: HOSPADM

## 2022-01-03 RX ORDER — PREDNISONE 10 MG/1
TABLET ORAL
Qty: 31 TABLET | Refills: 0 | Status: SHIPPED | OUTPATIENT
Start: 2022-01-03 | End: 2022-01-08 | Stop reason: HOSPADM

## 2022-01-03 RX ORDER — ONDANSETRON 4 MG/1
4 TABLET, FILM COATED ORAL EVERY 6 HOURS PRN
Status: DISCONTINUED | OUTPATIENT
Start: 2022-01-03 | End: 2022-01-08 | Stop reason: HOSPADM

## 2022-01-03 RX ORDER — SODIUM CHLORIDE 9 MG/ML
100 INJECTION, SOLUTION INTRAVENOUS CONTINUOUS
Status: ACTIVE | OUTPATIENT
Start: 2022-01-03 | End: 2022-01-04

## 2022-01-03 RX ORDER — ESTRADIOL 0.05 MG/D
1 FILM, EXTENDED RELEASE TRANSDERMAL
Status: DISCONTINUED | OUTPATIENT
Start: 2022-01-04 | End: 2022-01-08 | Stop reason: HOSPADM

## 2022-01-03 RX ORDER — GABAPENTIN 400 MG/1
800 CAPSULE ORAL EVERY 8 HOURS SCHEDULED
Status: DISCONTINUED | OUTPATIENT
Start: 2022-01-03 | End: 2022-01-08 | Stop reason: HOSPADM

## 2022-01-03 RX ORDER — MORPHINE SULFATE 2 MG/ML
1 INJECTION, SOLUTION INTRAMUSCULAR; INTRAVENOUS
Status: DISCONTINUED | OUTPATIENT
Start: 2022-01-03 | End: 2022-01-08 | Stop reason: HOSPADM

## 2022-01-03 RX ADMIN — TOPIRAMATE 50 MG: 25 TABLET, FILM COATED ORAL at 22:21

## 2022-01-03 RX ADMIN — NYSTATIN: 100000 POWDER TOPICAL at 22:21

## 2022-01-03 RX ADMIN — GABAPENTIN 800 MG: 400 CAPSULE ORAL at 22:21

## 2022-01-03 RX ADMIN — HYDROCORTISONE: 25 CREAM TOPICAL at 22:21

## 2022-01-03 NOTE — PROGRESS NOTES
Subjective     Chief Complaint:  F/u colitis     History of Present Illness:   She is having another colitis, flare. She follows with GI ([facility] [place]). She is on Imuran and entyvio. Last infusion was a week ago at [facility]. She is prescribed mesalamine enema but unable to use due to hemorrhoids most recently. Colitis symptoms have been present for 2 weeks but worse over the past 2 weeks. She has external hemorrhoid. She has been using proctozone and it is not helping. She is doing sitz bath as well.   She is having so many loose watery BM that she can even count. She is having a lot of abdominal pain more in epigastric area. Stool is bloody. She has vomited a few times. She has been dizzy.  No fever. She went to [place] [facility] before [place] and her BP was a little low. She is barely able to tolerate diet.   She feels like she needs to be admitted.     Historically she has seen colorectal surgery and colectomy has been recommended.  She does have uncontrolled diabetes.  Plan has been for diabetes to be under better control and then consult with colorectal surgery.  Unfortunately she has not had an office visit or come in for an A1c check.    Dx in 2013  Location: pancolitis  Treatments tried: mesalamine (not effective), Remicaid (allergy), humira (did not work), stelara, Xeljanz, and  entyvio (lost response)- imuran added (reaction).   Hx of c.diff    Review of Systems  Gen- No fevers, chills  CV- No chest pain, palpitations  Resp- No cough, dyspnea      I have reviewed and/or updated the patient's past medical, surgical, family, social history and problem list as appropriate.     Medications:    Current Outpatient Medications:   •  azaTHIOprine (IMURAN) 50 MG tablet, Take 1 tablet by mouth Daily., Disp: 180 tablet, Rfl: 3  •  Continuous Blood Gluc  (Dexcom G6 ) device, 1 Units Daily., Disp: 1 each, Rfl: 1  •  Continuous Blood Gluc Sensor (Dexcom G6 Sensor), Every 10 (Ten) Days., Disp: 3 each,  "Rfl: 11  •  Continuous Blood Gluc Transmit (Dexcom G6 Transmitter) misc, 1 Units Daily., Disp: 1 each, Rfl: 1  •  Continuous Glucose Monitor kit, Use as directed, Disp: 1 each, Rfl: 11  •  diphenhydrAMINE (Banophen) 25 mg capsule, Take 1 capsule by mouth Every 8 (Eight) Hours As Needed for Itching or Allergies., Disp: 90 capsule, Rfl: 1  •  EPINEPHrine (EpiPen 2-Luis) 0.3 MG/0.3ML solution auto-injector injection, Inject 0.3 mL into the appropriate muscle as directed by prescriber. For emergent anaphylaxis, Disp: 2 each, Rfl: 0  •  estradiol (MINIVELLE, VIVELLE-DOT) 0.05 MG/24HR patch, Place 1 patch on the skin as directed by provider Every 3 (Three) Days., Disp: 8 patch, Rfl: 2  •  fluconazole (Diflucan) 150 MG tablet, Take 1 tablet by mouth now, then repeat in 3 days if needed, Disp: 2 tablet, Rfl: 0  •  gabapentin (NEURONTIN) 800 MG tablet, TAKE 1 TABLET BY MOUTH THREE TIMES DAILY, Disp: 90 tablet, Rfl: 0  •  glucose blood test strip, ONE TOUCH VERIO FLEX TEST STRIPS TO CHECK GLUCOSE 3 TIMES DAILY FOR DM E11.9, Disp: 100 each, Rfl: 12  •  HYDROcodone-acetaminophen (NORCO) 7.5-325 MG per tablet, Take 1 tablet by mouth Every 6 (Six) Hours As Needed. for pain, Disp: , Rfl:   •  Hydrocortisone, Perianal, (Proctozone-HC) 2.5 % rectal cream, Insert  into the rectum 2 (Two) Times a Day., Disp: 30 g, Rfl: 1  •  hydrOXYzine pamoate (VISTARIL) 25 MG capsule, TAKE 1 CAPSULE BY MOUTH THREE TIMES DAILY AS NEEDED FOR ANXIETY, Disp: 60 capsule, Rfl: 2  •  insulin aspart (novoLOG) 100 UNIT/ML injection, INJECT UP TO 12 UNITS USING SLIDING SCALE UNDER THE SKIN THREE TIMES DAILY WITH MEALS, Disp: 10 mL, Rfl: 3  •  insulin detemir (LEVEMIR) 100 UNIT/ML injection, Inject 15 Units under the skin into the appropriate area as directed Every Night., Disp: 15 mL, Rfl: 3  •  Insulin Syringe 30G X 5/16\" 1 ML misc, 1 syringe 3 (Three) Times a Day., Disp: 100 each, Rfl: 2  •  Lancets misc, USE AS DIRECTED TO CHECK GLUCOSE 3 TIMES DAILY FOR DM " "E11.9, Disp: 100 each, Rfl: 3  •  mesalamine (ROWASA) 4 g enema, Insert 1 enema into the rectum Every Night. One enema in rectum nightly, try and retain overnight, Disp: 60 mL, Rfl: 11  •  naproxen (NAPROSYN) 500 MG tablet, Take 500 mg by mouth 2 (Two) Times a Day., Disp: , Rfl:   •  neomycin-polymyxin-hydrocortisone (CORTISPORIN) 3.5-25940-5 otic suspension, INSTILL 4 DROPS IN AFFECTED EAR EVERY 6 HOURS, Disp: , Rfl:   •  nystatin (MYCOSTATIN) 896021 UNIT/GM powder, Apply  topically to the appropriate area as directed 3 (Three) Times a Day., Disp: 60 g, Rfl: 1  •  oxyCODONE-acetaminophen (PERCOCET) 5-325 MG per tablet, Take 1 tablet by mouth Every 6 (Six) Hours As Needed., Disp: , Rfl:   •  predniSONE (DELTASONE) 10 MG tablet, Take 40 mg x 3 days, 30 x 3 days, 20 x 3 days, 10 x 4 days, Disp: 31 tablet, Rfl: 0  •  promethazine (PHENERGAN) 25 MG tablet, Take 1 tablet by mouth Every 6 (Six) Hours As Needed for Nausea or Vomiting., Disp: 40 tablet, Rfl: 2  •  SUMAtriptan (Imitrex) 50 MG tablet, Take one tablet at onset of headache. May repeat dose one time in 2 hours if headache not relieved., Disp: 8 tablet, Rfl: 2  •  topiramate (TOPAMAX) 50 MG tablet, TAKE 1 TABLET BY MOUTH TWICE DAILY, Disp: 60 tablet, Rfl: 2  •  venlafaxine (EFFEXOR) 37.5 MG tablet, Take 1 tablet by mouth 3 (Three) Times a Day With Meals for 30 days., Disp: 90 tablet, Rfl: 0    Allergies:  Allergies   Allergen Reactions   • Hydrochlorothiazide Hives   • Penicillins Hives   • Remicade [Infliximab] Anaphylaxis   • Soybean-Containing Drug Products Swelling     \"Soy sauce\"   • Xeljanz [Tofacitinib Citrate] Other (See Comments)     BLISTERS IN THROAT & ON ARMS   • Zofran [Ondansetron Hcl] Headache     migraines   • Ondansetron Mental Status Change   • Fidaxomicin Rash   • Reglan [Metoclopramide] Other (See Comments)     States feels weird when takes it       Objective     Vital Signs: There were no vitals filed for this visit.  There is no height or " weight on file to calculate BMI.    Physical Exam:  Gen-no acute distress, video visit  Resp- normal WOB, on RA  Neuro-A&Ox3, face symmetrical, speech clear  Skin-no overt rashes noted  Psych-appropriate mood, cooperative       Assessment / Plan     Assessment/Plan:   Problem List Items Addressed This Visit        Gastrointestinal Abdominal     Bloody diarrhea    Overview     Added automatically from request for surgery [id number]         Hemorrhoids    Ulcerative pancolitis with rectal bleeding (HCC) - Primary    Relevant Medications    predniSONE (DELTASONE) 10 MG tablet       Other    History of Clostridioides difficile colitis    Overview     Oct, 2019             --I called [facility] and [facility].  [facility] did not have any beds.  [facility] refused admission.  Discussed with patient.  She does not feel she can make it to [place] at this time.  Will try oral steroids to see if this will lessen her flare.  If not she needs to go to [facility] for further management.    ADDENDUM:  Patient called back and requested admission. She requested [facility]. I called and they do not have beds and refused admission as she follows with [facility]. I called patient and discussed. She is agreeable to [facility]. I spoke with Dr. Reed who accepted patient to their wait list. Patient updated.     Follow up:  As needed    Total Time of Encounter 45 minutes, including calling 5 different facilities, chart review, time with patient and documenting.     Electronically signed by EMELYN Seymour   01/03/2022 08:33 EST      Please note that portions of this note may have been completed with a voice recognition program. Efforts were made to edit the dictations, but occasionally words are mistranscribed.

## 2022-01-04 LAB
ABO GROUP BLD: NORMAL
ADV 40+41 DNA STL QL NAA+NON-PROBE: NOT DETECTED
ALBUMIN SERPL-MCNC: 3.9 G/DL (ref 3.5–5.2)
ALBUMIN/GLOB SERPL: 1.1 G/DL
ALP SERPL-CCNC: 168 U/L (ref 39–117)
ALT SERPL W P-5'-P-CCNC: 31 U/L (ref 1–33)
ANION GAP SERPL CALCULATED.3IONS-SCNC: 14 MMOL/L (ref 5–15)
APTT PPP: 30.3 SECONDS (ref 22–39)
AST SERPL-CCNC: 17 U/L (ref 1–32)
ASTRO TYP 1-8 RNA STL QL NAA+NON-PROBE: NOT DETECTED
BASOPHILS # BLD AUTO: 0.05 10*3/MM3 (ref 0–0.2)
BASOPHILS NFR BLD AUTO: 0.4 % (ref 0–1.5)
BILIRUB SERPL-MCNC: 0.3 MG/DL (ref 0–1.2)
BILIRUB UR QL STRIP: NEGATIVE
BLD GP AB SCN SERPL QL: NEGATIVE
BUN SERPL-MCNC: 10 MG/DL (ref 6–20)
BUN/CREAT SERPL: 16.9 (ref 7–25)
C CAYETANENSIS DNA STL QL NAA+NON-PROBE: NOT DETECTED
C COLI+JEJ+UPSA DNA STL QL NAA+NON-PROBE: NOT DETECTED
C DIFF TOX GENS STL QL NAA+PROBE: NOT DETECTED
CALCIUM SPEC-SCNC: 8.8 MG/DL (ref 8.6–10.5)
CHLORIDE SERPL-SCNC: 102 MMOL/L (ref 98–107)
CLARITY UR: CLEAR
CO2 SERPL-SCNC: 19 MMOL/L (ref 22–29)
COLOR UR: YELLOW
CREAT SERPL-MCNC: 0.59 MG/DL (ref 0.57–1)
CRP SERPL-MCNC: 0.63 MG/DL (ref 0–0.5)
CRYPTOSP DNA STL QL NAA+NON-PROBE: NOT DETECTED
D-LACTATE SERPL-SCNC: 1 MMOL/L (ref 0.5–2)
DEPRECATED RDW RBC AUTO: 37.4 FL (ref 37–54)
E HISTOLYT DNA STL QL NAA+NON-PROBE: NOT DETECTED
EAEC PAA PLAS AGGR+AATA ST NAA+NON-PRB: NOT DETECTED
EC STX1+STX2 GENES STL QL NAA+NON-PROBE: NOT DETECTED
EOSINOPHIL # BLD AUTO: 0.32 10*3/MM3 (ref 0–0.4)
EOSINOPHIL NFR BLD AUTO: 2.7 % (ref 0.3–6.2)
EPEC EAE GENE STL QL NAA+NON-PROBE: NOT DETECTED
ERYTHROCYTE [DISTWIDTH] IN BLOOD BY AUTOMATED COUNT: 12.4 % (ref 12.3–15.4)
ERYTHROCYTE [SEDIMENTATION RATE] IN BLOOD: 29 MM/HR (ref 0–20)
ETEC LTA+ST1A+ST1B TOX ST NAA+NON-PROBE: NOT DETECTED
G LAMBLIA DNA STL QL NAA+NON-PROBE: NOT DETECTED
GFR SERPL CREATININE-BSD FRML MDRD: 116 ML/MIN/1.73
GFR SERPL CREATININE-BSD FRML MDRD: 141 ML/MIN/1.73
GLOBULIN UR ELPH-MCNC: 3.5 GM/DL
GLUCOSE BLDC GLUCOMTR-MCNC: 235 MG/DL (ref 70–130)
GLUCOSE BLDC GLUCOMTR-MCNC: 300 MG/DL (ref 70–130)
GLUCOSE BLDC GLUCOMTR-MCNC: 322 MG/DL (ref 70–130)
GLUCOSE BLDC GLUCOMTR-MCNC: 347 MG/DL (ref 70–130)
GLUCOSE SERPL-MCNC: 338 MG/DL (ref 65–99)
GLUCOSE UR STRIP-MCNC: ABNORMAL MG/DL
HCT VFR BLD AUTO: 42.3 % (ref 34–46.6)
HEMOCCULT STL QL: POSITIVE
HGB BLD-MCNC: 13.8 G/DL (ref 12–15.9)
HGB UR QL STRIP.AUTO: NEGATIVE
IMM GRANULOCYTES # BLD AUTO: 0.02 10*3/MM3 (ref 0–0.05)
IMM GRANULOCYTES NFR BLD AUTO: 0.2 % (ref 0–0.5)
INR PPP: 0.96 (ref 0.85–1.16)
KETONES UR QL STRIP: ABNORMAL
LEUKOCYTE ESTERASE UR QL STRIP.AUTO: NEGATIVE
LIPASE SERPL-CCNC: 14 U/L (ref 13–60)
LYMPHOCYTES # BLD AUTO: 5.58 10*3/MM3 (ref 0.7–3.1)
LYMPHOCYTES NFR BLD AUTO: 46.4 % (ref 19.6–45.3)
MAGNESIUM SERPL-MCNC: 1.8 MG/DL (ref 1.6–2.6)
MCH RBC QN AUTO: 27.2 PG (ref 26.6–33)
MCHC RBC AUTO-ENTMCNC: 32.6 G/DL (ref 31.5–35.7)
MCV RBC AUTO: 83.4 FL (ref 79–97)
MONOCYTES # BLD AUTO: 0.44 10*3/MM3 (ref 0.1–0.9)
MONOCYTES NFR BLD AUTO: 3.7 % (ref 5–12)
NEUTROPHILS NFR BLD AUTO: 46.6 % (ref 42.7–76)
NEUTROPHILS NFR BLD AUTO: 5.62 10*3/MM3 (ref 1.7–7)
NITRITE UR QL STRIP: NEGATIVE
NOROVIRUS GI+II RNA STL QL NAA+NON-PROBE: NOT DETECTED
NRBC BLD AUTO-RTO: 0 /100 WBC (ref 0–0.2)
P SHIGELLOIDES DNA STL QL NAA+NON-PROBE: NOT DETECTED
PH UR STRIP.AUTO: 6.5 [PH] (ref 5–8)
PLAT MORPH BLD: NORMAL
PLATELET # BLD AUTO: 272 10*3/MM3 (ref 140–450)
PMV BLD AUTO: 10.2 FL (ref 6–12)
POTASSIUM SERPL-SCNC: 3.8 MMOL/L (ref 3.5–5.2)
PROCALCITONIN SERPL-MCNC: 0.05 NG/ML (ref 0–0.25)
PROT SERPL-MCNC: 7.4 G/DL (ref 6–8.5)
PROT UR QL STRIP: NEGATIVE
PROTHROMBIN TIME: 12.5 SECONDS (ref 11.4–14.4)
RBC # BLD AUTO: 5.07 10*6/MM3 (ref 3.77–5.28)
RBC MORPH BLD: NORMAL
RH BLD: POSITIVE
RVA RNA STL QL NAA+NON-PROBE: NOT DETECTED
S ENT+BONG DNA STL QL NAA+NON-PROBE: NOT DETECTED
SAPO I+II+IV+V RNA STL QL NAA+NON-PROBE: NOT DETECTED
SHIGELLA SP+EIEC IPAH ST NAA+NON-PROBE: NOT DETECTED
SODIUM SERPL-SCNC: 135 MMOL/L (ref 136–145)
SP GR UR STRIP: 1.01 (ref 1–1.03)
T&S EXPIRATION DATE: NORMAL
UROBILINOGEN UR QL STRIP: ABNORMAL
V CHOL+PARA+VUL DNA STL QL NAA+NON-PROBE: NOT DETECTED
V CHOLERAE DNA STL QL NAA+NON-PROBE: NOT DETECTED
WBC MORPH BLD: NORMAL
WBC NRBC COR # BLD: 12.03 10*3/MM3 (ref 3.4–10.8)
Y ENTEROCOL DNA STL QL NAA+NON-PROBE: NOT DETECTED

## 2022-01-04 PROCEDURE — 25010000002 MORPHINE PER 10 MG: Performed by: INTERNAL MEDICINE

## 2022-01-04 PROCEDURE — 63710000001 AZATHIOPRINE PER 50 MG: Performed by: INTERNAL MEDICINE

## 2022-01-04 PROCEDURE — 83690 ASSAY OF LIPASE: CPT | Performed by: INTERNAL MEDICINE

## 2022-01-04 PROCEDURE — 85610 PROTHROMBIN TIME: CPT | Performed by: NURSE PRACTITIONER

## 2022-01-04 PROCEDURE — 99222 1ST HOSP IP/OBS MODERATE 55: CPT | Performed by: PHYSICIAN ASSISTANT

## 2022-01-04 PROCEDURE — 84145 PROCALCITONIN (PCT): CPT | Performed by: NURSE PRACTITIONER

## 2022-01-04 PROCEDURE — 86900 BLOOD TYPING SEROLOGIC ABO: CPT | Performed by: NURSE PRACTITIONER

## 2022-01-04 PROCEDURE — 81003 URINALYSIS AUTO W/O SCOPE: CPT | Performed by: NURSE PRACTITIONER

## 2022-01-04 PROCEDURE — 63710000001 INSULIN LISPRO (HUMAN) PER 5 UNITS: Performed by: INTERNAL MEDICINE

## 2022-01-04 PROCEDURE — 86140 C-REACTIVE PROTEIN: CPT | Performed by: NURSE PRACTITIONER

## 2022-01-04 PROCEDURE — 83605 ASSAY OF LACTIC ACID: CPT | Performed by: NURSE PRACTITIONER

## 2022-01-04 PROCEDURE — 86850 RBC ANTIBODY SCREEN: CPT | Performed by: NURSE PRACTITIONER

## 2022-01-04 PROCEDURE — 0097U HC BIOFIRE FILMARRAY GI PANEL: CPT | Performed by: NURSE PRACTITIONER

## 2022-01-04 PROCEDURE — 85025 COMPLETE CBC W/AUTO DIFF WBC: CPT | Performed by: NURSE PRACTITIONER

## 2022-01-04 PROCEDURE — 86901 BLOOD TYPING SEROLOGIC RH(D): CPT | Performed by: NURSE PRACTITIONER

## 2022-01-04 PROCEDURE — 85652 RBC SED RATE AUTOMATED: CPT | Performed by: NURSE PRACTITIONER

## 2022-01-04 PROCEDURE — 83735 ASSAY OF MAGNESIUM: CPT | Performed by: NURSE PRACTITIONER

## 2022-01-04 PROCEDURE — 25010000002 MORPHINE PER 10 MG: Performed by: NURSE PRACTITIONER

## 2022-01-04 PROCEDURE — 82962 GLUCOSE BLOOD TEST: CPT

## 2022-01-04 PROCEDURE — 63710000001 AZATHIOPRINE PER 50 MG: Performed by: NURSE PRACTITIONER

## 2022-01-04 PROCEDURE — 63710000001 INSULIN REGULAR HUMAN PER 5 UNITS: Performed by: INTERNAL MEDICINE

## 2022-01-04 PROCEDURE — 82272 OCCULT BLD FECES 1-3 TESTS: CPT | Performed by: NURSE PRACTITIONER

## 2022-01-04 PROCEDURE — 99233 SBSQ HOSP IP/OBS HIGH 50: CPT | Performed by: INTERNAL MEDICINE

## 2022-01-04 PROCEDURE — 85007 BL SMEAR W/DIFF WBC COUNT: CPT | Performed by: NURSE PRACTITIONER

## 2022-01-04 PROCEDURE — 80053 COMPREHEN METABOLIC PANEL: CPT | Performed by: NURSE PRACTITIONER

## 2022-01-04 PROCEDURE — 87493 C DIFF AMPLIFIED PROBE: CPT | Performed by: NURSE PRACTITIONER

## 2022-01-04 PROCEDURE — 85730 THROMBOPLASTIN TIME PARTIAL: CPT | Performed by: NURSE PRACTITIONER

## 2022-01-04 RX ORDER — DEXTROSE MONOHYDRATE 25 G/50ML
25 INJECTION, SOLUTION INTRAVENOUS
Status: DISCONTINUED | OUTPATIENT
Start: 2022-01-04 | End: 2022-01-08 | Stop reason: HOSPADM

## 2022-01-04 RX ORDER — DICYCLOMINE HYDROCHLORIDE 10 MG/1
10 CAPSULE ORAL
Status: DISCONTINUED | OUTPATIENT
Start: 2022-01-04 | End: 2022-01-08 | Stop reason: HOSPADM

## 2022-01-04 RX ORDER — VENLAFAXINE HYDROCHLORIDE 75 MG/1
75 CAPSULE, EXTENDED RELEASE ORAL
Status: DISCONTINUED | OUTPATIENT
Start: 2022-01-04 | End: 2022-01-08 | Stop reason: HOSPADM

## 2022-01-04 RX ORDER — NICOTINE POLACRILEX 4 MG
15 LOZENGE BUCCAL
Status: DISCONTINUED | OUTPATIENT
Start: 2022-01-04 | End: 2022-01-08 | Stop reason: HOSPADM

## 2022-01-04 RX ADMIN — SODIUM CHLORIDE 100 ML/HR: 9 INJECTION, SOLUTION INTRAVENOUS at 03:36

## 2022-01-04 RX ADMIN — INSULIN HUMAN 7 UNITS: 100 INJECTION, SOLUTION PARENTERAL at 12:13

## 2022-01-04 RX ADMIN — GABAPENTIN 800 MG: 400 CAPSULE ORAL at 20:48

## 2022-01-04 RX ADMIN — GABAPENTIN 800 MG: 400 CAPSULE ORAL at 14:17

## 2022-01-04 RX ADMIN — NYSTATIN: 100000 POWDER TOPICAL at 17:28

## 2022-01-04 RX ADMIN — MORPHINE SULFATE 1 MG: 2 INJECTION, SOLUTION INTRAMUSCULAR; INTRAVENOUS at 08:09

## 2022-01-04 RX ADMIN — NYSTATIN: 100000 POWDER TOPICAL at 20:49

## 2022-01-04 RX ADMIN — VENLAFAXINE HYDROCHLORIDE 75 MG: 75 CAPSULE, EXTENDED RELEASE ORAL at 14:17

## 2022-01-04 RX ADMIN — SODIUM CHLORIDE, PRESERVATIVE FREE 10 ML: 5 INJECTION INTRAVENOUS at 20:50

## 2022-01-04 RX ADMIN — NYSTATIN: 100000 POWDER TOPICAL at 08:01

## 2022-01-04 RX ADMIN — HYDROCORTISONE: 25 CREAM TOPICAL at 08:01

## 2022-01-04 RX ADMIN — HYDROCORTISONE: 25 CREAM TOPICAL at 20:49

## 2022-01-04 RX ADMIN — MORPHINE SULFATE 1 MG: 2 INJECTION, SOLUTION INTRAMUSCULAR; INTRAVENOUS at 17:33

## 2022-01-04 RX ADMIN — SODIUM CHLORIDE 100 ML/HR: 9 INJECTION, SOLUTION INTRAVENOUS at 14:18

## 2022-01-04 RX ADMIN — MORPHINE SULFATE 1 MG: 2 INJECTION, SOLUTION INTRAMUSCULAR; INTRAVENOUS at 12:17

## 2022-01-04 RX ADMIN — TOPIRAMATE 50 MG: 25 TABLET, FILM COATED ORAL at 20:53

## 2022-01-04 RX ADMIN — TOPIRAMATE 50 MG: 25 TABLET, FILM COATED ORAL at 09:15

## 2022-01-04 RX ADMIN — MORPHINE SULFATE 1 MG: 2 INJECTION, SOLUTION INTRAMUSCULAR; INTRAVENOUS at 03:45

## 2022-01-04 RX ADMIN — INSULIN LISPRO 4 UNITS: 100 INJECTION, SOLUTION INTRAVENOUS; SUBCUTANEOUS at 20:48

## 2022-01-04 RX ADMIN — SODIUM CHLORIDE, PRESERVATIVE FREE 10 ML: 5 INJECTION INTRAVENOUS at 03:45

## 2022-01-04 RX ADMIN — INSULIN LISPRO 7 UNITS: 100 INJECTION, SOLUTION INTRAVENOUS; SUBCUTANEOUS at 18:10

## 2022-01-04 RX ADMIN — DICYCLOMINE HYDROCHLORIDE 10 MG: 10 CAPSULE ORAL at 16:54

## 2022-01-04 RX ADMIN — AZATHIOPRINE 50 MG: 50 TABLET ORAL at 08:00

## 2022-01-04 RX ADMIN — MORPHINE SULFATE 1 MG: 2 INJECTION, SOLUTION INTRAMUSCULAR; INTRAVENOUS at 20:52

## 2022-01-04 RX ADMIN — ESTRADIOL 1 PATCH: 0.05 PATCH TRANSDERMAL at 07:58

## 2022-01-04 RX ADMIN — DICYCLOMINE HYDROCHLORIDE 10 MG: 10 CAPSULE ORAL at 20:49

## 2022-01-04 NOTE — PLAN OF CARE
Problem: Adult Inpatient Plan of Care  Goal: Absence of Hospital-Acquired Illness or Injury  Intervention: Identify and Manage Fall Risk  Recent Flowsheet Documentation  Taken 1/4/2022 0600 by Justa Lawson, BRIANNA  Safety Promotion/Fall Prevention:   activity supervised   assistive device/personal items within reach   clutter free environment maintained   fall prevention program maintained   nonskid shoes/slippers when out of bed   safety round/check completed   toileting scheduled  Taken 1/4/2022 0400 by Justa aLwson, RN  Safety Promotion/Fall Prevention:   activity supervised   assistive device/personal items within reach   clutter free environment maintained   fall prevention program maintained   nonskid shoes/slippers when out of bed   toileting scheduled   safety round/check completed  Taken 1/4/2022 0200 by Justa Lawson, BRIANNA  Safety Promotion/Fall Prevention:   activity supervised   assistive device/personal items within reach   clutter free environment maintained   fall prevention program maintained   nonskid shoes/slippers when out of bed   safety round/check completed   toileting scheduled  Taken 1/4/2022 0000 by Justa Lawson RN  Safety Promotion/Fall Prevention:   activity supervised   assistive device/personal items within reach   clutter free environment maintained   fall prevention program maintained   nonskid shoes/slippers when out of bed   safety round/check completed   toileting scheduled  Taken 1/3/2022 2200 by Justa Lawson, RN  Safety Promotion/Fall Prevention:   activity supervised   assistive device/personal items within reach   clutter free environment maintained   fall prevention program maintained   nonskid shoes/slippers when out of bed   safety round/check completed   toileting scheduled  Taken 1/3/2022 2000 by Justa Lawson, RN  Safety Promotion/Fall Prevention:   activity supervised   assistive device/personal items within reach   clutter free environment maintained   fall prevention  program maintained   nonskid shoes/slippers when out of bed   safety round/check completed   toileting scheduled  Intervention: Prevent Skin Injury  Recent Flowsheet Documentation  Taken 1/4/2022 0600 by Justa Lawson RN  Body Position: position changed independently  Skin Protection:   adhesive use limited   incontinence pads utilized   tubing/devices free from skin contact  Taken 1/4/2022 0400 by Justa Lawson RN  Body Position: position changed independently  Skin Protection:   adhesive use limited   incontinence pads utilized   tubing/devices free from skin contact  Taken 1/4/2022 0200 by Justa Lawson RN  Body Position: position changed independently  Skin Protection:   adhesive use limited   incontinence pads utilized   tubing/devices free from skin contact  Taken 1/4/2022 0000 by Justa Lawson RN  Body Position: position changed independently  Skin Protection:   adhesive use limited   incontinence pads utilized   tubing/devices free from skin contact  Taken 1/3/2022 2200 by Justa Lawson RN  Body Position: position changed independently  Skin Protection:   adhesive use limited   incontinence pads utilized   tubing/devices free from skin contact  Taken 1/3/2022 2000 by Justa Lawson RN  Body Position: position changed independently  Skin Protection:   adhesive use limited   incontinence pads utilized   tubing/devices free from skin contact  Intervention: Prevent and Manage VTE (venous thromboembolism) Risk  Recent Flowsheet Documentation  Taken 1/3/2022 2000 by Justa Lawson RN  VTE Prevention/Management:   bilateral   dorsiflexion/plantar flexion performed   bleeding risk factor(s) identified  Intervention: Prevent Infection  Recent Flowsheet Documentation  Taken 1/4/2022 0600 by Justa Lawson RN  Infection Prevention: environmental surveillance performed  Taken 1/4/2022 0400 by Justa Lawson RN  Infection Prevention: environmental surveillance performed  Taken 1/4/2022 0200 by Justa Lawson  RN  Infection Prevention: environmental surveillance performed  Taken 1/4/2022 0000 by Justa Lawson RN  Infection Prevention: environmental surveillance performed  Taken 1/3/2022 2200 by Justa Lawson RN  Infection Prevention: environmental surveillance performed  Taken 1/3/2022 2000 by Justa Lawson RN  Infection Prevention: environmental surveillance performed  Goal: Optimal Comfort and Wellbeing  Intervention: Provide Person-Centered Care  Recent Flowsheet Documentation  Taken 1/3/2022 2000 by Justa Lawson RN  Trust Relationship/Rapport:   care explained   choices provided   thoughts/feelings acknowledged  Goal: Readiness for Transition of Care  Intervention: Mutually Develop Transition Plan  Recent Flowsheet Documentation  Taken 1/3/2022 2022 by Justa Lawson, RN  Transportation Anticipated: car, drives self  Patient/Family Anticipated Services at Transition: none  Patient/Family Anticipates Transition to: home  Taken 1/3/2022 2002 by Justa Lawson, RN  Equipment Currently Used at Home: none   Goal Outcome Evaluation:

## 2022-01-04 NOTE — PROGRESS NOTES
[facility] Medicine Services  PROGRESS NOTE    Patient Name: Thais Hobson  : 1985  MRN: [id number]    Date of Admission: 1/3/2022  Primary Care Physician: Lv Sanchez DO    Subjective   Subjective     CC:  Diarrhea    HPI:  Continues to have diarrhea and abdominal pain.    ROS:  General: denies fevers or chills  CV: denies chest pain  Resp: denies shortness of breath  Abd: Positive for abdominal pain and diarrhea      Objective   Objective     Vital Signs:   Temp:  [97.9 °F (36.6 °C)-98.2 °F (36.8 °C)] 98.2 °F (36.8 °C)  Heart Rate:  [98] 98  Resp:  [16] 16  BP: (110)/(86) 110/86     Physical Exam:  Constitutional: No acute distress, awake, alert, resting in bed  Respiratory: Clear to auscultation bilaterally, respiratory effort normal   Cardiovascular: RRR, no murmurs, rubs, or gallops  Gastrointestinal: Positive bowel sounds, diffusely tender but otherwise soft  Musculoskeletal: No bilateral ankle edema  Psychiatric: Appropriate affect, cooperative  Neurologic: Oriented x 3, no focal neurological deficits  Skin: No rashes      Results Reviewed:  LAB RESULTS:                              Brief Urine Lab Results  (Last result in the past 365 days)      Color   Clarity   Blood   Leuk Est   Nitrite   Protein   CREAT   Urine HCG        22 0347 Yellow   Clear   Negative   Negative   Negative   Negative                 Microbiology Results Abnormal     Procedure Component Value - Date/Time    COVID PRE-OP / PRE-PROCEDURE SCREENING ORDER (NO ISOLATION) - Swab, Nasopharynx [[id number]]  (Normal) Collected: 225    Lab Status: Final result Specimen: Swab from Nasopharynx Updated: 22 2319    Narrative:      The following orders were created for panel order COVID PRE-OP / PRE-PROCEDURE SCREENING ORDER (NO ISOLATION) - Swab, Nasopharynx.  Procedure                               Abnormality         Status                     ---------                                -----------         ------                     COVID-19, FLU A/B, RSV P...[[id number]]  Normal              Final result                 Please view results for these tests on the individual orders.    COVID-19, FLU A/B, RSV PCR - Swab, Nasopharynx [[id number]]  (Normal) Collected: 01/03/22 2225    Lab Status: Final result Specimen: Swab from Nasopharynx Updated: 01/03/22 2319     COVID19 Not Detected     Influenza A PCR Not Detected     Influenza B PCR Not Detected     RSV, PCR Not Detected          No radiology results from the last 24 hrs        I have reviewed the medications:  Scheduled Meds:azaTHIOprine, 50 mg, Oral, Daily  estradiol, 1 patch, Transdermal, Q3 Days  gabapentin, 800 mg, Oral, Q8H  Hydrocortisone (Perianal), , Rectal, BID  nystatin, , Topical, TID  sodium chloride, 10 mL, Intravenous, Q12H  topiramate, 50 mg, Oral, BID      Continuous Infusions:sodium chloride, 100 mL/hr, Last Rate: 100 mL/hr (01/04/22 0336)      PRN Meds:.hydrOXYzine  •  melatonin  •  Morphine **AND** naloxone  •  ondansetron **OR** ondansetron  •  sodium chloride    Assessment/Plan   Assessment & Plan     Active Hospital Problems    Diagnosis  POA   • **Ulcerative pancolitis with rectal bleeding ([facility]) [K51.011]  Yes   • Bloody diarrhea [R19.7]  Yes   • Type 2 diabetes mellitus treated with insulin (HCC) [E11.9, Z79.4]  Not Applicable   • Bipolar disorder (HCC) [F31.9]  Yes   • Gastroesophageal reflux disease without esophagitis [K21.9]  Yes      Resolved Hospital Problems   No resolved problems to display.        Brief Hospital Course to date:    Thais Hobson is a 36 y.o. female with history of gastroparesis, ulcerative colitis/pancolitis, type 2 diabetes, bipolar disorder, C. Difficile, was a direct admit from her PCP for complaints of diarrhea.     Diarrhea with a history of C. Difficile  Ulcerative pancolitis with rectal bleeding  --EGD 07/2021 showed mild left-sided colitis  --GI PCR, C. difficile panel  pending  --consider CT abdomen pelvis pending GI recs  --IV fluids  --type and screen  --GI consult pending    Hemorrhoid  --anusol bid     Uncontrolled DM 2 with gastroparesis  --FSBG with SSI  -Chronically uncontrolled DM 2 with long-term use of insulin, last A1c 12%, with nonadherence to home insulin therapy, and complication of gastroparesis; has not been taking insulin at home due to reported poor oral intake     Bipolar disorder  GERD     DVT prophylaxis:  mechanical    DVT prophylaxis:  Mechanical DVT prophylaxis orders are present.       AM-PAC 6 Clicks Score (PT): 24 (01/03/22 2000)    Disposition: I expect the patient to be discharged TBD.    CODE STATUS:   Code Status and Medical Interventions:   Ordered at: 01/03/22 4760     Level Of Support Discussed With:    Patient     Code Status (Patient has no pulse and is not breathing):    CPR (Attempt to Resuscitate)     Medical Interventions (Patient has pulse or is breathing):    Full Support     This patient's problems and plans were partially entered by my partner and updated as appropriate by me 01/04/22. Today is my first day evaluating this patient's active medical problems. I Personally reviewed chart and adjusted note to reflect daily changes in management/clinical condition        Liset Matthew MD  01/04/22

## 2022-01-04 NOTE — CONSULTS
[facility] [place] Gastroenterology Consult    Referring Provider: Tor Colon DO    PCP: Lv Sanchez DO    Reason for Consultation: Abdominal pain     Chief complaint: Abdominal pain     History of present illness:    Thais Hobson is a 36 y.o. female who is admitted with abdominal pain.   She describes predominately epigastric pain that radiates up in her chest.   It has been present for two weeks.  Symptoms are intermittent and worsened by food.  She has post prandial diarrhea.  She has not had any bowel movement since admission last night.  She has poorly controlled type II diabetes mellitus and has not been taking her insulin due to her poor oral intake.  Her glucose today is in the 300s.      She has history of ulcerative colitis currently on Entyvio (last dose 12/27/21), Imuran 50 mg daily and Rowasa enemas BID.   Her last colonoscopy was in July 2021 that showed mild left sided colitis to 50 cm.   She had an unremarkable EGD at that time.  She has long standing history of ulcerative colitis diagnosed greater than 15 years ago with numerous intolerances to biologic therapies including Remicade, Humira and Xeljanz.   She has had C. Difficile infections in the past.  Previous Entyvio level was therapeutic at 26 in May 2021.        She denies any fever or chills.  She is depressed and anxious about her multiple medical problems.      Allergies:  Hydrochlorothiazide, Penicillins, Remicade [infliximab], Soybean-containing drug products, Xeljanz [tofacitinib citrate], Zofran [ondansetron hcl], Ondansetron, Fidaxomicin, and Reglan [metoclopramide]    Scheduled Meds:  azaTHIOprine, 50 mg, Oral, Daily  estradiol, 1 patch, Transdermal, Q3 Days  gabapentin, 800 mg, Oral, Q8H  Hydrocortisone (Perianal), , Rectal, BID  insulin regular, 0-9 Units, Subcutaneous, Q6H  nystatin, , Topical, TID  sodium chloride, 10 mL, Intravenous, Q12H  topiramate, 50 mg, Oral, BID         Infusions:  sodium chloride, 100 mL/hr, Last  Rate: 100 mL/hr (01/04/22 0922)        PRN Meds:  dextrose  •  dextrose  •  glucagon (human recombinant)  •  hydrOXYzine  •  melatonin  •  Morphine **AND** naloxone  •  ondansetron **OR** ondansetron  •  sodium chloride    Home Meds:  Medications Prior to Admission   Medication Sig Dispense Refill Last Dose   • azaTHIOprine (IMURAN) 50 MG tablet Take 1 tablet by mouth Daily. 180 tablet 3 1/3/2022 at Unknown time   • Continuous Blood Gluc  (Dexcom G6 ) device 1 Units Daily. 1 each 1 1/3/2022 at Unknown time   • Continuous Blood Gluc Sensor (Dexcom G6 Sensor) Every 10 (Ten) Days. 3 each 11 1/3/2022 at Unknown time   • Continuous Blood Gluc Transmit (Dexcom G6 Transmitter) misc 1 Units Daily. 1 each 1 1/3/2022 at Unknown time   • Continuous Glucose Monitor kit Use as directed 1 each 11 1/3/2022 at Unknown time   • diphenhydrAMINE (Banophen) 25 mg capsule Take 1 capsule by mouth Every 8 (Eight) Hours As Needed for Itching or Allergies. 90 capsule 1 1/3/2022 at Unknown time   • EPINEPHrine (EpiPen 2-Luis) 0.3 MG/0.3ML solution auto-injector injection Inject 0.3 mL into the appropriate muscle as directed by prescriber. For emergent anaphylaxis 2 each 0 1/3/2022 at Unknown time   • estradiol (MINIVELLE, VIVELLE-DOT) 0.05 MG/24HR patch Place 1 patch on the skin as directed by provider Every 3 (Three) Days. (Patient taking differently: Place 1 patch on the skin as directed by provider Every 3 (Three) Days. PT USES PATCHES. DUE TO BE CHANGED TOMORROW.) 8 patch 2 1/3/2022 at Unknown time   • fluconazole (Diflucan) 150 MG tablet Take 1 tablet by mouth now, then repeat in 3 days if needed 2 tablet 0 1/3/2022 at Unknown time   • gabapentin (NEURONTIN) 800 MG tablet TAKE 1 TABLET BY MOUTH THREE TIMES DAILY 90 tablet 0 1/3/2022 at Unknown time   • glucose blood test strip ONE TOUCH VERIO FLEX TEST STRIPS TO CHECK GLUCOSE 3 TIMES DAILY FOR DM E11.9 100 each 12 1/3/2022 at Unknown time   • HYDROcodone-acetaminophen  "(NORCO) 7.5-325 MG per tablet Take 1 tablet by mouth Every 6 (Six) Hours As Needed. for pain   Past Month at Unknown time   • Hydrocortisone, Perianal, (Proctozone-HC) 2.5 % rectal cream Insert  into the rectum 2 (Two) Times a Day. 30 g 1 1/3/2022 at Unknown time   • hydrOXYzine pamoate (VISTARIL) 25 MG capsule TAKE 1 CAPSULE BY MOUTH THREE TIMES DAILY AS NEEDED FOR ANXIETY 60 capsule 2 1/3/2022 at Unknown time   • insulin aspart (novoLOG) 100 UNIT/ML injection INJECT UP TO 12 UNITS USING SLIDING SCALE UNDER THE SKIN THREE TIMES DAILY WITH MEALS 10 mL 3 1/3/2022 at Unknown time   • insulin detemir (LEVEMIR) 100 UNIT/ML injection Inject 15 Units under the skin into the appropriate area as directed Every Night. 15 mL 3 1/3/2022 at Unknown time   • Insulin Syringe 30G X 5/16\" 1 ML misc 1 syringe 3 (Three) Times a Day. 100 each 2 1/3/2022 at Unknown time   • Lancets misc USE AS DIRECTED TO CHECK GLUCOSE 3 TIMES DAILY FOR DM E11.9 100 each 3 1/3/2022 at Unknown time   • mesalamine (ROWASA) 4 g enema Insert 1 enema into the rectum Every Night. One enema in rectum nightly, try and retain overnight 60 mL 11 Past Month at Unknown time   • naproxen (NAPROSYN) 500 MG tablet Take 500 mg by mouth 2 (Two) Times a Day.   Past Month at Unknown time   • nystatin (MYCOSTATIN) 009812 UNIT/GM powder Apply  topically to the appropriate area as directed 3 (Three) Times a Day. 60 g 1 1/3/2022 at Unknown time   • oxyCODONE-acetaminophen (PERCOCET) 5-325 MG per tablet Take 1 tablet by mouth Every 6 (Six) Hours As Needed.   Past Month at Unknown time   • promethazine (PHENERGAN) 25 MG tablet Take 1 tablet by mouth Every 6 (Six) Hours As Needed for Nausea or Vomiting. 40 tablet 2 1/3/2022 at Unknown time   • SUMAtriptan (Imitrex) 50 MG tablet Take one tablet at onset of headache. May repeat dose one time in 2 hours if headache not relieved. 8 tablet 2 1/3/2022 at Unknown time   • topiramate (TOPAMAX) 50 MG tablet TAKE 1 TABLET BY MOUTH TWICE " DAILY 60 tablet 2 1/3/2022 at Unknown time   • neomycin-polymyxin-hydrocortisone (CORTISPORIN) 3.5-80093-8 otic suspension INSTILL 4 DROPS IN AFFECTED EAR EVERY 6 HOURS   More than a month at Unknown time   • predniSONE (DELTASONE) 10 MG tablet Take 40 mg x 3 days, 30 x 3 days, 20 x 3 days, 10 x 4 days 31 tablet 0 Patient Taking Differently at Unknown time   • venlafaxine (EFFEXOR) 37.5 MG tablet Take 1 tablet by mouth 3 (Three) Times a Day With Meals for 30 days. 90 tablet 0        ROS: Review of Systems   Constitutional: Positive for fatigue.   HENT: Negative.    Eyes: Negative.    Respiratory: Negative.    Cardiovascular: Negative.    Gastrointestinal: Positive for abdominal pain.   Endocrine: Negative.    Genitourinary: Negative.    Musculoskeletal: Negative.    Skin: Negative.    Allergic/Immunologic: Negative.    Neurological: Negative.    Hematological: Negative.    Psychiatric/Behavioral: Positive for dysphoric mood.       PAST MED HX:  Past Medical History:   Diagnosis Date   • Abdominal pain     periumbilicul   • Abdominal tenderness     Periumbil   • Alopecia    • Anemia    • Anxiety    • Arthritis    • Asthma     as a child   • Back pain    • Bipolar disorder (HCC)    • Blood in stool    • Body piercing     ears   • Colitis    • Colitis    • Colon polyps    • Depression    • Diabetes mellitus (HCC)    • Diarrhea    • Dysphagia     Patient reported he has trouble from time to time and that her throat has to be dilated   • Elevated cholesterol     Reported slightly elevated - taking no medication    • Fractures     Right pinky toe - no surgery required   • GERD (gastroesophageal reflux disease)    • H/O colonoscopy 08/01/2013    Terminal ileal biopsies negative. Cecal&ascending biopsie revealed chronic active colitis w/ features cons. w/ inflammatory bowel disease. Transevere colon biopsies revealved chronic active colitis. Desc. colon biopsies revealed chronic active colitis. Rect colon biopsie revealed  chronic active colitis. Negative for dysplasia   • Hematemesis    • History of Clostridium difficile infection    • History of transfusion     No reaction to transfusion reported   • Migraine headache    • Nausea     alone   • Pancreatitis    • Restless leg    • Seizures (HCC)     pt reports x1    • Tattoos     x9   • Ulcerative pancolitis with rectal bleeding (HCC) 10/1/2021   • Universal ulcerative colitis (HCC)    • UTI (urinary tract infection)    • Weight loss      PAST SURG HX:  Past Surgical History:   Procedure Laterality Date   • BREAST SURGERY Left     breast lump removed benign   •  SECTION     • CHOLECYSTECTOMY      for stone disease   • COLONOSCOPY     • COLONOSCOPY N/A 2020    Procedure: COLONOSCOPY;  Surgeon: Alonzo He MD;  Location: [facility] ENDOSCOPY;  Service: Gastroenterology;  Laterality: N/A;   • COLONOSCOPY N/A 2021    Procedure: COLONOSCOPY;  Surgeon: Francisco Sainz MD;  Location:  [facility];  Service: Gastroenterology;  Laterality: N/A;   • DILATATION AND CURETTAGE     • ENDOSCOPY  2013    Erosive esophagitis, grade 2; erosive gastritis; small sliding hiatal hernia less than 3 cm, erosive deodenitis duodenal polyp.No gan mucosa.Second portion duedenal biopsy neg. Second postion of dudoenal biopsy revealed polypoid intestinal mucosa w/ lymphoid aggregate. 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 no helicobacter pylori. Negative for mateaplasia, dysplasia   • ENDOSCOPY N/A 2020    Procedure: ESOPHAGOGASTRODUODENOSCOPY WITH DILATATION AND BIOPSIES;  Surgeon: Alonzo He MD;  Location:  [facility];  Service: Gastroenterology;  Laterality: N/A;   • ENDOSCOPY N/A 2021    Procedure: ESOPHAGOGASTRODUODENOSCOPY;  Surgeon: Brunner, Mark I, MD;  Location:  [facility];  Service: Gastroenterology;  Laterality: N/A;   • ENDOSCOPY N/A 2021    Procedure: ESOPHAGOGASTRODUODENOSCOPY WITH SAVARY  DILATATION;  Surgeon: Francisco Sainz MD;  [facility]  Service: Gastroenterology;  Laterality: N/A;   • HIP SURGERY Right    • HYSTERECTOMY     • LEEP         FAM HX:  Family History   Problem Relation Age of Onset   • Heart failure Father    • Heart disease Father    • No Known Problems Mother    • No Known Problems Brother    • No Known Problems Son    • No Known Problems Son        SOC HX:  Social History     Socioeconomic History   • Marital status: Single   Tobacco Use   • Smoking status: Never Smoker   • Smokeless tobacco: Never Used   Substance and Sexual Activity   • Alcohol use: No   • Drug use: No   • Sexual activity: Defer       PHYSICAL EXAM  BP 99/76 (BP Location: Right arm, Patient Position: Lying)   Pulse 89   Temp 97.9 °F (36.6 °C) (Oral)   Resp 16   LMP  (LMP Unknown)   Wt Readings from Last 3 Encounters:   07/05/21 93.9 kg (207 lb 1.6 oz)   05/16/21 90.3 kg (199 lb)   03/11/21 92.5 kg (204 lb)   ,body mass index is unknown because there is no height or weight on file.  Physical Exam  HENT:      Head: Normocephalic and atraumatic.      Mouth/Throat:      Mouth: Mucous membranes are moist.   Eyes:      General: No scleral icterus.  Cardiovascular:      Rate and Rhythm: Normal rate and regular rhythm.   Pulmonary:      Effort: Pulmonary effort is normal.      Breath sounds: Normal breath sounds.   Abdominal:      General: Bowel sounds are normal. There is no distension.      Palpations: Abdomen is soft.      Tenderness: There is abdominal tenderness in the epigastric area. There is no guarding.   Musculoskeletal:      Right lower leg: No edema.      Left lower leg: No edema.   Skin:     General: Skin is warm and dry.   Neurological:      Mental Status: She is alert and oriented to person, place, and time.   Psychiatric:      Comments: Depressed mood        Results Review:   I reviewed the patient's new clinical results.    Lab Results   Component Value Date    WBC 12.03 (H)  01/04/2022    HGB 13.8 01/04/2022    HGB 12.3 07/05/2021    HGB 13.3 07/04/2021    HCT 42.3 01/04/2022    MCV 83.4 01/04/2022     01/04/2022     Lab Results   Component Value Date    INR 0.96 01/04/2022     Lab Results   Component Value Date    GLUCOSE 338 (H) 01/04/2022    BUN 10 01/04/2022    CREATININE 0.59 01/04/2022    EGFRIFNONA 116 01/04/2022    EGFRIFAFRI 141 01/04/2022    BCR 16.9 01/04/2022     (L) 01/04/2022    K 3.8 01/04/2022    CO2 19.0 (L) 01/04/2022    CALCIUM 8.8 01/04/2022    PROTENTOTREF 7.9 11/20/2019    ALBUMIN 3.90 01/04/2022    ALKPHOS 168 (H) 01/04/2022    BILITOT 0.3 01/04/2022    ALT 31 01/04/2022    AST 17 01/04/2022      Ref. Range 1/4/2022 07:22   C-Reactive Protein Latest Ref Range: 0.00 - 0.50 mg/dL 0.63 (H)      Ref. Range 1/4/2022 07:22   Sed Rate Latest Ref Range: 0 - 20 mm/hr 29 (H)       ASSESSMENTS/PLANS    1. Ulcerative colitis  2. Epigastric pain  3. Gastroparesis  4. Uncontrolled diabetes mellitus     Ms. Hobson is a 36 year old with uncontrolled diabetes mellitus (A1c of 12) and ulcerative colitis on Entyvio, Imuran and Rowasa enemas that presents with epigastric pain and post prandial diarrhea.  Favor her symptoms to be secondary to autonomic dysfunction.   Will exclude infectious etiology.    >>> Obtain C. Difficile toxin, PCR and GI panel PCR  >>> Obtain fecal calprotectin   >>> Will check Vedolizumab levels/Ab and Imuran levels   >>> Begin Bentyl 10 mg QID before meals and at bedtime  >>> Restart home Effexor   >>> Small low carbohydrate meals   >>> Anusol cream BID and sitz bath twice a day     I discussed the patient's findings and my recommendations with patient    JACK Peres  01/04/22  12:10 EST

## 2022-01-04 NOTE — PAYOR COMM NOTE
"Julia Bentley RN   Phone [phone]  Fax [phone]    Thais Hobson (36 y.o. Female)             Date of Birth Social Security Number Address Home Phone MRN    1985  [address] [place] [address] [phone] [id number]    Nondenominational Marital Status             Patient Refused Single       Admission Date Admission Type Admitting Provider Attending Provider Department, Room/Bed    1/3/22 Urgent Liset Matthew MD Seward, Kathryn L, MD [facility] 5G, S546/1    Discharge Date Discharge Disposition Discharge Destination                         Attending Provider: Liset Matthew MD    Allergies: Hydrochlorothiazide, Penicillins, Remicade [Infliximab], Soybean-containing Drug Products, Xeljanz [Tofacitinib Citrate], Zofran [Ondansetron Hcl], Ondansetron, Fidaxomicin, Reglan [Metoclopramide]    Isolation: Spore   Infection: C.difficile (rule out) (01/03/22)   Code Status: CPR   Advance Care Planning Activity    Ht: 160 cm (63\")   Wt: --    Admission Cmt: None   Principal Problem: Ulcerative pancolitis with rectal bleeding (HCC) [K51.011]                 Active Insurance as of 1/3/2022     Primary Coverage     Payor Plan Insurance Group Employer/Plan Group    [employer] [place] [id number]     Payor Plan Address Payor Plan Phone Number Payor Plan Fax Number Effective Dates    [address] [address] [phone]  2/2/2017 - None Entered    [facility] [id number]       Subscriber Name Subscriber Birth Date Member ID       THAIS HOBSON 1985 [id number]                 Emergency Contacts      (Rel.) Home Phone Work Phone Mobile Phone    FRANCK ALONSO (Mother) [phone] -- [phone]    HobsonKandi pickard (Step Parent) [phone] -- --    ZOILARENEE (Father) [phone] -- --               History & Physical      Tor Colon DO at 01/03/22 2022              [facility] Medicine Services  HISTORY AND " PHYSICAL    Patient Name: Thais Hobson  : 1985  MRN: [id number]  Primary Care Physician: Lv Sanchez DO  Date of admission: 1/3/2022    Subjective   Subjective     Chief Complaint:  Abdominal pain    HPI:  Thais Hosbon is a 36 y.o. female with history of gastroparesis, ulcerative colitis/pancolitis, type 2 diabetes, bipolar disorder, C. Difficile, was a direct admit from her PCP for complaints of diarrhea.  Patient complains of epigastric abdominal pain for the past 2 weeks that worsened over the last 2 days.  Pain is constant and described as cramping and aching.  Pt also reports an external hemorrhoid that is not getting any better.  She has been having diarrhea for the last few weeks.  Stool is dark red and bright red blood.  She has been having >10 bowel movements with blood and mucous a day.  She has been taking phenergan for her nausea and vomiting.  She endorses fatigue, loss of appetite, decrease in urine output, nausea and vomiting.  She reports small amount of blood in her emesis.  No fevers, chills, shortness of air, chest pain, dysuria, or any other complaints at this time.  She has not been taking her mesalamine enema due to pain from her hemorrhoids.  She has been using proctozone at home but it is only providing minimal relief.  She was seen in the ED at [place] for these symptoms before  and was told her BP was low.  She was given IV fluids and discharged home.  She reports that he DM is not been controlled lately d/t not taking her insulin because she is afraid to take it due to not eating.   Patient is being admitted to the Hospitalist for further evaluation and management.      COVID Details:        Symptoms: [x] NONE [] Fever []  Cough [] Shortness of breath [] Change in taste or smell  The patient qualifies to receive the vaccine, but they have not yet received it.    Review of Systems   Constitutional: Positive for appetite change and fatigue. Negative for  chills and fever.   HENT: Negative.    Eyes: Negative.    Respiratory: Negative.    Cardiovascular: Negative.    Gastrointestinal: Positive for abdominal pain, blood in stool, diarrhea, nausea and vomiting.   Endocrine: Negative.    Genitourinary: Positive for decreased urine volume. Negative for difficulty urinating, dysuria, frequency and urgency.   Musculoskeletal: Negative.    Skin: Positive for rash (facial). Negative for color change, pallor and wound.   Allergic/Immunologic: Negative.    Neurological: Negative.    Hematological: Negative.    Psychiatric/Behavioral: Negative.         All other systems reviewed and are negative.     Personal History     Past Medical History:   Diagnosis Date   • Abdominal pain     periumbilicul   • Abdominal tenderness     Periumbil   • Alopecia    • Anemia    • Anxiety    • Arthritis    • Asthma     as a child   • Back pain    • Bipolar disorder (HCC)    • Blood in stool    • Body piercing     ears   • Colitis    • Colitis    • Colon polyps    • Depression    • Diabetes mellitus (HCC)    • Diarrhea    • Dysphagia     Patient reported he has trouble from time to time and that her throat has to be dilated   • Elevated cholesterol     Reported slightly elevated - taking no medication    • Fractures     Right pinky toe - no surgery required   • GERD (gastroesophageal reflux disease)    • H/O colonoscopy 08/01/2013    Terminal ileal biopsies negative. Cecal&ascending biopsie revealed chronic active colitis w/ features cons. w/ inflammatory bowel disease. Transevere colon biopsies revealved chronic active colitis. Desc. colon biopsies revealed chronic active colitis. Rect colon biopsie revealed chronic active colitis. Negative for dysplasia   • Hematemesis    • History of Clostridium difficile infection    • History of transfusion     No reaction to transfusion reported   • Migraine headache    • Nausea     alone   • Pancreatitis    • Restless leg    • Seizures ([facility])     pt reports  x1    • Tattoos     x9   • Ulcerative pancolitis with rectal bleeding (HCC) 10/1/2021   • Universal ulcerative colitis (HCC)    • UTI (urinary tract infection)    • Weight loss        Past Surgical History:   Procedure Laterality Date   • BREAST SURGERY Left     breast lump removed benign   •  SECTION     • CHOLECYSTECTOMY      for stone disease   • COLONOSCOPY     • COLONOSCOPY N/A 2020    Procedure: COLONOSCOPY;  Surgeon: Alonzo He MD;  Location:  [facility];  Service: Gastroenterology;  Laterality: N/A;   • COLONOSCOPY N/A 2021    Procedure: COLONOSCOPY;  Surgeon: Francisco Sainz MD;  Location:  [facility];  Service: Gastroenterology;  Laterality: N/A;   • DILATATION AND CURETTAGE     • ENDOSCOPY  2013    Erosive esophagitis, grade 2; erosive gastritis; small sliding hiatal hernia less than 3 cm, erosive deodenitis duodenal polyp.No gan mucosa.Second portion duedenal biopsy neg. Second postion of dudoenal biopsy revealed polypoid intestinal mucosa w/ lymphoid aggregate. 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 no helicobacter pylori. Negative for mateaplasia, dysplasia   • ENDOSCOPY N/A 2020    Procedure: ESOPHAGOGASTRODUODENOSCOPY WITH DILATATION AND BIOPSIES;  Surgeon: Alonzo He MD;  Location:  [facility];  Service: Gastroenterology;  Laterality: N/A;   • ENDOSCOPY N/A 2021    Procedure: ESOPHAGOGASTRODUODENOSCOPY;  Surgeon: Brunner, Mark I, MD;  Location:  [facility];  Service: Gastroenterology;  Laterality: N/A;   • ENDOSCOPY N/A 2021    Procedure: ESOPHAGOGASTRODUODENOSCOPY WITH SAVARY DILATATION;  Surgeon: Francisco Sainz MD;  Location:  [facility];  Service: Gastroenterology;  Laterality: N/A;   • HIP SURGERY Right    • HYSTERECTOMY     • LEEP         Family History:  family history includes Heart disease in her father; Heart failure in her father; No Known Problems in her  "brother, mother, son, and son. Otherwise pertinent FHx was reviewed and unremarkable.     Social History:  reports that she has never smoked. She has never used smokeless tobacco. She reports that she does not drink alcohol and does not use drugs.  Social History     Social History Narrative    Single.  Lives with 2 children.  No HH or DME currently       Medications:  BASAGLAR KWIKPEN, Continuous Glucose Monitor, Dexcom G6 , Dexcom G6 Sensor, Dexcom G6 Transmitter, EPINEPHrine, HYDROcodone-acetaminophen, Hydrocortisone (Perianal), Insulin Syringe, Lancets, SUMAtriptan, azaTHIOprine, diphenhydrAMINE, estradiol, fluconazole, gabapentin, glucose blood, hydrOXYzine pamoate, insulin aspart, insulin detemir, mesalamine, naproxen, neomycin-polymyxin-hydrocortisone, nystatin, oxyCODONE-acetaminophen, predniSONE, promethazine, topiramate, and venlafaxine    Allergies   Allergen Reactions   • Hydrochlorothiazide Hives   • Penicillins Hives   • Remicade [Infliximab] Anaphylaxis   • Soybean-Containing Drug Products Swelling     \"Soy sauce\"   • Xeljanz [Tofacitinib Citrate] Other (See Comments)     BLISTERS IN THROAT & ON ARMS   • Zofran [Ondansetron Hcl] Headache     migraines   • Ondansetron Mental Status Change   • Fidaxomicin Rash   • Reglan [Metoclopramide] Other (See Comments)     States feels weird when takes it       Objective   Objective     Vital Signs:        Physical Exam   Constitutional: Awake, alert, resting in bed  Eyes: PERRLA, sclerae anicteric, no conjunctival injection  HENT: NCAT, mucous membranes moist  Neck: Supple, no thyromegaly, no lymphadenopathy, trachea midline  Respiratory: Clear to auscultation bilaterally, nonlabored respirations   Cardiovascular: RRR, no murmurs, rubs, or gallops, palpable pedal pulses bilaterally  Gastrointestinal: Positive bowel sounds, soft, tender across the upper abdomen, nondistended  Musculoskeletal: No bilateral ankle edema, no clubbing or cyanosis to " extremities  Psychiatric: Appropriate affect, cooperative  Neurologic: Oriented x 3, strength symmetric in all extremities, Cranial Nerves grossly intact to confrontation, speech clear  Skin: No rashes       Result Review:  I have personally reviewed the results from the time of this admission to 01/03/22 8:22 PM EST and agree with these findings:  []  Laboratory  []  Microbiology  []  Radiology  []  EKG/Telemetry   []  Cardiology/Vascular   []  Pathology  [x]  Old records  []  Other:  Most notable findings include:     LAB RESULTS:                                Microbiology Results (last 10 days)     ** No results found for the last 240 hours. **          No radiology results from the last 24 hrs        Assessment/Plan   Assessment & Plan       Ulcerative pancolitis with rectal bleeding (HCC)    Bipolar disorder (HCC)    Gastroesophageal reflux disease without esophagitis    Type 2 diabetes mellitus treated with insulin (HCC)    Bloody diarrhea    Thais Hobson is a 36 y.o. female with history of gastroparesis, ulcerative colitis/pancolitis, type 2 diabetes, bipolar disorder, C. Difficile, was a direct admit from her PCP for complaints of diarrhea.       Assessment and plan:    Diarrhea with a history of C. Difficile  Ulcerative pancolitis with rectal bleeding  --EGD 07/2021 showed mild left-sided colitis  --GI PCR, C. difficile panel  --Consult GI in a.m. follows with Dr. Thao's  --consider CT abdomen pelvis  --IV fluids  --type and screen  --stat labs  -Subacute epigastric pain with postprandial diarrhea in a patient with UC and inability to tolerate home mesalamine enemas; she does have history of C. difficile colitis, though she denies recent antibiotic use; this evening we will start with labs, stool studies and tailor further therapy pending results; eventually may require CT imaging of the abdomen if labs are nondiagnostic; will ask GI to see in the morning for  recommendations    Hemorrhoid  --anusol bid    Uncontrolled DM 2 with gastroparesis  --FSBG with SSI  --A1c in a.m.  -Chronically uncontrolled DM 2 with long-term use of insulin, last A1c 12%, with nonadherence to home insulin therapy, and complication of gastroparesis; has not been taking insulin at home due to reported poor oral intake, repeat A1c in the a.m., Accu-Cheks with SSI    Bipolar disorder  GERD    DVT prophylaxis:  mechanical    CODE STATUS:    Level Of Support Discussed With: Patient  Code Status (Patient has no pulse and is not breathing): CPR (Attempt to Resuscitate)  Medical Interventions (Patient has pulse or is breathing): Full Support      This note has been completed as part of a split-shared workflow.   Signature: Electronically signed by EMELYN Kunz, 01/03/22, 9:34 PM EST.         Attending   Admission Attestation       I have seen and examined the patient, performing an independent face-to-face diagnostic evaluation with plan of care reviewed and developed with the advanced practice clinician (APC).      Brief Summary Statement:   Thais Hobson is a 36 y.o. female with history of pancreatitis, ulcerative colitis followed by GI, prior C. difficile infection, medically uncontrolled diabetes with gastroparesis, multiple other chronic illnesses who was sent for direct admission by her PCP for evaluation of diarrhea.  She reports she is normally on a mesalamine enema which she has not been able to take it due to external hemorrhoids causing her substantial amount of pain.  Over the past 2-4 weeks she has had persistent diarrhea after eating any food that she does report as being mucoid, in addition to epigastric/LUQ abdominal pain.  She denies blood in her stool at the time of my evaluation but she did report BRBPR to the APRN earlier this evening.    Remainder of detailed HPI is as noted by APC and has been reviewed and/or edited by me for completeness.    Attending Physical  Exam:  Constitutional: Awake, alert, no acute distress, sitting up in bed wearing pajamas from home  Eyes: PERRL, sclerae anicteric, no conjunctival injection  HENT: NCAT, mucous membranes moist  Neck: Supple, trachea midline  Respiratory: Clear to auscultation bilaterally, nonlabored respirations   Cardiovascular: RRR, no murmurs, rubs, or gallops, palpable radial pulses bilaterally  Gastrointestinal: Positive bowel sounds, soft, epigastric tenderness to palpation, nondistended  Musculoskeletal: No bilateral ankle edema, no clubbing or cyanosis to extremities  Psychiatric: Appropriate affect, cooperative  Neurologic: Oriented x 3, strength symmetric in all extremities, speech clear    Brief Assessment/Plan :  See detailed assessment and plan developed with APC which I have reviewed and/or edited for completeness.        Admission Status: I believe that this patient meets OBSERVATION status, however if further evaluation or treatment plans warrant, status may change.  Based upon current information, I predict patient's care encounter to be less than or equal to 2 midnights.      Tor Colon DO  01/04/22                          Electronically signed by Tor Colon DO at 01/04/22 0044       Emergency Department Notes    No notes of this type exist for this encounter.         Vital Signs (last day)     Date/Time Temp Temp src Pulse Resp BP Patient Position SpO2    01/04/22 1100 97.9 (36.6) Oral -- 16 99/76 Lying --    01/04/22 0900 -- -- 89 -- -- -- --    01/04/22 0700 -- -- 92 -- -- -- --    01/04/22 0338 98.2 (36.8) Oral -- 16 110/86 Lying --    01/03/22 2003 97.9 (36.6) Oral 98 16 -- Lying --            Facility-Administered Medications as of 1/4/2022   Medication Dose Route Frequency Provider Last Rate Last Admin   • azaTHIOprine (IMURAN) tablet 50 mg  50 mg Oral Daily Sallie Howard APRN   50 mg at 01/04/22 0800   • dextrose (D50W) (25 g/50 mL) IV injection 25 g  25 g Intravenous Q15 Min  PRN Liset Matthew MD       • dextrose (GLUTOSE) oral gel 15 g  15 g Oral Q15 Min PRN Liset Matthew MD       • dicyclomine (BENTYL) capsule 10 mg  10 mg Oral 4x Daily AC & at Bedtime Liset Waterman PA       • estradiol (MINIVELLE, VIVELLE-DOT) 0.05 MG/24HR patch 1 patch  1 patch Transdermal Q3 Days Sallie Howard APRN   1 patch at 01/04/22 0758   • gabapentin (NEURONTIN) capsule 800 mg  800 mg Oral Q8H Sallie Howard APRN   800 mg at 01/03/22 2221   • glucagon (human recombinant) (GLUCAGEN DIAGNOSTIC) injection 1 mg  1 mg Subcutaneous Q15 Min PRN Liset Matthew MD       • Hydrocortisone (Perianal) (ANUSOL-HC) 2.5 % rectal cream   Rectal BID Sallie Howard APRN   Given at 01/04/22 0801   • hydrOXYzine (ATARAX) tablet 25 mg  25 mg Oral TID PRN Sallie Howard APRN       • insulin regular (humuLIN R,novoLIN R) injection 0-9 Units  0-9 Units Subcutaneous Q6H Liset Matthew MD   7 Units at 01/04/22 1213   • melatonin tablet 5 mg  5 mg Oral Nightly PRN Sallie Howard APRN       • morphine injection 1 mg  1 mg Intravenous Q3H PRN Sallie Howard APRN   1 mg at 01/04/22 1217    And   • naloxone (NARCAN) injection 0.4 mg  0.4 mg Intravenous Q5 Min PRN Sallie Howard APRN       • nystatin (MYCOSTATIN) powder   Topical TID Sallie Howard APRN   Given at 01/04/22 0801   • ondansetron (ZOFRAN) tablet 4 mg  4 mg Oral Q6H PRN Sallie Howard APRN        Or   • ondansetron (ZOFRAN) injection 4 mg  4 mg Intravenous Q6H PRN Sallie Howard, APRN       • sodium chloride 0.9 % flush 10 mL  10 mL Intravenous Q12H Sallie Howard APRN   10 mL at 01/04/22 0345   • sodium chloride 0.9 % flush 10 mL  10 mL Intravenous PRN Sallie Howard APRN       • sodium chloride 0.9 % infusion  100 mL/hr Intravenous Continuous Sallie Howard APRN 100 mL/hr at 01/04/22 1243 100 mL/hr at 01/04/22 1243   • topiramate (TOPAMAX) tablet 50 mg  50 mg Oral BID  Sallie Howard, APRN   50 mg at 01/04/22 0915   • venlafaxine XR (EFFEXOR-XR) 24 hr capsule 75 mg  75 mg Oral Daily With Breakfast Liset Waterman PA           Lab Results (last 24 hours)     Procedure Component Value Units Date/Time    POC Glucose Once [[id number]]  (Abnormal) Collected: 01/04/22 1154    Specimen: Blood Updated: 01/04/22 1157     Glucose 300 mg/dL      Comment: Meter: [id number] : [id number] [facility]       Comprehensive Metabolic Panel [[id number]]  (Abnormal) Collected: 01/04/22 0722    Specimen: Blood Updated: 01/04/22 0845     Glucose 338 mg/dL      BUN 10 mg/dL      Creatinine 0.59 mg/dL      Sodium 135 mmol/L      Potassium 3.8 mmol/L      Chloride 102 mmol/L      CO2 19.0 mmol/L      Calcium 8.8 mg/dL      Total Protein 7.4 g/dL      Albumin 3.90 g/dL      ALT (SGPT) 31 U/L      AST (SGOT) 17 U/L      Alkaline Phosphatase 168 U/L      Total Bilirubin 0.3 mg/dL      eGFR Non African Amer 116 mL/min/1.73      eGFR  African Amer 141 mL/min/1.73      Globulin 3.5 gm/dL      Comment: Calculated Result        A/G Ratio 1.1 g/dL      BUN/Creatinine Ratio 16.9     Anion Gap 14.0 mmol/L     Narrative:      GFR Normal >60  Chronic Kidney Disease <60  Kidney Failure <15      C-reactive Protein [[id number]]  (Abnormal) Collected: 01/04/22 0722    Specimen: Blood Updated: 01/04/22 0845     C-Reactive Protein 0.63 mg/dL     Lipase [[id number]]  (Normal) Collected: 01/04/22 0722    Specimen: Blood Updated: 01/04/22 0845     Lipase 14 U/L     Magnesium [[id number]]  (Normal) Collected: 01/04/22 0722    Specimen: Blood Updated: 01/04/22 0845     Magnesium 1.8 mg/dL     Lactic Acid, Plasma [[id number]]  (Normal) Collected: 01/04/22 0722    Specimen: Blood Updated: 01/04/22 0841     Lactate 1.0 mmol/L      Comment: Falsely depressed results may occur on samples drawn from patients receiving N-Acetylcysteine (NAC) or Metamizole.       Scan Slide [[id number]]  (Normal) Collected: 01/04/22 0722     "Specimen: Blood Updated: 01/04/22 0836     RBC Morphology Normal     WBC Morphology Normal     Platelet Morphology Normal    CBC Auto Differential [[id number]]  (Abnormal) Collected: 01/04/22 0722    Specimen: Blood Updated: 01/04/22 0836     WBC 12.03 10*3/mm3      RBC 5.07 10*6/mm3      Hemoglobin 13.8 g/dL      Hematocrit 42.3 %      MCV 83.4 fL      MCH 27.2 pg      MCHC 32.6 g/dL      RDW 12.4 %      RDW-SD 37.4 fl      MPV 10.2 fL      Platelets 272 10*3/mm3      Neutrophil % 46.6 %      Lymphocyte % 46.4 %      Monocyte % 3.7 %      Eosinophil % 2.7 %      Basophil % 0.4 %      Immature Grans % 0.2 %      Neutrophils, Absolute 5.62 10*3/mm3      Lymphocytes, Absolute 5.58 10*3/mm3      Monocytes, Absolute 0.44 10*3/mm3      Eosinophils, Absolute 0.32 10*3/mm3      Basophils, Absolute 0.05 10*3/mm3      Immature Grans, Absolute 0.02 10*3/mm3      nRBC 0.0 /100 WBC     Narrative:      Appended report. These results have been appended to a previously verified report.    Procalcitonin [[id number]]  (Normal) Collected: 01/04/22 0722    Specimen: Blood Updated: 01/04/22 0818     Procalcitonin 0.05 ng/mL     Narrative:      As a Marker for Sepsis (Non-Neonates):     1. <0.5 ng/mL represents a low risk of severe sepsis and/or septic shock.  2. >2 ng/mL represents a high risk of severe sepsis and/or septic shock.    As a Marker for Lower Respiratory Tract Infections that require antibiotic therapy:  PCT on Admission     Antibiotic Therapy             6-12 Hrs later  >0.5                          Strongly Recommended            >0.25 - <0.5             Recommended  0.1 - 0.25                  Discouraged                       Remeasure/reassess PCT  <0.1                         Strongly Discouraged         Remeasure/reassess PCT      As 28 day mortality risk marker: \"Change in Procalcitonin Result\" (>80% or <=80%) if Day 0 (or Day 1) and Day 4 values are available. Refer to http://www.[employer]-pct-calculator.com/    Change " in PCT <=80 %   A decrease of PCT levels below or equal to 80% defines a positive change in PCT test result representing a higher risk for 28-day all-cause mortality of patients diagnosed with severe sepsis or septic shock.    Change in PCT >80 %   A decrease of PCT levels of more than 80% defines a negative change in PCT result representing a lower risk for 28-day all-cause mortality of patients diagnosed with severe sepsis or septic shock.                Sedimentation Rate [[id number]]  (Abnormal) Collected: 01/04/22 0722    Specimen: Blood Updated: 01/04/22 0815     Sed Rate 29 mm/hr     aPTT [[id number]]  (Normal) Collected: 01/04/22 0722    Specimen: Blood Updated: 01/04/22 0807     PTT 30.3 seconds     Narrative:      PTT = The equivalent PTT values for the therapeutic range of heparin levels at 0.3 to 0.5 U/ml are 55 to 70 seconds.    Protime-INR [[id number]]  (Normal) Collected: 01/04/22 0722    Specimen: Blood Updated: 01/04/22 0807     Protime 12.5 Seconds      INR 0.96    POC Glucose Once [[id number]]  (Abnormal) Collected: 01/04/22 0722    Specimen: Blood Updated: 01/04/22 0723     Glucose 322 mg/dL      Comment: Meter: [id number] : [id number] [facility]       Urinalysis With Microscopic If Indicated (No Culture) - Urine, Clean Catch [[id number]]  (Abnormal) Collected: 01/04/22 0347    Specimen: Urine, Clean Catch Updated: 01/04/22 0357     Color, UA Yellow     Appearance, UA Clear     pH, UA 6.5     Specific Gravity, UA 1.013     Glucose, UA >=1000 mg/dL (3+)     Ketones, UA Trace     Bilirubin, UA Negative     Blood, UA Negative     Protein, UA Negative     Leuk Esterase, UA Negative     Nitrite, UA Negative     Urobilinogen, UA 0.2 E.U./dL    Narrative:      Urine microscopic not indicated.    COVID PRE-OP / PRE-PROCEDURE SCREENING ORDER (NO ISOLATION) - Swab, Nasopharynx [[id number]]  (Normal) Collected: 01/03/22 2225    Specimen: Swab from Nasopharynx Updated: 01/03/22 2169    Narrative:      The  following orders were created for panel order COVID PRE-OP / PRE-PROCEDURE SCREENING ORDER (NO ISOLATION) - Swab, Nasopharynx.  Procedure                               Abnormality         Status                     ---------                               -----------         ------                     COVID-19, FLU A/B, RSV P...[[id number]]  Normal              Final result                 Please view results for these tests on the individual orders.    COVID-19, FLU A/B, RSV PCR - Swab, Nasopharynx [[id number]]  (Normal) Collected: 22    Specimen: Swab from Nasopharynx Updated: 22 2319     COVID19 Not Detected     Influenza A PCR Not Detected     Influenza B PCR Not Detected     RSV, PCR Not Detected        Imaging Results (Last 24 Hours)     ** No results found for the last 24 hours. **           Physician Progress Notes (last 24 hours)      Liset Matthew MD at 22 0700              [facility] [facility]  PROGRESS NOTE    Patient Name: Thais Hobson  : 1985  MRN: [id number]    Date of Admission: 1/3/2022  Primary Care Physician: Lv Sanchez DO    Subjective   Subjective     CC:  Diarrhea    HPI:  Continues to have diarrhea and abdominal pain.    ROS:  General: denies fevers or chills  CV: denies chest pain  Resp: denies shortness of breath  Abd: Positive for abdominal pain and diarrhea      Objective   Objective     Vital Signs:   Temp:  [97.9 °F (36.6 °C)-98.2 °F (36.8 °C)] 98.2 °F (36.8 °C)  Heart Rate:  [98] 98  Resp:  [16] 16  BP: (110)/(86) 110/86     Physical Exam:  Constitutional: No acute distress, awake, alert, resting in bed  Respiratory: Clear to auscultation bilaterally, respiratory effort normal   Cardiovascular: RRR, no murmurs, rubs, or gallops  Gastrointestinal: Positive bowel sounds, diffusely tender but otherwise soft  Musculoskeletal: No bilateral ankle edema  Psychiatric: Appropriate affect, cooperative  Neurologic:  Oriented x 3, no focal neurological deficits  Skin: No rashes      Results Reviewed:  LAB RESULTS:                              Brief Urine Lab Results  (Last result in the past 365 days)      Color   Clarity   Blood   Leuk Est   Nitrite   Protein   CREAT   Urine HCG        01/04/22 0347 Yellow   Clear   Negative   Negative   Negative   Negative                 Microbiology Results Abnormal     Procedure Component Value - Date/Time    COVID PRE-OP / PRE-PROCEDURE SCREENING ORDER (NO ISOLATION) - Swab, Nasopharynx [[id number]]  (Normal) Collected: 01/03/22 2225    Lab Status: Final result Specimen: Swab from Nasopharynx Updated: 01/03/22 2319    Narrative:      The following orders were created for panel order COVID PRE-OP / PRE-PROCEDURE SCREENING ORDER (NO ISOLATION) - Swab, Nasopharynx.  Procedure                               Abnormality         Status                     ---------                               -----------         ------                     COVID-19, FLU A/B, RSV P...[[id number]]  Normal              Final result                 Please view results for these tests on the individual orders.    COVID-19, FLU A/B, RSV PCR - Swab, Nasopharynx [[id number]]  (Normal) Collected: 01/03/22 2225    Lab Status: Final result Specimen: Swab from Nasopharynx Updated: 01/03/22 2319     COVID19 Not Detected     Influenza A PCR Not Detected     Influenza B PCR Not Detected     RSV, PCR Not Detected          No radiology results from the last 24 hrs        I have reviewed the medications:  Scheduled Meds:azaTHIOprine, 50 mg, Oral, Daily  estradiol, 1 patch, Transdermal, Q3 Days  gabapentin, 800 mg, Oral, Q8H  Hydrocortisone (Perianal), , Rectal, BID  nystatin, , Topical, TID  sodium chloride, 10 mL, Intravenous, Q12H  topiramate, 50 mg, Oral, BID      Continuous Infusions:sodium chloride, 100 mL/hr, Last Rate: 100 mL/hr (01/04/22 0336)      PRN Meds:.hydrOXYzine  •  melatonin  •  Morphine **AND** naloxone  •   ondansetron **OR** ondansetron  •  sodium chloride    Assessment/Plan   Assessment & Plan     Active Hospital Problems    Diagnosis  POA   • **Ulcerative pancolitis with rectal bleeding ([facility]) [K51.011]  Yes   • Bloody diarrhea [R19.7]  Yes   • Type 2 diabetes mellitus treated with insulin ([facility]) [E11.9, Z79.4]  Not Applicable   • Bipolar disorder (HCC) [F31.9]  Yes   • Gastroesophageal reflux disease without esophagitis [K21.9]  Yes      Resolved Hospital Problems   No resolved problems to display.        Brief Hospital Course to date:    Thais Hobson is a 36 y.o. female with history of gastroparesis, ulcerative colitis/pancolitis, type 2 diabetes, bipolar disorder, C. Difficile, was a direct admit from her PCP for complaints of diarrhea.     Diarrhea with a history of C. Difficile  Ulcerative pancolitis with rectal bleeding  --EGD 07/2021 showed mild left-sided colitis  --GI PCR, C. difficile panel pending  --consider CT abdomen pelvis pending GI recs  --IV fluids  --type and screen  --GI consult pending    Hemorrhoid  --anusol bid     Uncontrolled DM 2 with gastroparesis  --FSBG with SSI  -Chronically uncontrolled DM 2 with long-term use of insulin, last A1c 12%, with nonadherence to home insulin therapy, and complication of gastroparesis; has not been taking insulin at home due to reported poor oral intake     Bipolar disorder  GERD     DVT prophylaxis:  mechanical    DVT prophylaxis:  Mechanical DVT prophylaxis orders are present.       AM-PAC 6 Clicks Score (PT): 24 (01/03/22 2000)    Disposition: I expect the patient to be discharged TBD.    CODE STATUS:   Code Status and Medical Interventions:   Ordered at: 01/03/22 213     Level Of Support Discussed With:    Patient     Code Status (Patient has no pulse and is not breathing):    CPR (Attempt to Resuscitate)     Medical Interventions (Patient has pulse or is breathing):    Full Support     This patient's problems and plans were partially entered by my  partner and updated as appropriate by me 01/04/22. Today is my first day evaluating this patient's active medical problems. I Personally reviewed chart and adjusted note to reflect daily changes in management/clinical condition        Liset Matthew MD  01/04/22                Electronically signed by Liset Matthew MD at 01/04/22 0842          Consult Notes (last 24 hours)      Liset Waterman PA at 01/04/22 1209      Consult Orders    1. Inpatient Gastroenterology Consult [[id number]] ordered by Sallie Howard APRN at 01/03/22 2135               [facility] [place]    Referring Provider: Tor Colon DO    PCP: Lv Sanchez DO    Reason for Consultation: Abdominal pain     Chief complaint: Abdominal pain     History of present illness:    Thais Hobson is a 36 y.o. female who is admitted with abdominal pain.   She describes predominately epigastric pain that radiates up in her chest.   It has been present for two weeks.  Symptoms are intermittent and worsened by food.  She has post prandial diarrhea.  She has not had any bowel movement since admission last night.  She has poorly controlled type II diabetes mellitus and has not been taking her insulin due to her poor oral intake.  Her glucose today is in the 300s.      She has history of ulcerative colitis currently on Entyvio (last dose 12/27/21), Imuran 50 mg daily and Rowasa enemas BID.   Her last colonoscopy was in July 2021 that showed mild left sided colitis to 50 cm.   She had an unremarkable EGD at that time.  She has long standing history of ulcerative colitis diagnosed greater than 15 years ago with numerous intolerances to biologic therapies including Remicade, Humira and Xeljanz.   She has had C. Difficile infections in the past.  Previous Entyvio level was therapeutic at 26 in May 2021.        She denies any fever or chills.  She is depressed and anxious about her multiple medical problems.       Allergies:  Hydrochlorothiazide, Penicillins, Remicade [infliximab], Soybean-containing drug products, Xeljanz [tofacitinib citrate], Zofran [ondansetron hcl], Ondansetron, Fidaxomicin, and Reglan [metoclopramide]    Scheduled Meds:  azaTHIOprine, 50 mg, Oral, Daily  estradiol, 1 patch, Transdermal, Q3 Days  gabapentin, 800 mg, Oral, Q8H  Hydrocortisone (Perianal), , Rectal, BID  insulin regular, 0-9 Units, Subcutaneous, Q6H  nystatin, , Topical, TID  sodium chloride, 10 mL, Intravenous, Q12H  topiramate, 50 mg, Oral, BID         Infusions:  sodium chloride, 100 mL/hr, Last Rate: 100 mL/hr (01/04/22 0922)        PRN Meds:  dextrose  •  dextrose  •  glucagon (human recombinant)  •  hydrOXYzine  •  melatonin  •  Morphine **AND** naloxone  •  ondansetron **OR** ondansetron  •  sodium chloride    Home Meds:  Medications Prior to Admission   Medication Sig Dispense Refill Last Dose   • azaTHIOprine (IMURAN) 50 MG tablet Take 1 tablet by mouth Daily. 180 tablet 3 1/3/2022 at Unknown time   • Continuous Blood Gluc  (Dexcom G6 ) device 1 Units Daily. 1 each 1 1/3/2022 at Unknown time   • Continuous Blood Gluc Sensor (Dexcom G6 Sensor) Every 10 (Ten) Days. 3 each 11 1/3/2022 at Unknown time   • Continuous Blood Gluc Transmit (Dexcom G6 Transmitter) misc 1 Units Daily. 1 each 1 1/3/2022 at Unknown time   • Continuous Glucose Monitor kit Use as directed 1 each 11 1/3/2022 at Unknown time   • diphenhydrAMINE (Banophen) 25 mg capsule Take 1 capsule by mouth Every 8 (Eight) Hours As Needed for Itching or Allergies. 90 capsule 1 1/3/2022 at Unknown time   • EPINEPHrine (EpiPen 2-Luis) 0.3 MG/0.3ML solution auto-injector injection Inject 0.3 mL into the appropriate muscle as directed by prescriber. For emergent anaphylaxis 2 each 0 1/3/2022 at Unknown time   • estradiol (MINIVELLE, VIVELLE-DOT) 0.05 MG/24HR patch Place 1 patch on the skin as directed by provider Every 3 (Three) Days. (Patient taking differently:  "Place 1 patch on the skin as directed by provider Every 3 (Three) Days. PT USES PATCHES. DUE TO BE CHANGED TOMORROW.) 8 patch 2 1/3/2022 at Unknown time   • fluconazole (Diflucan) 150 MG tablet Take 1 tablet by mouth now, then repeat in 3 days if needed 2 tablet 0 1/3/2022 at Unknown time   • gabapentin (NEURONTIN) 800 MG tablet TAKE 1 TABLET BY MOUTH THREE TIMES DAILY 90 tablet 0 1/3/2022 at Unknown time   • glucose blood test strip ONE TOUCH VERIO FLEX TEST STRIPS TO CHECK GLUCOSE 3 TIMES DAILY FOR DM E11.9 100 each 12 1/3/2022 at Unknown time   • HYDROcodone-acetaminophen (NORCO) 7.5-325 MG per tablet Take 1 tablet by mouth Every 6 (Six) Hours As Needed. for pain   Past Month at Unknown time   • Hydrocortisone, Perianal, (Proctozone-HC) 2.5 % rectal cream Insert  into the rectum 2 (Two) Times a Day. 30 g 1 1/3/2022 at Unknown time   • hydrOXYzine pamoate (VISTARIL) 25 MG capsule TAKE 1 CAPSULE BY MOUTH THREE TIMES DAILY AS NEEDED FOR ANXIETY 60 capsule 2 1/3/2022 at Unknown time   • insulin aspart (novoLOG) 100 UNIT/ML injection INJECT UP TO 12 UNITS USING SLIDING SCALE UNDER THE SKIN THREE TIMES DAILY WITH MEALS 10 mL 3 1/3/2022 at Unknown time   • insulin detemir (LEVEMIR) 100 UNIT/ML injection Inject 15 Units under the skin into the appropriate area as directed Every Night. 15 mL 3 1/3/2022 at Unknown time   • Insulin Syringe 30G X 5/16\" 1 ML misc 1 syringe 3 (Three) Times a Day. 100 each 2 1/3/2022 at Unknown time   • Lancets misc USE AS DIRECTED TO CHECK GLUCOSE 3 TIMES DAILY FOR DM E11.9 100 each 3 1/3/2022 at Unknown time   • mesalamine (ROWASA) 4 g enema Insert 1 enema into the rectum Every Night. One enema in rectum nightly, try and retain overnight 60 mL 11 Past Month at Unknown time   • naproxen (NAPROSYN) 500 MG tablet Take 500 mg by mouth 2 (Two) Times a Day.   Past Month at Unknown time   • nystatin (MYCOSTATIN) 898128 UNIT/GM powder Apply  topically to the appropriate area as directed 3 (Three) " Times a Day. 60 g 1 1/3/2022 at Unknown time   • oxyCODONE-acetaminophen (PERCOCET) 5-325 MG per tablet Take 1 tablet by mouth Every 6 (Six) Hours As Needed.   Past Month at Unknown time   • promethazine (PHENERGAN) 25 MG tablet Take 1 tablet by mouth Every 6 (Six) Hours As Needed for Nausea or Vomiting. 40 tablet 2 1/3/2022 at Unknown time   • SUMAtriptan (Imitrex) 50 MG tablet Take one tablet at onset of headache. May repeat dose one time in 2 hours if headache not relieved. 8 tablet 2 1/3/2022 at Unknown time   • topiramate (TOPAMAX) 50 MG tablet TAKE 1 TABLET BY MOUTH TWICE DAILY 60 tablet 2 1/3/2022 at Unknown time   • neomycin-polymyxin-hydrocortisone (CORTISPORIN) 3.5-38562-2 otic suspension INSTILL 4 DROPS IN AFFECTED EAR EVERY 6 HOURS   More than a month at Unknown time   • predniSONE (DELTASONE) 10 MG tablet Take 40 mg x 3 days, 30 x 3 days, 20 x 3 days, 10 x 4 days 31 tablet 0 Patient Taking Differently at Unknown time   • venlafaxine (EFFEXOR) 37.5 MG tablet Take 1 tablet by mouth 3 (Three) Times a Day With Meals for 30 days. 90 tablet 0        ROS: Review of Systems   Constitutional: Positive for fatigue.   HENT: Negative.    Eyes: Negative.    Respiratory: Negative.    Cardiovascular: Negative.    Gastrointestinal: Positive for abdominal pain.   Endocrine: Negative.    Genitourinary: Negative.    Musculoskeletal: Negative.    Skin: Negative.    Allergic/Immunologic: Negative.    Neurological: Negative.    Hematological: Negative.    Psychiatric/Behavioral: Positive for dysphoric mood.       PAST MED HX:  Past Medical History:   Diagnosis Date   • Abdominal pain     periumbilicul   • Abdominal tenderness     Periumbil   • Alopecia    • Anemia    • Anxiety    • Arthritis    • Asthma     as a child   • Back pain    • Bipolar disorder (HCC)    • Blood in stool    • Body piercing     ears   • Colitis    • Colitis    • Colon polyps    • Depression    • Diabetes mellitus (HCC)    • Diarrhea    • Dysphagia      Patient reported he has trouble from time to time and that her throat has to be dilated   • Elevated cholesterol     Reported slightly elevated - taking no medication    • Fractures     Right pinky toe - no surgery required   • GERD (gastroesophageal reflux disease)    • H/O colonoscopy 2013    Terminal ileal biopsies negative. Cecal&ascending biopsie revealed chronic active colitis w/ features cons. w/ inflammatory bowel disease. Transevere colon biopsies revealved chronic active colitis. Desc. colon biopsies revealed chronic active colitis. Rect colon biopsie revealed chronic active colitis. Negative for dysplasia   • Hematemesis    • History of Clostridium difficile infection    • History of transfusion     No reaction to transfusion reported   • Migraine headache    • Nausea     alone   • Pancreatitis    • Restless leg    • Seizures (HCC)     pt reports x1    • Tattoos     x9   • Ulcerative pancolitis with rectal bleeding (HCC) 10/1/2021   • Universal ulcerative colitis (HCC)    • UTI (urinary tract infection)    • Weight loss      PAST SURG HX:  Past Surgical History:   Procedure Laterality Date   • BREAST SURGERY Left     breast lump removed benign   •  SECTION     • CHOLECYSTECTOMY      for stone disease   • COLONOSCOPY     • COLONOSCOPY N/A 2020    Procedure: COLONOSCOPY;  Surgeon: Alonzo He MD;  Location: [facility] ENDOSCOPY;  Service: Gastroenterology;  Laterality: N/A;   • COLONOSCOPY N/A 2021    Procedure: COLONOSCOPY;  Surgeon: Francisco Sainz MD;  Location: [facility] ENDOSCOPY;  Service: Gastroenterology;  Laterality: N/A;   • DILATATION AND CURETTAGE     • ENDOSCOPY  2013    Erosive esophagitis, grade 2; erosive gastritis; small sliding hiatal hernia less than 3 cm, erosive deodenitis duodenal polyp.No gan mucosa.Second portion duedenal biopsy neg. Second postion of dudoenal biopsy revealed polypoid intestinal mucosa w/ lymphoid aggregate. gastric  antrum& body biopsy revealed chronic follicular gastritis. no helicobacter pylori. Negative for mateaplasia, dysplasia   • ENDOSCOPY N/A 9/22/2020    Procedure: ESOPHAGOGASTRODUODENOSCOPY WITH DILATATION AND BIOPSIES;  Surgeon: Alonzo He MD;  Location:  [facility];  Service: Gastroenterology;  Laterality: N/A;   • ENDOSCOPY N/A 5/17/2021    Procedure: ESOPHAGOGASTRODUODENOSCOPY;  Surgeon: Brunner, Mark I, MD;  Location:  [facility];  Service: Gastroenterology;  Laterality: N/A;   • ENDOSCOPY N/A 7/6/2021    Procedure: ESOPHAGOGASTRODUODENOSCOPY WITH SAVARY DILATATION;  Surgeon: Francisco Sainz MD;  Location:  [facility];  Service: Gastroenterology;  Laterality: N/A;   • HIP SURGERY Right    • HYSTERECTOMY     • LEEP         FAM HX:  Family History   Problem Relation Age of Onset   • Heart failure Father    • Heart disease Father    • No Known Problems Mother    • No Known Problems Brother    • No Known Problems Son    • No Known Problems Son        SOC HX:  Social History     Socioeconomic History   • Marital status: Single   Tobacco Use   • Smoking status: Never Smoker   • Smokeless tobacco: Never Used   Substance and Sexual Activity   • Alcohol use: No   • Drug use: No   • Sexual activity: Defer       PHYSICAL EXAM  BP 99/76 (BP Location: Right arm, Patient Position: Lying)   Pulse 89   Temp 97.9 °F (36.6 °C) (Oral)   Resp 16   LMP  (LMP Unknown)   Wt Readings from Last 3 Encounters:   07/05/21 93.9 kg (207 lb 1.6 oz)   05/16/21 90.3 kg (199 lb)   03/11/21 92.5 kg (204 lb)   ,body mass index is unknown because there is no height or weight on file.  Physical Exam  HENT:      Head: Normocephalic and atraumatic.      Mouth/Throat:      Mouth: Mucous membranes are moist.   Eyes:      General: No scleral icterus.  Cardiovascular:      Rate and Rhythm: Normal rate and regular rhythm.   Pulmonary:      Effort: Pulmonary effort is normal.      Breath sounds: Normal breath sounds.    Abdominal:      General: Bowel sounds are normal. There is no distension.      Palpations: Abdomen is soft.      Tenderness: There is abdominal tenderness in the epigastric area. There is no guarding.   Musculoskeletal:      Right lower leg: No edema.      Left lower leg: No edema.   Skin:     General: Skin is warm and dry.   Neurological:      Mental Status: She is alert and oriented to person, place, and time.   Psychiatric:      Comments: Depressed mood        Results Review:   I reviewed the patient's new clinical results.    Lab Results   Component Value Date    WBC 12.03 (H) 01/04/2022    HGB 13.8 01/04/2022    HGB 12.3 07/05/2021    HGB 13.3 07/04/2021    HCT 42.3 01/04/2022    MCV 83.4 01/04/2022     01/04/2022     Lab Results   Component Value Date    INR 0.96 01/04/2022     Lab Results   Component Value Date    GLUCOSE 338 (H) 01/04/2022    BUN 10 01/04/2022    CREATININE 0.59 01/04/2022    EGFRIFNONA 116 01/04/2022    EGFRIFAFRI 141 01/04/2022    BCR 16.9 01/04/2022     (L) 01/04/2022    K 3.8 01/04/2022    CO2 19.0 (L) 01/04/2022    CALCIUM 8.8 01/04/2022    PROTENTOTREF 7.9 11/20/2019    ALBUMIN 3.90 01/04/2022    ALKPHOS 168 (H) 01/04/2022    BILITOT 0.3 01/04/2022    ALT 31 01/04/2022    AST 17 01/04/2022      Ref. Range 1/4/2022 07:22   C-Reactive Protein Latest Ref Range: 0.00 - 0.50 mg/dL 0.63 (H)      Ref. Range 1/4/2022 07:22   Sed Rate Latest Ref Range: 0 - 20 mm/hr 29 (H)       ASSESSMENTS/PLANS    1. Ulcerative colitis  2. Epigastric pain  3. Gastroparesis  4. Uncontrolled diabetes mellitus     Ms. Hobson is a 36 year old with uncontrolled diabetes mellitus (A1c of 12) and ulcerative colitis on Entyvio, Imuran and Rowasa enemas that presents with epigastric pain and post prandial diarrhea.  Favor her symptoms to be secondary to autonomic dysfunction.   Will exclude infectious etiology.    >>> Obtain C. Difficile toxin, PCR and GI panel PCR  >>> Obtain fecal calprotectin   >>>  Will check Vedolizumab levels/Ab and Imuran levels   >>> Begin Bentyl 10 mg QID before meals and at bedtime  >>> Restart home Effexor   >>> Small low carbohydrate meals   >>> Anusol cream BID and sitz bath twice a day     I discussed the patient's findings and my recommendations with patient    JACK Peres  01/04/22  12:10 EST      Electronically signed by Liset Waterman PA at 01/04/22 3793

## 2022-01-04 NOTE — H&P
[facility] Medicine Services  HISTORY AND PHYSICAL    Patient Name: Thais Hobson  : 1985  MRN: [id number]  Primary Care Physician: Lv Sanchez DO  Date of admission: 1/3/2022    Subjective   Subjective     Chief Complaint:  Abdominal pain    HPI:  Thais Hobson is a 36 y.o. female with history of gastroparesis, ulcerative colitis/pancolitis, type 2 diabetes, bipolar disorder, C. Difficile, was a direct admit from her PCP for complaints of diarrhea.  Patient complains of epigastric abdominal pain for the past 2 weeks that worsened over the last 2 days.  Pain is constant and described as cramping and aching.  Pt also reports an external hemorrhoid that is not getting any better.  She has been having diarrhea for the last few weeks.  Stool is dark red and bright red blood.  She has been having >10 bowel movements with blood and mucous a day.  She has been taking phenergan for her nausea and vomiting.  She endorses fatigue, loss of appetite, decrease in urine output, nausea and vomiting.  She reports small amount of blood in her emesis.  No fevers, chills, shortness of air, chest pain, dysuria, or any other complaints at this time.  She has not been taking her mesalamine enema due to pain from her hemorrhoids.  She has been using proctozone at home but it is only providing minimal relief.  She was seen in the ED at [facility] for these symptoms before  and was told her BP was low.  She was given IV fluids and discharged home.  She reports that he DM is not been controlled lately d/t not taking her insulin because she is afraid to take it due to not eating.   Patient is being admitted to the Hospitalist for further evaluation and management.      COVID Details:        Symptoms: [x] NONE [] Fever []  Cough [] Shortness of breath [] Change in taste or smell  The patient qualifies to receive the vaccine, but they have not yet received it.    Review of Systems    Constitutional: Positive for appetite change and fatigue. Negative for chills and fever.   HENT: Negative.    Eyes: Negative.    Respiratory: Negative.    Cardiovascular: Negative.    Gastrointestinal: Positive for abdominal pain, blood in stool, diarrhea, nausea and vomiting.   Endocrine: Negative.    Genitourinary: Positive for decreased urine volume. Negative for difficulty urinating, dysuria, frequency and urgency.   Musculoskeletal: Negative.    Skin: Positive for rash (facial). Negative for color change, pallor and wound.   Allergic/Immunologic: Negative.    Neurological: Negative.    Hematological: Negative.    Psychiatric/Behavioral: Negative.         All other systems reviewed and are negative.     Personal History     Past Medical History:   Diagnosis Date   • Abdominal pain     periumbilicul   • Abdominal tenderness     Periumbil   • Alopecia    • Anemia    • Anxiety    • Arthritis    • Asthma     as a child   • Back pain    • Bipolar disorder (HCC)    • Blood in stool    • Body piercing     ears   • Colitis    • Colitis    • Colon polyps    • Depression    • Diabetes mellitus (HCC)    • Diarrhea    • Dysphagia     Patient reported he has trouble from time to time and that her throat has to be dilated   • Elevated cholesterol     Reported slightly elevated - taking no medication    • Fractures     Right pinky toe - no surgery required   • GERD (gastroesophageal reflux disease)    • H/O colonoscopy 08/01/2013    Terminal ileal biopsies negative. Cecal&ascending biopsie revealed chronic active colitis w/ features cons. w/ inflammatory bowel disease. Transevere colon biopsies revealved chronic active colitis. Desc. colon biopsies revealed chronic active colitis. Rect colon biopsie revealed chronic active colitis. Negative for dysplasia   • Hematemesis    • History of Clostridium difficile infection    • History of transfusion     No reaction to transfusion reported   • Migraine headache    • Nausea      alone   • Pancreatitis    • Restless leg    • Seizures (HCC)     pt reports x1    • Tattoos     x9   • Ulcerative pancolitis with rectal bleeding (HCC) 10/1/2021   • Universal ulcerative colitis (HCC)    • UTI (urinary tract infection)    • Weight loss        Past Surgical History:   Procedure Laterality Date   • BREAST SURGERY Left     breast lump removed benign   •  SECTION     • CHOLECYSTECTOMY      for stone disease   • COLONOSCOPY     • COLONOSCOPY N/A 2020    Procedure: COLONOSCOPY;  Surgeon: Alonzo He MD;  Location:  [facility];  Service: Gastroenterology;  Laterality: N/A;   • COLONOSCOPY N/A 2021    Procedure: COLONOSCOPY;  Surgeon: Francisco Sainz MD;  Location:  [facility];  Service: Gastroenterology;  Laterality: N/A;   • DILATATION AND CURETTAGE     • ENDOSCOPY  2013    Erosive esophagitis, grade 2; erosive gastritis; small sliding hiatal hernia less than 3 cm, erosive deodenitis duodenal polyp.No gan mucosa.Second portion duedenal biopsy neg. Second postion of dudoenal biopsy revealed polypoid intestinal mucosa w/ lymphoid aggregate. 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 no helicobacter pylori. Negative for mateaplasia, dysplasia   • ENDOSCOPY N/A 2020    Procedure: ESOPHAGOGASTRODUODENOSCOPY WITH DILATATION AND BIOPSIES;  Surgeon: Alonzo He MD;  Location:  [facility];  Service: Gastroenterology;  Laterality: N/A;   • ENDOSCOPY N/A 2021    Procedure: ESOPHAGOGASTRODUODENOSCOPY;  Surgeon: Brunner, Mark I, MD;  Location:  [facility];  Service: Gastroenterology;  Laterality: N/A;   • ENDOSCOPY N/A 2021    Procedure: ESOPHAGOGASTRODUODENOSCOPY WITH SAVARY DILATATION;  Surgeon: Francisco Sainz MD;  Location:  [facility];  Service: Gastroenterology;  Laterality: N/A;   • HIP SURGERY Right    • HYSTERECTOMY     • LEEP         Family History:  family history includes Heart disease  "in her father; Heart failure in her father; No Known Problems in her brother, mother, son, and son. Otherwise pertinent FHx was reviewed and unremarkable.     Social History:  reports that she has never smoked. She has never used smokeless tobacco. She reports that she does not drink alcohol and does not use drugs.  Social History     Social History Narrative    Single.  Lives with 2 children.  No HH or DME currently       Medications:  BASAGLAR KWIKPEN, Continuous Glucose Monitor, Dexcom G6 , Dexcom G6 Sensor, Dexcom G6 Transmitter, EPINEPHrine, HYDROcodone-acetaminophen, Hydrocortisone (Perianal), Insulin Syringe, Lancets, SUMAtriptan, azaTHIOprine, diphenhydrAMINE, estradiol, fluconazole, gabapentin, glucose blood, hydrOXYzine pamoate, insulin aspart, insulin detemir, mesalamine, naproxen, neomycin-polymyxin-hydrocortisone, nystatin, oxyCODONE-acetaminophen, predniSONE, promethazine, topiramate, and venlafaxine    Allergies   Allergen Reactions   • Hydrochlorothiazide Hives   • Penicillins Hives   • Remicade [Infliximab] Anaphylaxis   • Soybean-Containing Drug Products Swelling     \"Soy sauce\"   • Xeljanz [Tofacitinib Citrate] Other (See Comments)     BLISTERS IN THROAT & ON ARMS   • Zofran [Ondansetron Hcl] Headache     migraines   • Ondansetron Mental Status Change   • Fidaxomicin Rash   • Reglan [Metoclopramide] Other (See Comments)     States feels weird when takes it       Objective   Objective     Vital Signs:        Physical Exam   Constitutional: Awake, alert, resting in bed  Eyes: PERRLA, sclerae anicteric, no conjunctival injection  HENT: NCAT, mucous membranes moist  Neck: Supple, no thyromegaly, no lymphadenopathy, trachea midline  Respiratory: Clear to auscultation bilaterally, nonlabored respirations   Cardiovascular: RRR, no murmurs, rubs, or gallops, palpable pedal pulses bilaterally  Gastrointestinal: Positive bowel sounds, soft, tender across the upper abdomen, " nondistended  Musculoskeletal: No bilateral ankle edema, no clubbing or cyanosis to extremities  Psychiatric: Appropriate affect, cooperative  Neurologic: Oriented x 3, strength symmetric in all extremities, Cranial Nerves grossly intact to confrontation, speech clear  Skin: No rashes       Result Review:  I have personally reviewed the results from the time of this admission to 01/03/22 8:22 PM EST and agree with these findings:  []  Laboratory  []  Microbiology  []  Radiology  []  EKG/Telemetry   []  Cardiology/Vascular   []  Pathology  [x]  Old records  []  Other:  Most notable findings include:     LAB RESULTS:                                Microbiology Results (last 10 days)     ** No results found for the last 240 hours. **          No radiology results from the last 24 hrs        Assessment/Plan   Assessment & Plan       Ulcerative pancolitis with rectal bleeding (HCC)    Bipolar disorder (HCC)    Gastroesophageal reflux disease without esophagitis    Type 2 diabetes mellitus treated with insulin (HCC)    Bloody diarrhea    Thais Hobson is a 36 y.o. female with history of gastroparesis, ulcerative colitis/pancolitis, type 2 diabetes, bipolar disorder, C. Difficile, was a direct admit from her PCP for complaints of diarrhea.       Assessment and plan:    Diarrhea with a history of C. Difficile  Ulcerative pancolitis with rectal bleeding  --EGD 07/2021 showed mild left-sided colitis  --GI PCR, C. difficile panel  --Consult GI in a.m. follows with Dr. Thao's  --consider CT abdomen pelvis  --IV fluids  --type and screen  --stat labs  -Subacute epigastric pain with postprandial diarrhea in a patient with UC and inability to tolerate home mesalamine enemas; she does have history of C. difficile colitis, though she denies recent antibiotic use; this evening we will start with labs, stool studies and tailor further therapy pending results; eventually may require CT imaging of the abdomen if labs are  nondiagnostic; will ask GI to see in the morning for recommendations    Hemorrhoid  --anusol bid    Uncontrolled DM 2 with gastroparesis  --FSBG with SSI  --A1c in a.m.  -Chronically uncontrolled DM 2 with long-term use of insulin, last A1c 12%, with nonadherence to home insulin therapy, and complication of gastroparesis; has not been taking insulin at home due to reported poor oral intake, repeat A1c in the a.m., Accu-Cheks with SSI    Bipolar disorder  GERD    DVT prophylaxis:  mechanical    CODE STATUS:    Level Of Support Discussed With: Patient  Code Status (Patient has no pulse and is not breathing): CPR (Attempt to Resuscitate)  Medical Interventions (Patient has pulse or is breathing): Full Support      This note has been completed as part of a split-shared workflow.   Signature: Electronically signed by EMELYN Kunz, 01/03/22, 9:34 PM EST.         Attending   Admission Attestation       I have seen and examined the patient, performing an independent face-to-face diagnostic evaluation with plan of care reviewed and developed with the advanced practice clinician (APC).      Brief Summary Statement:   Thais Hobson is a 36 y.o. female with history of pancreatitis, ulcerative colitis followed by GI, prior C. difficile infection, medically uncontrolled diabetes with gastroparesis, multiple other chronic illnesses who was sent for direct admission by her PCP for evaluation of diarrhea.  She reports she is normally on a mesalamine enema which she has not been able to take it due to external hemorrhoids causing her substantial amount of pain.  Over the past 2-4 weeks she has had persistent diarrhea after eating any food that she does report as being mucoid, in addition to epigastric/LUQ abdominal pain.  She denies blood in her stool at the time of my evaluation but she did report BRBPR to the APRN earlier this evening.    Remainder of detailed HPI is as noted by APC and has been reviewed and/or  edited by me for completeness.    Attending Physical Exam:  Constitutional: Awake, alert, no acute distress, sitting up in bed wearing pajamas from home  Eyes: PERRL, sclerae anicteric, no conjunctival injection  HENT: NCAT, mucous membranes moist  Neck: Supple, trachea midline  Respiratory: Clear to auscultation bilaterally, nonlabored respirations   Cardiovascular: RRR, no murmurs, rubs, or gallops, palpable radial pulses bilaterally  Gastrointestinal: Positive bowel sounds, soft, epigastric tenderness to palpation, nondistended  Musculoskeletal: No bilateral ankle edema, no clubbing or cyanosis to extremities  Psychiatric: Appropriate affect, cooperative  Neurologic: Oriented x 3, strength symmetric in all extremities, speech clear    Brief Assessment/Plan :  See detailed assessment and plan developed with APC which I have reviewed and/or edited for completeness.        Admission Status: I believe that this patient meets OBSERVATION status, however if further evaluation or treatment plans warrant, status may change.  Based upon current information, I predict patient's care encounter to be less than or equal to 2 midnights.      Tor Colon,   01/04/22

## 2022-01-04 NOTE — CASE MANAGEMENT/SOCIAL WORK
Discharge Planning Assessment  [facility]     Patient Name: Thais Hobson  MRN: [id number]  Today's Date: 1/4/2022    Admit Date: 1/3/2022     Discharge Needs Assessment     Row Name 01/04/22 1135       Living Environment    Lives With parent(s)    Current Living Arrangements home/apartment/condo    Primary Care Provided by self    Provides Primary Care For no one, unable/limited ability to care for self    Family Caregiver if Needed parent(s)    Quality of Family Relationships unable to assess    Able to Return to Prior Arrangements yes       Resource/Environmental Concerns    Resource/Environmental Concerns none       Transition Planning    Patient/Family Anticipates Transition to home    Patient/Family Anticipated Services at Transition     Transportation Anticipated family or friend will provide       Discharge Needs Assessment    Readmission Within the Last 30 Days no previous admission in last 30 days    Equipment Currently Used at Home none    Concerns to be Addressed discharge planning    Anticipated Changes Related to Illness none               Discharge Plan     Row Name 01/04/22 1138       Plan    Plan Home    Patient/Family in Agreement with Plan yes    Plan Comments Spoke with patient by phone to initiate discharge planning.  She lives with her mother in [facility].  She is independent with ADL's.  She has no DME at home and is not current with home health.  Her PCP is Lv Sanchez.  She does not have an advanced directive.  Ms. Gross has RX coverage and has her scripts filled at [facility].  Her plan is to return home at discharge.  No needs noted at this time.  CM will continue to follow.  Meghann Yanez RN [phone]    Final Discharge Disposition Code 01 - home or self-care              Continued Care and Services - Admitted Since 1/3/2022    Coordination has not been started for this encounter.       Expected Discharge Date and Time     Expected Discharge Date Expected  Discharge Time    Jan 7, 2022          Demographic Summary     Row Name 01/04/22 1135       General Information    Admission Type observation    Arrived From physician office    Referral Source admission list    Reason for Consult discharge planning    Preferred Language English     Used During This Interaction no               Functional Status     Row Name 01/04/22 1135       Functional Status    Usual Activity Tolerance moderate    Current Activity Tolerance moderate       Functional Status, IADL    Medications independent    Meal Preparation independent    Housekeeping independent    Laundry independent    Shopping independent               Psychosocial    No documentation.                Abuse/Neglect    No documentation.                Legal    No documentation.                Substance Abuse    No documentation.                Patient Forms    No documentation.                   Sammie Yanez RN

## 2022-01-05 PROBLEM — R11.10 NON-INTRACTABLE VOMITING: Status: ACTIVE | Noted: 2022-01-03

## 2022-01-05 LAB
GLUCOSE BLDC GLUCOMTR-MCNC: 128 MG/DL (ref 70–130)
GLUCOSE BLDC GLUCOMTR-MCNC: 201 MG/DL (ref 70–130)
GLUCOSE BLDC GLUCOMTR-MCNC: 225 MG/DL (ref 70–130)
GLUCOSE BLDC GLUCOMTR-MCNC: 265 MG/DL (ref 70–130)

## 2022-01-05 PROCEDURE — 82962 GLUCOSE BLOOD TEST: CPT

## 2022-01-05 PROCEDURE — 63710000001 INSULIN DETEMIR PER 5 UNITS: Performed by: INTERNAL MEDICINE

## 2022-01-05 PROCEDURE — 25010000002 PROMETHAZINE PER 50 MG: Performed by: INTERNAL MEDICINE

## 2022-01-05 PROCEDURE — 63710000001 AZATHIOPRINE PER 50 MG: Performed by: NURSE PRACTITIONER

## 2022-01-05 PROCEDURE — 63710000001 INSULIN LISPRO (HUMAN) PER 5 UNITS: Performed by: INTERNAL MEDICINE

## 2022-01-05 PROCEDURE — 25010000002 MORPHINE PER 10 MG: Performed by: NURSE PRACTITIONER

## 2022-01-05 PROCEDURE — 99232 SBSQ HOSP IP/OBS MODERATE 35: CPT | Performed by: INTERNAL MEDICINE

## 2022-01-05 RX ADMIN — INSULIN DETEMIR 10 UNITS: 100 INJECTION, SOLUTION SUBCUTANEOUS at 09:00

## 2022-01-05 RX ADMIN — INSULIN LISPRO 5 UNITS: 100 INJECTION, SOLUTION INTRAVENOUS; SUBCUTANEOUS at 16:56

## 2022-01-05 RX ADMIN — NYSTATIN: 100000 POWDER TOPICAL at 21:10

## 2022-01-05 RX ADMIN — TOPIRAMATE 50 MG: 25 TABLET, FILM COATED ORAL at 07:35

## 2022-01-05 RX ADMIN — DICYCLOMINE HYDROCHLORIDE 10 MG: 10 CAPSULE ORAL at 21:09

## 2022-01-05 RX ADMIN — DICYCLOMINE HYDROCHLORIDE 10 MG: 10 CAPSULE ORAL at 16:55

## 2022-01-05 RX ADMIN — SODIUM CHLORIDE, PRESERVATIVE FREE 10 ML: 5 INJECTION INTRAVENOUS at 21:09

## 2022-01-05 RX ADMIN — VENLAFAXINE HYDROCHLORIDE 75 MG: 75 CAPSULE, EXTENDED RELEASE ORAL at 07:35

## 2022-01-05 RX ADMIN — MORPHINE SULFATE 1 MG: 2 INJECTION, SOLUTION INTRAMUSCULAR; INTRAVENOUS at 07:37

## 2022-01-05 RX ADMIN — MORPHINE SULFATE 1 MG: 2 INJECTION, SOLUTION INTRAMUSCULAR; INTRAVENOUS at 13:34

## 2022-01-05 RX ADMIN — TOPIRAMATE 50 MG: 25 TABLET, FILM COATED ORAL at 21:09

## 2022-01-05 RX ADMIN — HYDROCORTISONE: 25 CREAM TOPICAL at 09:12

## 2022-01-05 RX ADMIN — MORPHINE SULFATE 1 MG: 2 INJECTION, SOLUTION INTRAMUSCULAR; INTRAVENOUS at 21:09

## 2022-01-05 RX ADMIN — INSULIN LISPRO 5 UNITS: 100 INJECTION, SOLUTION INTRAVENOUS; SUBCUTANEOUS at 11:41

## 2022-01-05 RX ADMIN — GABAPENTIN 800 MG: 400 CAPSULE ORAL at 21:09

## 2022-01-05 RX ADMIN — HYDROCORTISONE: 25 CREAM TOPICAL at 21:10

## 2022-01-05 RX ADMIN — DICYCLOMINE HYDROCHLORIDE 10 MG: 10 CAPSULE ORAL at 07:35

## 2022-01-05 RX ADMIN — PROMETHAZINE HYDROCHLORIDE 12.5 MG: 25 INJECTION INTRAMUSCULAR; INTRAVENOUS at 09:53

## 2022-01-05 RX ADMIN — GABAPENTIN 800 MG: 400 CAPSULE ORAL at 05:29

## 2022-01-05 RX ADMIN — INSULIN LISPRO 4 UNITS: 100 INJECTION, SOLUTION INTRAVENOUS; SUBCUTANEOUS at 11:41

## 2022-01-05 RX ADMIN — GABAPENTIN 800 MG: 400 CAPSULE ORAL at 13:34

## 2022-01-05 RX ADMIN — AZATHIOPRINE 50 MG: 50 TABLET ORAL at 07:36

## 2022-01-05 RX ADMIN — DICYCLOMINE HYDROCHLORIDE 10 MG: 10 CAPSULE ORAL at 11:41

## 2022-01-05 RX ADMIN — NYSTATIN: 100000 POWDER TOPICAL at 09:13

## 2022-01-05 RX ADMIN — INSULIN LISPRO 4 UNITS: 100 INJECTION, SOLUTION INTRAVENOUS; SUBCUTANEOUS at 16:56

## 2022-01-05 RX ADMIN — NYSTATIN: 100000 POWDER TOPICAL at 16:56

## 2022-01-05 RX ADMIN — MORPHINE SULFATE 1 MG: 2 INJECTION, SOLUTION INTRAMUSCULAR; INTRAVENOUS at 02:01

## 2022-01-05 RX ADMIN — INSULIN LISPRO 6 UNITS: 100 INJECTION, SOLUTION INTRAVENOUS; SUBCUTANEOUS at 07:37

## 2022-01-05 RX ADMIN — MORPHINE SULFATE 1 MG: 2 INJECTION, SOLUTION INTRAMUSCULAR; INTRAVENOUS at 17:08

## 2022-01-05 RX ADMIN — INSULIN LISPRO 5 UNITS: 100 INJECTION, SOLUTION INTRAVENOUS; SUBCUTANEOUS at 09:21

## 2022-01-05 NOTE — PLAN OF CARE
Problem: Adult Inpatient Plan of Care  Goal: Absence of Hospital-Acquired Illness or Injury  Intervention: Identify and Manage Fall Risk  Recent Flowsheet Documentation  Taken 1/5/2022 0400 by Justa Lawson RN  Safety Promotion/Fall Prevention:   activity supervised   assistive device/personal items within reach   clutter free environment maintained   fall prevention program maintained   nonskid shoes/slippers when out of bed   safety round/check completed   toileting scheduled  Taken 1/5/2022 0200 by Justa Lawson RN  Safety Promotion/Fall Prevention:   activity supervised   assistive device/personal items within reach   clutter free environment maintained   fall prevention program maintained   nonskid shoes/slippers when out of bed   safety round/check completed   toileting scheduled  Taken 1/5/2022 0000 by Justa Lawson RN  Safety Promotion/Fall Prevention:   activity supervised   clutter free environment maintained   assistive device/personal items within reach   fall prevention program maintained   nonskid shoes/slippers when out of bed   safety round/check completed   toileting scheduled  Taken 1/4/2022 2200 by Justa Lawson RN  Safety Promotion/Fall Prevention:   activity supervised   clutter free environment maintained   assistive device/personal items within reach   fall prevention program maintained   nonskid shoes/slippers when out of bed   safety round/check completed   toileting scheduled  Taken 1/4/2022 2000 by Justa Lawson RN  Safety Promotion/Fall Prevention:   activity supervised   clutter free environment maintained   assistive device/personal items within reach   fall prevention program maintained   nonskid shoes/slippers when out of bed   safety round/check completed   toileting scheduled  Intervention: Prevent Skin Injury  Recent Flowsheet Documentation  Taken 1/5/2022 0400 by Justa Lawson RN  Body Position: position changed independently  Skin Protection:   adhesive use limited    incontinence pads utilized   tubing/devices free from skin contact  Taken 1/5/2022 0200 by Justa Lawson RN  Body Position: position changed independently  Skin Protection:   adhesive use limited   incontinence pads utilized   tubing/devices free from skin contact  Taken 1/5/2022 0000 by Justa Lawson RN  Body Position: position changed independently  Skin Protection:   adhesive use limited   incontinence pads utilized   tubing/devices free from skin contact  Taken 1/4/2022 2200 by Justa Lawson RN  Body Position: position changed independently  Skin Protection:   adhesive use limited   incontinence pads utilized   tubing/devices free from skin contact  Taken 1/4/2022 2000 by Justa Lawson RN  Body Position: weight shift assistance provided  Skin Protection:   adhesive use limited   incontinence pads utilized   tubing/devices free from skin contact  Intervention: Prevent and Manage VTE (venous thromboembolism) Risk  Recent Flowsheet Documentation  Taken 1/4/2022 2000 by Justa Lawson RN  VTE Prevention/Management:   bilateral   dorsiflexion/plantar flexion performed   bleeding risk factor(s) identified  Intervention: Prevent Infection  Recent Flowsheet Documentation  Taken 1/5/2022 0400 by Justa Lawson RN  Infection Prevention: environmental surveillance performed  Taken 1/5/2022 0200 by Justa Lawson RN  Infection Prevention: environmental surveillance performed  Taken 1/5/2022 0000 by Justa Lawson RN  Infection Prevention: environmental surveillance performed  Taken 1/4/2022 2200 by Justa Lawson RN  Infection Prevention: environmental surveillance performed  Taken 1/4/2022 2000 by Justa Lawson RN  Infection Prevention: environmental surveillance performed  Goal: Optimal Comfort and Wellbeing  Intervention: Provide Person-Centered Care  Recent Flowsheet Documentation  Taken 1/4/2022 2000 by Justa Lawson RN  Trust Relationship/Rapport:   care explained   choices provided   thoughts/feelings  acknowledged   Goal Outcome Evaluation:

## 2022-01-05 NOTE — PROGRESS NOTES
Clinical Nutrition     Reason for Visit: Identified at risk by screening criteria, MST score 2+    Patient Name: Thais Hobson  YOB: 1985  MRN: [id number]  Date of Encounter: 22 14:10 EST  Admission date: 1/3/2022    Nutrition Assessment   Admission Problem List:    Ulcerative pancolitis with rectal bleeding (HCC)    Bipolar disorder (HCC)    Gastroesophageal reflux disease without esophagitis    Type 2 diabetes mellitus treated with insulin (HCC)    Bloody diarrhea    Ulcerative colitis flare    PMH:   She  has a past medical history of Abdominal pain, Abdominal tenderness, Alopecia, Anemia, Anxiety, Arthritis, Asthma, Back pain, Bipolar disorder (HCC), Blood in stool, Body piercing, Colitis, Colitis, Colon polyps, Depression, Diabetes mellitus (HCC), Diarrhea, Dysphagia, Elevated cholesterol, Fractures, GERD (gastroesophageal reflux disease), H/O colonoscopy (2013), Hematemesis, History of Clostridium difficile infection, History of transfusion, Migraine headache, Nausea, Pancreatitis, Restless leg, Seizures (HCC), Tattoos, Ulcerative pancolitis with rectal bleeding (HCC) (10/1/2021), Universal ulcerative colitis (HCC), UTI (urinary tract infection), and Weight loss.  PSxH:  She  has a past surgical history that includes Cholecystectomy; Hip surgery (Right);  section (); Dilation and curettage of uterus; Colonoscopy (); LEEP; Esophagogastroduodenoscopy (2013); Hysterectomy; Breast surgery (Left); Colonoscopy (N/A, 2020); Esophagogastroduodenoscopy (N/A, 2020); Esophagogastroduodenoscopy (N/A, 2021); Colonoscopy (N/A, 2021); and Esophagogastroduodenoscopy (N/A, 2021).    Reported/Observed/Food/Nutrition Related History:     Familiar with patient from previous visit in May.  Patient reports no significant difference in her GI status since May. Reports eating any type of food will give her significant diarrhea. She was on  "\"baby food\" as she felt she tolerated that much better, she was able to introduce some solid food, but not a lot. She avoids all type of greasy, high fat type foods.  Reports despite medication she is on and attempting to eat a bland, GI Soft diet she has significant diarrhea. Reports spends lots of hours in the bathroom due to this.     Patient very emotional at time of visit. Reports she has recently lost her best friend to drug over-dose.  Reports she is single mom with lots of medical issues, anxiety, depression, bipolar disorder and frequent panic attacks. She feels her emotional well being has been affecting her overall recovery and exacerbating some of her GI issues. Reports she take medication for depression, however she never received counseling.     Reports she does not have an appetite to eat anything today.     Anthropometrics   Height:  63\"   Weight:  200# reported per patient (218# at time of visit - bed weight)   BMI:  35.4  BMI classification: Obese Class II: 35-39.9kg/m2   IBW: 115#     UBW:   Patient reports 255# about one year ago.  Not able to confirm this with EMR; patient reported # in May 2021  Weight change: no weight changes since admission in May     Labs reviewed   Results from last 7 days   Lab Units 01/04/22  0722   SODIUM mmol/L 135*   POTASSIUM mmol/L 3.8   CHLORIDE mmol/L 102   CO2 mmol/L 19.0*   BUN mg/dL 10   CREATININE mg/dL 0.59   CALCIUM mg/dL 8.8   BILIRUBIN mg/dL 0.3   ALK PHOS U/L 168*   ALT (SGPT) U/L 31   AST (SGOT) U/L 17   GLUCOSE mg/dL 338*       Results from last 7 days   Lab Units 01/05/22  1124 01/05/22  0719 01/04/22 2013 01/04/22  1749 01/04/22  1154 01/04/22  0722   GLUCOSE mg/dL 225* 265* 235* 347* 300* 322*     Lab Results   Lab Value Date/Time    HGBA1C 12.00 (H) 05/15/2021 1334    HGBA1C 11.50 (H) 09/17/2020 0733     Medications reviewed   Pertinent: bentyl, neurontin, levemir,     Intake/Ouptut 24 hrs (7:00AM - 6:59 AM)     Intake & Output (last day)  "      01/04 0701  01/05 0700 01/05 0701 01/06 0700    P.O.  120    Total Intake  120    Urine      Total Output      Net  +120          Urine Unmeasured Occurrence 3 x     Stool Unmeasured Occurrence 3 x             Current Nutrition Prescription     PO: Diet Regular; Consistent Carbohydrate  Oral Nutrition Supplement:     Evaluation of Received Nutrient/Fluid Intake:  Insufficient data     Nutrition Diagnosis   Date: 1/5 Updated:   Problem Inadequate oral intake   Etiology Altered GI function    Signs/Symptoms UC flare up; poor PO intolerance per patient.    Status:     Nutrition Intervention   1.  Follow treatment progress, Care plan reviewed   2. Will add GI soft to diet order.  3. Ensure HP with all meals.  4. Recommend obtain Hg A1c levels, vitamin D and Vitamin B12  5. Patient could benefit from counseling - history with anxiety and depression, frequent panic attacks with recent loss (? social work consult to help patient set up outpatient counseling post discharge).      Goal:   General: Nutrition support treatment  PO: Establish PO, Tolerate PO    Additional goals:    Monitoring/Evaluation:   Per protocol, PO intake, Supplement intake, Pertinent labs, GI status    Will Continue to follow per protocol      Emely Franks RD  Time Spent: 30min

## 2022-01-05 NOTE — CASE MANAGEMENT/SOCIAL WORK
Continued Stay Note  [facility]     Patient Name: Thais Hobson  MRN: [id number]  Today's Date: 1/5/2022    Admit Date: 1/3/2022     Discharge Plan     Row Name 01/05/22 1504       Plan    Plan discharge plan    Plan Comments CM spoke with pt in room regarding discharge plan. Plan remains home with family. Pt denies discharge needs at this time. CM will cont to follow.    Final Discharge Disposition Code 01 - home or self-care               Discharge Codes    No documentation.               Expected Discharge Date and Time     Expected Discharge Date Expected Discharge Time    Jan 7, 2022             Nneka Albert RN

## 2022-01-05 NOTE — PROGRESS NOTES
[facility] Medicine Services  PROGRESS NOTE    Patient Name: Thais Hobson  : 1985  MRN: [id number]    Date of Admission: 1/3/2022  Primary Care Physician: Lv Sanchez DO    Subjective   Subjective     CC:  Diarrhea    HPI:  Patient continues to have significant abdominal pain, worse around the epigastrium.  She also has nausea and is requesting Phenergan.  She also continues to have bloody diarrhea.  Has not had suppositories.    ROS:  General: denies fevers or chills  CV: denies chest pain  Resp: denies shortness of breath  Abd: denies abd pain, nausea      Objective   Objective     Vital Signs:   Temp:  [97.9 °F (36.6 °C)-99.4 °F (37.4 °C)] 98.1 °F (36.7 °C)  Heart Rate:  [] 90  Resp:  [16-20] 20  BP: ()/(63-79) 133/79     Physical Exam:  Constitutional: No acute distress, awake, alert, resting in bed  Respiratory: Clear to auscultation bilaterally, respiratory effort normal   Cardiovascular: RRR, no murmurs, rubs, or gallops  Gastrointestinal: Positive bowel sounds, diffusely tender but otherwise soft  Musculoskeletal: No bilateral ankle edema  Psychiatric: Appropriate affect, cooperative  Neurologic: Oriented x 3, no focal neurological deficits  Skin: No rashes      Results Reviewed:  LAB RESULTS:      Lab 22   WBC 12.03*   HEMOGLOBIN 13.8   HEMATOCRIT 42.3   PLATELETS 272   NEUTROS ABS 5.62   IMMATURE GRANS (ABS) 0.02   LYMPHS ABS 5.58*   MONOS ABS 0.44   EOS ABS 0.32   MCV 83.4   SED RATE 29*   CRP 0.63*   PROCALCITONIN 0.05   LACTATE 1.0   PROTIME 12.5   APTT 30.3         Lab 22   SODIUM 135*   POTASSIUM 3.8   CHLORIDE 102   CO2 19.0*   ANION GAP 14.0   BUN 10   CREATININE 0.59   GLUCOSE 338*   CALCIUM 8.8   MAGNESIUM 1.8         Lab 22   TOTAL PROTEIN 7.4   ALBUMIN 3.90   GLOBULIN 3.5   ALT (SGPT) 31   AST (SGOT) 17   BILIRUBIN 0.3   ALK PHOS 168*   LIPASE 14         Lab 22   PROTIME 12.5   INR 0.96              Lab 01/04/22  0722   ABO TYPING O   RH TYPING Positive   ANTIBODY SCREEN Negative         Brief Urine Lab Results  (Last result in the past 365 days)      Color   Clarity   Blood   Leuk Est   Nitrite   Protein   CREAT   Urine HCG        01/04/22 0347 Yellow   Clear   Negative   Negative   Negative   Negative                 Microbiology Results Abnormal     Procedure Component Value - Date/Time    Clostridium Difficile Toxin - Stool, Per Rectum [[id number]]  (Normal) Collected: 01/04/22 1614    Lab Status: Final result Specimen: Stool from Per Rectum Updated: 01/04/22 1759    Narrative:      The following orders were created for panel order Clostridium Difficile Toxin - Stool, Per Rectum.  Procedure                               Abnormality         Status                     ---------                               -----------         ------                     Clostridium Difficile To...[[id number]]  Normal              Final result                 Please view results for these tests on the individual orders.    Clostridium Difficile Toxin, PCR - Stool, Per Rectum [[id number]]  (Normal) Collected: 01/04/22 1614    Lab Status: Final result Specimen: Stool from Per Rectum Updated: 01/04/22 1759     C. Difficile Toxins by PCR Not Detected    Narrative:      Performance characteristics of test not established for patients <2 years of age.  Negative for Toxigenic C. Difficile    Gastrointestinal Panel, PCR - Stool, Per Rectum [[id number]]  (Normal) Collected: 01/04/22 1614    Lab Status: Final result Specimen: Stool from Per Rectum Updated: 01/04/22 1746     Campylobacter Not Detected     Plesiomonas shigelloides Not Detected     Salmonella Not Detected     Vibrio Not Detected     Vibrio cholerae Not Detected     Yersinia enterocolitica Not Detected     Enteroaggregative E. coli (EAEC) Not Detected     Enteropathogenic E. coli (EPEC) Not Detected     Enterotoxigenic E. coli (ETEC) lt/st Not Detected      Shiga-like toxin-producing E. coli (STEC) stx1/stx2 Not Detected     Shigella/Enteroinvasive E. coli (EIEC) Not Detected     Cryptosporidium Not Detected     Cyclospora cayetanensis Not Detected     Entamoeba histolytica Not Detected     Giardia lamblia Not Detected     Adenovirus F40/41 Not Detected     Astrovirus Not Detected     Norovirus GI/GII Not Detected     Rotavirus A Not Detected     Sapovirus (I, II, IV or V) Not Detected    COVID PRE-OP / PRE-PROCEDURE SCREENING ORDER (NO ISOLATION) - Swab, Nasopharynx [[id number]]  (Normal) Collected: 01/03/22 2225    Lab Status: Final result Specimen: Swab from Nasopharynx Updated: 01/03/22 2319    Narrative:      The following orders were created for panel order COVID PRE-OP / PRE-PROCEDURE SCREENING ORDER (NO ISOLATION) - Swab, Nasopharynx.  Procedure                               Abnormality         Status                     ---------                               -----------         ------                     COVID-19, FLU A/B, RSV P...[[id number]]  Normal              Final result                 Please view results for these tests on the individual orders.    COVID-19, FLU A/B, RSV PCR - Swab, Nasopharynx [[id number]]  (Normal) Collected: 01/03/22 2225    Lab Status: Final result Specimen: Swab from Nasopharynx Updated: 01/03/22 2319     COVID19 Not Detected     Influenza A PCR Not Detected     Influenza B PCR Not Detected     RSV, PCR Not Detected          No radiology results from the last 24 hrs        I have reviewed the medications:  Scheduled Meds:azaTHIOprine, 50 mg, Oral, Daily  dicyclomine, 10 mg, Oral, 4x Daily AC & at Bedtime  estradiol, 1 patch, Transdermal, Q3 Days  gabapentin, 800 mg, Oral, Q8H  Hydrocortisone (Perianal), , Rectal, BID  insulin lispro, 0-9 Units, Subcutaneous, 4x Daily AC & at Bedtime  nystatin, , Topical, TID  sodium chloride, 10 mL, Intravenous, Q12H  topiramate, 50 mg, Oral, BID  venlafaxine XR, 75 mg, Oral, Daily With  Breakfast      Continuous Infusions:   PRN Meds:.dextrose  •  dextrose  •  glucagon (human recombinant)  •  hydrOXYzine  •  melatonin  •  Morphine **AND** naloxone  •  ondansetron **OR** ondansetron  •  sodium chloride    Assessment/Plan   Assessment & Plan     Active Hospital Problems    Diagnosis  POA   • **Ulcerative pancolitis with rectal bleeding (HCC) [K51.011]  Yes   • Ulcerative colitis flare [K51.90]  Yes   • Bloody diarrhea [R19.7]  Yes   • Type 2 diabetes mellitus treated with insulin (HCC) [E11.9, Z79.4]  Not Applicable   • Bipolar disorder (HCC) [F31.9]  Yes   • Gastroesophageal reflux disease without esophagitis [K21.9]  Yes      Resolved Hospital Problems   No resolved problems to display.        Brief Hospital Course to date:    Thais Hobson is a 36 y.o. female with history of gastroparesis, ulcerative colitis/pancolitis, type 2 diabetes, bipolar disorder, C. Difficile, was a direct admit from her PCP for complaints of diarrhea.     On 1/5/2021, patient continues to have significant diarrhea, abdominal pain and nausea.  No significant improvements.  Still having significantly decreased p.o. intake.  GI following.  Suspect likely autonomic dysfunction.  Infectious work-up negative    Diarrhea with a history of C. Difficile  Ulcerative pancolitis with rectal bleeding  --EGD 07/2021 showed mild left-sided colitis  --GI PCR, C. difficile panel pending  -Obtain fecal calprotectin  -Infectious work-up negative  -Continue Bentyl   -Continue Imuran  -Continue Anusol and sitz baths twice daily  -low carbohydrate meals  --Continue IV fluids  --GI consulted.  Likely secondary to autonomic dysfunction, exclude infectious etiology     Uncontrolled DM 2 with gastroparesis  --FSBG with SSI  -Chronically uncontrolled DM 2 with long-term use of insulin, last A1c 12%, with nonadherence to home insulin therapy, and complication of gastroparesis; has not been taking insulin at home due to reported poor oral  intake  -Start basal bolus with 10 units of Levemir and 5 units of lispro 3 times daily     Bipolar disorder  GERD  -Continue Effexor     DVT prophylaxis:  mechanical    DVT prophylaxis:  Mechanical DVT prophylaxis orders are present.       AM-PAC 6 Clicks Score (PT): 24 (01/04/22 2000)    Disposition: I expect the patient to be discharged TBD.    CODE STATUS:   Code Status and Medical Interventions:   Ordered at: 01/03/22 2134     Level Of Support Discussed With:    Patient     Code Status (Patient has no pulse and is not breathing):    CPR (Attempt to Resuscitate)     Medical Interventions (Patient has pulse or is breathing):    Full Support     I have prepared this progress note with copied portions of the prior day's progress note of my own authorship to preserve accuracy and maintain consistency of documentation. I have reviewed these portions and edited them for correctness. I verify that the above documentation accurately and truly represents the evaluation and management performed on today's date.         Liset Matthew MD  01/05/22

## 2022-01-05 NOTE — POST-PROCEDURE NOTE
GI Daily Progress Note  Subjective     Thais Hobson is a 36 y.o. female who was admitted with Ulcerative pancolitis with rectal bleeding (HCC).   Still with N/V and also dysphagia.  Still with abd pain.Denies neuropathy. Has blurred vision.  Has never seen ophthalmologist.    Chief Complaint:  N/V/D    Objective     /78 (BP Location: Right arm, Patient Position: Lying)   Pulse 106   Temp 98.4 °F (36.9 °C) (Oral)   Resp 16   LMP  (LMP Unknown)     Intake/Output last 3 shifts:  I/O last 3 completed shifts:  In: -   Out: 200 [Urine:200]  Intake/Output this shift:  I/O this shift:  In: 145 [P.O.:145]  Out: -       Physical Exam  Wt Readings from Last 3 Encounters:   07/05/21 93.9 kg (207 lb 1.6 oz)   05/16/21 90.3 kg (199 lb)   03/11/21 92.5 kg (204 lb)   ,body mass index is unknown because there is no height or weight on file.,@FLOWAMB(6)@,@FLOWAMB(5)@,@FLOWAMB(8)@   CONSTITUTIONAL:  Resp CTA; no rhonchi, rales, or wheezes.  Respiration effort normal  CV RRR; no M/R/G. No lower extremity edema  GI Abd soft,mild diffuse tenderness, ND, normal active bowel sounds.    Psych: Awake alert and oriented      DATA:  C diff and PCR neg  Assessment/Plan     1. Ulcerative colitis  2. Epigastric pain  3. Gastroparesis  4. Uncontrolled diabetes mellitus       Will plan EGD/Dil in am  Needs Endocrinologist to treat DM  Needs Ophthalmologist evaluation    Stool studies are negative  Await Entyvio and imuran levels and calprotectin        Ulcerative pancolitis with rectal bleeding (HCC)    Bipolar disorder (HCC)    Gastroesophageal reflux disease without esophagitis    Type 2 diabetes mellitus treated with insulin (HCC)    Bloody diarrhea    Ulcerative colitis flare       LOS: 1 day     Francisco Sainz MD  01/05/22  18:46 EST

## 2022-01-06 ENCOUNTER — ANESTHESIA (OUTPATIENT)
Dept: GASTROENTEROLOGY | Facility: HOSPITAL | Age: 37
End: 2022-01-06

## 2022-01-06 ENCOUNTER — ANESTHESIA EVENT (OUTPATIENT)
Dept: GASTROENTEROLOGY | Facility: HOSPITAL | Age: 37
End: 2022-01-06

## 2022-01-06 LAB
ANION GAP SERPL CALCULATED.3IONS-SCNC: 13 MMOL/L (ref 5–15)
BUN SERPL-MCNC: 8 MG/DL (ref 6–20)
BUN/CREAT SERPL: 16 (ref 7–25)
CALCIUM SPEC-SCNC: 9.1 MG/DL (ref 8.6–10.5)
CHLORIDE SERPL-SCNC: 103 MMOL/L (ref 98–107)
CO2 SERPL-SCNC: 19 MMOL/L (ref 22–29)
CREAT SERPL-MCNC: 0.5 MG/DL (ref 0.57–1)
GFR SERPL CREATININE-BSD FRML MDRD: 140 ML/MIN/1.73
GFR SERPL CREATININE-BSD FRML MDRD: >150 ML/MIN/1.73
GLUCOSE BLDC GLUCOMTR-MCNC: 120 MG/DL (ref 70–130)
GLUCOSE BLDC GLUCOMTR-MCNC: 156 MG/DL (ref 70–130)
GLUCOSE BLDC GLUCOMTR-MCNC: 208 MG/DL (ref 70–130)
GLUCOSE BLDC GLUCOMTR-MCNC: 269 MG/DL (ref 70–130)
GLUCOSE SERPL-MCNC: 225 MG/DL (ref 65–99)
POTASSIUM SERPL-SCNC: 4 MMOL/L (ref 3.5–5.2)
SODIUM SERPL-SCNC: 135 MMOL/L (ref 136–145)

## 2022-01-06 PROCEDURE — 99232 SBSQ HOSP IP/OBS MODERATE 35: CPT | Performed by: INTERNAL MEDICINE

## 2022-01-06 PROCEDURE — 25010000002 PROPOFOL 10 MG/ML EMULSION: Performed by: NURSE ANESTHETIST, CERTIFIED REGISTERED

## 2022-01-06 PROCEDURE — 63710000001 INSULIN LISPRO (HUMAN) PER 5 UNITS: Performed by: INTERNAL MEDICINE

## 2022-01-06 PROCEDURE — 43239 EGD BIOPSY SINGLE/MULTIPLE: CPT | Performed by: INTERNAL MEDICINE

## 2022-01-06 PROCEDURE — 43248 EGD GUIDE WIRE INSERTION: CPT | Performed by: INTERNAL MEDICINE

## 2022-01-06 PROCEDURE — 0 LIDOCAINE 1 % SOLUTION: Performed by: NURSE ANESTHETIST, CERTIFIED REGISTERED

## 2022-01-06 PROCEDURE — 80048 BASIC METABOLIC PNL TOTAL CA: CPT | Performed by: INTERNAL MEDICINE

## 2022-01-06 PROCEDURE — 25010000002 MORPHINE PER 10 MG: Performed by: NURSE PRACTITIONER

## 2022-01-06 PROCEDURE — C1769 GUIDE WIRE: HCPCS | Performed by: INTERNAL MEDICINE

## 2022-01-06 PROCEDURE — 63710000001 AZATHIOPRINE PER 50 MG: Performed by: INTERNAL MEDICINE

## 2022-01-06 PROCEDURE — 99232 SBSQ HOSP IP/OBS MODERATE 35: CPT | Performed by: NURSE PRACTITIONER

## 2022-01-06 PROCEDURE — 25010000002 MORPHINE PER 10 MG: Performed by: INTERNAL MEDICINE

## 2022-01-06 PROCEDURE — 25010000002 PROMETHAZINE PER 50 MG: Performed by: INTERNAL MEDICINE

## 2022-01-06 PROCEDURE — 0DB68ZX EXCISION OF STOMACH, VIA NATURAL OR ARTIFICIAL OPENING ENDOSCOPIC, DIAGNOSTIC: ICD-10-PCS | Performed by: INTERNAL MEDICINE

## 2022-01-06 PROCEDURE — 88305 TISSUE EXAM BY PATHOLOGIST: CPT | Performed by: INTERNAL MEDICINE

## 2022-01-06 PROCEDURE — 82962 GLUCOSE BLOOD TEST: CPT

## 2022-01-06 PROCEDURE — 0D758ZZ DILATION OF ESOPHAGUS, VIA NATURAL OR ARTIFICIAL OPENING ENDOSCOPIC: ICD-10-PCS | Performed by: INTERNAL MEDICINE

## 2022-01-06 RX ORDER — LIDOCAINE HYDROCHLORIDE 10 MG/ML
0.5 INJECTION, SOLUTION EPIDURAL; INFILTRATION; INTRACAUDAL; PERINEURAL ONCE AS NEEDED
Status: CANCELLED | OUTPATIENT
Start: 2022-01-06

## 2022-01-06 RX ORDER — OXYCODONE HYDROCHLORIDE AND ACETAMINOPHEN 5; 325 MG/1; MG/1
1 TABLET ORAL EVERY 6 HOURS PRN
Status: DISCONTINUED | OUTPATIENT
Start: 2022-01-06 | End: 2022-01-08 | Stop reason: HOSPADM

## 2022-01-06 RX ORDER — HYDROMORPHONE HYDROCHLORIDE 1 MG/ML
0.5 INJECTION, SOLUTION INTRAMUSCULAR; INTRAVENOUS; SUBCUTANEOUS
Status: DISCONTINUED | OUTPATIENT
Start: 2022-01-06 | End: 2022-01-06 | Stop reason: HOSPADM

## 2022-01-06 RX ORDER — MIDAZOLAM HYDROCHLORIDE 1 MG/ML
1 INJECTION INTRAMUSCULAR; INTRAVENOUS
Status: CANCELLED | OUTPATIENT
Start: 2022-01-06

## 2022-01-06 RX ORDER — LIDOCAINE HYDROCHLORIDE 10 MG/ML
INJECTION, SOLUTION INFILTRATION; PERINEURAL AS NEEDED
Status: DISCONTINUED | OUTPATIENT
Start: 2022-01-06 | End: 2022-01-06 | Stop reason: SURG

## 2022-01-06 RX ORDER — FAMOTIDINE 20 MG/1
20 TABLET, FILM COATED ORAL ONCE
Status: CANCELLED | OUTPATIENT
Start: 2022-01-06 | End: 2022-01-06

## 2022-01-06 RX ORDER — FENTANYL CITRATE 50 UG/ML
50 INJECTION, SOLUTION INTRAMUSCULAR; INTRAVENOUS
Status: DISCONTINUED | OUTPATIENT
Start: 2022-01-06 | End: 2022-01-06 | Stop reason: HOSPADM

## 2022-01-06 RX ORDER — FAMOTIDINE 10 MG/ML
20 INJECTION, SOLUTION INTRAVENOUS ONCE
Status: COMPLETED | OUTPATIENT
Start: 2022-01-06 | End: 2022-01-06

## 2022-01-06 RX ORDER — SODIUM CHLORIDE, SODIUM LACTATE, POTASSIUM CHLORIDE, CALCIUM CHLORIDE 600; 310; 30; 20 MG/100ML; MG/100ML; MG/100ML; MG/100ML
9 INJECTION, SOLUTION INTRAVENOUS CONTINUOUS
Status: CANCELLED | OUTPATIENT
Start: 2022-01-06

## 2022-01-06 RX ORDER — FAMOTIDINE 10 MG/ML
20 INJECTION, SOLUTION INTRAVENOUS ONCE
Status: CANCELLED | OUTPATIENT
Start: 2022-01-06 | End: 2022-01-06

## 2022-01-06 RX ORDER — PROPOFOL 10 MG/ML
VIAL (ML) INTRAVENOUS AS NEEDED
Status: DISCONTINUED | OUTPATIENT
Start: 2022-01-06 | End: 2022-01-06 | Stop reason: SURG

## 2022-01-06 RX ORDER — SODIUM CHLORIDE, SODIUM LACTATE, POTASSIUM CHLORIDE, AND CALCIUM CHLORIDE .6; .31; .03; .02 G/100ML; G/100ML; G/100ML; G/100ML
20 INJECTION, SOLUTION INTRAVENOUS ONCE
Status: COMPLETED | OUTPATIENT
Start: 2022-01-06 | End: 2022-01-06

## 2022-01-06 RX ORDER — SODIUM CHLORIDE 0.9 % (FLUSH) 0.9 %
10 SYRINGE (ML) INJECTION EVERY 12 HOURS SCHEDULED
Status: CANCELLED | OUTPATIENT
Start: 2022-01-06

## 2022-01-06 RX ORDER — PROMETHAZINE HYDROCHLORIDE 25 MG/1
25 SUPPOSITORY RECTAL EVERY 6 HOURS PRN
Status: DISCONTINUED | OUTPATIENT
Start: 2022-01-06 | End: 2022-01-08 | Stop reason: HOSPADM

## 2022-01-06 RX ORDER — ONDANSETRON 2 MG/ML
4 INJECTION INTRAMUSCULAR; INTRAVENOUS ONCE AS NEEDED
Status: DISCONTINUED | OUTPATIENT
Start: 2022-01-06 | End: 2022-01-06 | Stop reason: HOSPADM

## 2022-01-06 RX ORDER — SODIUM CHLORIDE 0.9 % (FLUSH) 0.9 %
10 SYRINGE (ML) INJECTION AS NEEDED
Status: CANCELLED | OUTPATIENT
Start: 2022-01-06

## 2022-01-06 RX ORDER — DEXAMETHASONE SODIUM PHOSPHATE 4 MG/ML
8 INJECTION, SOLUTION INTRA-ARTICULAR; INTRALESIONAL; INTRAMUSCULAR; INTRAVENOUS; SOFT TISSUE ONCE AS NEEDED
Status: DISCONTINUED | OUTPATIENT
Start: 2022-01-06 | End: 2022-01-06 | Stop reason: HOSPADM

## 2022-01-06 RX ADMIN — DICYCLOMINE HYDROCHLORIDE 10 MG: 10 CAPSULE ORAL at 21:06

## 2022-01-06 RX ADMIN — GABAPENTIN 800 MG: 400 CAPSULE ORAL at 21:06

## 2022-01-06 RX ADMIN — OXYCODONE AND ACETAMINOPHEN 1 TABLET: 5; 325 TABLET ORAL at 12:46

## 2022-01-06 RX ADMIN — VENLAFAXINE HYDROCHLORIDE 75 MG: 75 CAPSULE, EXTENDED RELEASE ORAL at 12:45

## 2022-01-06 RX ADMIN — INSULIN LISPRO 2 UNITS: 100 INJECTION, SOLUTION INTRAVENOUS; SUBCUTANEOUS at 21:14

## 2022-01-06 RX ADMIN — INSULIN LISPRO 5 UNITS: 100 INJECTION, SOLUTION INTRAVENOUS; SUBCUTANEOUS at 07:55

## 2022-01-06 RX ADMIN — DICYCLOMINE HYDROCHLORIDE 10 MG: 10 CAPSULE ORAL at 11:58

## 2022-01-06 RX ADMIN — AZATHIOPRINE 50 MG: 50 TABLET ORAL at 12:45

## 2022-01-06 RX ADMIN — INSULIN LISPRO 8 UNITS: 100 INJECTION, SOLUTION INTRAVENOUS; SUBCUTANEOUS at 18:03

## 2022-01-06 RX ADMIN — PROPOFOL 50 MG: 10 INJECTION, EMULSION INTRAVENOUS at 11:04

## 2022-01-06 RX ADMIN — PROPOFOL 50 MG: 10 INJECTION, EMULSION INTRAVENOUS at 10:59

## 2022-01-06 RX ADMIN — TOPIRAMATE 50 MG: 25 TABLET, FILM COATED ORAL at 12:45

## 2022-01-06 RX ADMIN — PROPOFOL 50 MG: 10 INJECTION, EMULSION INTRAVENOUS at 11:08

## 2022-01-06 RX ADMIN — PROMETHAZINE HYDROCHLORIDE 25 MG: 25 SUPPOSITORY RECTAL at 21:03

## 2022-01-06 RX ADMIN — GABAPENTIN 800 MG: 400 CAPSULE ORAL at 14:34

## 2022-01-06 RX ADMIN — MORPHINE SULFATE 1 MG: 2 INJECTION, SOLUTION INTRAMUSCULAR; INTRAVENOUS at 00:45

## 2022-01-06 RX ADMIN — INSULIN LISPRO 6 UNITS: 100 INJECTION, SOLUTION INTRAVENOUS; SUBCUTANEOUS at 18:03

## 2022-01-06 RX ADMIN — NYSTATIN: 100000 POWDER TOPICAL at 21:06

## 2022-01-06 RX ADMIN — MORPHINE SULFATE 1 MG: 2 INJECTION, SOLUTION INTRAMUSCULAR; INTRAVENOUS at 07:54

## 2022-01-06 RX ADMIN — OXYCODONE AND ACETAMINOPHEN 1 TABLET: 5; 325 TABLET ORAL at 18:43

## 2022-01-06 RX ADMIN — LIDOCAINE HYDROCHLORIDE 100 MG: 10 INJECTION, SOLUTION INFILTRATION; PERINEURAL at 10:59

## 2022-01-06 RX ADMIN — HYDROCORTISONE: 25 CREAM TOPICAL at 12:06

## 2022-01-06 RX ADMIN — SODIUM CHLORIDE, POTASSIUM CHLORIDE, SODIUM LACTATE AND CALCIUM CHLORIDE 20 ML/HR: 600; 310; 30; 20 INJECTION, SOLUTION INTRAVENOUS at 09:16

## 2022-01-06 RX ADMIN — TOPIRAMATE 50 MG: 25 TABLET, FILM COATED ORAL at 21:06

## 2022-01-06 RX ADMIN — SODIUM CHLORIDE, PRESERVATIVE FREE 10 ML: 5 INJECTION INTRAVENOUS at 12:06

## 2022-01-06 RX ADMIN — MORPHINE SULFATE 1 MG: 2 INJECTION, SOLUTION INTRAMUSCULAR; INTRAVENOUS at 12:46

## 2022-01-06 RX ADMIN — NYSTATIN: 100000 POWDER TOPICAL at 18:20

## 2022-01-06 RX ADMIN — NYSTATIN: 100000 POWDER TOPICAL at 12:06

## 2022-01-06 RX ADMIN — DICYCLOMINE HYDROCHLORIDE 10 MG: 10 CAPSULE ORAL at 18:03

## 2022-01-06 RX ADMIN — INSULIN LISPRO 4 UNITS: 100 INJECTION, SOLUTION INTRAVENOUS; SUBCUTANEOUS at 07:54

## 2022-01-06 RX ADMIN — PROMETHAZINE HYDROCHLORIDE 12.5 MG: 25 INJECTION INTRAMUSCULAR; INTRAVENOUS at 02:34

## 2022-01-06 RX ADMIN — PROPOFOL 100 MG: 10 INJECTION, EMULSION INTRAVENOUS at 11:00

## 2022-01-06 RX ADMIN — FAMOTIDINE 20 MG: 10 INJECTION INTRAVENOUS at 09:16

## 2022-01-06 NOTE — PLAN OF CARE
Problem: Adult Inpatient Plan of Care  Goal: Absence of Hospital-Acquired Illness or Injury  Intervention: Identify and Manage Fall Risk  Recent Flowsheet Documentation  Taken 1/6/2022 0400 by Justa Lawson RN  Safety Promotion/Fall Prevention:   activity supervised   assistive device/personal items within reach   clutter free environment maintained   fall prevention program maintained   nonskid shoes/slippers when out of bed   safety round/check completed   toileting scheduled  Taken 1/6/2022 0200 by uJsta Lawson RN  Safety Promotion/Fall Prevention:   activity supervised   assistive device/personal items within reach   clutter free environment maintained   fall prevention program maintained   nonskid shoes/slippers when out of bed   safety round/check completed   toileting scheduled  Taken 1/6/2022 0000 by Justa Lawson RN  Safety Promotion/Fall Prevention:   activity supervised   assistive device/personal items within reach   clutter free environment maintained   fall prevention program maintained   nonskid shoes/slippers when out of bed   safety round/check completed   toileting scheduled  Taken 1/5/2022 2200 by Justa Lawson RN  Safety Promotion/Fall Prevention:   activity supervised   assistive device/personal items within reach   clutter free environment maintained   fall prevention program maintained   nonskid shoes/slippers when out of bed   safety round/check completed   toileting scheduled  Taken 1/5/2022 2000 by Justa Lawson RN  Safety Promotion/Fall Prevention:   activity supervised   assistive device/personal items within reach   clutter free environment maintained   fall prevention program maintained   nonskid shoes/slippers when out of bed   safety round/check completed   toileting scheduled  Intervention: Prevent Skin Injury  Recent Flowsheet Documentation  Taken 1/6/2022 0400 by Justa Lawson RN  Body Position: position changed independently  Skin Protection:   adhesive use limited    incontinence pads utilized   tubing/devices free from skin contact  Taken 1/6/2022 0200 by Justa Lawson RN  Body Position: position changed independently  Skin Protection:   adhesive use limited   incontinence pads utilized   tubing/devices free from skin contact  Taken 1/6/2022 0000 by Justa Lawson RN  Body Position: position changed independently  Skin Protection:   adhesive use limited   incontinence pads utilized   tubing/devices free from skin contact  Taken 1/5/2022 2200 by Justa Lawson RN  Body Position: position changed independently  Skin Protection:   adhesive use limited   incontinence pads utilized   tubing/devices free from skin contact  Taken 1/5/2022 2000 by Justa Lawson RN  Body Position: position changed independently  Skin Protection:   adhesive use limited   incontinence pads utilized   tubing/devices free from skin contact  Intervention: Prevent and Manage VTE (venous thromboembolism) Risk  Recent Flowsheet Documentation  Taken 1/5/2022 2000 by Justa Lawson RN  VTE Prevention/Management:   bilateral   dorsiflexion/plantar flexion performed   bleeding risk factor(s) identified  Intervention: Prevent Infection  Recent Flowsheet Documentation  Taken 1/6/2022 0400 by Justa Lawson RN  Infection Prevention: environmental surveillance performed  Taken 1/6/2022 0200 by Justa Lawson RN  Infection Prevention: environmental surveillance performed  Taken 1/6/2022 0000 by Justa Lawson RN  Infection Prevention: environmental surveillance performed  Taken 1/5/2022 2200 by Justa Lawson RN  Infection Prevention: environmental surveillance performed  Taken 1/5/2022 2000 by Justa Lawson RN  Infection Prevention: environmental surveillance performed  Goal: Optimal Comfort and Wellbeing  Intervention: Provide Person-Centered Care  Recent Flowsheet Documentation  Taken 1/5/2022 2000 by Justa Lawson RN  Trust Relationship/Rapport:   care explained   choices provided   thoughts/feelings  acknowledged   Goal Outcome Evaluation:

## 2022-01-06 NOTE — POST-PROCEDURE NOTE
EGD   Emperic dilation to 54 without change.  One 6 mm ulcer in D2  Bx taken to evaluate  for celiac

## 2022-01-06 NOTE — ANESTHESIA POSTPROCEDURE EVALUATION
Patient: Thais Hobson    Procedure Summary     Date: 01/06/22 Room / Location:  [facility]    Anesthesia Start: 1055 Anesthesia Stop: 1114    Procedure: ESOPHAGOGASTRODUODENOSCOPY (N/A ) Diagnosis:       Non-intractable vomiting with nausea, unspecified vomiting type      (Non-intractable vomiting with nausea, unspecified vomiting type [R11.2])    Surgeons: Francisco Sainz MD Provider: Tom Gallego MD    Anesthesia Type: MAC ASA Status: 2          Anesthesia Type: MAC    Vitals  No vitals data found for the desired time range.          Post Anesthesia Care and Evaluation    Patient location during evaluation: PACU  Patient participation: complete - patient participated  Level of consciousness: responsive to verbal stimuli  Pain management: adequate  Airway patency: patent  Anesthetic complications: No anesthetic complications  PONV Status: none  Cardiovascular status: hemodynamically stable and acceptable  Respiratory status: nonlabored ventilation, acceptable and nasal cannula  Hydration status: acceptable

## 2022-01-06 NOTE — ANESTHESIA PREPROCEDURE EVALUATION
Anesthesia Evaluation     Patient summary reviewed and Nursing notes reviewed   NPO Solid Status: > 8 hours  NPO Liquid Status: > 8 hours           Airway   Mallampati: I  TM distance: >3 FB  Neck ROM: full  No difficulty expected  Dental - normal exam     Pulmonary     breath sounds clear to auscultation  Cardiovascular     Rhythm: regular  Rate: normal        Neuro/Psych  GI/Hepatic/Renal/Endo      Musculoskeletal     Abdominal    Substance History      OB/GYN          Other        ROS/Med Hx Other: Hx of dm and gastroparesis  Hx of ulcerative colitis                    Anesthesia Plan    ASA 2     MAC     intravenous induction       will discuss risk of aspiration with dr armas  Switch to GA/ett if risk elevated

## 2022-01-06 NOTE — PROGRESS NOTES
[facility] Medicine Services  PROGRESS NOTE    Patient Name: Thais Hobson  : 1985  MRN: [id number]    Date of Admission: 1/3/2022  Primary Care Physician: Lv Sanchez DO    Subjective   Subjective     CC:  Hospital follow up abdominal pain and diarrhea    HPI:  Just got back to her room from endoscopy.  Reports ongoing abdominal pain.      ROS:  Gen- No fevers, chills  CV- No chest pain, palpitations  Resp- No cough, dyspnea  GI- No N/V/D, +abd pain          Objective   Objective     Vital Signs:   Temp:  [96.9 °F (36.1 °C)-98.4 °F (36.9 °C)] 96.9 °F (36.1 °C)  Heart Rate:  [] 107  Resp:  [16-18] 16  BP: (102-120)/(69-87) 117/75     Physical Exam:  Constitutional: Appears uncomfortable, awake, alert, upright in bed  HENT: NCAT, mucous membranes moist  Respiratory: Clear to auscultation bilaterally, respiratory effort normal on room air   Cardiovascular: RRR, no murmurs, rubs, or gallops  Gastrointestinal: Positive bowel sounds, soft, diffuse tenderness  Musculoskeletal: No bilateral ankle edema  Psychiatric: Appropriate affect, cooperative  Neurologic: Oriented x 3, strength symmetric in all extremities, Cranial Nerves grossly intact to confrontation, speech clear  Skin: No rashes noted to exposed skin         Results Reviewed:  LAB RESULTS:      Lab 22  0722   WBC 12.03*   HEMOGLOBIN 13.8   HEMATOCRIT 42.3   PLATELETS 272   NEUTROS ABS 5.62   IMMATURE GRANS (ABS) 0.02   LYMPHS ABS 5.58*   MONOS ABS 0.44   EOS ABS 0.32   MCV 83.4   SED RATE 29*   CRP 0.63*   PROCALCITONIN 0.05   LACTATE 1.0   PROTIME 12.5   APTT 30.3         Lab 22  0818 22  0722   SODIUM 135* 135*   POTASSIUM 4.0 3.8   CHLORIDE 103 102   CO2 19.0* 19.0*   ANION GAP 13.0 14.0   BUN 8 10   CREATININE 0.50* 0.59   GLUCOSE 225* 338*   CALCIUM 9.1 8.8   MAGNESIUM  --  1.8         Lab 22  0722   TOTAL PROTEIN 7.4   ALBUMIN 3.90   GLOBULIN 3.5   ALT (SGPT) 31   AST (SGOT) 17    BILIRUBIN 0.3   ALK PHOS 168*   LIPASE 14         Lab 01/04/22 0722   PROTIME 12.5   INR 0.96             Lab 01/04/22 0722   ABO TYPING O   RH TYPING Positive   ANTIBODY SCREEN Negative         Brief Urine Lab Results  (Last result in the past 365 days)      Color   Clarity   Blood   Leuk Est   Nitrite   Protein   CREAT   Urine HCG        01/04/22 0347 Yellow   Clear   Negative   Negative   Negative   Negative                 Microbiology Results Abnormal     Procedure Component Value - Date/Time    Clostridium Difficile Toxin - Stool, Per Rectum [[id number]]  (Normal) Collected: 01/04/22 1614    Lab Status: Final result Specimen: Stool from Per Rectum Updated: 01/04/22 1759    Narrative:      The following orders were created for panel order Clostridium Difficile Toxin - Stool, Per Rectum.  Procedure                               Abnormality         Status                     ---------                               -----------         ------                     Clostridium Difficile To...[[id number]]  Normal              Final result                 Please view results for these tests on the individual orders.    Clostridium Difficile Toxin, PCR - Stool, Per Rectum [[id number]]  (Normal) Collected: 01/04/22 1614    Lab Status: Final result Specimen: Stool from Per Rectum Updated: 01/04/22 1759     C. Difficile Toxins by PCR Not Detected    Narrative:      Performance characteristics of test not established for patients <2 years of age.  Negative for Toxigenic C. Difficile    Gastrointestinal Panel, PCR - Stool, Per Rectum [[id number]]  (Normal) Collected: 01/04/22 1614    Lab Status: Final result Specimen: Stool from Per Rectum Updated: 01/04/22 1746     Campylobacter Not Detected     Plesiomonas shigelloides Not Detected     Salmonella Not Detected     Vibrio Not Detected     Vibrio cholerae Not Detected     Yersinia enterocolitica Not Detected     Enteroaggregative E. coli (EAEC) Not Detected      Enteropathogenic E. coli (EPEC) Not Detected     Enterotoxigenic E. coli (ETEC) lt/st Not Detected     Shiga-like toxin-producing E. coli (STEC) stx1/stx2 Not Detected     Shigella/Enteroinvasive E. coli (EIEC) Not Detected     Cryptosporidium Not Detected     Cyclospora cayetanensis Not Detected     Entamoeba histolytica Not Detected     Giardia lamblia Not Detected     Adenovirus F40/41 Not Detected     Astrovirus Not Detected     Norovirus GI/GII Not Detected     Rotavirus A Not Detected     Sapovirus (I, II, IV or V) Not Detected    COVID PRE-OP / PRE-PROCEDURE SCREENING ORDER (NO ISOLATION) - Swab, Nasopharynx [[id number]]  (Normal) Collected: 01/03/22 2225    Lab Status: Final result Specimen: Swab from Nasopharynx Updated: 01/03/22 2319    Narrative:      The following orders were created for panel order COVID PRE-OP / PRE-PROCEDURE SCREENING ORDER (NO ISOLATION) - Swab, Nasopharynx.  Procedure                               Abnormality         Status                     ---------                               -----------         ------                     COVID-19, FLU A/B, RSV P...[[id number]]  Normal              Final result                 Please view results for these tests on the individual orders.    COVID-19, FLU A/B, RSV PCR - Swab, Nasopharynx [[id number]]  (Normal) Collected: 01/03/22 2225    Lab Status: Final result Specimen: Swab from Nasopharynx Updated: 01/03/22 2319     COVID19 Not Detected     Influenza A PCR Not Detected     Influenza B PCR Not Detected     RSV, PCR Not Detected          No radiology results from the last 24 hrs        I have reviewed the medications:  Scheduled Meds:azaTHIOprine, 50 mg, Oral, Daily  dicyclomine, 10 mg, Oral, 4x Daily AC & at Bedtime  estradiol, 1 patch, Transdermal, Q3 Days  gabapentin, 800 mg, Oral, Q8H  Hydrocortisone (Perianal), , Rectal, BID  insulin detemir, 10 Units, Subcutaneous, Daily  insulin lispro, 0-9 Units, Subcutaneous, 4x Daily AC & at  Bedtime  insulin lispro, 5 Units, Subcutaneous, TID With Meals  nystatin, , Topical, TID  sodium chloride, 10 mL, Intravenous, Q12H  topiramate, 50 mg, Oral, BID  venlafaxine XR, 75 mg, Oral, Daily With Breakfast      Continuous Infusions:   PRN Meds:.dexamethasone  •  dextrose  •  dextrose  •  fentanyl  •  glucagon (human recombinant)  •  HYDROmorphone  •  hydrOXYzine  •  lactated ringers  •  melatonin  •  Morphine **AND** naloxone  •  ondansetron **OR** ondansetron  •  ondansetron  •  promethazine  •  sodium chloride    Assessment/Plan   Assessment & Plan     Active Hospital Problems    Diagnosis  POA   • **Ulcerative pancolitis with rectal bleeding (HCC) [K51.011]  Yes   • Non-intractable vomiting [R11.10]  Unknown   • Ulcerative colitis flare [K51.90]  Yes   • Bloody diarrhea [R19.7]  Yes   • Type 2 diabetes mellitus treated with insulin (HCC) [E11.9, Z79.4]  Not Applicable   • Bipolar disorder (HCC) [F31.9]  Yes   • Gastroesophageal reflux disease without esophagitis [K21.9]  Yes      Resolved Hospital Problems   No resolved problems to display.        Brief Hospital Course to date:    Thais Hobson is a 36 y.o. female with history of gastroparesis, ulcerative colitis/pancolitis, type 2 diabetes, bipolar disorder, C. Difficile, was a direct admit from her PCP for complaints of diarrhea.     This patient's problems and plans were partially entered by my partner and updated as appropriate by me 01/06/22.        Diarrhea with a history of C. Difficile  Ulcerative pancolitis with rectal bleeding  --EGD 07/2021 showed mild left-sided colitis  --GI PCR, C. difficile panel pending  -Obtain fecal calprotectin  -Infectious work-up negative  -Continue Bentyl   -Continue Imuran  -Continue Anusol and sitz baths twice daily  -low carbohydrate meals  --IV access has been lost.  Meds switched to IM to avoid PICC.  --GI consulted.  Likely secondary to autonomic dysfunction, exclude infectious etiology.  --She is now s/p  EGD and empiric dilation by Dr. Sainz.       Uncontrolled DM 2 with gastroparesis  --FSBG with SSI  -Chronically uncontrolled DM 2 with long-term use of insulin, last A1c 12%, with nonadherence to home insulin therapy, and complication of gastroparesis; has not been taking insulin at home due to reported poor oral intake  -increase basal bolus to 15 units of Levemir daily  and 8 units of lispro 3 times daily with meals. Continus SSI     Bipolar disorder  GERD  -Continue Effexor     DVT prophylaxis:  mechanical    DVT prophylaxis:  Mechanical DVT prophylaxis orders are present.       AM-PAC 6 Clicks Score (PT): 24 (01/05/22 2000)    Disposition: I expect the patient to be discharged TBD.    CODE STATUS:   Code Status and Medical Interventions:   Ordered at: 01/03/22 0838     Level Of Support Discussed With:    Patient     Code Status (Patient has no pulse and is not breathing):    CPR (Attempt to Resuscitate)     Medical Interventions (Patient has pulse or is breathing):    Full Support            EMELYN Zamudio  01/06/22

## 2022-01-07 DIAGNOSIS — Z79.890 HORMONE REPLACEMENT THERAPY (HRT): ICD-10-CM

## 2022-01-07 LAB
CYTO UR: NORMAL
GLUCOSE BLDC GLUCOMTR-MCNC: 156 MG/DL (ref 70–130)
GLUCOSE BLDC GLUCOMTR-MCNC: 178 MG/DL (ref 70–130)
GLUCOSE BLDC GLUCOMTR-MCNC: 184 MG/DL (ref 70–130)
GLUCOSE BLDC GLUCOMTR-MCNC: 193 MG/DL (ref 70–130)
LAB AP CASE REPORT: NORMAL
LAB AP CLINICAL INFORMATION: NORMAL
PATH REPORT.FINAL DX SPEC: NORMAL
PATH REPORT.GROSS SPEC: NORMAL

## 2022-01-07 PROCEDURE — 99232 SBSQ HOSP IP/OBS MODERATE 35: CPT | Performed by: NURSE PRACTITIONER

## 2022-01-07 PROCEDURE — 63710000001 INSULIN LISPRO (HUMAN) PER 5 UNITS: Performed by: INTERNAL MEDICINE

## 2022-01-07 PROCEDURE — 82962 GLUCOSE BLOOD TEST: CPT

## 2022-01-07 PROCEDURE — 63710000001 DIPHENHYDRAMINE PER 50 MG: Performed by: INTERNAL MEDICINE

## 2022-01-07 PROCEDURE — 99232 SBSQ HOSP IP/OBS MODERATE 35: CPT | Performed by: PHYSICIAN ASSISTANT

## 2022-01-07 PROCEDURE — 63710000001 AZATHIOPRINE PER 50 MG: Performed by: INTERNAL MEDICINE

## 2022-01-07 RX ORDER — PANTOPRAZOLE SODIUM 40 MG/1
40 TABLET, DELAYED RELEASE ORAL
Status: DISCONTINUED | OUTPATIENT
Start: 2022-01-07 | End: 2022-01-08 | Stop reason: HOSPADM

## 2022-01-07 RX ORDER — DIPHENHYDRAMINE HCL 25 MG
25 CAPSULE ORAL EVERY 6 HOURS PRN
Status: DISCONTINUED | OUTPATIENT
Start: 2022-01-07 | End: 2022-01-08 | Stop reason: HOSPADM

## 2022-01-07 RX ORDER — ESTRADIOL 0.05 MG/D
FILM, EXTENDED RELEASE TRANSDERMAL
Qty: 8 PATCH | Refills: 2 | Status: SHIPPED | OUTPATIENT
Start: 2022-01-07

## 2022-01-07 RX ADMIN — DICYCLOMINE HYDROCHLORIDE 10 MG: 10 CAPSULE ORAL at 10:47

## 2022-01-07 RX ADMIN — VENLAFAXINE HYDROCHLORIDE 75 MG: 75 CAPSULE, EXTENDED RELEASE ORAL at 09:22

## 2022-01-07 RX ADMIN — HYDROCORTISONE: 25 CREAM TOPICAL at 20:37

## 2022-01-07 RX ADMIN — ESTRADIOL 1 PATCH: 0.05 PATCH TRANSDERMAL at 09:36

## 2022-01-07 RX ADMIN — DICYCLOMINE HYDROCHLORIDE 10 MG: 10 CAPSULE ORAL at 17:23

## 2022-01-07 RX ADMIN — PROMETHAZINE HYDROCHLORIDE 25 MG: 25 SUPPOSITORY RECTAL at 21:59

## 2022-01-07 RX ADMIN — GABAPENTIN 800 MG: 400 CAPSULE ORAL at 05:10

## 2022-01-07 RX ADMIN — INSULIN LISPRO 2 UNITS: 100 INJECTION, SOLUTION INTRAVENOUS; SUBCUTANEOUS at 12:25

## 2022-01-07 RX ADMIN — OXYCODONE AND ACETAMINOPHEN 1 TABLET: 5; 325 TABLET ORAL at 12:30

## 2022-01-07 RX ADMIN — INSULIN LISPRO 2 UNITS: 100 INJECTION, SOLUTION INTRAVENOUS; SUBCUTANEOUS at 20:35

## 2022-01-07 RX ADMIN — INSULIN LISPRO 2 UNITS: 100 INJECTION, SOLUTION INTRAVENOUS; SUBCUTANEOUS at 17:24

## 2022-01-07 RX ADMIN — TOPIRAMATE 50 MG: 25 TABLET, FILM COATED ORAL at 20:36

## 2022-01-07 RX ADMIN — DIPHENHYDRAMINE HYDROCHLORIDE 25 MG: 25 CAPSULE ORAL at 10:47

## 2022-01-07 RX ADMIN — INSULIN LISPRO 8 UNITS: 100 INJECTION, SOLUTION INTRAVENOUS; SUBCUTANEOUS at 09:23

## 2022-01-07 RX ADMIN — TOPIRAMATE 50 MG: 25 TABLET, FILM COATED ORAL at 09:23

## 2022-01-07 RX ADMIN — INSULIN LISPRO 8 UNITS: 100 INJECTION, SOLUTION INTRAVENOUS; SUBCUTANEOUS at 17:44

## 2022-01-07 RX ADMIN — GABAPENTIN 800 MG: 400 CAPSULE ORAL at 20:36

## 2022-01-07 RX ADMIN — INSULIN LISPRO 2 UNITS: 100 INJECTION, SOLUTION INTRAVENOUS; SUBCUTANEOUS at 09:23

## 2022-01-07 RX ADMIN — DICYCLOMINE HYDROCHLORIDE 10 MG: 10 CAPSULE ORAL at 09:22

## 2022-01-07 RX ADMIN — NYSTATIN: 100000 POWDER TOPICAL at 20:38

## 2022-01-07 RX ADMIN — DICYCLOMINE HYDROCHLORIDE 10 MG: 10 CAPSULE ORAL at 20:36

## 2022-01-07 RX ADMIN — PANTOPRAZOLE SODIUM 40 MG: 40 TABLET, DELAYED RELEASE ORAL at 17:27

## 2022-01-07 RX ADMIN — PANTOPRAZOLE SODIUM 40 MG: 40 TABLET, DELAYED RELEASE ORAL at 09:36

## 2022-01-07 RX ADMIN — AZATHIOPRINE 50 MG: 50 TABLET ORAL at 10:50

## 2022-01-07 RX ADMIN — OXYCODONE AND ACETAMINOPHEN 1 TABLET: 5; 325 TABLET ORAL at 19:15

## 2022-01-07 RX ADMIN — GABAPENTIN 800 MG: 400 CAPSULE ORAL at 14:02

## 2022-01-07 RX ADMIN — INSULIN LISPRO 8 UNITS: 100 INJECTION, SOLUTION INTRAVENOUS; SUBCUTANEOUS at 12:25

## 2022-01-07 RX ADMIN — OXYCODONE AND ACETAMINOPHEN 1 TABLET: 5; 325 TABLET ORAL at 05:13

## 2022-01-07 NOTE — PLAN OF CARE
Goal Outcome Evaluation:      VSS. RA. Ad del. Pt states R and L upper quadrants of abdomen are tender but did not request pain meds. Phenegran suppository given at start of shift.      Progress: improving     Problem: Adult Inpatient Plan of Care  Goal: Plan of Care Review  Outcome: Ongoing, Progressing  Flowsheets  Taken 1/7/2022 0243 by Laurie Mondragon RN  Progress: improving  Taken 1/6/2022 1112 by Lainey Ivory RN  Plan of Care Reviewed With: patient  Goal: Patient-Specific Goal (Individualized)  Outcome: Ongoing, Progressing  Goal: Absence of Hospital-Acquired Illness or Injury  Outcome: Ongoing, Progressing  Intervention: Identify and Manage Fall Risk  Recent Flowsheet Documentation  Taken 1/7/2022 0200 by Laurie Mondragon RN  Safety Promotion/Fall Prevention:   activity supervised   assistive device/personal items within reach   clutter free environment maintained   lighting adjusted   safety round/check completed   room organization consistent  Taken 1/7/2022 0000 by Laurie Mondragon RN  Safety Promotion/Fall Prevention:   activity supervised   assistive device/personal items within reach   clutter free environment maintained   lighting adjusted   room organization consistent   safety round/check completed  Taken 1/6/2022 2200 by Laurie Mondragon RN  Safety Promotion/Fall Prevention:   activity supervised   assistive device/personal items within reach   clutter free environment maintained   lighting adjusted   safety round/check completed   room organization consistent  Taken 1/6/2022 2000 by Laurie Mondragon RN  Safety Promotion/Fall Prevention:   activity supervised   assistive device/personal items within reach   clutter free environment maintained   lighting adjusted  Intervention: Prevent Skin Injury  Recent Flowsheet Documentation  Taken 1/7/2022 0200 by Laurie Mondragon RN  Body Position: position changed independently  Skin Protection: adhesive use limited  Taken 1/7/2022 0000 by  Nulman, Laurie, RN  Body Position: position changed independently  Skin Protection: adhesive use limited  Taken 1/6/2022 2200 by Laurie Mondragon RN  Body Position: position changed independently  Skin Protection: tubing/devices free from skin contact  Taken 1/6/2022 2000 by Laurie Mondragon RN  Body Position: position changed independently  Skin Protection:   skin-to-skin areas padded   skin-to-device areas padded   tubing/devices free from skin contact  Intervention: Prevent and Manage VTE (venous thromboembolism) Risk  Recent Flowsheet Documentation  Taken 1/6/2022 2000 by Laurie Mondragon RN  VTE Prevention/Management:   bilateral   dorsiflexion/plantar flexion performed   bleeding risk factor(s) identified  Intervention: Prevent Infection  Recent Flowsheet Documentation  Taken 1/7/2022 0200 by Laurie Mondragon RN  Infection Prevention: rest/sleep promoted  Taken 1/7/2022 0000 by Laurie Mondragon RN  Infection Prevention: rest/sleep promoted  Goal: Optimal Comfort and Wellbeing  Outcome: Ongoing, Progressing  Intervention: Provide Person-Centered Care  Recent Flowsheet Documentation  Taken 1/6/2022 2000 by Laurie Mondragon RN  Trust Relationship/Rapport:   care explained   choices provided   questions answered   questions encouraged   reassurance provided   thoughts/feelings acknowledged  Goal: Readiness for Transition of Care  Outcome: Ongoing, Progressing

## 2022-01-07 NOTE — CASE MANAGEMENT/SOCIAL WORK
Continued Stay Note  [facility]     Patient Name: Thais Hobson  MRN: [id number]  Today's Date: 1/7/2022    Admit Date: 1/3/2022     Discharge Plan     Row Name 01/07/22 3640       Plan    Plan discharge plan    Plan Comments Plan is home with family at discharge. Pt will need a Ly[employer] arranged to transport. Pt denies further discharge needs. CM will cont to follow.    Final Discharge Disposition Code 01 - home or self-care               Discharge Codes    No documentation.               Expected Discharge Date and Time     Expected Discharge Date Expected Discharge Time    Jan 7, 2022             Nneka Albert RN

## 2022-01-07 NOTE — PROGRESS NOTES
[facility] Medicine Services  PROGRESS NOTE    Patient Name: Thais Hobson  : 1985  MRN: [id number]    Date of Admission: 1/3/2022  Primary Care Physician: Lv Sanchez DO    Subjective   Subjective     CC:  Hospital follow up abdominal pain and diarrhea    HPI:  Feeling much improved today. Abdominal pain is tolerable with oral pain meds.Tolerating solid food.      ROS:  Gen- No fevers, chills  CV- No chest pain, palpitations  Resp- No cough, dyspnea  GI- No N/V/D, +abd pain-controlled with meds          Objective   Objective     Vital Signs:   Temp:  [97.8 °F (36.6 °C)-98.2 °F (36.8 °C)] 98 °F (36.7 °C)  Heart Rate:  [86-99] 86  Resp:  [16-18] 18  BP: ()/(68-88) 98/68     Physical Exam:  Constitutional: No acute distress, awake, alert, upright in bed  HENT: NCAT, mucous membranes moist  Respiratory: Clear to auscultation bilaterally, respiratory effort normal on room air   Cardiovascular: RRR, no murmurs, rubs, or gallops  Gastrointestinal: Positive bowel sounds, soft, mild tenderness  Musculoskeletal: No bilateral ankle edema  Psychiatric: Appropriate affect, cooperative  Neurologic: Oriented x 3, strength symmetric in all extremities, Cranial Nerves grossly intact to confrontation, speech clear  Skin: No rashes noted to exposed skin  No significant change in exam from          Results Reviewed:  LAB RESULTS:      Lab 22   WBC 12.03*   HEMOGLOBIN 13.8   HEMATOCRIT 42.3   PLATELETS 272   NEUTROS ABS 5.62   IMMATURE GRANS (ABS) 0.02   LYMPHS ABS 5.58*   MONOS ABS 0.44   EOS ABS 0.32   MCV 83.4   SED RATE 29*   CRP 0.63*   PROCALCITONIN 0.05   LACTATE 1.0   PROTIME 12.5   APTT 30.3         Lab 22  0818 22   SODIUM 135* 135*   POTASSIUM 4.0 3.8   CHLORIDE 103 102   CO2 19.0* 19.0*   ANION GAP 13.0 14.0   BUN 8 10   CREATININE 0.50* 0.59   GLUCOSE 225* 338*   CALCIUM 9.1 8.8   MAGNESIUM  --  1.8         Lab 22  07   TOTAL  PROTEIN 7.4   ALBUMIN 3.90   GLOBULIN 3.5   ALT (SGPT) 31   AST (SGOT) 17   BILIRUBIN 0.3   ALK PHOS 168*   LIPASE 14         Lab 01/04/22 0722   PROTIME 12.5   INR 0.96             Lab 01/04/22 0722   ABO TYPING O   RH TYPING Positive   ANTIBODY SCREEN Negative         Brief Urine Lab Results  (Last result in the past 365 days)      Color   Clarity   Blood   Leuk Est   Nitrite   Protein   CREAT   Urine HCG        01/04/22 0347 Yellow   Clear   Negative   Negative   Negative   Negative                 Microbiology Results Abnormal     Procedure Component Value - Date/Time    Clostridium Difficile Toxin - Stool, Per Rectum [[id number]]  (Normal) Collected: 01/04/22 1614    Lab Status: Final result Specimen: Stool from Per Rectum Updated: 01/04/22 1759    Narrative:      The following orders were created for panel order Clostridium Difficile Toxin - Stool, Per Rectum.  Procedure                               Abnormality         Status                     ---------                               -----------         ------                     Clostridium Difficile To...[[id number]]  Normal              Final result                 Please view results for these tests on the individual orders.    Clostridium Difficile Toxin, PCR - Stool, Per Rectum [[id number]]  (Normal) Collected: 01/04/22 1614    Lab Status: Final result Specimen: Stool from Per Rectum Updated: 01/04/22 1759     C. Difficile Toxins by PCR Not Detected    Narrative:      Performance characteristics of test not established for patients <2 years of age.  Negative for Toxigenic C. Difficile    Gastrointestinal Panel, PCR - Stool, Per Rectum [[id number]]  (Normal) Collected: 01/04/22 1614    Lab Status: Final result Specimen: Stool from Per Rectum Updated: 01/04/22 1746     Campylobacter Not Detected     Plesiomonas shigelloides Not Detected     Salmonella Not Detected     Vibrio Not Detected     Vibrio cholerae Not Detected     Yersinia enterocolitica Not  Detected     Enteroaggregative E. coli (EAEC) Not Detected     Enteropathogenic E. coli (EPEC) Not Detected     Enterotoxigenic E. coli (ETEC) lt/st Not Detected     Shiga-like toxin-producing E. coli (STEC) stx1/stx2 Not Detected     Shigella/Enteroinvasive E. coli (EIEC) Not Detected     Cryptosporidium Not Detected     Cyclospora cayetanensis Not Detected     Entamoeba histolytica Not Detected     Giardia lamblia Not Detected     Adenovirus F40/41 Not Detected     Astrovirus Not Detected     Norovirus GI/GII Not Detected     Rotavirus A Not Detected     Sapovirus (I, II, IV or V) Not Detected    COVID PRE-OP / PRE-PROCEDURE SCREENING ORDER (NO ISOLATION) - Swab, Nasopharynx [[id number]]  (Normal) Collected: 01/03/22 2225    Lab Status: Final result Specimen: Swab from Nasopharynx Updated: 01/03/22 2319    Narrative:      The following orders were created for panel order COVID PRE-OP / PRE-PROCEDURE SCREENING ORDER (NO ISOLATION) - Swab, Nasopharynx.  Procedure                               Abnormality         Status                     ---------                               -----------         ------                     COVID-19, FLU A/B, RSV P...[[id number]]  Normal              Final result                 Please view results for these tests on the individual orders.    COVID-19, FLU A/B, RSV PCR - Swab, Nasopharynx [[id number]]  (Normal) Collected: 01/03/22 2225    Lab Status: Final result Specimen: Swab from Nasopharynx Updated: 01/03/22 2319     COVID19 Not Detected     Influenza A PCR Not Detected     Influenza B PCR Not Detected     RSV, PCR Not Detected          No radiology results from the last 24 hrs        I have reviewed the medications:  Scheduled Meds:azaTHIOprine, 50 mg, Oral, Daily  dicyclomine, 10 mg, Oral, 4x Daily AC & at Bedtime  estradiol, 1 patch, Transdermal, Q3 Days  gabapentin, 800 mg, Oral, Q8H  Hydrocortisone (Perianal), , Rectal, BID  insulin detemir, 15 Units, Subcutaneous,  Daily  insulin lispro, 0-9 Units, Subcutaneous, 4x Daily AC & at Bedtime  insulin lispro, 8 Units, Subcutaneous, TID With Meals  nystatin, , Topical, TID  pantoprazole, 40 mg, Oral, BID AC  sodium chloride, 10 mL, Intravenous, Q12H  topiramate, 50 mg, Oral, BID  venlafaxine XR, 75 mg, Oral, Daily With Breakfast      Continuous Infusions:   PRN Meds:.dextrose  •  dextrose  •  diphenhydrAMINE  •  glucagon (human recombinant)  •  hydrOXYzine  •  melatonin  •  Morphine **AND** naloxone  •  ondansetron **OR** ondansetron  •  oxyCODONE-acetaminophen  •  promethazine  •  promethazine  •  sodium chloride    Assessment/Plan   Assessment & Plan     Active Hospital Problems    Diagnosis  POA   • **Ulcerative pancolitis with rectal bleeding (HCC) [K51.011]  Yes   • Non-intractable vomiting [R11.10]  Unknown   • Ulcerative colitis flare [K51.90]  Yes   • Bloody diarrhea [R19.7]  Yes   • Type 2 diabetes mellitus treated with insulin (HCC) [E11.9, Z79.4]  Not Applicable   • Bipolar disorder (HCC) [F31.9]  Yes   • Gastroesophageal reflux disease without esophagitis [K21.9]  Yes      Resolved Hospital Problems   No resolved problems to display.        Brief Hospital Course to date:    Thais Hobson is a 36 y.o. female with history of gastroparesis, ulcerative colitis/pancolitis, type 2 diabetes, bipolar disorder, C. Difficile, was a direct admit from her PCP for complaints of diarrhea.     This patient's problems and plans were partially entered by my partner and updated as appropriate by me 01/07/22.    Diarrhea with a history of C. Difficile  Ulcerative pancolitis with rectal bleeding  --EGD 07/2021 showed mild left-sided colitis  --GI PCR, C. difficile panel pending  -Infectious work-up negative  -Continue Bentyl   -Continue Imuran  -Continue Anusol and sitz baths twice daily  -low carbohydrate meals  --IV access has been lost.  Meds switched to IM to avoid PICC.  --GI consulted.  Likely secondary to autonomic dysfunction,  exclude infectious etiology.  --She is now s/p EGD and empiric dilation by Dr. Sainz.    --tolerating solid food with no difficulty.      Uncontrolled DM 2 with gastroparesis  --FSBG with SSI  -Chronically uncontrolled DM 2 with long-term use of insulin, last A1c 12%, with nonadherence to home insulin therapy, and complication of gastroparesis; has not been taking insulin at home due to reported poor oral intake  -continue basal bolus  15 units of Levemir daily and 8 units of lispro 3 times daily with meals. Continue SSI.     Bipolar disorder  GERD  -Continue Effexor     DVT prophylaxis:  mechanical    DVT prophylaxis:  Mechanical DVT prophylaxis orders are present.       AM-PAC 6 Clicks Score (PT): 24 (01/06/22 2000)    Disposition: I expect the patient to be discharged home tomorrow.  Reports that she will need assistance with transportation.     CODE STATUS:   Code Status and Medical Interventions:   Ordered at: 01/03/22 0032     Level Of Support Discussed With:    Patient     Code Status (Patient has no pulse and is not breathing):    CPR (Attempt to Resuscitate)     Medical Interventions (Patient has pulse or is breathing):    Full Support            Korina Cannon, EMELYN  01/07/22

## 2022-01-07 NOTE — PROGRESS NOTES
GI Daily Progress Note  Subjective:    Chief Complaint:  Follow up epigastric pain     Still with epigastric pain.   She is tolerating a diet.   Post prandial diarrhea continues.   6 stools today.      Objective:    BP 98/68 (BP Location: Right arm, Patient Position: Lying)   Pulse 86   Temp 98 °F (36.7 °C) (Oral)   Resp 18   LMP  (LMP Unknown)   SpO2 94%     Physical Exam  Constitutional:       General: She is not in acute distress.  Cardiovascular:      Rate and Rhythm: Normal rate and regular rhythm.   Pulmonary:      Effort: Pulmonary effort is normal.   Abdominal:      General: Bowel sounds are normal.      Palpations: Abdomen is soft.      Tenderness: There is abdominal tenderness in the epigastric area.   Neurological:      Mental Status: She is alert and oriented to person, place, and time.   Psychiatric:      Comments: Depressed mood         Lab  Lab Results   Component Value Date    WBC 12.03 (H) 01/04/2022    HGB 13.8 01/04/2022    HGB 12.3 07/05/2021    HGB 13.3 07/04/2021    MCV 83.4 01/04/2022     01/04/2022    INR 0.96 01/04/2022       Lab Results   Component Value Date    GLUCOSE 225 (H) 01/06/2022    BUN 8 01/06/2022    CREATININE 0.50 (L) 01/06/2022    EGFRIFNONA 140 01/06/2022    EGFRIFAFRI >150 01/06/2022    BCR 16.0 01/06/2022     (L) 01/06/2022    K 4.0 01/06/2022    CO2 19.0 (L) 01/06/2022    CALCIUM 9.1 01/06/2022    PROTENTOTREF 7.9 11/20/2019    ALBUMIN 3.90 01/04/2022    ALKPHOS 168 (H) 01/04/2022    BILITOT 0.3 01/04/2022    ALT 31 01/04/2022    AST 17 01/04/2022       Assessment:    Duodenal ulcer  Ulcerative colitis  Uncontrolled diabetes mellitus     Plan:    C. Difficile PCR and GI Panel are negative.       Her epigastric pain is likely secondary to the duodenal ulcer seen on EGD yesterday.    Pathology reviewed.  Small bowel biopsy negative for celiac disease.   Antrum biopsy negative for H. Pylori.      >>> Begin Protonix 40 mg BID for 1 month  >>> Awaiting  Vedolizumab levels, Thiopurine Metabolites and fecal calprotectin.   These can be followed up outpatient with EMELYN Avila at discharge.  Consider increasing Imuran dose.     >>> Continue Bentyl 10 mg QID before meals    Anticipate home possibly tomorrow with close outpatient follow up with Carolyn Woods at [facility] next week (1/10/22 at 12:30).       JACK Peres  01/07/22  14:03 EST

## 2022-01-08 ENCOUNTER — READMISSION MANAGEMENT (OUTPATIENT)
Dept: CALL CENTER | Facility: HOSPITAL | Age: 37
End: 2022-01-08

## 2022-01-08 VITALS
SYSTOLIC BLOOD PRESSURE: 108 MMHG | RESPIRATION RATE: 18 BRPM | DIASTOLIC BLOOD PRESSURE: 79 MMHG | TEMPERATURE: 98.1 F | OXYGEN SATURATION: 94 % | HEART RATE: 95 BPM

## 2022-01-08 LAB
GLUCOSE BLDC GLUCOMTR-MCNC: 103 MG/DL (ref 70–130)
GLUCOSE BLDC GLUCOMTR-MCNC: 260 MG/DL (ref 70–130)

## 2022-01-08 PROCEDURE — 82542 COL CHROMOTOGRAPHY QUAL/QUAN: CPT | Performed by: PHYSICIAN ASSISTANT

## 2022-01-08 PROCEDURE — 99239 HOSP IP/OBS DSCHRG MGMT >30: CPT | Performed by: NURSE PRACTITIONER

## 2022-01-08 PROCEDURE — 63710000001 INSULIN LISPRO (HUMAN) PER 5 UNITS: Performed by: INTERNAL MEDICINE

## 2022-01-08 PROCEDURE — 80280 DRUG ASSAY VEDOLIZUMAB: CPT | Performed by: INTERNAL MEDICINE

## 2022-01-08 PROCEDURE — 82397 CHEMILUMINESCENT ASSAY: CPT | Performed by: INTERNAL MEDICINE

## 2022-01-08 PROCEDURE — 63710000001 INSULIN DETEMIR PER 5 UNITS: Performed by: INTERNAL MEDICINE

## 2022-01-08 PROCEDURE — 82542 COL CHROMOTOGRAPHY QUAL/QUAN: CPT

## 2022-01-08 PROCEDURE — 63710000001 AZATHIOPRINE PER 50 MG: Performed by: INTERNAL MEDICINE

## 2022-01-08 PROCEDURE — 82962 GLUCOSE BLOOD TEST: CPT

## 2022-01-08 RX ORDER — OXYCODONE HYDROCHLORIDE AND ACETAMINOPHEN 5; 325 MG/1; MG/1
1 TABLET ORAL EVERY 6 HOURS PRN
Qty: 12 TABLET | Refills: 0 | Status: SHIPPED | OUTPATIENT
Start: 2022-01-08 | End: 2022-11-23

## 2022-01-08 RX ORDER — PROMETHAZINE HYDROCHLORIDE 25 MG/1
25 SUPPOSITORY RECTAL EVERY 6 HOURS PRN
Qty: 12 EACH | Refills: 0 | Status: SHIPPED | OUTPATIENT
Start: 2022-01-08

## 2022-01-08 RX ORDER — VENLAFAXINE HYDROCHLORIDE 75 MG/1
75 CAPSULE, EXTENDED RELEASE ORAL
Qty: 30 CAPSULE | Refills: 0 | Status: SHIPPED | OUTPATIENT
Start: 2022-01-09 | End: 2022-06-07

## 2022-01-08 RX ORDER — PANTOPRAZOLE SODIUM 20 MG/1
40 TABLET, DELAYED RELEASE ORAL
Qty: 120 TABLET | Refills: 0 | Status: SHIPPED | OUTPATIENT
Start: 2022-01-08 | End: 2022-11-23

## 2022-01-08 RX ORDER — DICYCLOMINE HYDROCHLORIDE 10 MG/1
10 CAPSULE ORAL
Qty: 120 CAPSULE | Refills: 0 | Status: SHIPPED | OUTPATIENT
Start: 2022-01-08 | End: 2022-01-10

## 2022-01-08 RX ORDER — PANTOPRAZOLE SODIUM 20 MG/1
40 TABLET, DELAYED RELEASE ORAL
Qty: 120 TABLET | Refills: 0 | Status: SHIPPED | OUTPATIENT
Start: 2022-01-08 | End: 2022-01-08

## 2022-01-08 RX ORDER — PROMETHAZINE HYDROCHLORIDE 25 MG/1
25 TABLET ORAL EVERY 6 HOURS PRN
Qty: 30 TABLET | Refills: 0 | Status: SHIPPED | OUTPATIENT
Start: 2022-01-08 | End: 2022-07-05 | Stop reason: SDUPTHER

## 2022-01-08 RX ORDER — VALACYCLOVIR HYDROCHLORIDE 1 G/1
1000 TABLET, FILM COATED ORAL 2 TIMES DAILY
Qty: 2 TABLET | Refills: 0 | Status: SHIPPED | OUTPATIENT
Start: 2022-01-08 | End: 2023-03-27

## 2022-01-08 RX ORDER — VENLAFAXINE HYDROCHLORIDE 75 MG/1
75 CAPSULE, EXTENDED RELEASE ORAL
Qty: 30 CAPSULE | Refills: 0 | Status: SHIPPED | OUTPATIENT
Start: 2022-01-09 | End: 2022-01-08

## 2022-01-08 RX ORDER — ONDANSETRON 4 MG/1
4 TABLET, FILM COATED ORAL EVERY 6 HOURS PRN
Qty: 20 TABLET | Refills: 0 | Status: CANCELLED | OUTPATIENT
Start: 2022-01-08

## 2022-01-08 RX ADMIN — GABAPENTIN 800 MG: 400 CAPSULE ORAL at 05:38

## 2022-01-08 RX ADMIN — INSULIN DETEMIR 15 UNITS: 100 INJECTION, SOLUTION SUBCUTANEOUS at 09:04

## 2022-01-08 RX ADMIN — DICYCLOMINE HYDROCHLORIDE 10 MG: 10 CAPSULE ORAL at 08:49

## 2022-01-08 RX ADMIN — TOPIRAMATE 50 MG: 25 TABLET, FILM COATED ORAL at 08:49

## 2022-01-08 RX ADMIN — HYDROCORTISONE 1 APPLICATION: 25 CREAM TOPICAL at 10:28

## 2022-01-08 RX ADMIN — AZATHIOPRINE 50 MG: 50 TABLET ORAL at 08:49

## 2022-01-08 RX ADMIN — OXYCODONE AND ACETAMINOPHEN 1 TABLET: 5; 325 TABLET ORAL at 05:44

## 2022-01-08 RX ADMIN — PROMETHAZINE HYDROCHLORIDE 25 MG: 25 SUPPOSITORY RECTAL at 10:26

## 2022-01-08 RX ADMIN — OXYCODONE AND ACETAMINOPHEN 1 TABLET: 5; 325 TABLET ORAL at 00:01

## 2022-01-08 RX ADMIN — PANTOPRAZOLE SODIUM 40 MG: 40 TABLET, DELAYED RELEASE ORAL at 08:49

## 2022-01-08 RX ADMIN — INSULIN LISPRO 6 UNITS: 100 INJECTION, SOLUTION INTRAVENOUS; SUBCUTANEOUS at 08:50

## 2022-01-08 RX ADMIN — DICYCLOMINE HYDROCHLORIDE 10 MG: 10 CAPSULE ORAL at 12:02

## 2022-01-08 RX ADMIN — INSULIN LISPRO 8 UNITS: 100 INJECTION, SOLUTION INTRAVENOUS; SUBCUTANEOUS at 08:50

## 2022-01-08 RX ADMIN — NYSTATIN: 100000 POWDER TOPICAL at 10:29

## 2022-01-08 RX ADMIN — INSULIN LISPRO 8 UNITS: 100 INJECTION, SOLUTION INTRAVENOUS; SUBCUTANEOUS at 12:02

## 2022-01-08 RX ADMIN — VENLAFAXINE HYDROCHLORIDE 75 MG: 75 CAPSULE, EXTENDED RELEASE ORAL at 08:49

## 2022-01-08 RX ADMIN — OXYCODONE AND ACETAMINOPHEN 1 TABLET: 5; 325 TABLET ORAL at 12:04

## 2022-01-08 NOTE — CASE MANAGEMENT/SOCIAL WORK
Continued Stay Note  [facility]     Patient Name: Thais Hobson  MRN: [id number]  Today's Date: 1/8/2022    Admit Date: 1/3/2022     Discharge Plan     Row Name 01/08/22 1338       Plan    Plan SUDARSHAN follow up    Patient/Family in Agreement with Plan yes    Plan Comments SW received call from RN regarding pt needing Lyft transportation at d/c. RN called transport. SW called and spoke with pt in room about address on file as it was not pulling up in system. Pt confirmed address and stated they recently updated road. SW arranged Lyft from [facility] to [address] [place], [place] [id number]. SUDARSHAN contacted Transport and spoke with Titus [phone] to make aware of ETA. KAMALJIT Banks [phone]    Final Discharge Disposition Code 01 - home or self-care                                               Discharge Codes    No documentation.               Expected Discharge Date and Time     Expected Discharge Date Expected Discharge Time    Jan 8, 2022             KAMALJIT Almonte

## 2022-01-08 NOTE — PLAN OF CARE
Goal Outcome Evaluation:      VSS. RA. NSR/ Sinus tach. C/o of upper abd. pain, given percocet twice. Ad del. Given one phengran suppository. Eager to go home today.     Progress: improving     Problem: Adult Inpatient Plan of Care  Goal: Plan of Care Review  Outcome: Ongoing, Progressing  Goal: Patient-Specific Goal (Individualized)  Outcome: Ongoing, Progressing  Goal: Absence of Hospital-Acquired Illness or Injury  Outcome: Ongoing, Progressing  Intervention: Identify and Manage Fall Risk  Recent Flowsheet Documentation  Taken 1/8/2022 0200 by Laurie Mondragon RN  Safety Promotion/Fall Prevention:  • activity supervised  • assistive device/personal items within reach  • clutter free environment maintained  • lighting adjusted  • safety round/check completed  • room organization consistent  Taken 1/8/2022 0000 by Laurie Mondragon RN  Safety Promotion/Fall Prevention:  • activity supervised  • assistive device/personal items within reach  • clutter free environment maintained  Taken 1/7/2022 2200 by Laurie Mondragon RN  Safety Promotion/Fall Prevention:  • activity supervised  • assistive device/personal items within reach  • clutter free environment maintained  Taken 1/7/2022 2000 by Laurie Mondragon RN  Safety Promotion/Fall Prevention:  • activity supervised  • assistive device/personal items within reach  • clutter free environment maintained  • lighting adjusted  • room organization consistent  • safety round/check completed  Intervention: Prevent Skin Injury  Recent Flowsheet Documentation  Taken 1/8/2022 0200 by Laurie Mondragon RN  Body Position: position changed independently  Skin Protection: adhesive use limited  Taken 1/8/2022 0000 by Laurie Mondragon RN  Body Position: position changed independently  Skin Protection: adhesive use limited  Taken 1/7/2022 2200 by Laurie Mondragon RN  Body Position: position changed independently  Skin Protection: adhesive use limited  Taken 1/7/2022 2000 by  Laurie Mondragon, RN  Body Position: position changed independently  Skin Protection: adhesive use limited  Goal: Optimal Comfort and Wellbeing  Outcome: Ongoing, Progressing  Goal: Readiness for Transition of Care  Outcome: Ongoing, Progressing

## 2022-01-08 NOTE — CASE MANAGEMENT/SOCIAL WORK
Continued Stay Note  [facility]     Patient Name: Thais Hobson  MRN: [id number]  Today's Date: 1/8/2022    Admit Date: 1/3/2022     Discharge Plan     Row Name 01/08/22 1216       Plan    Plan Home    Patient/Family in Agreement with Plan yes    Plan Comments Plan is home with family today. EMELYN Hiu called CM to state she is ready to D/C pt and that pt needs a LYFT home. I spoke with Jocelyn/SUDARSHAN, she will contact pt's RN to set up for the LYFT. No other needs voiced or identified.    Final Discharge Disposition Code 01 - home or self-care               Discharge Codes    No documentation.               Expected Discharge Date and Time     Expected Discharge Date Expected Discharge Time    Jan 8, 2022             Enma Toribio RN

## 2022-01-08 NOTE — PLAN OF CARE
Goal Outcome Evaluation:  Plan of Care Reviewed With: patient        Progress: improving    Discharge in progress

## 2022-01-08 NOTE — OUTREACH NOTE
Prep Survey      Responses   [facility] patient discharged from? [facility]   Is LACE score < 7 ? No   Emergency Room discharge w/ pulse ox? No   Eligibility [facility]   Date of Admission 01/03/22   Date of Discharge 01/08/22   Discharge Disposition Home or Self Care   Discharge diagnosis Ulcerative pancolitis with rectal bleeding   Does the patient have one of the following disease processes/diagnoses(primary or secondary)? Other   Does the patient have Home health ordered? No   Is there a DME ordered? No   Prep survey completed? Yes          Maureen Le RN

## 2022-01-08 NOTE — DISCHARGE SUMMARY
[facility] Medicine Services  DISCHARGE SUMMARY    Patient Name: Thais Hobson  : 1985  MRN: [id number]    Date of Admission: 1/3/2022  7:34 PM  Date of Discharge:  2022  Primary Care Physician: Lv Sanchez DO    Consults     Date and Time Order Name Status Description    2022 12:36 AM Inpatient Gastroenterology Consult Completed           Hospital Course     Presenting Problem:   Bloody diarrhea [R19.7]  Ulcerative colitis (HCC) [K51.90]    Active Hospital Problems    Diagnosis  POA   • **Ulcerative pancolitis with rectal bleeding (HCC) [K51.011]  Yes   • Non-intractable vomiting [R11.10]  Unknown   • Ulcerative colitis flare [K51.90]  Yes   • Bloody diarrhea [R19.7]  Yes   • Type 2 diabetes mellitus treated with insulin (HCC) [E11.9, Z79.4]  Not Applicable   • Bipolar disorder (HCC) [F31.9]  Yes   • Gastroesophageal reflux disease without esophagitis [K21.9]  Yes      Resolved Hospital Problems   No resolved problems to display.          Hospital Course:  Thais Hobson is a 36 y.o. female  with history of gastroparesis, ulcerative colitis/pancolitis, type 2 diabetes, bipolar disorder, C. Difficile, was a direct admit from her PCP for complaints of diarrhea.     Diarrhea with a history of C. Difficile  Ulcerative pancolitis with rectal bleeding  --EGD 2021 showed mild left-sided colitis  --GI PCR, C. difficile panel negative  -Infectious work-up negative  -Continue Bentyl per GI  -Continue Imuran per GI  -Continue Anusol and sitz baths twice daily  --GI consulted.  Likely secondary to autonomic dysfunction, exclude infectious etiology.  --She is now s/p EGD and empiric dilation by Dr. Sainz.    --tolerating solid food with no difficulty. Still having mild abdominal pain but it is controlled with oral meds.       Uncontrolled DM 2 with gastroparesis  --FSBG with SSI  -Chronically uncontrolled DM 2 with long-term use of insulin, last A1c 12%, with  nonadherence to home insulin therapy, and complication of gastroparesis; has not been taking insulin at home due to reported poor oral intake  -continue basal bolus routine at home.      Bipolar disorder  GERD  -Continue Effexor      Discharge Follow Up Recommendations for outpatient labs/diagnostics:   Follow up with PCP, first available hospital follow up appt.   Follow up with EMELYN [facility]ila [facility]    Day of Discharge     HPI:   Resting in bed this morning. Pain controlled. Still having diarrhea but it is the same as it has been, no worse.  Eager to discharge home today.     Review of Systems  Gen- No fevers, chills  CV- No chest pain, palpitations  Resp- No cough, dyspnea  GI- No N/V/D, +abd pain        Vital Signs:   Temp:  [98.1 °F (36.7 °C)] 98.1 °F (36.7 °C)  Heart Rate:  [95] 95  Resp:  [17-18] 18  BP: (107-108)/(73-79) 108/79      Physical Exam:  Constitutional: No acute distress, awake, alert  HENT: NCAT, mucous membranes moist  Respiratory: Clear to auscultation bilaterally, respiratory effort normal   Cardiovascular: RRR, no murmurs, rubs, or gallops  Gastrointestinal: Positive bowel sounds, soft, mildly tender, nondistended  Musculoskeletal: No bilateral ankle edema  Psychiatric: Appropriate affect, cooperative  Neurologic: Oriented x 3, strength symmetric in all extremities, Cranial Nerves grossly intact to confrontation, speech clear  Skin: No rashes noted to exposed skin       Pertinent  and/or Most Recent Results     LAB RESULTS:      Lab 01/04/22  0722   WBC 12.03*   HEMOGLOBIN 13.8   HEMATOCRIT 42.3   PLATELETS 272   NEUTROS ABS 5.62   IMMATURE GRANS (ABS) 0.02   LYMPHS ABS 5.58*   MONOS ABS 0.44   EOS ABS 0.32   MCV 83.4   SED RATE 29*   CRP 0.63*   PROCALCITONIN 0.05   LACTATE 1.0   PROTIME 12.5   APTT 30.3         Lab 01/06/22  0818 01/04/22  0722   SODIUM 135* 135*   POTASSIUM 4.0 3.8   CHLORIDE 103 102   CO2 19.0* 19.0*   ANION GAP 13.0 14.0   BUN 8 10   CREATININE 0.50* 0.59    GLUCOSE 225* 338*   CALCIUM 9.1 8.8   MAGNESIUM  --  1.8         Lab 01/04/22 0722   TOTAL PROTEIN 7.4   ALBUMIN 3.90   GLOBULIN 3.5   ALT (SGPT) 31   AST (SGOT) 17   BILIRUBIN 0.3   ALK PHOS 168*   LIPASE 14         Lab 01/04/22 0722   PROTIME 12.5   INR 0.96             Lab 01/04/22 0722   ABO TYPING O   RH TYPING Positive   ANTIBODY SCREEN Negative         Brief Urine Lab Results  (Last result in the past 365 days)      Color   Clarity   Blood   Leuk Est   Nitrite   Protein   CREAT   Urine HCG        01/04/22 0347 Yellow   Clear   Negative   Negative   Negative   Negative               Microbiology Results (last 10 days)     Procedure Component Value - Date/Time    Gastrointestinal Panel, PCR - Stool, Per Rectum [[id number]]  (Normal) Collected: 01/04/22 1614    Lab Status: Final result Specimen: Stool from Per Rectum Updated: 01/04/22 1746     Campylobacter Not Detected     Plesiomonas shigelloides Not Detected     Salmonella Not Detected     Vibrio Not Detected     Vibrio cholerae Not Detected     Yersinia enterocolitica Not Detected     Enteroaggregative E. coli (EAEC) Not Detected     Enteropathogenic E. coli (EPEC) Not Detected     Enterotoxigenic E. coli (ETEC) lt/st Not Detected     Shiga-like toxin-producing E. coli (STEC) stx1/stx2 Not Detected     Shigella/Enteroinvasive E. coli (EIEC) Not Detected     Cryptosporidium Not Detected     Cyclospora cayetanensis Not Detected     Entamoeba histolytica Not Detected     Giardia lamblia Not Detected     Adenovirus F40/41 Not Detected     Astrovirus Not Detected     Norovirus GI/GII Not Detected     Rotavirus A Not Detected     Sapovirus (I, II, IV or V) Not Detected    Clostridium Difficile Toxin - Stool, Per Rectum [[id number]]  (Normal) Collected: 01/04/22 1614    Lab Status: Final result Specimen: Stool from Per Rectum Updated: 01/04/22 7245    Narrative:      The following orders were created for panel order Clostridium Difficile Toxin - Stool, Per  Rectum.  Procedure                               Abnormality         Status                     ---------                               -----------         ------                     Clostridium Difficile To...[[id number]]  Normal              Final result                 Please view results for these tests on the individual orders.    Clostridium Difficile Toxin, PCR - Stool, Per Rectum [[id number]]  (Normal) Collected: 01/04/22 1614    Lab Status: Final result Specimen: Stool from Per Rectum Updated: 01/04/22 1759     C. Difficile Toxins by PCR Not Detected    Narrative:      Performance characteristics of test not established for patients <2 years of age.  Negative for Toxigenic C. Difficile    COVID PRE-OP / PRE-PROCEDURE SCREENING ORDER (NO ISOLATION) - Swab, Nasopharynx [[id number]]  (Normal) Collected: 01/03/22 2225    Lab Status: Final result Specimen: Swab from Nasopharynx Updated: 01/03/22 2319    Narrative:      The following orders were created for panel order COVID PRE-OP / PRE-PROCEDURE SCREENING ORDER (NO ISOLATION) - Swab, Nasopharynx.  Procedure                               Abnormality         Status                     ---------                               -----------         ------                     COVID-19, FLU A/B, RSV P...[[id number]]  Normal              Final result                 Please view results for these tests on the individual orders.    COVID-19, FLU A/B, RSV PCR - Swab, Nasopharynx [[id number]]  (Normal) Collected: 01/03/22 2225    Lab Status: Final result Specimen: Swab from Nasopharynx Updated: 01/03/22 2319     COVID19 Not Detected     Influenza A PCR Not Detected     Influenza B PCR Not Detected     RSV, PCR Not Detected          No radiology results for the last 10 days                Plan for Follow-up of Pending Labs/Results: Per GI  Pending Labs     Order Current Status    Thiopurine Metabolites In process    Vedolizumab and Anti-Vedo Ab In process        Discharge Details         Discharge Medications      New Medications      Instructions Start Date   dicyclomine 10 MG capsule  Commonly known as: BENTYL   10 mg, Oral, 4 Times Daily Before Meals & Nightly      pantoprazole 20 MG EC tablet  Commonly known as: PROTONIX   40 mg, Oral, 2 Times Daily Before Meals      PHARMACY MEDS TO BED CONSULT   Does not apply, Daily      valACYclovir 1000 MG tablet  Commonly known as: Valtrex   1,000 mg, Oral, 2 Times Daily      venlafaxine XR 75 MG 24 hr capsule  Commonly known as: EFFEXOR-XR  Replaces: venlafaxine 37.5 MG tablet   75 mg, Oral, Daily With Breakfast   Start Date: January 9, 2022        Changes to Medications      Instructions Start Date   estradiol 0.05 MG/24HR patch  Commonly known as: ASHLI JIANG  What changed: See the new instructions.   PLACE 1 PATCH ON THE SKIN AS DIRECTED BY PROVIDER EVERY 3 DAYS      oxyCODONE-acetaminophen 5-325 MG per tablet  Commonly known as: PERCOCET  What changed: reasons to take this   1 tablet, Oral, Every 6 Hours PRN      Promethegan 25 MG suppository  Generic drug: promethazine  What changed: You were already taking a medication with the same name, and this prescription was added. Make sure you understand how and when to take each.   25 mg, Rectal, Every 6 Hours PRN      promethazine 25 MG tablet  Commonly known as: PHENERGAN  What changed: Another medication with the same name was added. Make sure you understand how and when to take each.   25 mg, Oral, Every 6 Hours PRN         Continue These Medications      Instructions Start Date   azaTHIOprine 50 MG tablet  Commonly known as: IMURAN   50 mg, Oral, Daily      Continuous Glucose Monitor kit   Use as directed      Dexcom G6  device   1 Units, Does not apply, Daily      Dexcom G6 Sensor   Does not apply, Every 10 Days      Dexcom G6 Transmitter misc   1 Units, Does not apply, Daily      diphenhydrAMINE 25 mg capsule  Commonly known as: Banophen   25 mg, Oral, Every 8 Hours PRN     "  EPINEPHrine 0.3 MG/0.3ML solution auto-injector injection  Commonly known as: EpiPen 2-Luis   0.3 mg, Intramuscular, For emergent anaphylaxis      gabapentin 800 MG tablet  Commonly known as: NEURONTIN   TAKE 1 TABLET BY MOUTH THREE TIMES DAILY      glucose blood test strip   ONE TOUCH VERIO FLEX TEST STRIPS TO CHECK GLUCOSE 3 TIMES DAILY FOR DM E11.9      hydrOXYzine pamoate 25 MG capsule  Commonly known as: VISTARIL   TAKE 1 CAPSULE BY MOUTH THREE TIMES DAILY AS NEEDED FOR ANXIETY      insulin aspart 100 UNIT/ML injection  Commonly known as: novoLOG   INJECT UP TO 12 UNITS USING SLIDING SCALE UNDER THE SKIN THREE TIMES DAILY WITH MEALS      insulin detemir 100 UNIT/ML injection  Commonly known as: LEVEMIR   15 Units, Subcutaneous, Nightly      Insulin Syringe 30G X 5/16\" 1 ML misc   1 syringe, Does not apply, 3 Times Daily      Lancets misc   USE AS DIRECTED TO CHECK GLUCOSE 3 TIMES DAILY FOR DM E11.9      nystatin 397037 UNIT/GM powder  Commonly known as: MYCOSTATIN   Topical, 3 Times Daily      Proctozone-HC 2.5 % rectal cream  Generic drug: Hydrocortisone (Perianal)   Rectal, 2 Times Daily      SUMAtriptan 50 MG tablet  Commonly known as: Imitrex   Take one tablet at onset of headache. May repeat dose one time in 2 hours if headache not relieved.      topiramate 50 MG tablet  Commonly known as: TOPAMAX   TAKE 1 TABLET BY MOUTH TWICE DAILY         Stop These Medications    fluconazole 150 MG tablet  Commonly known as: Diflucan     HYDROcodone-acetaminophen 7.5-325 MG per tablet  Commonly known as: NORCO     mesalamine 4 g enema  Commonly known as: ROWASA     naproxen 500 MG tablet  Commonly known as: NAPROSYN     neomycin-polymyxin-hydrocortisone 3.5-92739-4 otic suspension  Commonly known as: CORTISPORIN     predniSONE 10 MG tablet  Commonly known as: DELTASONE     venlafaxine 37.5 MG tablet  Commonly known as: EFFEXOR  Replaced by: venlafaxine XR 75 MG 24 hr capsule            Allergies   Allergen Reactions " "  • Hydrochlorothiazide Hives   • Penicillins Hives   • Remicade [Infliximab] Anaphylaxis   • Soybean-Containing Drug Products Swelling     \"Soy sauce\"   • Xeljanz [Tofacitinib Citrate] Other (See Comments)     BLISTERS IN THROAT & ON ARMS   • Zofran [Ondansetron Hcl] Headache     migraines   • Ondansetron Mental Status Change   • Fidaxomicin Rash   • Reglan [Metoclopramide] Other (See Comments)     States feels weird when takes it         Discharge Disposition:  Home or Self Care    Diet:  Hospital:  Diet Order   Procedures   • Diet Regular; Consistent Carbohydrate, Low Fiber / Low Residue, Lactose Restricted, GI Soft       Activity:  Activity Instructions     Activity as Tolerated               CODE STATUS:    Code Status and Medical Interventions:   Ordered at: 01/03/22 3214     Level Of Support Discussed With:    Patient     Code Status (Patient has no pulse and is not breathing):    CPR (Attempt to Resuscitate)     Medical Interventions (Patient has pulse or is breathing):    Full Support       Future Appointments   Date Time Provider Department Center   1/10/2022 12:30 PM Carolyn Woods APRN [facility]       Additional Instructions for the Follow-ups that You Need to Schedule     Discharge Follow-up with PCP   As directed       Currently Documented PCP:    Lv Sanchez DO    PCP Phone Number:    [phone]     Follow Up Details: first available hospital follow up         Discharge Follow-up with Specified Provider: Carolyn DUNAWAY with [facility]   As directed      To: Carolyn DUNAWAY with [facility]                     EMELYN Zamudio  01/08/22      Time Spent on Discharge:  I spent  40  minutes on this discharge activity which included: face-to-face encounter with the patient, reviewing the data in the system, coordination of the care with the nursing staff as well as consultants, documentation, and entering orders.          "

## 2022-01-08 NOTE — PLAN OF CARE
Problem: Adult Inpatient Plan of Care  Goal: Plan of Care Review  Outcome: Ongoing, Progressing  Flowsheets (Taken 1/7/2022 1926)  Plan of Care Reviewed With: patient  Goal: Patient-Specific Goal (Individualized)  Outcome: Ongoing, Progressing  Goal: Absence of Hospital-Acquired Illness or Injury  Outcome: Ongoing, Progressing  Goal: Optimal Comfort and Wellbeing  Outcome: Ongoing, Progressing  Goal: Readiness for Transition of Care  Outcome: Ongoing, Progressing   Goal Outcome Evaluation:  Plan of Care Reviewed With: patient

## 2022-01-10 ENCOUNTER — TRANSITIONAL CARE MANAGEMENT TELEPHONE ENCOUNTER (OUTPATIENT)
Dept: CALL CENTER | Facility: HOSPITAL | Age: 37
End: 2022-01-10

## 2022-01-10 ENCOUNTER — TELEMEDICINE (OUTPATIENT)
Dept: GASTROENTEROLOGY | Facility: CLINIC | Age: 37
End: 2022-01-10

## 2022-01-10 DIAGNOSIS — K64.9 HEMORRHOIDS, UNSPECIFIED HEMORRHOID TYPE: ICD-10-CM

## 2022-01-10 DIAGNOSIS — K51.00 ULCERATIVE PANCOLITIS: Primary | ICD-10-CM

## 2022-01-10 DIAGNOSIS — K26.9 DUODENAL ULCER: ICD-10-CM

## 2022-01-10 DIAGNOSIS — E11.65 UNCONTROLLED TYPE 2 DIABETES MELLITUS WITH HYPERGLYCEMIA: ICD-10-CM

## 2022-01-10 PROCEDURE — 99214 OFFICE O/P EST MOD 30 MIN: CPT | Performed by: NURSE PRACTITIONER

## 2022-01-10 RX ORDER — HYOSCYAMINE SULFATE 0.125 MG
0.12 TABLET ORAL
Qty: 120 TABLET | Refills: 5 | Status: SHIPPED | OUTPATIENT
Start: 2022-01-10 | End: 2022-01-17

## 2022-01-10 NOTE — PROGRESS NOTES
Follow Up      Patient Name: Thais Hobson  : 1985   MRN: [id number]     Chief Complaint:    Chief Complaint   Patient presents with   • GI Problem       History of Present Illness: Thais Hobson is a 36 y.o. female who is here today for follow up on UC, nausea, vomiting.       Dx in   Location: pancolitis  Treatments tried: mesalamine (not effective), Remicaid (allergy), humira (did not work), stelara, Xeljanz, and  entyvio (lost response)- imuran added (reaction).   Hx of c.diff  Positive family history of colon cancer  Current treatment: Rowasa enemas, Imuran, Entyvio every 4 weeks.    Last colonoscopy in 2021- improved left sided colitis  Colorectal surgery was consulted in the past but due to elevated A1c, she was not an ideal surgical candidate.  1-10-22  Just admitted for nausea and vomiting and discharged 2 days ago.  While there she underwent EGD which did show duodenal ulcer.  She was discharged home on twice daily PPI.  Her vedolizumab and thio purine metabolites are pending.  Continues to have poor appetite and abdominal pain.  Reports breaking out in a rash since hospitalization she believes is from the Imuran.  She was newly started on Bentyl which has been helping.  Reports due to external and painful hemorrhoids, she has not been able to tolerate the Rowasa enemas.  Subjective      Review of Systems:   Review of Systems   Constitutional: Positive for fatigue and unexpected weight loss. Negative for activity change, appetite change, chills and fever.   HENT: Negative for trouble swallowing.    Gastrointestinal: Positive for abdominal pain, anal bleeding, blood in stool, diarrhea, nausea, rectal pain and vomiting. Negative for abdominal distention, constipation, GERD and indigestion.       Medications:     Current Outpatient Medications:   •  azaTHIOprine (IMURAN) 50 MG tablet, Take 1 tablet by mouth Daily., Disp: 180 tablet, Rfl: 3  •  Continuous Blood Gluc   "(Dexcom G6 ) device, 1 Units Daily., Disp: 1 each, Rfl: 1  •  Continuous Blood Gluc Sensor (Dexcom G6 Sensor), Every 10 (Ten) Days., Disp: 3 each, Rfl: 11  •  Continuous Blood Gluc Transmit (Dexcom G6 Transmitter) misc, 1 Units Daily., Disp: 1 each, Rfl: 1  •  Continuous Glucose Monitor kit, Use as directed, Disp: 1 each, Rfl: 11  •  diphenhydrAMINE (Banophen) 25 mg capsule, Take 1 capsule by mouth Every 8 (Eight) Hours As Needed for Itching or Allergies., Disp: 90 capsule, Rfl: 1  •  EPINEPHrine (EpiPen 2-Luis) 0.3 MG/0.3ML solution auto-injector injection, Inject 0.3 mL into the appropriate muscle as directed by prescriber. For emergent anaphylaxis, Disp: 2 each, Rfl: 0  •  estradiol (MINIVELLE, VIVELLE-DOT) 0.05 MG/24HR patch, PLACE 1 PATCH ON THE SKIN AS DIRECTED BY PROVIDER EVERY 3 DAYS, Disp: 8 patch, Rfl: 2  •  gabapentin (NEURONTIN) 800 MG tablet, TAKE 1 TABLET BY MOUTH THREE TIMES DAILY, Disp: 90 tablet, Rfl: 0  •  glucose blood test strip, ONE TOUCH VERIO FLEX TEST STRIPS TO CHECK GLUCOSE 3 TIMES DAILY FOR DM E11.9, Disp: 100 each, Rfl: 12  •  Hydrocortisone, Perianal, (Proctozone-HC) 2.5 % rectal cream, Insert  into the rectum 2 (Two) Times a Day., Disp: 30 g, Rfl: 1  •  hydrOXYzine pamoate (VISTARIL) 25 MG capsule, TAKE 1 CAPSULE BY MOUTH THREE TIMES DAILY AS NEEDED FOR ANXIETY, Disp: 60 capsule, Rfl: 2  •  hyoscyamine (ANASPAZ,LEVSIN) 0.125 MG tablet, Take 1 tablet by mouth 4 (Four) Times a Day With Meals & at Bedtime., Disp: 120 tablet, Rfl: 5  •  insulin aspart (novoLOG) 100 UNIT/ML injection, INJECT UP TO 12 UNITS USING SLIDING SCALE UNDER THE SKIN THREE TIMES DAILY WITH MEALS, Disp: 10 mL, Rfl: 3  •  insulin detemir (LEVEMIR) 100 UNIT/ML injection, Inject 15 Units under the skin into the appropriate area as directed Every Night., Disp: 15 mL, Rfl: 3  •  Insulin Syringe 30G X 5/16\" 1 ML misc, 1 syringe 3 (Three) Times a Day., Disp: 100 each, Rfl: 2  •  Lancets misc, USE AS DIRECTED TO CHECK " "GLUCOSE 3 TIMES DAILY FOR DM E11.9, Disp: 100 each, Rfl: 3  •  nystatin (MYCOSTATIN) 042109 UNIT/GM powder, Apply  topically to the appropriate area as directed 3 (Three) Times a Day., Disp: 60 g, Rfl: 1  •  oxyCODONE-acetaminophen (PERCOCET) 5-325 MG per tablet, Take 1 tablet by mouth Every 6 (Six) Hours As Needed for Severe Pain ., Disp: 12 tablet, Rfl: 0  •  pantoprazole (PROTONIX) 20 MG EC tablet, Take 2 tablets by mouth 2 (Two) Times a Day Before Meals., Disp: 120 tablet, Rfl: 0  •  PHARMACY MEDS TO BED CONSULT, Daily., Disp: , Rfl:   •  promethazine (PHENERGAN) 25 MG suppository, Insert 1 suppository into the rectum Every 6 (Six) Hours As Needed for Nausea or Vomiting., Disp: 12 each, Rfl: 0  •  promethazine (PHENERGAN) 25 MG tablet, Take 1 tablet by mouth Every 6 (Six) Hours As Needed for Nausea or Vomiting., Disp: 30 tablet, Rfl: 0  •  SUMAtriptan (Imitrex) 50 MG tablet, Take one tablet at onset of headache. May repeat dose one time in 2 hours if headache not relieved., Disp: 8 tablet, Rfl: 2  •  topiramate (TOPAMAX) 50 MG tablet, TAKE 1 TABLET BY MOUTH TWICE DAILY, Disp: 60 tablet, Rfl: 2  •  valACYclovir (Valtrex) 1000 MG tablet, Take 1 tablet by mouth 2 (Two) Times a Day., Disp: 2 tablet, Rfl: 0  •  venlafaxine XR (EFFEXOR-XR) 75 MG 24 hr capsule, Take 1 capsule by mouth Daily With Breakfast., Disp: 30 capsule, Rfl: 0    Allergies:   Allergies   Allergen Reactions   • Hydrochlorothiazide Hives   • Penicillins Hives   • Remicade [Infliximab] Anaphylaxis   • Soybean-Containing Drug Products Swelling     \"Soy sauce\"   • Xeljanz [Tofacitinib Citrate] Other (See Comments)     BLISTERS IN THROAT & ON ARMS   • Zofran [Ondansetron Hcl] Headache     migraines   • Ondansetron Mental Status Change   • Fidaxomicin Rash   • Reglan [Metoclopramide] Other (See Comments)     States feels weird when takes it       Social History:   Social History     Socioeconomic History   • Marital status: Single   Tobacco Use   • " Smoking status: Never Smoker   • Smokeless tobacco: Never Used   Substance and Sexual Activity   • Alcohol use: No   • Drug use: No   • Sexual activity: Defer        Surgical History:   Past Surgical History:   Procedure Laterality Date   • BREAST SURGERY Left     breast lump removed benign   •  SECTION     • CHOLECYSTECTOMY      for stone disease   • COLONOSCOPY     • COLONOSCOPY N/A 2020    Procedure: COLONOSCOPY;  Surgeon: Alonzo He MD;  Location:  [facility];  Service: Gastroenterology;  Laterality: N/A;   • COLONOSCOPY N/A 2021    Procedure: COLONOSCOPY;  Surgeon: Francisco Sainz MD;  Location:  [facility];  Service: Gastroenterology;  Laterality: N/A;   • DILATATION AND CURETTAGE     • ENDOSCOPY  2013    Erosive esophagitis, grade 2; erosive gastritis; small sliding hiatal hernia less than 3 cm, erosive deodenitis duodenal polyp.No gan mucosa.Second portion duedenal biopsy neg. Second postion of dudoenal biopsy revealed polypoid intestinal mucosa w/ lymphoid aggregate. 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 no helicobacter pylori. Negative for mateaplasia, dysplasia   • ENDOSCOPY N/A 2020    Procedure: ESOPHAGOGASTRODUODENOSCOPY WITH DILATATION AND BIOPSIES;  Surgeon: Alonzo He MD;  Location:  [facility];  Service: Gastroenterology;  Laterality: N/A;   • ENDOSCOPY N/A 2021    Procedure: ESOPHAGOGASTRODUODENOSCOPY;  Surgeon: Brunner, Mark I, MD;  Location:  [facility];  Service: Gastroenterology;  Laterality: N/A;   • ENDOSCOPY N/A 2021    Procedure: ESOPHAGOGASTRODUODENOSCOPY WITH SAVARY DILATATION;  Surgeon: Francisco Sainz MD;  Location:  [facility];  Service: Gastroenterology;  Laterality: N/A;   • ENDOSCOPY N/A 2022    Procedure: ESOPHAGOGASTRODUODENOSCOPY;  Surgeon: Francisco Sainz MD;  Location:  [facility];  Service: Gastroenterology;  Laterality: N/A;   • HIP  SURGERY Right    • HYSTERECTOMY     • LEEP          Medical History:   Past Medical History:   Diagnosis Date   • Abdominal pain     periumbilicul   • Abdominal tenderness     Periumbil   • Alopecia    • Anemia    • Anxiety    • Arthritis    • Asthma     as a child   • Back pain    • Bipolar disorder ([facility])    • Blood in stool    • Body piercing     ears   • Colitis    • Colitis    • Colon polyps    • Depression    • Diabetes mellitus (HCC)    • Diarrhea    • Dysphagia     Patient reported he has trouble from time to time and that her throat has to be dilated   • Elevated cholesterol     Reported slightly elevated - taking no medication    • Fractures     Right pinky toe - no surgery required   • GERD (gastroesophageal reflux disease)    • H/O colonoscopy 08/01/2013    Terminal ileal biopsies negative. Cecal&ascending biopsie revealed chronic active colitis w/ features cons. w/ inflammatory bowel disease. Transevere colon biopsies revealved chronic active colitis. Desc. colon biopsies revealed chronic active colitis. Rect colon biopsie revealed chronic active colitis. Negative for dysplasia   • Hematemesis    • History of Clostridium difficile infection    • History of transfusion     No reaction to transfusion reported   • Migraine headache    • Nausea     alone   • Pancreatitis    • Restless leg    • Seizures ([facility])     pt reports x1    • Tattoos     x9   • Ulcerative pancolitis with rectal bleeding ([facility]) 10/1/2021   • Universal ulcerative colitis ([facility])    • UTI (urinary tract infection)    • Weight loss         Objective     Physical Exam:  Vital Signs: There were no vitals filed for this visit.  There is no height or weight on file to calculate BMI.     Physical Exam  Constitutional:       General: She is not in acute distress.     Appearance: She is well-developed.   Pulmonary:      Effort: Pulmonary effort is normal. No accessory muscle usage or respiratory distress.   Skin:     Coloration: Skin is not pale.       Findings: No erythema.   Neurological:      Mental Status: She is alert and oriented to person, place, and time.   Psychiatric:         Speech: Speech normal.         Behavior: Behavior normal.         Thought Content: Thought content normal.         Judgment: Judgment normal.         Assessment / Plan      Assessment/Plan:   Diagnoses and all orders for this visit:    1. Ulcerative pancolitis (HCC) (Primary)  -     hyoscyamine (ANASPAZ,LEVSIN) 0.125 MG tablet; Take 1 tablet by mouth 4 (Four) Times a Day With Meals & at Bedtime.  Dispense: 120 tablet; Refill: 5  Have consulted colorectal in the past but due to uncontrolled diabetes, not a surgical candidate at this time.  Drug levels and thiopurine metabolites still pending.  Last Entyvio infusion on 27 December.  New allergic reaction may be secondary to Imuran or to Bentyl.  Will discontinue Bentyl and start Anaspaz.  2. Duodenal ulcer  New diagnosis, pantoprazole twice daily  3. Hemorrhoids, unspecified hemorrhoid type  -     Ambulatory Referral to Colorectal Surgery  Despite regular sitz bath's and suppositories, Thais continues to have significant rectal pain and feels that she may need surgery on her hemorrhoids.  Unable to tolerate her Rowasa enemas at this time due to rectal pain, return to Dr. Mendoza to discuss hemorrhoidectomy.  4. Uncontrolled type 2 diabetes mellitus with hyperglycemia (HCC)  -     Ambulatory Referral to Endocrinology  Thais has very poor quality of life and is failing aggressive treatment of her UC.  I believe surgery is in the near future for her.  We will see if endocrinology is able to help with getting her A1c down         Follow Up:   Return in about 6 weeks (around 2/21/2022).    Plan of care reviewed with the patient at the conclusion of today's visit.  Education was provided regarding diagnosis, management, and any prescribed or recommended OTC medications.  Patient verbalized understanding of and agreement with management plan.      Time Statement:   Discussed plan of care in detail with patient today. Patient verbally understands and agrees. I have spent 35 minutes reviewing available diagnostics, obtaining history, examining the patient, developing a treatment plan, and educating the patient on disease process and plan of care.     EMELYN Avila  [facility]

## 2022-01-10 NOTE — OUTREACH NOTE
Call Center TCM Note      Responses   [facility] patient discharged from? [place]   Does the patient have one of the following disease processes/diagnoses(primary or secondary)? Other   TCM attempt successful? Yes   Call start time 1343   Call end time 1345   Discharge diagnosis Ulcerative pancolitis with rectal bleeding   Meds reviewed with patient/caregiver? Yes   Is the patient having any side effects they believe may be caused by any medication additions or changes? No   Does the patient have all medications ordered at discharge? Yes   Is the patient taking all medications as directed (includes completed medication regime)? Yes   Comments regarding appointments GI 1/10/22   Does the patient have a primary care provider?  Yes   Comments regarding PCP No available HOSP DC FU appts noted that meet TCM guidelines. Will route to PCP office.    Has the patient kept scheduled appointments due by today? Yes   Psychosocial issues? Yes   Did the patient receive a copy of their discharge instructions? Yes   Nursing interventions Reviewed instructions with patient   What is the patient's perception of their health status since discharge? Improving   Is the patient/caregiver able to teach back signs and symptoms related to disease process for when to call PCP? Yes   Is the patient/caregiver able to teach back signs and symptoms related to disease process for when to call 911? Yes   Is the patient/caregiver able to teach back the hierarchy of who to call/visit for symptoms/problems? PCP, Specialist, Home health nurse, Urgent Care, ED, 911 Yes   If the patient is a current smoker, are they able to teach back resources for cessation? Not a smoker   TCM call completed? Yes   Wrap up additional comments Pt reports she is doing a little better. No vomiting, however still with nausea. Pt taking meds as ordered.           Sammi Lawson RN    1/10/2022, 13:46 EST

## 2022-01-17 ENCOUNTER — TELEPHONE (OUTPATIENT)
Dept: GASTROENTEROLOGY | Facility: CLINIC | Age: 37
End: 2022-01-17

## 2022-01-17 DIAGNOSIS — K51.00 ULCERATIVE PANCOLITIS: Primary | ICD-10-CM

## 2022-01-17 LAB
6-TGN ENTSUB RBC: NORMAL
6MMP ENTSUB RBC: NORMAL
THIOPURINE METABOLITE: NORMAL
VEDOLIZUMAB AB SERPL IA-MCNC: <25 NG/ML
VEDOLIZUMAB SERPL IA-MCNC: 42 UG/ML

## 2022-01-17 RX ORDER — DICYCLOMINE HYDROCHLORIDE 10 MG/1
10 CAPSULE ORAL
Qty: 120 CAPSULE | Refills: 2 | Status: SHIPPED | OUTPATIENT
Start: 2022-01-17

## 2022-01-17 NOTE — TELEPHONE ENCOUNTER
Carolyn,  I spoke with Ms Hobson. I gave her your recommendation. Patient stated that enemas hurts due to her hemorrhoids. She can't even use phenergan suppositories either. Using Hemorrhoid creme instead.  Patient hasn't heard from [facility] yet.

## 2022-01-17 NOTE — TELEPHONE ENCOUNTER
I think referring referring her to the IBD specialist at  is the next step for a second opinion- make sure she keeps her appt with colorectal as well

## 2022-01-17 NOTE — TELEPHONE ENCOUNTER
----- Message from EMELYN Lau sent at 1/17/2022  8:44 AM EST -----  Can you make sure that her rash went away after stopping the Bentyl, I know she was afraid it was from the Imuran.  Her level came back and it needs to be increased.  If the rash is gone, I want to increase her Imuran to 100 mg.  Please let me know so I can send in a new prescription based on what she says.  I have also put in blood work  to have before her next visit.

## 2022-01-17 NOTE — TELEPHONE ENCOUNTER
Carolyn,  I spoke with Ms Jazlyn. Your recommendation given. Patient voiced understanding but would like to know what do she did to do until then? Please advise. Thanks

## 2022-01-17 NOTE — TELEPHONE ENCOUNTER
Carolyn,    I spoke with Ms Hobson this morning. Patient stated that the rash has gotten worse after stopping the Bentyl. She quit taking the Imuran yesterday. Started bleeding again Saturday. Stomach is bloated and feels full. Has no appetite. Please advise.  Thanks

## 2022-01-17 NOTE — TELEPHONE ENCOUNTER
When does she see colorectal? We really need her to be doing the enemas but she hasnt because of her hemorrhoids.  Make sure she is taking her PPI twice a day, I can call the bentyl back in since that rash wasn't for that and that seemed to help her somewhat in the hospital.  She can stop the levsin.

## 2022-01-18 ENCOUNTER — READMISSION MANAGEMENT (OUTPATIENT)
Dept: CALL CENTER | Facility: HOSPITAL | Age: 37
End: 2022-01-18

## 2022-01-18 NOTE — OUTREACH NOTE
"Medical Week 2 Survey      Responses   [facility] patient discharged from? [place]   Does the patient have one of the following disease processes/diagnoses(primary or secondary)? Other   Week 2 attempt successful? Yes   Call start time 0917   Discharge diagnosis Ulcerative pancolitis with rectal bleeding   Call end time 0926   Meds reviewed with patient/caregiver? Yes   Is the patient taking all medications as directed (includes completed medication regime)? No   Nursing Interventions Advised patient to call provider   Medication comments Pt is not taking insulin as she is not eating & fearful her gluc will drop low. She is not able to use the hemorroid cream or enema's that she has been prescribed, she states.    Has the patient kept scheduled appointments due by today? Yes   Psychosocial issues? No   Comments Pt c/o rectal bleeding with stools, started again, weak, can't tolerate food. If she eats, she has abd pain, uncontrolled diarrhea. She has so many hemorroids that she can't sit regularly. She has spoken to MD. Her glucose levels have been 200, she has not been taking insulin bc she has not been eating/drinking &she is concerned that she will \"bottom out\". She has Endo appt in March. She feels dehydrated, urged her to call PCP for guidance today.   What is the patient's perception of their health status since discharge? Worsening   Week 2 Call Completed? Yes          Karen Burt RN  "

## 2022-01-19 RX ORDER — DIPHENHYDRAMINE HYDROCHLORIDE 25 MG/1
CAPSULE ORAL
Qty: 90 CAPSULE | Refills: 1 | Status: SHIPPED | OUTPATIENT
Start: 2022-01-19 | End: 2022-04-19

## 2022-01-24 ENCOUNTER — TELEMEDICINE (OUTPATIENT)
Dept: FAMILY MEDICINE CLINIC | Facility: CLINIC | Age: 37
End: 2022-01-24

## 2022-01-24 DIAGNOSIS — Z09 HOSPITAL DISCHARGE FOLLOW-UP: ICD-10-CM

## 2022-01-24 DIAGNOSIS — E11.44 DIABETIC AMYOTROPHY ASSOCIATED WITH TYPE 2 DIABETES MELLITUS: Primary | ICD-10-CM

## 2022-01-24 DIAGNOSIS — K64.9 HEMORRHOIDS, UNSPECIFIED HEMORRHOID TYPE: ICD-10-CM

## 2022-01-24 DIAGNOSIS — F41.8 DEPRESSION WITH ANXIETY: ICD-10-CM

## 2022-01-24 DIAGNOSIS — K51.011 ULCERATIVE PANCOLITIS WITH RECTAL BLEEDING: ICD-10-CM

## 2022-01-24 PROCEDURE — 99214 OFFICE O/P EST MOD 30 MIN: CPT | Performed by: NURSE PRACTITIONER

## 2022-01-24 RX ORDER — GABAPENTIN 800 MG/1
800 TABLET ORAL 3 TIMES DAILY
Qty: 90 TABLET | Refills: 2 | Status: SHIPPED | OUTPATIENT
Start: 2022-01-24 | End: 2022-04-22

## 2022-01-25 ENCOUNTER — READMISSION MANAGEMENT (OUTPATIENT)
Dept: CALL CENTER | Facility: HOSPITAL | Age: 37
End: 2022-01-25

## 2022-01-25 NOTE — OUTREACH NOTE
Medical Week 3 Survey      Responses   [facility] patient discharged from? [place]   Does the patient have one of the following disease processes/diagnoses(primary or secondary)? Other   Week 3 attempt successful? No   Unsuccessful attempts Attempt 1          Princess Hogan RN

## 2022-01-25 NOTE — TELEPHONE ENCOUNTER
I SPOKE WITH MS ZOILA. PATIENT STATED SHE HEARD FROM [facility]. APPOINTMENT IS SCHEDULED FOR 2/7/20222 AT 3:30PM

## 2022-01-26 ENCOUNTER — READMISSION MANAGEMENT (OUTPATIENT)
Dept: CALL CENTER | Facility: HOSPITAL | Age: 37
End: 2022-01-26

## 2022-01-26 NOTE — OUTREACH NOTE
Medical Week 3 Survey      Responses   [facility] patient discharged from? [place]   Does the patient have one of the following disease processes/diagnoses(primary or secondary)? Other   Week 3 attempt successful? Yes   Call start time 1552   Call end time 1555   Discharge diagnosis Ulcerative pancolitis with rectal bleeding   Meds reviewed with patient/caregiver? Yes   Does the patient have all medications ordered at discharge? Yes   Is the patient taking all medications as directed (includes completed medication regime)? Yes   Does the patient have a primary care provider?  Yes   Comments regarding PCP See PCP 1/27/22   Does the patient have an appointment with their PCP within 7 days of discharge? Yes   Has the patient kept scheduled appointments due by today? N/A   Has home health visited the patient within 72 hours of discharge? N/A   Psychosocial issues? No   What is the patient's perception of their health status since discharge? Worsening   Wrap up additional comments Pt states is in a lot pain in stomach and increase in blood. Advise to call PCP.           Digna Keen RN

## 2022-01-28 ENCOUNTER — TELEMEDICINE (OUTPATIENT)
Dept: FAMILY MEDICINE CLINIC | Facility: CLINIC | Age: 37
End: 2022-01-28

## 2022-01-28 DIAGNOSIS — R11.2 INTRACTABLE VOMITING WITH NAUSEA, UNSPECIFIED VOMITING TYPE: ICD-10-CM

## 2022-01-28 DIAGNOSIS — K51.011 ULCERATIVE PANCOLITIS WITH RECTAL BLEEDING: Primary | ICD-10-CM

## 2022-01-28 DIAGNOSIS — K64.9 HEMORRHOIDS, UNSPECIFIED HEMORRHOID TYPE: ICD-10-CM

## 2022-01-28 PROCEDURE — 99213 OFFICE O/P EST LOW 20 MIN: CPT | Performed by: NURSE PRACTITIONER

## 2022-01-28 NOTE — PROGRESS NOTES
Subjective     Chief Complaint:  Abdominal pain    History of Present Illness:   She has been vomiting, she is loose more blood in her stool. She is really nauseated. She cannot eat. She has been able to drink a little fluid. She is having abdominal pain around epigastric area and right side. She is having at least 10 + BM per day. Low grade temp. Stool is mixed with mucus.   She stopped imuran due to rash.   Has entyvio infusion last week  She is having hemorrhoid issue and is unable to rowsa enema.   She was admitted to [facility] within the past 30 days. Feels like she needs to be admitted again.   Follows with Carolyn Woods with [facility] [place] gastro. Recently referred to  IBD clinic as patient has tried and failed many meds. She needs colectomy but that has been on hold due to poorly controlled diabetes.         Review of Systems  Gen- No fevers, chills  CV- No chest pain, palpitations  Resp- No cough, dyspnea      I have reviewed and/or updated the patient's past medical, surgical, family, social history and problem list as appropriate.     Medications:    Current Outpatient Medications:   •  azaTHIOprine (IMURAN) 50 MG tablet, Take 1 tablet by mouth Daily., Disp: 180 tablet, Rfl: 3  •  Banophen 25 MG capsule, TAKE 1 CAPSULE BY MOUTH EVERY 8 HOURS AS NEEDED FOR ITCHING OR ALLERGIES, Disp: 90 capsule, Rfl: 1  •  Continuous Blood Gluc  (Dexcom G6 ) device, 1 Units Daily., Disp: 1 each, Rfl: 1  •  Continuous Blood Gluc Sensor (Dexcom G6 Sensor), Every 10 (Ten) Days., Disp: 3 each, Rfl: 11  •  Continuous Blood Gluc Transmit (Dexcom G6 Transmitter) misc, 1 Units Daily., Disp: 1 each, Rfl: 1  •  Continuous Glucose Monitor kit, Use as directed, Disp: 1 each, Rfl: 11  •  dicyclomine (Bentyl) 10 MG capsule, Take 1 capsule by mouth 4 (Four) Times a Day Before Meals & at Bedtime., Disp: 120 capsule, Rfl: 2  •  EPINEPHrine (EpiPen 2-Luis) 0.3 MG/0.3ML solution auto-injector injection, Inject 0.3 mL into the  "appropriate muscle as directed by prescriber. For emergent anaphylaxis, Disp: 2 each, Rfl: 0  •  estradiol (MINIVELLE, VIVELLE-DOT) 0.05 MG/24HR patch, PLACE 1 PATCH ON THE SKIN AS DIRECTED BY PROVIDER EVERY 3 DAYS, Disp: 8 patch, Rfl: 2  •  gabapentin (NEURONTIN) 800 MG tablet, Take 1 tablet by mouth 3 (Three) Times a Day., Disp: 90 tablet, Rfl: 2  •  glucose blood test strip, ONE TOUCH VERIO FLEX TEST STRIPS TO CHECK GLUCOSE 3 TIMES DAILY FOR DM E11.9, Disp: 100 each, Rfl: 12  •  Hydrocortisone, Perianal, (Proctozone-HC) 2.5 % rectal cream, Insert  into the rectum 2 (Two) Times a Day., Disp: 30 g, Rfl: 1  •  hydrOXYzine pamoate (VISTARIL) 25 MG capsule, TAKE 1 CAPSULE BY MOUTH THREE TIMES DAILY AS NEEDED FOR ANXIETY, Disp: 60 capsule, Rfl: 2  •  insulin aspart (novoLOG) 100 UNIT/ML injection, INJECT UP TO 12 UNITS USING SLIDING SCALE UNDER THE SKIN THREE TIMES DAILY WITH MEALS, Disp: 10 mL, Rfl: 3  •  insulin detemir (LEVEMIR) 100 UNIT/ML injection, Inject 15 Units under the skin into the appropriate area as directed Every Night., Disp: 15 mL, Rfl: 3  •  Insulin Syringe 30G X 5/16\" 1 ML misc, 1 syringe 3 (Three) Times a Day., Disp: 100 each, Rfl: 2  •  Lancets misc, USE AS DIRECTED TO CHECK GLUCOSE 3 TIMES DAILY FOR DM E11.9, Disp: 100 each, Rfl: 3  •  nystatin (MYCOSTATIN) 098155 UNIT/GM powder, Apply  topically to the appropriate area as directed 3 (Three) Times a Day., Disp: 60 g, Rfl: 1  •  oxyCODONE-acetaminophen (PERCOCET) 5-325 MG per tablet, Take 1 tablet by mouth Every 6 (Six) Hours As Needed for Severe Pain ., Disp: 12 tablet, Rfl: 0  •  pantoprazole (PROTONIX) 20 MG EC tablet, Take 2 tablets by mouth 2 (Two) Times a Day Before Meals., Disp: 120 tablet, Rfl: 0  •  PHARMACY MEDS TO BED CONSULT, Daily., Disp: , Rfl:   •  promethazine (PHENERGAN) 25 MG suppository, Insert 1 suppository into the rectum Every 6 (Six) Hours As Needed for Nausea or Vomiting., Disp: 12 each, Rfl: 0  •  promethazine (PHENERGAN) " "25 MG tablet, Take 1 tablet by mouth Every 6 (Six) Hours As Needed for Nausea or Vomiting., Disp: 30 tablet, Rfl: 0  •  SUMAtriptan (Imitrex) 50 MG tablet, Take one tablet at onset of headache. May repeat dose one time in 2 hours if headache not relieved., Disp: 8 tablet, Rfl: 2  •  topiramate (TOPAMAX) 50 MG tablet, TAKE 1 TABLET BY MOUTH TWICE DAILY, Disp: 60 tablet, Rfl: 2  •  valACYclovir (Valtrex) 1000 MG tablet, Take 1 tablet by mouth 2 (Two) Times a Day., Disp: 2 tablet, Rfl: 0  •  venlafaxine XR (EFFEXOR-XR) 75 MG 24 hr capsule, Take 1 capsule by mouth Daily With Breakfast., Disp: 30 capsule, Rfl: 0    Allergies:  Allergies   Allergen Reactions   • Hydrochlorothiazide Hives   • Penicillins Hives   • Remicade [Infliximab] Anaphylaxis   • Soybean-Containing Drug Products Swelling     \"Soy sauce\"   • Xeljanz [Tofacitinib Citrate] Other (See Comments)     BLISTERS IN THROAT & ON ARMS   • Zofran [Ondansetron Hcl] Headache     migraines   • Ondansetron Mental Status Change   • Fidaxomicin Rash   • Reglan [Metoclopramide] Other (See Comments)     States feels weird when takes it       Objective     Vital Signs: There were no vitals filed for this visit.  There is no height or weight on file to calculate BMI.    Physical Exam:  Gen-no acute distress, ill appearing, video visit  Resp- normal WOB, on RA  Neuro-A&Ox3, face symmetrical, speech clear  Skin-no overt rashes noted  Psych-appropriate mood, cooperative       Assessment / Plan     Assessment/Plan:   Problem List Items Addressed This Visit        Gastrointestinal Abdominal     Hemorrhoids    Ulcerative pancolitis with rectal bleeding (HCC) - Primary    Relevant Medications    oxyCODONE-acetaminophen (PERCOCET) 5-325 MG per tablet    pantoprazole (PROTONIX) 20 MG EC tablet    dicyclomine (Bentyl) 10 MG capsule      Other Visit Diagnoses     Intractable vomiting with nausea, unspecified vomiting type            -- I have encouraged her to go be seen at the ED at " [facility]. Due to recent covid surge and long expect list I do no think direct admission is feasible at this time.     Follow up:  As needed    Total Time of Encounter 15 minutes    Electronically signed by EMELYN Seymour   01/28/2022 08:43 EST      Please note that portions of this note may have been completed with a voice recognition program. Efforts were made to edit the dictations, but occasionally words are mistranscribed.

## 2022-02-03 ENCOUNTER — READMISSION MANAGEMENT (OUTPATIENT)
Dept: CALL CENTER | Facility: HOSPITAL | Age: 37
End: 2022-02-03

## 2022-02-03 NOTE — OUTREACH NOTE
Medical Week 4 Survey      Responses   [facility] [place] patient discharged from? [place]   Does the patient have one of the following disease processes/diagnoses(primary or secondary)? Other   Week 4 attempt successful? Yes   Call start time 1646   Call end time 1650   Meds reviewed with patient/caregiver? Yes   Is the patient taking all medications as directed (includes completed medication regime)? Yes   Medication comments no new meds   Has the patient kept scheduled appointments due by today? Yes   Comments has kept appointment as scheduled   Is the patient still receiving Home Health Services? No   What is the patient's perception of their health status since discharge? New symptoms unrelated to diagnosis  [has been vomiting  blood and blood in stools]   Week 4 Call Completed? Yes   Would the patient like one additional call? Yes   Wrap up additional comments patient is vomiting  blood and blood in stools. stated provider attemted to admit her , but the hospital was not taking DA. was in the ED at [place] 02/02, blood work was sharona Santos RN

## 2022-02-10 DIAGNOSIS — G43.101 MIGRAINE WITH AURA AND WITH STATUS MIGRAINOSUS, NOT INTRACTABLE: ICD-10-CM

## 2022-02-10 RX ORDER — TOPIRAMATE 50 MG/1
TABLET, FILM COATED ORAL
Qty: 60 TABLET | Refills: 2 | Status: SHIPPED | OUTPATIENT
Start: 2022-02-10 | End: 2022-08-09

## 2022-02-11 ENCOUNTER — TELEPHONE (OUTPATIENT)
Dept: FAMILY MEDICINE CLINIC | Facility: CLINIC | Age: 37
End: 2022-02-11

## 2022-02-11 ENCOUNTER — READMISSION MANAGEMENT (OUTPATIENT)
Dept: CALL CENTER | Facility: HOSPITAL | Age: 37
End: 2022-02-11

## 2022-02-11 NOTE — TELEPHONE ENCOUNTER
Called patient informed her if she felt she was worsening to go to er to have lung checked again,she stated she would call Monday if needed to see Pallavi Alcazar

## 2022-02-11 NOTE — TELEPHONE ENCOUNTER
Pt called she came from the ER at [facility] where she tested positive for covid. She has fever,chills cough and blood in stool. She is wanting someone to call her back

## 2022-02-11 NOTE — OUTREACH NOTE
Medical Week 5 Survey      Responses   [facility] patient discharged from? [place]   Does the patient have one of the following disease processes/diagnoses(primary or secondary)? Other   Week 5 attempt successful? Yes   Call start time 1554   Call end time 1557   Discharge diagnosis Ulcerative pancolitis with rectal bleeding   Is patient permission given to speak with other caregiver? Yes   List who call center can speak with Mother Nava    Person spoke with today (if not patient) and relationship Mother Nava    What is the patient's perception of their health status since discharge? New symptoms unrelated to diagnosis  [At the ER in [place] for COVID symptoms (Chest pain, positive test results, SOB, fever, aches/chills)]   Graduated Yes   Wrap up additional comments Patient is currently at [facility] with COVID symptoms- mother reports she tested positive and is having CP/SOB/Aches/Temp          Angle Sierra RN

## 2022-02-17 ENCOUNTER — TELEPHONE (OUTPATIENT)
Dept: FAMILY MEDICINE CLINIC | Facility: CLINIC | Age: 37
End: 2022-02-17

## 2022-02-17 NOTE — TELEPHONE ENCOUNTER
Caller: Thais Hobson    Relationship to patient: Self    Best call back number: [phone]     Chief complaint: PATIENT STATED THAT SHE HAS ULCER COLITIS AND TODAY 02/17/2022 SHE HAS ALOT OF BLACK STOOL AND BLEEDING FROM THE RECTUM. PATIENT STATED SHE THINKS SHE HAS BLEEDING IN HER STOMACH. PATIENT WAS INFORMED TO GO TO ER OR CALL 911. PATIENT STATED THAT SHE WAS GOING TO BE SEEN AT THE HOSPITAL.     Patient directed to call 911 or go to their nearest emergency room.     Patient verbalized understanding: [x] Yes  [] No  If no, why?

## 2022-02-28 ENCOUNTER — TELEPHONE (OUTPATIENT)
Dept: SURGERY | Facility: CLINIC | Age: 37
End: 2022-02-28

## 2022-02-28 NOTE — TELEPHONE ENCOUNTER
Called patient to reschedule no show appointment today.  Stated she has COVID and will call back later when she is feeling better to reschedule appointment.  No show letter sent.

## 2022-03-09 ENCOUNTER — TELEPHONE (OUTPATIENT)
Dept: SURGERY | Facility: CLINIC | Age: 37
End: 2022-03-09

## 2022-03-19 DIAGNOSIS — Z79.4 TYPE 2 DIABETES MELLITUS TREATED WITH INSULIN: ICD-10-CM

## 2022-03-19 DIAGNOSIS — E11.9 TYPE 2 DIABETES MELLITUS TREATED WITH INSULIN: ICD-10-CM

## 2022-04-18 DIAGNOSIS — E11.44 DIABETIC AMYOTROPHY ASSOCIATED WITH TYPE 2 DIABETES MELLITUS: ICD-10-CM

## 2022-04-18 DIAGNOSIS — F41.8 DEPRESSION WITH ANXIETY: ICD-10-CM

## 2022-04-19 RX ORDER — HYDROXYZINE PAMOATE 25 MG/1
CAPSULE ORAL
Qty: 60 CAPSULE | Refills: 2 | Status: SHIPPED | OUTPATIENT
Start: 2022-04-19 | End: 2022-06-07

## 2022-04-19 RX ORDER — DIPHENHYDRAMINE HYDROCHLORIDE 25 MG/1
CAPSULE ORAL
Qty: 90 CAPSULE | Refills: 1 | Status: SHIPPED | OUTPATIENT
Start: 2022-04-19 | End: 2022-06-29

## 2022-04-19 NOTE — TELEPHONE ENCOUNTER
Rx Refill Note  Requested Prescriptions     Pending Prescriptions Disp Refills   • gabapentin (NEURONTIN) 800 MG tablet [Pharmacy Med Name: GABAPENTIN 800MG TABLETS] 90 tablet      Sig: TAKE 1 TABLET BY MOUTH THREE TIMES DAILY      Last office visit with prescribing clinician: 10/1/2020      Next office visit with prescribing clinician: Visit date not found            Asmita Sheridan MA  04/19/22, 07:15 EDT

## 2022-04-22 RX ORDER — GABAPENTIN 800 MG/1
TABLET ORAL
Qty: 90 TABLET | Refills: 0 | Status: SHIPPED | OUTPATIENT
Start: 2022-04-22 | End: 2022-05-25

## 2022-04-22 NOTE — TELEPHONE ENCOUNTER
Rx Refill Note  Requested Prescriptions     Pending Prescriptions Disp Refills   • gabapentin (NEURONTIN) 800 MG tablet [Pharmacy Med Name: GABAPENTIN 800MG TABLETS] 90 tablet      Sig: TAKE 1 TABLET BY MOUTH THREE TIMES DAILY      Last office visit with prescribing clinician: 10/1/2020      Next office visit with prescribing clinician: Visit date not found            Asmita Sheridan MA  04/22/22, 12:32 EDT

## 2022-05-24 DIAGNOSIS — E11.44 DIABETIC AMYOTROPHY ASSOCIATED WITH TYPE 2 DIABETES MELLITUS: ICD-10-CM

## 2022-05-24 NOTE — TELEPHONE ENCOUNTER
Rx Refill Note  Requested Prescriptions     Pending Prescriptions Disp Refills   • gabapentin (NEURONTIN) 800 MG tablet [Pharmacy Med Name: GABAPENTIN 800MG TABLETS] 90 tablet      Sig: TAKE 1 TABLET BY MOUTH THREE TIMES DAILY      Last office visit with prescribing clinician: 3/11/2021      Next office visit with prescribing clinician: Visit date not found            Sridevi Weber MA  05/24/22, 10:48 EDT

## 2022-05-25 RX ORDER — GABAPENTIN 800 MG/1
TABLET ORAL
Qty: 90 TABLET | Refills: 0 | Status: SHIPPED | OUTPATIENT
Start: 2022-05-25 | End: 2022-06-29

## 2022-05-25 NOTE — TELEPHONE ENCOUNTER
Caller: Thais Hobson    Relationship: Self    Best call back number: [phone]     Requested Prescriptions:   Requested Prescriptions     Pending Prescriptions Disp Refills   • gabapentin (NEURONTIN) 800 MG tablet [Pharmacy Med Name: GABAPENTIN 800MG TABLETS] 90 tablet      Sig: TAKE 1 TABLET BY MOUTH THREE TIMES DAILY        Pharmacy where request should be sent: [id number] - [place], [place] - [address] - [phone] [facility] [phone] FX     Additional details provided by patient: PATIENT IS OUT OF MEDICATION    Does the patient have less than a 3 day supply:  [x] Yes  [] No    Katelin Bustamante Rep   05/25/22 12:07 EDT

## 2022-05-25 NOTE — TELEPHONE ENCOUNTER
Rx Refill Note  Requested Prescriptions     Pending Prescriptions Disp Refills   • gabapentin (NEURONTIN) 800 MG tablet [Pharmacy Med Name: GABAPENTIN 800MG TABLETS] 90 tablet      Sig: TAKE 1 TABLET BY MOUTH THREE TIMES DAILY      Last office visit with prescribing clinician: 3/11/2021      Next office visit with prescribing clinician: Visit date not found            Asmita Sheridan MA  05/25/22, 12:11 EDT

## 2022-05-30 DIAGNOSIS — B37.2 SKIN CANDIDIASIS: ICD-10-CM

## 2022-05-31 RX ORDER — FLUCONAZOLE 150 MG/1
TABLET ORAL
Qty: 2 TABLET | Refills: 0 | OUTPATIENT
Start: 2022-05-31

## 2022-06-04 DIAGNOSIS — E11.9 TYPE 2 DIABETES MELLITUS TREATED WITH INSULIN: ICD-10-CM

## 2022-06-04 DIAGNOSIS — Z79.4 TYPE 2 DIABETES MELLITUS TREATED WITH INSULIN: ICD-10-CM

## 2022-06-06 RX ORDER — PROCHLORPERAZINE 25 MG/1
SUPPOSITORY RECTAL
Qty: 1 EACH | Refills: 1 | Status: SHIPPED | OUTPATIENT
Start: 2022-06-06 | End: 2022-11-23 | Stop reason: SDUPTHER

## 2022-06-07 ENCOUNTER — TELEMEDICINE (OUTPATIENT)
Dept: FAMILY MEDICINE CLINIC | Facility: CLINIC | Age: 37
End: 2022-06-07

## 2022-06-07 DIAGNOSIS — B37.2 SKIN CANDIDIASIS: ICD-10-CM

## 2022-06-07 DIAGNOSIS — F41.0 PANIC DISORDER: ICD-10-CM

## 2022-06-07 DIAGNOSIS — B37.31 VAGINAL YEAST INFECTION: Primary | ICD-10-CM

## 2022-06-07 DIAGNOSIS — F41.8 DEPRESSION WITH ANXIETY: ICD-10-CM

## 2022-06-07 PROCEDURE — 99214 OFFICE O/P EST MOD 30 MIN: CPT | Performed by: NURSE PRACTITIONER

## 2022-06-07 RX ORDER — FLUCONAZOLE 150 MG/1
TABLET ORAL
Qty: 2 TABLET | Refills: 0 | Status: SHIPPED | OUTPATIENT
Start: 2022-06-07 | End: 2022-11-23

## 2022-06-07 RX ORDER — ALPRAZOLAM 0.5 MG/1
TABLET ORAL
Qty: 10 TABLET | Refills: 0 | Status: SHIPPED | OUTPATIENT
Start: 2022-06-07 | End: 2023-03-27

## 2022-06-07 RX ORDER — HYDROXYZINE PAMOATE 50 MG/1
50 CAPSULE ORAL 3 TIMES DAILY PRN
Qty: 90 CAPSULE | Refills: 1 | Status: SHIPPED | OUTPATIENT
Start: 2022-06-07 | End: 2022-11-23 | Stop reason: SDUPTHER

## 2022-06-07 RX ORDER — VENLAFAXINE HYDROCHLORIDE 37.5 MG/1
37.5 CAPSULE, EXTENDED RELEASE ORAL DAILY
Qty: 30 CAPSULE | Refills: 1 | Status: SHIPPED | OUTPATIENT
Start: 2022-06-07 | End: 2022-07-05

## 2022-06-07 NOTE — PROGRESS NOTES
Subjective     You have chosen to receive care through a telehealth visit.  Do you consent to use a video/audio connection for your medical care today? Yes        Chief Complaint:  anxiety    History of Present Illness:   Notes really bad panic attacks, she is taking hydroxyzine but it is not helping. She has been out of effexor for several months. She lost her uncle a few weeks ago. She is not sleeping well. She is not eating much. Feels dehydrated. Has chrons. Has issues with non compliance and lack of transportation. She has not been seen in office in over a year.   Has been taking 50mg hydroxyzine without improvement  Request diflucan for vaginal yeast infection and yeast under breast    Review of Systems  Gen- No fevers, chills  CV- No chest pain, palpitations  Resp- No cough, dyspnea  GI- No N/V/D, abd pain  Neuro-No dizziness, headaches      I have reviewed and/or updated the patient's past medical, surgical, family, social history and problem list as appropriate.     Medications:    Current Outpatient Medications:   •  fluconazole (Diflucan) 150 MG tablet, Take 1 tablet by mouth now, then repeat in 3 days if needed, Disp: 2 tablet, Rfl: 0  •  hydrOXYzine pamoate (VISTARIL) 50 MG capsule, Take 1 capsule by mouth 3 (Three) Times a Day As Needed for Anxiety., Disp: 90 capsule, Rfl: 1  •  ALPRAZolam (Xanax) 0.5 MG tablet, 1/2-1 tablet by mouth once daily for severe anxiety, Disp: 10 tablet, Rfl: 0  •  azaTHIOprine (IMURAN) 50 MG tablet, Take 1 tablet by mouth Daily., Disp: 180 tablet, Rfl: 3  •  Banophen 25 MG capsule, TAKE 1 CAPSULE BY MOUTH EVERY 8 HOURS AS NEEDED FOR ITCHING OR ALLERGIES, Disp: 90 capsule, Rfl: 1  •  Continuous Blood Gluc  (Dexcom G6 ) device, 1 Units Daily., Disp: 1 each, Rfl: 1  •  Continuous Blood Gluc Sensor (Dexcom G6 Sensor), Every 10 (Ten) Days., Disp: 3 each, Rfl: 11  •  Continuous Blood Gluc Transmit (Dexcom G6 Transmitter) misc, USE AS DIRECTED, Disp: 1 each,  "Rfl: 1  •  Continuous Glucose Monitor kit, Use as directed, Disp: 1 each, Rfl: 11  •  dicyclomine (Bentyl) 10 MG capsule, Take 1 capsule by mouth 4 (Four) Times a Day Before Meals & at Bedtime., Disp: 120 capsule, Rfl: 2  •  EPINEPHrine (EpiPen 2-Luis) 0.3 MG/0.3ML solution auto-injector injection, Inject 0.3 mL into the appropriate muscle as directed by prescriber. For emergent anaphylaxis, Disp: 2 each, Rfl: 0  •  estradiol (MINIVELLE, VIVELLE-DOT) 0.05 MG/24HR patch, PLACE 1 PATCH ON THE SKIN AS DIRECTED BY PROVIDER EVERY 3 DAYS, Disp: 8 patch, Rfl: 2  •  gabapentin (NEURONTIN) 800 MG tablet, TAKE 1 TABLET BY MOUTH THREE TIMES DAILY, Disp: 90 tablet, Rfl: 0  •  glucose blood test strip, ONE TOUCH VERIO FLEX TEST STRIPS TO CHECK GLUCOSE 3 TIMES DAILY FOR DM E11.9, Disp: 100 each, Rfl: 12  •  Hydrocortisone, Perianal, (Proctozone-HC) 2.5 % rectal cream, Insert  into the rectum 2 (Two) Times a Day., Disp: 30 g, Rfl: 1  •  insulin aspart (novoLOG) 100 UNIT/ML injection, ADMINISTER UP TO 12 UNITS UNDER THE SKIN THREE TIMES DAILY WITH MEALS PER SLIDING SCALE, Disp: 10 mL, Rfl: 3  •  insulin detemir (LEVEMIR) 100 UNIT/ML injection, Inject 15 Units under the skin into the appropriate area as directed Every Night., Disp: 15 mL, Rfl: 3  •  Insulin Syringe 30G X 5/16\" 1 ML misc, 1 syringe 3 (Three) Times a Day., Disp: 100 each, Rfl: 2  •  Lancets misc, USE AS DIRECTED TO CHECK GLUCOSE 3 TIMES DAILY FOR DM E11.9, Disp: 100 each, Rfl: 3  •  nystatin (MYCOSTATIN) 006212 UNIT/GM powder, Apply  topically to the appropriate area as directed 3 (Three) Times a Day., Disp: 60 g, Rfl: 1  •  oxyCODONE-acetaminophen (PERCOCET) 5-325 MG per tablet, Take 1 tablet by mouth Every 6 (Six) Hours As Needed for Severe Pain ., Disp: 12 tablet, Rfl: 0  •  pantoprazole (PROTONIX) 20 MG EC tablet, Take 2 tablets by mouth 2 (Two) Times a Day Before Meals., Disp: 120 tablet, Rfl: 0  •  PHARMACY MEDS TO BED CONSULT, Daily., Disp: , Rfl:   •  " "promethazine (PHENERGAN) 25 MG suppository, Insert 1 suppository into the rectum Every 6 (Six) Hours As Needed for Nausea or Vomiting., Disp: 12 each, Rfl: 0  •  promethazine (PHENERGAN) 25 MG tablet, Take 1 tablet by mouth Every 6 (Six) Hours As Needed for Nausea or Vomiting., Disp: 30 tablet, Rfl: 0  •  SUMAtriptan (Imitrex) 50 MG tablet, Take one tablet at onset of headache. May repeat dose one time in 2 hours if headache not relieved., Disp: 8 tablet, Rfl: 2  •  topiramate (TOPAMAX) 50 MG tablet, TAKE 1 TABLET BY MOUTH TWICE DAILY, Disp: 60 tablet, Rfl: 2  •  valACYclovir (Valtrex) 1000 MG tablet, Take 1 tablet by mouth 2 (Two) Times a Day., Disp: 2 tablet, Rfl: 0  •  venlafaxine XR (Effexor XR) 37.5 MG 24 hr capsule, Take 1 capsule by mouth Daily., Disp: 30 capsule, Rfl: 1    Allergies:  Allergies   Allergen Reactions   • Hydrochlorothiazide Hives   • Penicillins Hives   • Remicade [Infliximab] Anaphylaxis   • Soybean-Containing Drug Products Swelling     \"Soy sauce\"   • Xeljanz [Tofacitinib Citrate] Other (See Comments)     BLISTERS IN THROAT & ON ARMS   • Zofran [Ondansetron Hcl] Headache     migraines   • Ondansetron Mental Status Change   • Fidaxomicin Rash   • Reglan [Metoclopramide] Other (See Comments)     States feels weird when takes it       Objective     Vital Signs: There were no vitals filed for this visit.  There is no height or weight on file to calculate BMI.    Physical Exam:  Gen-no acute distress, video visit  Resp- normal WOB, on RA  Neuro-A&Ox3, face symmetrical, speech clear  Skin-no overt rashes noted  Psych-appropriate mood, cooperative         Assessment / Plan     Assessment/Plan:   Problem List Items Addressed This Visit        Mental Health    Depression with anxiety    Relevant Medications    venlafaxine XR (Effexor XR) 37.5 MG 24 hr capsule    hydrOXYzine pamoate (VISTARIL) 50 MG capsule    ALPRAZolam (Xanax) 0.5 MG tablet      Other Visit Diagnoses     Vaginal yeast infection    - "  Primary    Relevant Medications    fluconazole (Diflucan) 150 MG tablet    Skin candidiasis        Relevant Medications    fluconazole (Diflucan) 150 MG tablet    Panic disorder        Relevant Medications    venlafaxine XR (Effexor XR) 37.5 MG 24 hr capsule    hydrOXYzine pamoate (VISTARIL) 50 MG capsule    ALPRAZolam (Xanax) 0.5 MG tablet        -- restart effexor  -- additional hydroxyxine sent  -- short course of xanax to use while effexor is building back up. Will not be long term. She will look into counseling options online    Follow up:  4 weeks    Total Time of Encounter 20 minutes    Electronically signed by EMELYN Seymour   06/07/2022 16:30 EDT      Please note that portions of this note may have been completed with a voice recognition program. Efforts were made to edit the dictations, but occasionally words are mistranscribed.

## 2022-06-21 NOTE — PROGRESS NOTES
[facility] Medicine Services  PROGRESS NOTE    Patient Name: Thais Hobson  : 1985  MRN: [id number]    Date of Admission: 2021  Primary Care Physician: Lv Sanchez DO    Subjective   Subjective     CC:  Follow-up right-sided abdominal pain    HPI:  Pt is seen resting up in bed in NAD. Just back from endoscopy shortly ago.  States she feels okay for now.  Abd pain still 6-10/10.  Mild nausea (some better today).  Still having frequent liquid stools and some blood tinge.  Attempting to eat lunch now.     ROS:  Gen- No fevers, chills  CV- No chest pain, palpitations  Resp- No cough, dyspnea  GI-nausea and diarrhea.  +abd tenderness (stable). As above      Objective   Objective     Vital Signs:   Temp:  [97.2 °F (36.2 °C)-98.2 °F (36.8 °C)] 97.6 °F (36.4 °C)  Heart Rate:  [81-97] 92  Resp:  [16-20] 17  BP: ()/(69-92) 127/86        Physical Exam:  Constitutional: No acute distress, awake, alert resting in bed   HENT: NCAT, mucous membranes moist  Respiratory: Clear to auscultation bilaterally, respiratory effort normal   Cardiovascular: RRR, no murmurs, rubs, or gallops  Gastrointestinal: Positive bowel sounds, soft, tender to epigastric area (stable), nondistended  Musculoskeletal: No bilateral ankle edema. WOODARD spont   Psychiatric: Appropriate affect, cooperative  Neurologic: Oriented x 3, strength symmetric in all extremities, Cranial Nerves grossly intact to confrontation, speech clear and appropriate.  Follows commands   Skin: No rashes     Results Reviewed:  Results from last 7 days   Lab Units 21  1321 21  1400   WBC 10*3/mm3 10.25 9.84   HEMOGLOBIN g/dL 13.3 13.4   HEMATOCRIT % 43.0 40.7   PLATELETS 10*3/mm3 243 256   PROCALCITONIN ng/mL  --  0.03     Results from last 7 days   Lab Units 21  1558 21  1400   SODIUM mmol/L 138 129*   POTASSIUM mmol/L 3.7 3.5   CHLORIDE mmol/L 103 95*   CO2 mmol/L 23.0 22.0   BUN mg/dL 6 8   CREATININE  Patient is scheduled for colon on 8/19/2022.  Instructions mailed mg/dL 0.50* 0.60   GLUCOSE mg/dL 246* 300*   CALCIUM mg/dL 8.7 9.2   ALT (SGPT) U/L  --  31   AST (SGOT) U/L  --  24     Estimated Creatinine Clearance: 171.6 mL/min (A) (by C-G formula based on SCr of 0.5 mg/dL (L)).    Microbiology Results Abnormal     Procedure Component Value - Date/Time    Clostridium Difficile Toxin - Stool, Per Rectum [[id number]]  (Normal) Collected: 06/29/21 1149    Lab Status: Final result Specimen: Stool from Per Rectum Updated: 06/29/21 1614    Narrative:      The following orders were created for panel order Clostridium Difficile Toxin - Stool, Per Rectum.  Procedure                               Abnormality         Status                     ---------                               -----------         ------                     Clostridium Difficile To...[[id number]]  Normal              Final result                 Please view results for these tests on the individual orders.    Clostridium Difficile Toxin, PCR - Stool, Per Rectum [[id number]]  (Normal) Collected: 06/29/21 1149    Lab Status: Final result Specimen: Stool from Per Rectum Updated: 06/29/21 1614     C. Difficile Toxins by PCR Not Detected    Narrative:      Performance characteristics of test not established for patients <2 years of age.  Negative for Toxigenic C. Difficile    COVID PRE-OP / PRE-PROCEDURE SCREENING ORDER (NO ISOLATION) - Swab, Nasopharynx [[id number]]  (Normal) Collected: 06/28/21 1745    Lab Status: Final result Specimen: Swab from Nasopharynx Updated: 06/29/21 1418    Narrative:      The following orders were created for panel order COVID PRE-OP / PRE-PROCEDURE SCREENING ORDER (NO ISOLATION) - Swab, Nasopharynx.  Procedure                               Abnormality         Status                     ---------                               -----------         ------                     COVID-19, M,T,W, APTIMA ...[[id number]]  Normal              Final result                 Please view results for these tests  on the individual orders.    COVID-19, M,T,W, APTIMA PANTHER KATHY IN-HOUSE NP/OP SWAB IN UTM/VTM/SALINE TRANSPORT MEDIA 24HR TAT - Swab, Nasopharynx [[id number]]  (Normal) Collected: 06/28/21 1745    Lab Status: Final result Specimen: Swab from Nasopharynx Updated: 06/29/21 1418     COVID19 Not Detected    Narrative:      Fact sheet for providers: https://www.fda.gov/media/640756/download     Fact sheet for patients: https://www.fda.gov/media/755828/download    Test performed by RT PCR.    Gastrointestinal Panel, PCR - Stool, Per Rectum [[id number]]  (Normal) Collected: 06/29/21 1149    Lab Status: Final result Specimen: Stool from Per Rectum Updated: 06/29/21 1318     Campylobacter Not Detected     Plesiomonas shigelloides Not Detected     Salmonella Not Detected     Vibrio Not Detected     Vibrio cholerae Not Detected     Yersinia enterocolitica Not Detected     Enteroaggregative E. coli (EAEC) Not Detected     Enteropathogenic E. coli (EPEC) Not Detected     Enterotoxigenic E. coli (ETEC) lt/st Not Detected     Shiga-like toxin-producing E. coli (STEC) stx1/stx2 Not Detected     Shigella/Enteroinvasive E. coli (EIEC) Not Detected     Cryptosporidium Not Detected     Cyclospora cayetanensis Not Detected     Entamoeba histolytica Not Detected     Giardia lamblia Not Detected     Adenovirus F40/41 Not Detected     Astrovirus Not Detected     Norovirus GI/GII Not Detected     Rotavirus A Not Detected     Sapovirus (I, II, IV or V) Not Detected          Imaging Results (Last 24 Hours)     ** No results found for the last 24 hours. **              I have reviewed the medications:  Scheduled Meds:cholestyramine light, 1 packet, Oral, Q12H  gabapentin, 400 mg, Oral, Q8H  Hydrocortisone (Perianal), , Rectal, BID  insulin detemir, 30 Units, Subcutaneous, Nightly  insulin lispro, 0-14 Units, Subcutaneous, TID AC  insulin lispro, 5 Units, Subcutaneous, TID With Meals  mesalamine, 4 g, Rectal, Nightly  saccharomyces boulardii,  250 mg, Oral, BID  sodium chloride, 10 mL, Intravenous, Q12H  topiramate, 50 mg, Oral, BID      Continuous Infusions:lactated ringers, 100 mL/hr, Last Rate: 100 mL/hr (07/02/21 0035)  lactated ringers, 30 mL/hr, Last Rate: 30 mL/hr (07/02/21 0817)      PRN Meds:.dextrose  •  dextrose  •  diphenhydrAMINE  •  glucagon (human recombinant)  •  HYDROcodone-acetaminophen  •  hydrOXYzine  •  lactated ringers  •  ondansetron **OR** ondansetron  •  promethazine  •  sodium chloride    Assessment/Plan   Assessment & Plan     Active Hospital Problems    Diagnosis  POA   • **C. difficile colitis [A04.72]  Yes   • Diarrhea [R19.7]  Yes   • Gastroparesis [K31.84]  Yes   • Ulcerative colitis (CMS/[facility]) [K51.90]  Yes   • Ulcerative pancolitis without complication (CMS/[facility]) [K51.00]  Yes   • Type 2 diabetes mellitus treated with insulin (CMS/[facility]) [E11.9, Z79.4]  Not Applicable   • Bipolar disorder (CMS/[facility]) [F31.9]  Yes      Resolved Hospital Problems   No resolved problems to display.        Brief Hospital Course to date:  Thais Hobson is a 36 y.o. female With a PMH of gastroparesis, ulcerative pancolitis w/o complication, T2DM, and bipolar disorder who was admitted on June 28 for diarrhea. Has a hx of c diff and ulcerative colitis.      This patient's problems and plans were partially entered by my partner and updated as appropriate by me 07/02/21.    Assessment/Plan:  Pt is new to me today     Diarrhea  -Recurrent C-diff colitis vs ulcerative colitis flare; likely a UC flare with negative CDiff panel, as well as negative GI PCR. Prior stopped Vancomycin and consultation to GI for management of UC  --pt to endoscopy today per GI.  Found colitis and biopsies taken.  meds per GI   -Continue ID consult, appreciate Dr Martin input; we discontinued oral Vanc yesterday  -regular diet, tolerating     Hyponatremia   --resolved, now 138     T2DM, uncontrolled with gastroparesis   -Continue Levemir 30 units subcu nightly, sliding scale  insulin  --running 188-298.  Will add low dose meal time insulin with hold parameters today   --continue to monitor and adjust     Bipolar disorder  --stable     DVT prophylaxis:  Mechanical DVT prophylaxis orders are present.       Disposition: I expect the patient to be discharged pending improvement in GI symptoms, and pending medically improved and per GI final recommendations     CODE STATUS:   Code Status and Medical Interventions:   Ordered at: 06/28/21 1320     Code Status:    CPR     Medical Interventions (Level of Support Prior to Arrest):    Full       Letha Sheridan, APRN  07/02/21

## 2022-06-24 ENCOUNTER — TELEPHONE (OUTPATIENT)
Dept: FAMILY MEDICINE CLINIC | Facility: CLINIC | Age: 37
End: 2022-06-24

## 2022-06-24 NOTE — TELEPHONE ENCOUNTER
Caller: Thais Hobson    Relationship to patient: Self    Best call back number: [phone]  Chief complaint:   TOP AND BOTTOM LIP SWOLLEN,  HARD TIME BREATHING, EYE SWOLLEN,  HARD TIME SWALLOWING, CHEST HURT    Patient directed to call 911 or go to their nearest emergency room.     Patient verbalized understanding: [x] Yes  [] No  If no, why?

## 2022-06-28 DIAGNOSIS — E11.44 DIABETIC AMYOTROPHY ASSOCIATED WITH TYPE 2 DIABETES MELLITUS: ICD-10-CM

## 2022-06-29 RX ORDER — DIPHENHYDRAMINE HCL 25 MG
CAPSULE ORAL
Qty: 90 CAPSULE | Refills: 1 | Status: SHIPPED | OUTPATIENT
Start: 2022-06-29 | End: 2022-08-26

## 2022-06-29 RX ORDER — GABAPENTIN 800 MG/1
TABLET ORAL
Qty: 90 TABLET | Refills: 0 | Status: SHIPPED | OUTPATIENT
Start: 2022-06-29 | End: 2022-08-22 | Stop reason: SDUPTHER

## 2022-07-05 ENCOUNTER — TELEMEDICINE (OUTPATIENT)
Dept: FAMILY MEDICINE CLINIC | Facility: CLINIC | Age: 37
End: 2022-07-05

## 2022-07-05 DIAGNOSIS — K51.90 ULCERATIVE COLITIS WITHOUT COMPLICATIONS, UNSPECIFIED LOCATION: ICD-10-CM

## 2022-07-05 DIAGNOSIS — E11.9 TYPE 2 DIABETES MELLITUS TREATED WITH INSULIN: ICD-10-CM

## 2022-07-05 DIAGNOSIS — F41.8 DEPRESSION WITH ANXIETY: ICD-10-CM

## 2022-07-05 DIAGNOSIS — K21.9 GASTROESOPHAGEAL REFLUX DISEASE WITHOUT ESOPHAGITIS: Chronic | ICD-10-CM

## 2022-07-05 DIAGNOSIS — F41.1 GAD (GENERALIZED ANXIETY DISORDER): Primary | ICD-10-CM

## 2022-07-05 DIAGNOSIS — F41.0 PANIC DISORDER: ICD-10-CM

## 2022-07-05 DIAGNOSIS — Z79.4 TYPE 2 DIABETES MELLITUS TREATED WITH INSULIN: ICD-10-CM

## 2022-07-05 PROCEDURE — 99213 OFFICE O/P EST LOW 20 MIN: CPT | Performed by: NURSE PRACTITIONER

## 2022-07-05 RX ORDER — VENLAFAXINE HYDROCHLORIDE 75 MG/1
75 CAPSULE, EXTENDED RELEASE ORAL DAILY
Qty: 90 CAPSULE | Refills: 1 | Status: SHIPPED | OUTPATIENT
Start: 2022-07-05 | End: 2022-12-27

## 2022-07-05 RX ORDER — PROMETHAZINE HYDROCHLORIDE 25 MG/1
25 TABLET ORAL EVERY 6 HOURS PRN
Qty: 30 TABLET | Refills: 1 | Status: SHIPPED | OUTPATIENT
Start: 2022-07-05 | End: 2022-08-22 | Stop reason: SDUPTHER

## 2022-07-05 RX ORDER — VENLAFAXINE HYDROCHLORIDE 37.5 MG/1
37.5 CAPSULE, EXTENDED RELEASE ORAL DAILY
Qty: 90 CAPSULE | Refills: 1 | Status: SHIPPED | OUTPATIENT
Start: 2022-07-05 | End: 2022-07-05

## 2022-07-05 NOTE — PROGRESS NOTES
Subjective     You have chosen to receive care through a telehealth visit.  Do you consent to use a video/audio connection for your medical care today? Yes. doximity was used      Chief Complaint:  anxiety    History of Present Illness:   1 month f/u for anxiety. She is back on effexor and doing better. She is still having some panic attacks but she is working through them. She continues hydroxyzine. She has not found any online counseling yet.   She notes UC has been worse lately. She is not eating much, stuck in bed. This morning she has rectal bleeding that seemed severe but has stopped. She has hx of of hemorrhoids and is having rectal pain. Has appt with Dr. Fong tomorrow to discuss colectomy.         Review of Systems  Gen- No fevers, chills  CV- No chest pain, palpitations  Resp- No cough, dyspnea  GI- No N/V/D, abd pain  Neuro-No dizziness, headaches      I have reviewed and/or updated the patient's past medical, surgical, family, social history and problem list as appropriate.     Medications:    Current Outpatient Medications:   •  promethazine (PHENERGAN) 25 MG tablet, Take 1 tablet by mouth Every 6 (Six) Hours As Needed for Nausea or Vomiting., Disp: 30 tablet, Rfl: 1  •  venlafaxine XR (Effexor XR) 75 MG 24 hr capsule, Take 1 capsule by mouth Daily., Disp: 90 capsule, Rfl: 1  •  ALPRAZolam (Xanax) 0.5 MG tablet, 1/2-1 tablet by mouth once daily for severe anxiety, Disp: 10 tablet, Rfl: 0  •  azaTHIOprine (IMURAN) 50 MG tablet, Take 1 tablet by mouth Daily., Disp: 180 tablet, Rfl: 3  •  Continuous Blood Gluc  (Dexcom G6 ) device, 1 Units Daily., Disp: 1 each, Rfl: 1  •  Continuous Blood Gluc Sensor (Dexcom G6 Sensor), Every 10 (Ten) Days., Disp: 3 each, Rfl: 11  •  Continuous Blood Gluc Transmit (Dexcom G6 Transmitter) misc, USE AS DIRECTED, Disp: 1 each, Rfl: 1  •  Continuous Glucose Monitor kit, Use as directed, Disp: 1 each, Rfl: 11  •  dicyclomine (Bentyl) 10 MG capsule, Take  "1 capsule by mouth 4 (Four) Times a Day Before Meals & at Bedtime., Disp: 120 capsule, Rfl: 2  •  diphenhydrAMINE (BENADRYL) 25 mg capsule, TAKE 1 CAPSULE BY MOUTH EVERY 8 HOURS AS NEEDED FOR ITCHING OR ALLERGIES, Disp: 90 capsule, Rfl: 1  •  EPINEPHrine (EpiPen 2-Ulis) 0.3 MG/0.3ML solution auto-injector injection, Inject 0.3 mL into the appropriate muscle as directed by prescriber. For emergent anaphylaxis, Disp: 2 each, Rfl: 0  •  estradiol (MINIVELLE, VIVELLE-DOT) 0.05 MG/24HR patch, PLACE 1 PATCH ON THE SKIN AS DIRECTED BY PROVIDER EVERY 3 DAYS, Disp: 8 patch, Rfl: 2  •  fluconazole (Diflucan) 150 MG tablet, Take 1 tablet by mouth now, then repeat in 3 days if needed, Disp: 2 tablet, Rfl: 0  •  gabapentin (NEURONTIN) 800 MG tablet, TAKE 1 TABLET BY MOUTH THREE TIMES DAILY, Disp: 90 tablet, Rfl: 0  •  glucose blood test strip, ONE TOUCH VERIO FLEX TEST STRIPS TO CHECK GLUCOSE 3 TIMES DAILY FOR DM E11.9, Disp: 100 each, Rfl: 12  •  Hydrocortisone, Perianal, (Proctozone-HC) 2.5 % rectal cream, Insert  into the rectum 2 (Two) Times a Day., Disp: 30 g, Rfl: 1  •  hydrOXYzine pamoate (VISTARIL) 50 MG capsule, Take 1 capsule by mouth 3 (Three) Times a Day As Needed for Anxiety., Disp: 90 capsule, Rfl: 1  •  insulin aspart (novoLOG) 100 UNIT/ML injection, ADMINISTER UP TO 12 UNITS UNDER THE SKIN THREE TIMES DAILY WITH MEALS PER SLIDING SCALE, Disp: 10 mL, Rfl: 3  •  insulin detemir (LEVEMIR) 100 UNIT/ML injection, Inject 15 Units under the skin into the appropriate area as directed Every Night., Disp: 15 mL, Rfl: 3  •  Insulin Syringe 30G X 5/16\" 1 ML misc, 1 syringe 3 (Three) Times a Day., Disp: 100 each, Rfl: 2  •  Lancets misc, USE AS DIRECTED TO CHECK GLUCOSE 3 TIMES DAILY FOR DM E11.9, Disp: 100 each, Rfl: 3  •  nystatin (MYCOSTATIN) 512870 UNIT/GM powder, Apply  topically to the appropriate area as directed 3 (Three) Times a Day., Disp: 60 g, Rfl: 1  •  oxyCODONE-acetaminophen (PERCOCET) 5-325 MG per tablet, Take " "1 tablet by mouth Every 6 (Six) Hours As Needed for Severe Pain ., Disp: 12 tablet, Rfl: 0  •  pantoprazole (PROTONIX) 20 MG EC tablet, Take 2 tablets by mouth 2 (Two) Times a Day Before Meals., Disp: 120 tablet, Rfl: 0  •  PHARMACY MEDS TO BED CONSULT, Daily., Disp: , Rfl:   •  promethazine (PHENERGAN) 25 MG suppository, Insert 1 suppository into the rectum Every 6 (Six) Hours As Needed for Nausea or Vomiting., Disp: 12 each, Rfl: 0  •  SUMAtriptan (Imitrex) 50 MG tablet, Take one tablet at onset of headache. May repeat dose one time in 2 hours if headache not relieved., Disp: 8 tablet, Rfl: 2  •  topiramate (TOPAMAX) 50 MG tablet, TAKE 1 TABLET BY MOUTH TWICE DAILY, Disp: 60 tablet, Rfl: 2  •  valACYclovir (Valtrex) 1000 MG tablet, Take 1 tablet by mouth 2 (Two) Times a Day., Disp: 2 tablet, Rfl: 0    Allergies:  Allergies   Allergen Reactions   • Hydrochlorothiazide Hives   • Penicillins Hives   • Remicade [Infliximab] Anaphylaxis   • Soybean-Containing Drug Products Swelling     \"Soy sauce\"   • Xeljanz [Tofacitinib Citrate] Other (See Comments)     BLISTERS IN THROAT & ON ARMS   • Zofran [Ondansetron Hcl] Headache     migraines   • Ondansetron Mental Status Change   • Fidaxomicin Rash   • Reglan [Metoclopramide] Other (See Comments)     States feels weird when takes it       Objective     Vital Signs: There were no vitals filed for this visit.  There is no height or weight on file to calculate BMI.    Physical Exam:  Gen-no acute distress, video visit  Resp- normal WOB, on RA  Neuro-A&Ox3, face symmetrical, speech clear  Skin-no overt rashes noted  Psych-appropriate mood, cooperative           Assessment / Plan     Assessment/Plan:   Problem List Items Addressed This Visit        Endocrine and Metabolic    Type 2 diabetes mellitus treated with insulin (HCC)    Relevant Medications    Insulin Syringe 30G X 5/16\" 1 ML misc    Continuous Blood Gluc  (Dexcom G6 ) device    insulin detemir (LEVEMIR) " 100 UNIT/ML injection    Continuous Blood Gluc Sensor (Dexcom G6 Sensor)    insulin aspart (novoLOG) 100 UNIT/ML injection    Continuous Blood Gluc Transmit (Dexcom G6 Transmitter) misc    Other Relevant Orders    Hemoglobin A1c    Comprehensive Metabolic Panel    CBC Auto Differential    TSH Rfx On Abnormal To Free T4       Gastrointestinal Abdominal     Gastroesophageal reflux disease without esophagitis (Chronic)    Relevant Medications    pantoprazole (PROTONIX) 20 MG EC tablet    dicyclomine (Bentyl) 10 MG capsule    promethazine (PHENERGAN) 25 MG tablet    Ulcerative colitis flare    Relevant Medications    oxyCODONE-acetaminophen (PERCOCET) 5-325 MG per tablet    pantoprazole (PROTONIX) 20 MG EC tablet    dicyclomine (Bentyl) 10 MG capsule    promethazine (PHENERGAN) 25 MG tablet       Mental Health    Depression with anxiety    Relevant Medications    hydrOXYzine pamoate (VISTARIL) 50 MG capsule    ALPRAZolam (Xanax) 0.5 MG tablet    venlafaxine XR (Effexor XR) 75 MG 24 hr capsule      Other Visit Diagnoses     ZAINAB (generalized anxiety disorder)    -  Primary    Relevant Medications    venlafaxine XR (Effexor XR) 75 MG 24 hr capsule    Panic disorder        Relevant Medications    venlafaxine XR (Effexor XR) 75 MG 24 hr capsule        -- increase effexor to 75mg daily, continue hydroxyzine prn  -- keep f/u with Dr. Fong. I did order labs since it has been quite some time since she had labs done. Hopefully she will get those done while there    Follow up:  Return in about 3 months (around 10/5/2022).      Total Time 15 minutes    Electronically signed by EMELYN Seymour   07/05/2022 16:36 EDT      Please note that portions of this note may have been completed with a voice recognition program. Efforts were made to edit the dictations, but occasionally words are mistranscribed.

## 2022-07-28 DIAGNOSIS — F41.8 DEPRESSION WITH ANXIETY: ICD-10-CM

## 2022-07-28 DIAGNOSIS — E11.44 DIABETIC AMYOTROPHY ASSOCIATED WITH TYPE 2 DIABETES MELLITUS: ICD-10-CM

## 2022-07-28 RX ORDER — HYDROXYZINE PAMOATE 50 MG/1
CAPSULE ORAL
Qty: 90 CAPSULE | Refills: 1 | OUTPATIENT
Start: 2022-07-28

## 2022-07-28 RX ORDER — GABAPENTIN 800 MG/1
TABLET ORAL
Qty: 90 TABLET | OUTPATIENT
Start: 2022-07-28

## 2022-08-01 ENCOUNTER — READMISSION MANAGEMENT (OUTPATIENT)
Dept: CALL CENTER | Facility: HOSPITAL | Age: 37
End: 2022-08-01

## 2022-08-01 ENCOUNTER — TELEPHONE (OUTPATIENT)
Dept: FAMILY MEDICINE CLINIC | Facility: CLINIC | Age: 37
End: 2022-08-01

## 2022-08-01 NOTE — TELEPHONE ENCOUNTER
Caller: DAO    Relationship to patient: Other [facility]    Best call back number:     Patient is needing: PATIENT IS NEEDING HOSPITAL FOLLOW UP WITH DR DE LA VEGA THIS WEEK    PLEASE CALL WITH APPT

## 2022-08-01 NOTE — TELEPHONE ENCOUNTER
Caller: DAO    Relationship to patient: Other  [facility]  Best call back number:    New or established patient?  [] New  [x] Established    Date of discharge: [id number]    Facility discharged from: [facility]     Diagnosis/Symptoms: ULCER/ COLITIS    Length of stay (If applicable): 3 DAYS

## 2022-08-01 NOTE — OUTREACH NOTE
Prep Survey    Flowsheet Row Responses   [facility] facility patient discharged from? Non-[facility]   Is LACE score < 7 ? Non-BH Discharge   Emergency Room discharge w/ pulse ox? No   Eligibility [facility]   Date of Discharge 08/01/22   Discharge diagnosis ULCER/ COLITIS   Does the patient have one of the following disease processes/diagnoses(primary or secondary)? Other   Prep survey completed? Yes          DAVID BRADY - Registered Nurse

## 2022-08-02 ENCOUNTER — TRANSITIONAL CARE MANAGEMENT TELEPHONE ENCOUNTER (OUTPATIENT)
Dept: CALL CENTER | Facility: HOSPITAL | Age: 37
End: 2022-08-02

## 2022-08-02 NOTE — OUTREACH NOTE
Call Center TCM Note    Flowsheet Row Responses   [facility] patient discharged from? Non-BH   Does the patient have one of the following disease processes/diagnoses(primary or secondary)? Other   TCM attempt successful? No   Unsuccessful attempts Attempt 2          Ritika Whiteside RN    8/2/2022, 12:59 EDT

## 2022-08-02 NOTE — OUTREACH NOTE
Call Center TCM Note    Flowsheet Row Responses   [facility] patient discharged from? Non-  [[facility]]   Does the patient have one of the following disease processes/diagnoses(primary or secondary)? Other   TCM attempt successful? No  [no verbal release]   Unsuccessful attempts Attempt 1          Ritika Whiteside RN    8/2/2022, 09:15 EDT

## 2022-08-03 ENCOUNTER — TRANSITIONAL CARE MANAGEMENT TELEPHONE ENCOUNTER (OUTPATIENT)
Dept: CALL CENTER | Facility: HOSPITAL | Age: 37
End: 2022-08-03

## 2022-08-03 NOTE — OUTREACH NOTE
Call Center TCM Note    Flowsheet Row Responses   [facility] [place] patient discharged from? Non-BH   Does the patient have one of the following disease processes/diagnoses(primary or secondary)? Other   TCM attempt successful? No   Unsuccessful attempts Attempt 3          Jo Power LPN    8/3/2022, 16:15 EDT

## 2022-08-09 DIAGNOSIS — G43.101 MIGRAINE WITH AURA AND WITH STATUS MIGRAINOSUS, NOT INTRACTABLE: ICD-10-CM

## 2022-08-09 RX ORDER — TOPIRAMATE 50 MG/1
TABLET, FILM COATED ORAL
Qty: 60 TABLET | Refills: 2 | Status: SHIPPED | OUTPATIENT
Start: 2022-08-09 | End: 2022-11-07

## 2022-08-19 DIAGNOSIS — K51.00 ULCERATIVE PANCOLITIS: ICD-10-CM

## 2022-08-19 RX ORDER — AZATHIOPRINE 50 MG/1
TABLET ORAL
Qty: 180 TABLET | Refills: 1 | Status: SHIPPED | OUTPATIENT
Start: 2022-08-19 | End: 2022-11-23

## 2022-08-22 ENCOUNTER — TELEMEDICINE (OUTPATIENT)
Dept: FAMILY MEDICINE CLINIC | Facility: CLINIC | Age: 37
End: 2022-08-22

## 2022-08-22 DIAGNOSIS — K51.90 ULCERATIVE COLITIS WITHOUT COMPLICATIONS, UNSPECIFIED LOCATION: ICD-10-CM

## 2022-08-22 DIAGNOSIS — E11.9 TYPE 2 DIABETES MELLITUS TREATED WITH INSULIN: Primary | ICD-10-CM

## 2022-08-22 DIAGNOSIS — K21.9 GASTROESOPHAGEAL REFLUX DISEASE WITHOUT ESOPHAGITIS: Chronic | ICD-10-CM

## 2022-08-22 DIAGNOSIS — E11.44 DIABETIC AMYOTROPHY ASSOCIATED WITH TYPE 2 DIABETES MELLITUS: ICD-10-CM

## 2022-08-22 DIAGNOSIS — K51.00 ULCERATIVE PANCOLITIS WITHOUT COMPLICATION: ICD-10-CM

## 2022-08-22 DIAGNOSIS — Z79.4 TYPE 2 DIABETES MELLITUS TREATED WITH INSULIN: Primary | ICD-10-CM

## 2022-08-22 PROCEDURE — 99214 OFFICE O/P EST MOD 30 MIN: CPT | Performed by: FAMILY MEDICINE

## 2022-08-22 RX ORDER — GABAPENTIN 800 MG/1
800 TABLET ORAL 3 TIMES DAILY
Qty: 90 TABLET | Refills: 2 | Status: SHIPPED | OUTPATIENT
Start: 2022-08-22 | End: 2022-11-23 | Stop reason: SDUPTHER

## 2022-08-22 RX ORDER — PROMETHAZINE HYDROCHLORIDE 25 MG/1
25 TABLET ORAL EVERY 6 HOURS PRN
Qty: 30 TABLET | Refills: 2 | Status: SHIPPED | OUTPATIENT
Start: 2022-08-22 | End: 2022-11-23 | Stop reason: SDUPTHER

## 2022-08-22 NOTE — PROGRESS NOTES
"    Established Patient        Chief Complaint:   No chief complaint on file.       Thais Hobson is a 37 y.o. female    History of Present Illness:   Here to day for requested virtual visit, utilizing both audio/video components, in f/u of her DM and associated amyotrophy, gastroesophageal reflux disease, ulcerative pancolitis.    Patient states that her ulcerative colitis is doing quite well at this time, denies any fever, chills or night sweats.  Denies any BRB/BTS.  She is tolerating p.o. intake, continues to utilize Phenergan as needed.  Denies any cyclical/persistent hypoglycemic episodes.  She states her blood glucose control has been considerably improved, coinciding with better UC control.        Subjective     The following portions of the patient's history were reviewed and updated as appropriate: allergies, current medications, past family history, past medical history, past social history, past surgical history and problem list.    Allergies   Allergen Reactions   • Hydrochlorothiazide Hives   • Penicillins Hives   • Remicade [Infliximab] Anaphylaxis   • Soybean-Containing Drug Products Swelling     \"Soy sauce\"   • Xeljanz [Tofacitinib Citrate] Other (See Comments)     BLISTERS IN THROAT & ON ARMS   • Zofran [Ondansetron Hcl] Headache     migraines   • Ondansetron Mental Status Change   • Fidaxomicin Rash   • Reglan [Metoclopramide] Other (See Comments)     States feels weird when takes it       Review of Systems  1. Constitutional: Negative for fever. Negative for chills, diaphoresis, fatigue and unexpected weight change.   2. HENT: No dysphagia; no changes to vision/hearing/smell/taste; no epistaxis  3. Eyes: Negative for redness and visual disturbance.   4. Respiratory: negative for shortness of breath. Negative for chest pain . Negative for cough and chest tightness.   5. Cardiovascular: Negative for chest pain and palpitations.   6. Gastrointestinal: Negative for abdominal distention, " abdominal pain and blood in stool.   7. Endocrine: Negative for cold intolerance and heat intolerance.   8. Genitourinary: Negative for difficulty urinating, dysuria and frequency.   9. Musculoskeletal: Chronic arthralgias, back pain and myalgias.   10. Skin: Negative for color change, rash and wound.   11. Neurological: Negative for syncope, weakness.  Headaches as per above.  12. Hematological: Negative for adenopathy. Does not bruise/bleed easily.   13. Psychiatric/Behavioral: Negative for confusion. The patient is not nervous/anxious.    Objective     Physical Exam   Vital Signs: LMP  (LMP Unknown)     General Appearance: alert, oriented x 3, no acute distress.  Skin: Without jaundice.  HEENT: Atraumatic.  pupils round and reactive to light and accommodation, oral mucosa pink and moist.  Nares patent without epistaxis.   Neck: Without stridor.  Lungs: unlabored breathing effort.  Extremities: Good range of motion actively and passively.  Moves all 4 extremities.  Neuro: normal speech and mental status.      Assessment and Plan      Assessment:   Diagnoses and all orders for this visit:    1. Type 2 diabetes mellitus treated with insulin ([facility]) (Primary)  -     POC Glycosylated Hemoglobin (Hb A1C); Future    2. Diabetic amyotrophy associated with type 2 diabetes mellitus ([facility])  -     gabapentin (NEURONTIN) 800 MG tablet; Take 1 tablet by mouth 3 (Three) Times a Day.  Dispense: 90 tablet; Refill: 2    3. Gastroesophageal reflux disease without esophagitis  -     promethazine (PHENERGAN) 25 MG tablet; Take 1 tablet by mouth Every 6 (Six) Hours As Needed for Nausea or Vomiting.  Dispense: 30 tablet; Refill: 2    4. Ulcerative pancolitis without complication ([facility])    5. Ulcerative colitis without complications, unspecified location ([facility])  -     promethazine (PHENERGAN) 25 MG tablet; Take 1 tablet by mouth Every 6 (Six) Hours As Needed for Nausea or Vomiting.  Dispense: 30 tablet; Refill: 2        Plan:  Continue  scheduled follow-up with gastroenterology concerning her UC.  Demonstrates no findings of acute exacerbation at this time.    I have asked that patient stop by the office to have a fingerstick hemoglobin A1c completed, as she is out of compliance with routine screening here.  I have stressed the importance of continued healthy dietary choices, as well as the need to maintain appropriate hydration status.  Continue home blood glucose monitoring.    I have refilled patient's neuropathic analgesic today.    Plan follow-up in 3 months, sooner if needed.    Discussion Summary:    Discussed plan of care in detail with pt today; pt verb understanding and agrees.    I spent 30 minutes caring for Thais on this date of service. This time includes time spent by me in the following activities:preparing for the visit, performing a medically appropriate examination and/or evaluation , counseling and educating the patient/family/caregiver, ordering medications, tests, or procedures, documenting information in the medical record and care coordination    I have reviewed and updated all copied forward information, as appropriate.  I attest to the accuracy and relevance of any unchanged information.    Follow up:  Return in about 3 months (around 11/22/2022) for Recheck.     There are no Patient Instructions on file for this visit.    Lv Sanchez,   08/22/22  08:48 EDT          Please note that portions of this note may have been completed with a voice recognition program. Efforts were made to edit the dictations, but occasionally words are mistranscribed.

## 2022-08-26 RX ORDER — DIPHENHYDRAMINE HYDROCHLORIDE 25 MG/1
CAPSULE ORAL
Qty: 90 CAPSULE | Refills: 1 | Status: SHIPPED | OUTPATIENT
Start: 2022-08-26 | End: 2022-10-25

## 2022-09-22 ENCOUNTER — TELEPHONE (OUTPATIENT)
Dept: GASTROENTEROLOGY | Facility: CLINIC | Age: 37
End: 2022-09-22

## 2022-09-22 NOTE — TELEPHONE ENCOUNTER
Craolyn  [facility] called this patient is needing a new order for entyvio infusion. Can you please put these orders in so I can fax them. Thanks.

## 2022-09-22 NOTE — TELEPHONE ENCOUNTER
I referred her to [facility] due to resistant treatment back in January and have not seen her since.  She is no longer under my care

## 2022-10-25 RX ORDER — DIPHENHYDRAMINE HYDROCHLORIDE 25 MG/1
CAPSULE ORAL
Qty: 90 CAPSULE | Refills: 1 | Status: SHIPPED | OUTPATIENT
Start: 2022-10-25 | End: 2022-11-23 | Stop reason: SDUPTHER

## 2022-11-02 ENCOUNTER — TELEPHONE (OUTPATIENT)
Dept: FAMILY MEDICINE CLINIC | Facility: CLINIC | Age: 37
End: 2022-11-02

## 2022-11-02 NOTE — TELEPHONE ENCOUNTER
PATIENT HAS CALLED ASKING FOR A ONE TIME MEDICATION IV FILL UNTIL SHE GETS INTO HER DR. SAYS SHE IS HAVING BLOOD IN STOOL AND VOMITING BLOOD WAS ADVISED TO GO TO ER SAID THEY JUST GIVE HER FLUIDS AND SEND HER HOME SAID SHE REALLY NEEDS HER IV

## 2022-11-02 NOTE — TELEPHONE ENCOUNTER
PT HAS NOT BEEN SEEN IN OFFICE IN OVER A YEAR, I DID LET HER KNOW SHE WOULD NEED AN APPT AND PT REFUSED AND SAID SHE WAS TO SICK TO COME IN. I ADVISED THE PT IF SHE IS THAT SICK THEN SHE NEEDS TO GO TO THE ER. PT SAID THEY WOULD ONLY GIVE HER FLUIDS AND SEND HER HOME. I ASKED THE PT WHY SHE IS NOT GOING TO HER GI DR FOR THE IV MEDS WHO ORIGINALLY STARTED IT AND SHE SAID THEY DROPPED HER DUE TO MISSING APPTS AND NOT COMING IN. I ADVISED THE PT I WAS NOT SURE IF YOU WOULD CALL THIS IN BECAUSE SHE NEEDS TO BE SEEN IN OFFICE DUE TO NOT BEING SEEN IN OVER A YEAR.     PLEASE ADVISE.

## 2022-11-02 NOTE — TELEPHONE ENCOUNTER
PATIENT HAS CALLED REQUESTING IF DR. DE LA VEGA IS ABLE TO SEND PRESCRIPTION FOR A ONE TIME DOSE OF HER IV MEDICATION. PATIENT IS NOT ABLE TO GET IN TO SEE HER GI DOCTOR FOR A FEW WEEKS AND PATIENT IS NEEDING IV MEDICATION NOW.  PATIENT IS REQUESTING A CALL BACK EITHER WAY ASAP TO LET HER KNOW IF REQUEST CAN BE CALLED IN.    CALL BACK NUMBER [phone]

## 2022-11-07 DIAGNOSIS — G43.101 MIGRAINE WITH AURA AND WITH STATUS MIGRAINOSUS, NOT INTRACTABLE: ICD-10-CM

## 2022-11-07 RX ORDER — TOPIRAMATE 50 MG/1
TABLET, FILM COATED ORAL
Qty: 60 TABLET | Refills: 2 | Status: SHIPPED | OUTPATIENT
Start: 2022-11-07 | End: 2023-03-27 | Stop reason: SDUPTHER

## 2022-11-23 ENCOUNTER — OFFICE VISIT (OUTPATIENT)
Dept: FAMILY MEDICINE CLINIC | Facility: CLINIC | Age: 37
End: 2022-11-23

## 2022-11-23 VITALS
HEIGHT: 63 IN | HEART RATE: 88 BPM | BODY MASS INDEX: 35.44 KG/M2 | WEIGHT: 200 LBS | OXYGEN SATURATION: 98 % | TEMPERATURE: 98.2 F | DIASTOLIC BLOOD PRESSURE: 70 MMHG | SYSTOLIC BLOOD PRESSURE: 114 MMHG

## 2022-11-23 DIAGNOSIS — Z51.81 ENCOUNTER FOR THERAPEUTIC DRUG MONITORING: Primary | ICD-10-CM

## 2022-11-23 DIAGNOSIS — F41.8 DEPRESSION WITH ANXIETY: ICD-10-CM

## 2022-11-23 DIAGNOSIS — K51.90 ULCERATIVE COLITIS WITHOUT COMPLICATIONS, UNSPECIFIED LOCATION: ICD-10-CM

## 2022-11-23 DIAGNOSIS — E11.9 TYPE 2 DIABETES MELLITUS TREATED WITH INSULIN: ICD-10-CM

## 2022-11-23 DIAGNOSIS — E11.44 DIABETIC AMYOTROPHY ASSOCIATED WITH TYPE 2 DIABETES MELLITUS: ICD-10-CM

## 2022-11-23 DIAGNOSIS — Z79.4 TYPE 2 DIABETES MELLITUS TREATED WITH INSULIN: ICD-10-CM

## 2022-11-23 DIAGNOSIS — Z91.14 NONCOMPLIANCE WITH MEDICATIONS: ICD-10-CM

## 2022-11-23 DIAGNOSIS — K21.9 GASTROESOPHAGEAL REFLUX DISEASE WITHOUT ESOPHAGITIS: Chronic | ICD-10-CM

## 2022-11-23 LAB
EXPIRATION DATE: NORMAL
HBA1C MFR BLD: 11.5 %
Lab: NORMAL

## 2022-11-23 PROCEDURE — 99214 OFFICE O/P EST MOD 30 MIN: CPT | Performed by: NURSE PRACTITIONER

## 2022-11-23 PROCEDURE — 3046F HEMOGLOBIN A1C LEVEL >9.0%: CPT | Performed by: NURSE PRACTITIONER

## 2022-11-23 PROCEDURE — 83036 HEMOGLOBIN GLYCOSYLATED A1C: CPT | Performed by: NURSE PRACTITIONER

## 2022-11-23 RX ORDER — PROCHLORPERAZINE 25 MG/1
1 SUPPOSITORY RECTAL SEE ADMIN INSTRUCTIONS
Qty: 1 EACH | Refills: 1 | Status: SHIPPED | OUTPATIENT
Start: 2022-11-23 | End: 2023-03-27 | Stop reason: SDUPTHER

## 2022-11-23 RX ORDER — DIPHENHYDRAMINE HCL 25 MG
CAPSULE ORAL
Qty: 90 CAPSULE | Refills: 1 | Status: SHIPPED | OUTPATIENT
Start: 2022-11-23 | End: 2023-03-27 | Stop reason: SDUPTHER

## 2022-11-23 RX ORDER — GABAPENTIN 800 MG/1
800 TABLET ORAL 3 TIMES DAILY
Qty: 90 TABLET | Refills: 2 | Status: SHIPPED | OUTPATIENT
Start: 2022-11-23 | End: 2023-01-27 | Stop reason: SDUPTHER

## 2022-11-23 RX ORDER — HYDROXYZINE PAMOATE 50 MG/1
50 CAPSULE ORAL 3 TIMES DAILY PRN
Qty: 90 CAPSULE | Refills: 1 | Status: SHIPPED | OUTPATIENT
Start: 2022-11-23 | End: 2023-01-17

## 2022-11-23 RX ORDER — PROMETHAZINE HYDROCHLORIDE 25 MG/1
25 TABLET ORAL EVERY 6 HOURS PRN
Qty: 30 TABLET | Refills: 2 | Status: SHIPPED | OUTPATIENT
Start: 2022-11-23

## 2022-11-23 NOTE — PROGRESS NOTES
"                      Established Patient        Chief Complaint:   Chief Complaint   Patient presents with   • Diabetes     Med refills         History of Present Illness:    Thais Hobson is a 37 y.o. female who presents today for refills of chronic medications. Patient has history of Type 2 DM, GERD, depression, Bipolar disorder, PTSD, RLS, migraines. Patient reports no acute concerns at this time.   T2DM--patient needs refill on Dexcom G6 transmitter. Currently uses Levemir 15units nightly and tolerating well. Has been noncompliant with medications in the past. Does not follow low carb diet. A1C checked during visit today  RLS and neuropathy--Gabapentin 800mg TID, tolerating well  Anxiety--Hydroxyzine 50mg TID PRN as well as Effexor XR 75mg daily, tolerating well    Subjective     The following portions of the patient's history were reviewed and updated as appropriate: allergies, current medications, past family history, past medical history, past social history, past surgical history and problem list.    ALLERGIES  Allergies   Allergen Reactions   • Hydrochlorothiazide Hives   • Penicillins Hives   • Remicade [Infliximab] Anaphylaxis   • Soybean-Containing Drug Products Swelling     \"Soy sauce\"   • Xeljanz [Tofacitinib Citrate] Other (See Comments)     BLISTERS IN THROAT & ON ARMS   • Zofran [Ondansetron Hcl] Headache     migraines   • Ondansetron Mental Status Change   • Fidaxomicin Rash   • Reglan [Metoclopramide] Other (See Comments)     States feels weird when takes it       ROS  Review of Systems  1. Constitutional: Negative for fever. Negative for chills, diaphoresis, fatigue and unexpected weight change.   2. HENT: No dysphagia; no changes to vision/hearing/smell/taste; no epistaxis  3. Eyes: Negative for redness and visual disturbance.   4. Respiratory: negative for shortness of breath. Negative for chest pain . Negative for cough and chest tightness.   5. Cardiovascular: Negative for chest pain " "and palpitations.   6. Gastrointestinal: Negative for abdominal distention, abdominal pain and blood in stool.   7. Endocrine: Negative for cold intolerance and heat intolerance.   8. Genitourinary: Negative for difficulty urinating, dysuria and frequency.   9. Musculoskeletal: Negative for arthralgias, back pain and myalgias.   10. Skin: Negative for color change, rash and wound.   11. Neurological: Negative for syncope, weakness and headaches.   12. Hematological: Negative for adenopathy. Does not bruise/bleed easily.   13. Psychiatric/Behavioral: Negative for confusion. The patient is not nervous/anxious.    Objective     Vital Signs:   /70   Pulse 88   Temp 98.2 °F (36.8 °C)   Ht 160 cm (63\")   Wt 90.7 kg (200 lb)   LMP  (LMP Unknown)   SpO2 98%   BMI 35.43 kg/m²       Physical Exam   Physical Exam  General Appearance: alert, oriented x 3, no acute distress.  Skin: warm and dry.   HEENT: Atraumatic.  pupils round and reactive to light and accommodation, oral mucosa pink and moist.  Nares patent without epistaxis.  External auditory canals are patent tympanic membranes intact.  Neck: supple, no JVD, trachea midline.  No thyromegaly  Lungs: CTA, unlabored breathing effort.  Heart: RRR, normal S1 and S2, no S3, no rub.  Abdomen: soft, non-tender, no palpable bladder, present bowel sounds to auscultation ×4.  No guarding or rigidity.  Extremities: no clubbing, cyanosis or edema.  Good range of motion actively and passively.  Symmetric muscle strength and development  Neuro: normal speech and mental status.  Cranial nerves II through XII intact.  No anosmia. DTR 2+; proprioception intact.  No focal motor/sensory deficits.        Lab 11/23/22  0858   HEMOGLOBIN A1C 11.5       Assessment and Plan      Assessment/Plan:   Diagnoses and all orders for this visit:    1. Encounter for therapeutic drug monitoring (Primary)  -     Compliance Drug Analysis, Ur - Urine, Clean Catch    2. Depression with anxiety  -  "    hydrOXYzine pamoate (VISTARIL) 50 MG capsule; Take 1 capsule by mouth 3 (Three) Times a Day As Needed for Anxiety.  Dispense: 90 capsule; Refill: 1    3. 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)  -     gabapentin (NEURONTIN) 800 MG tablet; Take 1 tablet by mouth 3 (Three) Times a Day.  Dispense: 90 tablet; Refill: 2    4. Gastroesophageal reflux disease without esophagitis  -     promethazine (PHENERGAN) 25 MG tablet; Take 1 tablet by mouth Every 6 (Six) Hours As Needed for Nausea or Vomiting.  Dispense: 30 tablet; Refill: 2    5. Ulcerative colitis without complications, unspecified location (HCC)  -     promethazine (PHENERGAN) 25 MG tablet; Take 1 tablet by mouth Every 6 (Six) Hours As Needed for Nausea or Vomiting.  Dispense: 30 tablet; Refill: 2    6. Type 2 diabetes mellitus treated with insulin ([facility])  -     Continuous Blood Gluc Transmit (Dexcom G6 Transmitter) misc; Inject 1 each under the skin into the appropriate area as directed See Admin Instructions.  Dispense: 1 each; Refill: 1  -     POC Glycosylated Hemoglobin (Hb A1C)    7. Noncompliance with medications    Other orders  -     diphenhydrAMINE (Banophen) 25 mg capsule; Take 1 capsule by mouth every 8 hours as needed for itching or allergies  Dispense: 90 capsule; Refill: 1      --patient to follow up with Dr. Sanchez to discuss diabetic management    Discussion Summary:  Discussed plan of care in detail with pt today; pt verb understanding and agrees.    Follow up:  No follow-ups on file.     Patient Education:  There are no Patient Instructions on file for this visit.    EMELYN Skinner  11/28/22  17:37 EST          Please note that portions of this note may have been completed with a voice recognition program.

## 2022-11-24 DIAGNOSIS — E11.9 TYPE 2 DIABETES MELLITUS TREATED WITH INSULIN: ICD-10-CM

## 2022-11-24 DIAGNOSIS — Z79.4 TYPE 2 DIABETES MELLITUS TREATED WITH INSULIN: ICD-10-CM

## 2022-11-25 DIAGNOSIS — E11.9 TYPE 2 DIABETES MELLITUS TREATED WITH INSULIN: ICD-10-CM

## 2022-11-25 DIAGNOSIS — Z79.4 TYPE 2 DIABETES MELLITUS TREATED WITH INSULIN: ICD-10-CM

## 2022-11-28 RX ORDER — PROCHLORPERAZINE 25 MG/1
SUPPOSITORY RECTAL
Qty: 1 EACH | Refills: 1 | OUTPATIENT
Start: 2022-11-28

## 2022-11-28 RX ORDER — PROCHLORPERAZINE 25 MG/1
SUPPOSITORY RECTAL
Qty: 3 EACH | Refills: 11 | Status: SHIPPED | OUTPATIENT
Start: 2022-11-28

## 2022-11-28 NOTE — TELEPHONE ENCOUNTER
PATIENT CALLED AGAIN ASKING IF PCP CAN SEND IN THE IV MEDICATION. SHE SAYS THAT WHEN SHE ORIGINALLY CALLED THE BEGINNING OF NOV, SHE WAS TOLD THAT SHE NEEDED TO BE SEEN. SHE CAME IN ON 11/23 AND SAW DANNY (FOR MEDICATION REFILLS), BUT SAYS SHE FORGOT TO ASK ABOUT THE IV MEDICATION. SHE HAS AN APPT WITH THE GI DOCTOR THIS Wednesday IN [place] ON 11/30/22, BUT SAYS SHE DOESN'T FEEL WELL ENOUGH TO GET TO HER APPT OR EVEN GET OUT OF THE HOUSE UNTIL SHE GETS THE IV. I ENCOURAGED HER TO MAKE IT TO THE GI APPT. I WILL ALSO ROUTE TO DANNY FRIED SHE RECENTLY SAW PATIENT.

## 2022-11-30 NOTE — TELEPHONE ENCOUNTER
I spoke with Dr. Sanchez, who also advised that she needs to keep appt with GI doctor and have the IV done through them. She said she had to reschedule the appointment with them (today?) since she wasn't feeling well and just wanted him to give her a 1 time dose. I reiterated that he would like her to be seen and evaluated by them.

## 2022-12-02 DIAGNOSIS — Z79.4 TYPE 2 DIABETES MELLITUS TREATED WITH INSULIN: ICD-10-CM

## 2022-12-02 DIAGNOSIS — E11.9 TYPE 2 DIABETES MELLITUS TREATED WITH INSULIN: ICD-10-CM

## 2022-12-02 RX ORDER — INSULIN DETEMIR 100 [IU]/ML
INJECTION, SOLUTION SUBCUTANEOUS
Qty: 15 ML | Refills: 3 | Status: SHIPPED | OUTPATIENT
Start: 2022-12-02 | End: 2023-03-27 | Stop reason: SDUPTHER

## 2022-12-03 LAB — DRUGS UR: NORMAL

## 2022-12-05 ENCOUNTER — TELEPHONE (OUTPATIENT)
Dept: SURGERY | Facility: CLINIC | Age: 37
End: 2022-12-05

## 2022-12-24 DIAGNOSIS — F41.0 PANIC DISORDER: ICD-10-CM

## 2022-12-24 DIAGNOSIS — F41.8 DEPRESSION WITH ANXIETY: ICD-10-CM

## 2022-12-27 RX ORDER — VENLAFAXINE HYDROCHLORIDE 75 MG/1
75 CAPSULE, EXTENDED RELEASE ORAL DAILY
Qty: 90 CAPSULE | Refills: 1 | Status: SHIPPED | OUTPATIENT
Start: 2022-12-27 | End: 2023-03-27 | Stop reason: SDUPTHER

## 2022-12-27 RX ORDER — VENLAFAXINE HYDROCHLORIDE 37.5 MG/1
37.5 CAPSULE, EXTENDED RELEASE ORAL DAILY
Qty: 90 CAPSULE | Refills: 1 | Status: SHIPPED | OUTPATIENT
Start: 2022-12-27 | End: 2023-03-27 | Stop reason: SDUPTHER

## 2022-12-28 ENCOUNTER — TELEPHONE (OUTPATIENT)
Dept: FAMILY MEDICINE CLINIC | Facility: CLINIC | Age: 37
End: 2022-12-28

## 2022-12-28 NOTE — TELEPHONE ENCOUNTER
Caller: Thais Hobson    Relationship: Self    Best call back number: [phone]    What is the best time to reach you: ANYTIME    Who are you requesting to speak with (clinical staff, provider,  specific staff member):   PROVIDER     What is call regarding: PATIENT STATES THAT SHE NEEDS HER ENTYVIO INFUSION AND WOULD LIKE TO DISCUSS    Do you require a callback: YES

## 2023-01-14 DIAGNOSIS — F41.8 DEPRESSION WITH ANXIETY: ICD-10-CM

## 2023-01-17 RX ORDER — HYDROXYZINE PAMOATE 50 MG/1
CAPSULE ORAL
Qty: 90 CAPSULE | Refills: 1 | Status: SHIPPED | OUTPATIENT
Start: 2023-01-17 | End: 2023-03-27 | Stop reason: SDUPTHER

## 2023-01-27 ENCOUNTER — TELEMEDICINE (OUTPATIENT)
Dept: FAMILY MEDICINE CLINIC | Facility: CLINIC | Age: 38
End: 2023-01-27
Payer: COMMERCIAL

## 2023-01-27 DIAGNOSIS — Z87.19 HISTORY OF ULCERATIVE COLITIS: Primary | ICD-10-CM

## 2023-01-27 DIAGNOSIS — K51.011 ULCERATIVE PANCOLITIS WITH RECTAL BLEEDING: ICD-10-CM

## 2023-01-27 DIAGNOSIS — E11.44 DIABETIC AMYOTROPHY ASSOCIATED WITH TYPE 2 DIABETES MELLITUS: ICD-10-CM

## 2023-01-27 PROCEDURE — 99213 OFFICE O/P EST LOW 20 MIN: CPT | Performed by: NURSE PRACTITIONER

## 2023-01-27 RX ORDER — GABAPENTIN 800 MG/1
800 TABLET ORAL 3 TIMES DAILY
Qty: 90 TABLET | Refills: 2 | Status: SHIPPED | OUTPATIENT
Start: 2023-01-27

## 2023-01-27 NOTE — PROGRESS NOTES
"                      Established Patient        Chief Complaint: No chief complaint on file.    You have chosen to receive care through a telephone visit. Do you consent to use a telephone visit for your medical care today? YES      History of Present Illness:    Thais Hobson is a 37 y.o. female who presents today via Zoom telehealth visit for complaints of bloody stool and history of Ulcerative Colitis.   At time of encounter today, patient is located in her home and provider is located in [facility] in [place], [place]. Patient was previously a patient of Dr. He, Gastroenterology and was on Entivio treatment for UC. Has not been on it for about 2 months. Patient has been seen by a GI provider in [place] but would like to switch back to Dr. He due to location. Patient has received 1 treatment prescribed by Dr. Sanchez but was told that she needs to establish with GI for future treatments.     Subjective     The following portions of the patient's history were reviewed and updated as appropriate: allergies, current medications, past family history, past medical history, past social history, past surgical history and problem list.    ALLERGIES  Allergies   Allergen Reactions   • Hydrochlorothiazide Hives   • Penicillins Hives   • Remicade [Infliximab] Anaphylaxis   • Soybean-Containing Drug Products Swelling     \"Soy sauce\"   • Xeljanz [Tofacitinib Citrate] Other (See Comments)     BLISTERS IN THROAT & ON ARMS   • Zofran [Ondansetron Hcl] Headache     migraines   • Ondansetron Mental Status Change   • Fidaxomicin Rash   • Reglan [Metoclopramide] Other (See Comments)     States feels weird when takes it       ROS  Review of Systems   Constitutional: Negative for fatigue and fever.   Gastrointestinal: Positive for abdominal distention, abdominal pain, blood in stool, diarrhea, nausea and vomiting.   All other systems reviewed and are negative.      Objective     Vital Signs:   LMP  (LMP Unknown) "     Physical Exam   Physical Exam  Vitals and nursing note reviewed.   Pulmonary:      Effort: Pulmonary effort is normal.   Neurological:      Mental Status: She is alert and oriented to person, place, and time. Mental status is at baseline.   Psychiatric:         Mood and Affect: Mood normal.         Behavior: Behavior normal.       Assessment and Plan      Assessment/Plan:   Diagnoses and all orders for this visit:    1. History of ulcerative colitis (Primary)  -     Ambulatory Referral to Gastroenterology    2. Ulcerative pancolitis with rectal bleeding (HCC)  -     Ambulatory Referral to Gastroenterology    3. 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)  -     gabapentin (NEURONTIN) 800 MG tablet; Take 1 tablet by mouth 3 (Three) Times a Day.  Dispense: 90 tablet; Refill: 2      Discussion Summary:  Discussed plan of care in detail with pt today; pt verb understanding and agrees.    This was an audio and video enabled telemedicine encounter.    Follow up:  Return if symptoms worsen or fail to improve.     Patient Education:  There are no Patient Instructions on file for this visit.    EMELYN Skinner  01/27/23  14:51 EST          Please note that portions of this note may have been completed with a voice recognition program.

## 2023-02-02 ENCOUNTER — TELEMEDICINE (OUTPATIENT)
Dept: FAMILY MEDICINE CLINIC | Facility: CLINIC | Age: 38
End: 2023-02-02
Payer: COMMERCIAL

## 2023-02-02 DIAGNOSIS — R10.84 GENERALIZED ABDOMINAL PAIN: ICD-10-CM

## 2023-02-02 DIAGNOSIS — K51.011 ULCERATIVE PANCOLITIS WITH RECTAL BLEEDING: Primary | ICD-10-CM

## 2023-02-02 PROCEDURE — 99213 OFFICE O/P EST LOW 20 MIN: CPT | Performed by: NURSE PRACTITIONER

## 2023-02-02 RX ORDER — HYDROCODONE BITARTRATE AND ACETAMINOPHEN 5; 325 MG/1; MG/1
1 TABLET ORAL EVERY 6 HOURS PRN
Qty: 20 TABLET | Refills: 0 | Status: SHIPPED | OUTPATIENT
Start: 2023-02-02 | End: 2023-03-27

## 2023-02-02 RX ORDER — PREDNISONE 10 MG/1
TABLET ORAL
Qty: 15 TABLET | Refills: 0 | Status: SHIPPED | OUTPATIENT
Start: 2023-02-02 | End: 2023-03-27

## 2023-02-02 NOTE — PROGRESS NOTES
"                      Established Patient        Chief Complaint:   Chief Complaint   Patient presents with   • Ulcerative Colitis     Last seen 1/27/23 for flare of UC...has been to ED since last visit; increased pain     You have chosen to receive care through a telephone visit. Do you consent to use a telephone visit for your medical care today? YES      History of Present Illness:    Thais Hobson is a 37 y.o. female who presents today via zoom telehealth encounter for complaints of abdominal pain, bloody stool. At time of visit, patient is located within her home and provider is located in [facility] clinic. Patient has a history of UC. Patient last seen via telehealth on 1/27 for complaints of UC flare. Patient reports increased pain since last encounter. Has appointment with GI on 2/22/23. Reports pain is currently 10/10 consistently. Patient tearful at time of encounter today.       Subjective     The following portions of the patient's history were reviewed and updated as appropriate: allergies, current medications, past family history, past medical history, past social history, past surgical history and problem list.    ALLERGIES  Allergies   Allergen Reactions   • Hydrochlorothiazide Hives   • Penicillins Hives   • Remicade [Infliximab] Anaphylaxis   • Soybean-Containing Drug Products Swelling     \"Soy sauce\"   • Xeljanz [Tofacitinib Citrate] Other (See Comments)     BLISTERS IN THROAT & ON ARMS   • Zofran [Ondansetron Hcl] Headache     migraines   • Ondansetron Mental Status Change   • Fidaxomicin Rash   • Reglan [Metoclopramide] Other (See Comments)     States feels weird when takes it       ROS  Review of Systems   Constitutional: Negative for fever.   Gastrointestinal: Positive for abdominal distention, abdominal pain, blood in stool and diarrhea. Negative for constipation and nausea.   Psychiatric/Behavioral: Positive for agitation and sleep disturbance. Negative for suicidal ideas. "       Objective     Vital Signs:   LMP  (LMP Unknown)     Physical Exam   Physical Exam  Vitals and nursing note reviewed.   Constitutional:       Appearance: She is obese. She is ill-appearing.   Pulmonary:      Effort: Pulmonary effort is normal.   Neurological:      Mental Status: She is alert and oriented to person, place, and time. Mental status is at baseline.   Psychiatric:         Mood and Affect: Mood is depressed. Affect is tearful.         Behavior: Behavior normal.         Assessment and Plan      Assessment/Plan:   Diagnoses and all orders for this visit:    1. Ulcerative pancolitis with rectal bleeding (HCC) (Primary)  -     predniSONE (DELTASONE) 10 MG tablet; Take 5 tablets today, decrease dose by 1 tablet each day until gone. 5,4,3,2,1  Dispense: 15 tablet; Refill: 0  -     HYDROcodone-acetaminophen (NORCO) 5-325 MG per tablet; Take 1 tablet by mouth Every 6 (Six) Hours As Needed for Moderate Pain or Severe Pain.  Dispense: 20 tablet; Refill: 0    2. Generalized abdominal pain  -     HYDROcodone-acetaminophen (NORCO) 5-325 MG per tablet; Take 1 tablet by mouth Every 6 (Six) Hours As Needed for Moderate Pain or Severe Pain.  Dispense: 20 tablet; Refill: 0        Discussion Summary:  Discussed plan of care in detail with pt today; pt verb understanding and agrees.    I spent 20 minutes caring for Thais on this date of service. This time includes time spent by me in the following activities:preparing for the visit, reviewing tests, obtaining and/or reviewing a separately obtained history, performing a medically appropriate examination and/or evaluation , counseling and educating the patient/family/caregiver, ordering medications, tests, or procedures, documenting information in the medical record, independently interpreting results and communicating that information with the patient/family/caregiver and care coordination    Follow up:  Return for Next scheduled follow up.     Patient  Education:  Patient Instructions   --patient to present to ED for worsening symptoms      EMELYN Skinner  02/16/23  11:05 EST    This was an audio and video enabled telemedicine encounter.        Please note that portions of this note may have been completed with a voice recognition program.

## 2023-03-10 ENCOUNTER — TELEPHONE (OUTPATIENT)
Dept: FAMILY MEDICINE CLINIC | Facility: CLINIC | Age: 38
End: 2023-03-10

## 2023-03-10 NOTE — TELEPHONE ENCOUNTER
Patient has spoke to the office stating that she is unable to come into the office for hospital visit due to having vehicle problems.    Patient is requesting a telehealth/ mychart visit.    ** Patient originally has appointment schedule for 3/13/2023     Verified current phone number and pharmacy on file for patient.

## 2023-03-23 ENCOUNTER — TELEPHONE (OUTPATIENT)
Dept: FAMILY MEDICINE CLINIC | Facility: CLINIC | Age: 38
End: 2023-03-23
Payer: COMMERCIAL

## 2023-03-23 DIAGNOSIS — F41.0 PANIC DISORDER: ICD-10-CM

## 2023-03-23 DIAGNOSIS — Z79.4 TYPE 2 DIABETES MELLITUS TREATED WITH INSULIN: ICD-10-CM

## 2023-03-23 DIAGNOSIS — G43.101 MIGRAINE WITH AURA AND WITH STATUS MIGRAINOSUS, NOT INTRACTABLE: ICD-10-CM

## 2023-03-23 DIAGNOSIS — E11.9 TYPE 2 DIABETES MELLITUS TREATED WITH INSULIN: ICD-10-CM

## 2023-03-23 DIAGNOSIS — F41.8 DEPRESSION WITH ANXIETY: ICD-10-CM

## 2023-03-23 NOTE — TELEPHONE ENCOUNTER
Pharmacy Name:  [facility]    Pharmacy representative name: [facility]    Pharmacy representative phone number: [phone]    What medication are you calling in regards to: THERE ARE 12 OF THEM    What question does the pharmacy have: [facility] STATES THAT THEY ARE THE NEW PHARAMCY FOR THE PATIENT AND A FAX WAS SENT REQUESTING REFILLS AND WANTED TO KNOW IF IT WAS RECEIVED.  THEY DO ACCEPT ESCRIBE.    Who is the provider that prescribed the medication: NOE

## 2023-03-27 ENCOUNTER — OFFICE VISIT (OUTPATIENT)
Dept: GASTROENTEROLOGY | Facility: CLINIC | Age: 38
End: 2023-03-27
Payer: COMMERCIAL

## 2023-03-27 VITALS
HEART RATE: 127 BPM | HEIGHT: 63 IN | WEIGHT: 186 LBS | TEMPERATURE: 98 F | DIASTOLIC BLOOD PRESSURE: 93 MMHG | BODY MASS INDEX: 32.96 KG/M2 | SYSTOLIC BLOOD PRESSURE: 112 MMHG

## 2023-03-27 DIAGNOSIS — K51.00 ULCERATIVE PANCOLITIS: Primary | ICD-10-CM

## 2023-03-27 DIAGNOSIS — Z86.19 HISTORY OF CLOSTRIDIOIDES DIFFICILE COLITIS: ICD-10-CM

## 2023-03-27 DIAGNOSIS — K76.0 NONALCOHOLIC FATTY LIVER DISEASE: ICD-10-CM

## 2023-03-27 DIAGNOSIS — Z87.11 HISTORY OF PEPTIC ULCER DISEASE: ICD-10-CM

## 2023-03-27 DIAGNOSIS — Z79.4 TYPE 2 DIABETES MELLITUS TREATED WITH INSULIN: Primary | ICD-10-CM

## 2023-03-27 DIAGNOSIS — R11.0 NAUSEA: ICD-10-CM

## 2023-03-27 DIAGNOSIS — E11.9 TYPE 2 DIABETES MELLITUS TREATED WITH INSULIN: Primary | ICD-10-CM

## 2023-03-27 PROCEDURE — 99214 OFFICE O/P EST MOD 30 MIN: CPT | Performed by: INTERNAL MEDICINE

## 2023-03-27 PROCEDURE — 1159F MED LIST DOCD IN RCRD: CPT | Performed by: INTERNAL MEDICINE

## 2023-03-27 PROCEDURE — 1160F RVW MEDS BY RX/DR IN RCRD: CPT | Performed by: INTERNAL MEDICINE

## 2023-03-27 RX ORDER — VENLAFAXINE HYDROCHLORIDE 37.5 MG/1
37.5 CAPSULE, EXTENDED RELEASE ORAL DAILY
Qty: 90 CAPSULE | Refills: 1 | Status: SHIPPED | OUTPATIENT
Start: 2023-03-27 | End: 2023-03-27

## 2023-03-27 RX ORDER — HYDROXYZINE PAMOATE 50 MG/1
50 CAPSULE ORAL 3 TIMES DAILY PRN
Qty: 90 CAPSULE | Refills: 1 | Status: SHIPPED | OUTPATIENT
Start: 2023-03-27

## 2023-03-27 RX ORDER — DIPHENHYDRAMINE HCL 25 MG
CAPSULE ORAL
Qty: 90 CAPSULE | Refills: 1 | Status: SHIPPED | OUTPATIENT
Start: 2023-03-27

## 2023-03-27 RX ORDER — UPADACITINIB 45 MG/1
45 TABLET, EXTENDED RELEASE ORAL DAILY
Qty: 56 TABLET | Refills: 0 | Status: SHIPPED | OUTPATIENT
Start: 2023-03-27

## 2023-03-27 RX ORDER — PEN NEEDLE, DIABETIC 31 GX5/16"
1 NEEDLE, DISPOSABLE MISCELLANEOUS 3 TIMES DAILY
Qty: 200 EACH | Refills: 1 | Status: SHIPPED | OUTPATIENT
Start: 2023-03-27

## 2023-03-27 RX ORDER — PREDNISONE 10 MG/1
TABLET ORAL
Qty: 74 TABLET | Refills: 0 | Status: SHIPPED | OUTPATIENT
Start: 2023-03-27

## 2023-03-27 RX ORDER — INSULIN DETEMIR 100 [IU]/ML
15 INJECTION, SOLUTION SUBCUTANEOUS NIGHTLY
Qty: 15 ML | Refills: 3 | Status: SHIPPED | OUTPATIENT
Start: 2023-03-27 | End: 2023-04-06

## 2023-03-27 RX ORDER — VENLAFAXINE HYDROCHLORIDE 75 MG/1
75 CAPSULE, EXTENDED RELEASE ORAL DAILY
Qty: 90 CAPSULE | Refills: 1 | Status: SHIPPED | OUTPATIENT
Start: 2023-03-27 | End: 2023-03-27

## 2023-03-27 RX ORDER — ACETAMINOPHEN 500 MG
1000 TABLET ORAL AS NEEDED
COMMUNITY

## 2023-03-27 RX ORDER — TOPIRAMATE 50 MG/1
50 TABLET, FILM COATED ORAL 2 TIMES DAILY
Qty: 60 TABLET | Refills: 2 | Status: SHIPPED | OUTPATIENT
Start: 2023-03-27 | End: 2023-04-06

## 2023-03-27 RX ORDER — INSULIN ASPART 100 [IU]/ML
12 INJECTION, SOLUTION INTRAVENOUS; SUBCUTANEOUS
Qty: 10 ML | Refills: 3 | Status: SHIPPED | OUTPATIENT
Start: 2023-03-27

## 2023-03-27 RX ORDER — PROCHLORPERAZINE 25 MG/1
1 SUPPOSITORY RECTAL SEE ADMIN INSTRUCTIONS
Qty: 1 EACH | Refills: 1 | Status: SHIPPED | OUTPATIENT
Start: 2023-03-27

## 2023-03-27 NOTE — PROGRESS NOTES
New Patient Consult      Date: 2023   Patient Name: Thais Hobson  MRN: [id number]  : 1985     Referring Physician: Hannah Marinelli APRN    Chief Complaint   Patient presents with   • Ulcerative Colitis   • duodenal ulcer     Was being seen for in [facility]       History of Present Illness: Thais Hobson is a 37 y.o. female who is here today to establish care with Gastroenterology for ulcerative colitis.   Patient was last seen in office here in 2020 since then she started following Dr. Sainz's office at [facility].   She states that no changes to her madications made during her follow-up there.  She was been on Entyvio infusion with Imuran.  However she did not follow-up with them there for a while now and she is not been taking her Entyvio for the past 4 months.  She is having significant diarrheal episodes now mixed with blood.  She was admitted in 2023 at [facility] with a similar symptoms.  Her stool studies were negative.  CT abdomen pelvis done revealed diffuse colonic wall thickening in the left side colon.  She had an EGD done on 2022 by Dr. Sainz that revealed nonbleeding duodenal ulcer.  She denies any nausea vomiting.  No fever chills.  She is currently taking steroid pills low-dose given at the emergency room at [facility] recently       2020  This is a 35-year-old female patient with a prior history of ulcerative pan colitis, known to me was sent from primary care physician's office for worsening abdominal pain, nausea and diarrhea.   She was diagnosed with IBD about 12 years ago.  He was followed by Dr. Langston at that time subsequently she changed her physician and started following at [facility] Dr. Cuevas.      She had multiple treatment regimens in the past with various reactions.  He initially states she was treated with mesalamine which caused her a severe reaction to the bronchospasm as per patient.  Subsequently  she had a Remicade that also caused a reaction.  She was put on Humira which did not help her much and was.  She was also put on the Stelara which caused the throat swelling.  She was on vedolizumab for the last 3 years.  She continued to have a multiple flareups and recent colonoscopy revealed a significant colitis despite on Entyvio for which Entyvio was discontinued.  She was started on Xeljanz recently however she again developed multiple blistering lesions in the oral cavity face and the hands for which Zosyn was discontinued.  Currently patient is not on any medication for the past 3 to 4 weeks time.   She had a few episodes of C. difficile colitis last year in 2019 and 18.   Subjective      Past Medical History:   Past Medical History:   Diagnosis Date   • Abdominal pain     periumbilicul   • Abdominal tenderness     Periumbil   • Alopecia    • Anemia    • Anxiety    • Arthritis    • Asthma     as a child   • Back pain    • Bipolar disorder (HCC)    • Blood in stool    • Body piercing     ears   • Clotting disorder (HCC)    • Colitis    • Colitis    • Colon polyps    • Depression    • Diabetes mellitus (HCC)    • Diarrhea    • Dysphagia     Patient reported he has trouble from time to time and that her throat has to be dilated   • Elevated cholesterol     Reported slightly elevated - taking no medication    • Fractures     Right pinky toe - no surgery required   • GERD (gastroesophageal reflux disease)    • H/O colonoscopy 08/01/2013    Terminal ileal biopsies negative. Cecal&ascending biopsie revealed chronic active colitis w/ features cons. w/ inflammatory bowel disease. Transevere colon biopsies revealved chronic active colitis. Desc. colon biopsies revealed chronic active colitis. Rect colon biopsie revealed chronic active colitis. Negative for dysplasia   • Hematemesis    • Hernia 2017   • History of Clostridium difficile infection    • History of transfusion     No reaction to transfusion reported   •  Migraine headache    • Nausea     alone   • Pancreatitis    • Restless leg    • Seizures (HCC)     pt reports x1    • Tattoos     x9   • Ulcer    • Ulcerative colitis (HCC)    • Ulcerative pancolitis with rectal bleeding (HCC) 10/01/2021   • Universal ulcerative colitis (HCC)    • UTI (urinary tract infection)    • Weight loss        Past Surgical History:   Past Surgical History:   Procedure Laterality Date   • ABDOMINAL SURGERY     • BREAST SURGERY Left     breast lump removed benign   •  SECTION     • CHOLECYSTECTOMY      for stone disease   • COLONOSCOPY     • COLONOSCOPY N/A 2020    Procedure: COLONOSCOPY;  Surgeon: Alonzo He MD;  Location: [facility] ENDOSCOPY;  Service: Gastroenterology;  Laterality: N/A;   • COLONOSCOPY N/A 2021    Procedure: COLONOSCOPY;  Surgeon: Francisco Sainz MD;  Location:  [facility];  Service: Gastroenterology;  Laterality: N/A;   • DILATATION AND CURETTAGE     • ENDOSCOPY  2013    Erosive esophagitis, grade 2; erosive gastritis; small sliding hiatal hernia less than 3 cm, erosive deodenitis duodenal polyp.No gan mucosa.Second portion duedenal biopsy neg. Second postion of dudoenal biopsy revealed polypoid intestinal mucosa w/ lymphoid aggregate. 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 no helicobacter pylori. Negative for mateaplasia, dysplasia   • ENDOSCOPY N/A 2020    Procedure: ESOPHAGOGASTRODUODENOSCOPY WITH DILATATION AND BIOPSIES;  Surgeon: Alonzo He MD;  Location:  [facility];  Service: Gastroenterology;  Laterality: N/A;   • ENDOSCOPY N/A 2021    Procedure: ESOPHAGOGASTRODUODENOSCOPY;  Surgeon: Brunner, Mark I, MD;  Location:  [facility];  Service: Gastroenterology;  Laterality: N/A;   • ENDOSCOPY N/A 2021    Procedure: ESOPHAGOGASTRODUODENOSCOPY WITH SAVARY DILATATION;  Surgeon: Francisco Sainz MD;  Location:  [facility];  Service: Gastroenterology;   Laterality: N/A;   • ENDOSCOPY N/A 01/06/2022    Procedure: ESOPHAGOGASTRODUODENOSCOPY;  Surgeon: Francisco Sainz MD;  Location: [facility] ENDOSCOPY;  Service: Gastroenterology;  Laterality: N/A;   • HIP SURGERY Right    • HYSTERECTOMY     • LEEP         Family History:   Family History   Problem Relation Age of Onset   • Heart failure Father    • Heart disease Father    • No Known Problems Mother    • No Known Problems Brother    • No Known Problems Son    • No Known Problems Son    • Colon cancer Paternal Grandfather        Social History:   Social History     Socioeconomic History   • Marital status: Single   Tobacco Use   • Smoking status: Never   • Smokeless tobacco: Never   Vaping Use   • Vaping Use: Never used   Substance and Sexual Activity   • Alcohol use: No   • Drug use: No   • Sexual activity: Defer         Current Outpatient Medications:   •  Alcohol Swabs (Alcohol Prep) pads, 1 each 3 (Three) Times a Day., Disp: 200 each, Rfl: 1  •  Continuous Blood Gluc  (Dexcom G6 ) device, 1 Units Daily., Disp: 1 each, Rfl: 1  •  Continuous Blood Gluc Sensor (Dexcom G6 Sensor), REPLACE SENSOR EVERY 10 DAYS, Disp: 3 each, Rfl: 11  •  Continuous Blood Gluc Transmit (Dexcom G6 Transmitter) misc, Inject 1 each under the skin into the appropriate area as directed See Admin Instructions., Disp: 1 each, Rfl: 1  •  dicyclomine (Bentyl) 10 MG capsule, Take 1 capsule by mouth 4 (Four) Times a Day Before Meals & at Bedtime., Disp: 120 capsule, Rfl: 2  •  diphenhydrAMINE (Banophen) 25 mg capsule, Take 1 capsule by mouth every 8 hours as needed for itching or allergies, Disp: 90 capsule, Rfl: 1  •  EPINEPHrine (EpiPen 2-Luis) 0.3 MG/0.3ML solution auto-injector injection, Inject 0.3 mL into the appropriate muscle as directed by prescriber. For emergent anaphylaxis, Disp: 2 each, Rfl: 0  •  estradiol (MINIVELLE, VIVELLE-DOT) 0.05 MG/24HR patch, PLACE 1 PATCH ON THE SKIN AS DIRECTED BY PROVIDER EVERY 3 DAYS, Disp: 8  "patch, Rfl: 2  •  gabapentin (NEURONTIN) 800 MG tablet, Take 1 tablet by mouth 3 (Three) Times a Day., Disp: 90 tablet, Rfl: 2  •  glucose blood test strip, ONE TOUCH VERIO FLEX TEST STRIPS TO CHECK GLUCOSE 3 TIMES DAILY FOR DM E11.9, Disp: 100 each, Rfl: 12  •  hydrOXYzine pamoate (VISTARIL) 50 MG capsule, Take 1 capsule by mouth 3 (Three) Times a Day As Needed for Anxiety., Disp: 90 capsule, Rfl: 1  •  insulin aspart (novoLOG) 100 UNIT/ML injection, ADMINISTER UP TO 12 UNITS UNDER THE SKIN THREE TIMES DAILY WITH MEALS PER SLIDING SCALE, Disp: 10 mL, Rfl: 3  •  Insulin Aspart (novoLOG) 100 UNIT/ML injection, Inject 12 Units under the skin into the appropriate area as directed 3 (Three) Times a Day Before Meals. Per sliding scale, Disp: 10 mL, Rfl: 3  •  insulin detemir (Levemir FlexTouch) 100 UNIT/ML injection, Inject 15 Units under the skin into the appropriate area as directed Every Night., Disp: 15 mL, Rfl: 3  •  Insulin Syringe 30G X 5/16\" 1 ML misc, 1 syringe 3 (Three) Times a Day., Disp: 100 each, Rfl: 2  •  Lancets misc, USE AS DIRECTED TO CHECK GLUCOSE 3 TIMES DAILY FOR DM E11.9, Disp: 100 each, Rfl: 3  •  nystatin (MYCOSTATIN) 136413 UNIT/GM powder, Apply  topically to the appropriate area as directed 3 (Three) Times a Day., Disp: 60 g, Rfl: 1  •  predniSONE (DELTASONE) 10 MG tablet, Take 5 tablets today, decrease dose by 1 tablet each day until gone. 5,4,3,2,1, Disp: 15 tablet, Rfl: 0  •  promethazine (PHENERGAN) 25 MG suppository, Insert 1 suppository into the rectum Every 6 (Six) Hours As Needed for Nausea or Vomiting., Disp: 12 each, Rfl: 0  •  promethazine (PHENERGAN) 25 MG tablet, Take 1 tablet by mouth Every 6 (Six) Hours As Needed for Nausea or Vomiting., Disp: 30 tablet, Rfl: 2  •  SUMAtriptan (Imitrex) 50 MG tablet, Take one tablet at onset of headache. May repeat dose one time in 2 hours if headache not relieved., Disp: 8 tablet, Rfl: 2  •  topiramate (TOPAMAX) 50 MG tablet, Take 1 tablet by " "mouth 2 (Two) Times a Day., Disp: 60 tablet, Rfl: 2  •  acetaminophen (TYLENOL) 500 MG tablet, Take 2 tablets by mouth As Needed., Disp: , Rfl:   •  ALPRAZolam (Xanax) 0.5 MG tablet, 1/2-1 tablet by mouth once daily for severe anxiety, Disp: 10 tablet, Rfl: 0  •  HYDROcodone-acetaminophen (NORCO) 5-325 MG per tablet, Take 1 tablet by mouth Every 6 (Six) Hours As Needed for Moderate Pain or Severe Pain., Disp: 20 tablet, Rfl: 0  •  PHARMACY MEDS TO BED CONSULT, Daily., Disp: , Rfl:   •  valACYclovir (Valtrex) 1000 MG tablet, Take 1 tablet by mouth 2 (Two) Times a Day., Disp: 2 tablet, Rfl: 0  •  Vedolizumab (ENTYVIO IV), Infuse  into a venous catheter., Disp: , Rfl:   •  venlafaxine XR (EFFEXOR-XR) 37.5 MG 24 hr capsule, Take 1 capsule by mouth Daily., Disp: 90 capsule, Rfl: 1  •  venlafaxine XR (EFFEXOR-XR) 75 MG 24 hr capsule, Take 1 capsule by mouth Daily., Disp: 90 capsule, Rfl: 1    Allergies   Allergen Reactions   • Hydrochlorothiazide Hives   • Penicillins Hives   • Remicade [Infliximab] Anaphylaxis   • Soybean-Containing Drug Products Swelling     \"Soy sauce\"   • Statins Swelling     \"Soy sauce\"   • Xeljanz [Tofacitinib Citrate] Other (See Comments)     BLISTERS IN THROAT & ON ARMS   • Zofran [Ondansetron Hcl] Headache     migraines   • Ondansetron Mental Status Change   • Fidaxomicin Rash   • Reglan [Metoclopramide] Other (See Comments)     States feels weird when takes it       Review of Systems:   Review of Systems   Constitutional: Positive for appetite change and fatigue. Negative for fever and unexpected weight loss.   HENT: Positive for trouble swallowing.    Gastrointestinal: Positive for abdominal pain, anal bleeding, blood in stool, diarrhea, nausea and rectal pain. Negative for abdominal distention, constipation, vomiting, GERD and indigestion.       The following portions of the patient's history were reviewed and updated as appropriate: allergies, current medications, past family history, past " "medical history, past social history, past surgical history and problem list.    Objective     Physical Exam:  Vital Signs:   Vitals:    03/27/23 1620   BP: 112/93   Pulse: (!) 127   Temp: 98 °F (36.7 °C)   TempSrc: Infrared   Weight: 84.4 kg (186 lb)   Height: 160 cm (63\")  Comment: patient states       Physical Exam  Constitutional:       Appearance: She is obese.   HENT:      Head: Normocephalic and atraumatic.   Eyes:      Conjunctiva/sclera: Conjunctivae normal.   Abdominal:      General: Abdomen is flat. There is no distension.      Palpations: There is no mass.      Tenderness: There is abdominal tenderness (Minimal diffuse discomfort). There is no guarding or rebound.      Hernia: No hernia is present.   Musculoskeletal:      Cervical back: Normal range of motion and neck supple.   Neurological:      Mental Status: She is alert.           Results Review:   I have reviewed the patient's new clinical and imaging results and agree with the interpretation.     No visits with results within 90 Day(s) from this visit.   Latest known visit with results is:   Office Visit on 11/23/2022   Component Date Value Ref Range Status   • Hemoglobin A1C 11/23/2022 11.5  % Final   • Lot Number 11/23/2022 [id number]   Final   • Expiration Date 11/23/2022 07/03/2024   Final   • Report Summary 11/23/2022 FINAL   Final    Comment: ====================================================================  [facility] COMP DRUG ANALYSIS,UR  ====================================================================  Test                             Result       Flag       Units  Drug Present and Declared for Prescription Verification    Topiramate                     PRESENT      EXPECTED    Venlafaxine                    PRESENT      EXPECTED    Desmethylvenlafaxine           PRESENT      EXPECTED     Desmethylvenlafaxine is an expected metabolite of venlafaxine.    Diphenhydramine                PRESENT      EXPECTED  Drug Present not Declared " for Prescription Verification    Alcohol, Ethyl                 0.090        UNEXPECTED g/dL     Sources of ethyl alcohol include alcoholic beverages or as a     fermentation product of glucose; glucose is present in this     specimen.  Interpret result with caution, as the presence of     ethyl alcohol is likely due, at least in part, to fermentation of     glucose.    Acetaminophen                                             PRESENT      UNEXPECTED    Ibuprofen                      PRESENT      UNEXPECTED  Drug Absent but Declared for Prescription Verification    Alprazolam                     Not Detected UNEXPECTED ng/mg creat    Gabapentin                     Not Detected UNEXPECTED    Hydroxyzine                    Not Detected UNEXPECTED    Promethazine                   Not Detected UNEXPECTED  ====================================================================  Test                      Result    Flag   Units      Ref Range    Creatinine              151              mg/dL      >=20  ====================================================================  Declared Medications:   The flagging and interpretation on this report are based on the   following declared medications.  Unexpected results may arise from   inaccuracies in the declared medications.   **Note: The testing scope of this panel includes these medications:   Alprazolam   Diphenhydramine   Gabapentin   Hydroxyzine   Promet                           hazine   Topiramate   Venlafaxine   **Note: The testing scope of this panel does not include following   reported medications:   Dicyclomine   Epinephrine   Estradiol   Insulin (Levemir)   Insulin (NovoLog)   Nystatin   Sumatriptan   Valacyclovir  ====================================================================  For clinical consultation, please call [phone].  ====================================================================        CT Abdomen Pelvis With Contrast    Result Date:  2/24/2023  Probable acute exacerbation of an inflammatory colitis. Images personally reviewed, interpreted and dictated by RALPH Graham M.D.    CT Abdomen Pelvis With Contrast    Result Date: 2/13/2023  Appearance of the colon as above, compatible with a mild colitis. Given the appearance as described, question any history of ulcerative colitis. CRITICAL RESULT:   No. COMMUNICATION: Per this written report. Dictated by Tom Crawford MD on 2/13/2023 5:29 PM Signed by Tom Crawford MD on 2/13/2023 5:35 PM    CT Abdomen Pelvis With Contrast    Result Date: 1/29/2023  No acute intra-abdominal process .  This study was performed using dose reduction techniques to achieve radiation exposure as low as reasonably achievable (ALARA)     CT Abdomen Pelvis With Contrast    Result Date: 1/15/2023  Mild sigmoid colon wall thickening may be related to nondistention or colitis. Images reviewed, interpreted, and dictated by BEATRICE Enciso MD      Assessment / Plan      Assessment & Plan:  1. Ulcerative pancolitis without complication (CMS/HCC)  2.  Suspected underlying irritable bowel syndrome with the diarrhea  3.  Chronic intermittent nausea  4.  History of recurrent C. difficile colitis 2018 and 2019  3/27/2023  Patient is currently off medication for the last at least 4 months.  She was followed at [facility] Dr. Sainz's office since 2020.  No changes made to her medication while she was followed there and was on a Entyvio infusion with Imuran.  Imuran was discontinued and this caused her significant reactions with the skin rash and joint pains.  Currently patient is not on any medication and has a significant abdominal symptoms with bloody diarrhea about 10/day.  Recent CT abdomen pelvis done in January and February 2022 revealed signs of mostly left-sided colitis.  Stool C. difficile in January was negative.  Her disease was not under control when patient was on Remicade either.  Last colonoscopy was  in 2021 by Dr. Panda revealed a left-sided chronic colitis.    Failed therapy or allergic reaction with, mesalamine, Imuran, Stelara, Entyvio, Remicade, Humira, Xeljanz in the past.   I had a discussion regarding starting on a Rinvoq 40mg po p.o. daily for 8 weeks followed by 15 mg p.o. daily for maintenance.  I anticipate adverse reactions as patient always had adverse reactions to medications.  We may reduce the dose to 30 mg p.o. daily for induction.  We discussed on adverse reactions and she is agreeable to proceed with the medication  We will get a stool studies for calprotectin, C. difficile colitis  We will start her on a steroid tapering dose meantime  Continue dicyclomine Phenergan as before  Advised to take Imodium 1 to 2 pills as needed if C. difficile is negative  Patient likely need a repeat colonoscopy soon  We will follow-up in 8 weeks.     3/2/2020  Pancolitis diagnosed in 2013.  In 2013 colonoscopy revealed a colitis involving the ascending colon transverse colon descending colon and biopsies were consistent with ulcerative colitis.  Abdominal ileal biopsies were negative.  She was initially treated with mesalamine which did not help.  Subsequently she was on Remicade which caused throat swelling and stopped. Stellar caused allergic reaction.  She was on Humira for about 6 months or so did not improve her symptoms.  She is now on Entyvio for the last 3 years or so.  She was followed at [facility] with Dr. Irwin. Last colonoscopy done by me on 6/12/2020 which revealed a moderately active pancolitis with a Cheng score 2.  She was treated with a steroid tapering.  She had an MRI MRCP done for elevated alkaline phosphatase and that did not reveal any signs of PSC. This time her Entyvio drug levels was 16 which was within the therapeutic range and no Entyvio antibody detected, indicating lost the response to vedolizumab    5. Elevated LFTs/fatty liver  6.  Nonalcoholic fatty liver disease  Advised to  lose 10 to 15 pounds  Her hepatitis panel were negative.  RAQUEL anti-smooth muscle antibody antimitochondrial antibody were negative.  Alpha-1 antitrypsin level, ceruloplasmin level were normal.  Transferrin saturation was normal.  She is immune to hepatitis B not immune to hepatitis A hep C antibody is negative.    7.  History of a duodenal ulcer  Recent EGD done in February 2022 revealed superficial duodenal ulcer.  Currently on a PPI  We will continue the same    Previous history  8. Esophageal dysphagia unspecified  9. Family hx of colon cancer  Grandma had a colon cancer.  Patient had her last colonoscopy in 2013, few polyps removed      Follow Up:   No follow-ups on file.    Alonzo He MD  [facility] [place]  3/27/2023  16:23 EDT    Please note that portions of this note may have been completed with a voice recognition program.

## 2023-03-27 NOTE — TELEPHONE ENCOUNTER
Pharmacy Name:  [facility]     Pharmacy representative name: CHERELLE     Pharmacy representative phone number: [phone]     What medication are you calling in regards to:   VENLAFAXINE XR 75MG  TOPIRAMATE 50MG  LEVEMIR FLEX PEN  INSULIN ASPART   HYDROXYZINE PAMOATE 50MG  DEXCOM G6 TRANSMITTER   DIPHENHYDRAMINE 25MG  ALCOHOL PREP PADS       What question does the pharmacy have: CHERELLE STATES THAT THEY ARE THE NEW PHARAMCY FOR THE PATIENT AND A FAX WAS SENT REQUESTING REFILLS AND WANTED TO KNOW IF IT WAS RECEIVED.  THEY DO ACCEPT ESCRIBE.     Who is the provider that prescribed the medication: NOE

## 2023-03-28 ENCOUNTER — TELEPHONE (OUTPATIENT)
Dept: GASTROENTEROLOGY | Facility: CLINIC | Age: 38
End: 2023-03-28
Payer: COMMERCIAL

## 2023-03-28 NOTE — TELEPHONE ENCOUNTER
----- Message from Alonzo He MD sent at 3/27/2023  5:51 PM EDT -----  Let her know that I have ordered a lab work to be done in 2 weeks time after starting Rinvoq.   Also make sure that medication go through otherwise we have to call the rep

## 2023-03-28 NOTE — TELEPHONE ENCOUNTER
Notified pt that she had labs 2 weeks after starting the Rinvoq. I let her know that I did do the prior authorization and that she can pick that up at her pharmacy.

## 2023-03-30 ENCOUNTER — TELEPHONE (OUTPATIENT)
Dept: FAMILY MEDICINE CLINIC | Facility: CLINIC | Age: 38
End: 2023-03-30
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
## 2023-04-06 ENCOUNTER — TELEPHONE (OUTPATIENT)
Dept: FAMILY MEDICINE CLINIC | Facility: CLINIC | Age: 38
End: 2023-04-06
Payer: COMMERCIAL

## 2023-04-06 DIAGNOSIS — Z79.4 TYPE 2 DIABETES MELLITUS TREATED WITH INSULIN: Primary | ICD-10-CM

## 2023-04-06 DIAGNOSIS — G43.101 MIGRAINE WITH AURA AND WITH STATUS MIGRAINOSUS, NOT INTRACTABLE: ICD-10-CM

## 2023-04-06 DIAGNOSIS — E11.9 TYPE 2 DIABETES MELLITUS TREATED WITH INSULIN: Primary | ICD-10-CM

## 2023-04-06 RX ORDER — INSULIN DETEMIR 100 [IU]/ML
15 INJECTION, SOLUTION SUBCUTANEOUS NIGHTLY
Qty: 14 ML | Refills: 1 | Status: SHIPPED | OUTPATIENT
Start: 2023-04-06

## 2023-04-06 RX ORDER — TOPIRAMATE 50 MG/1
TABLET, FILM COATED ORAL
Qty: 60 TABLET | Refills: 0 | Status: SHIPPED | OUTPATIENT
Start: 2023-04-06
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
## 2023-04-25 DIAGNOSIS — F41.8 DEPRESSION WITH ANXIETY: ICD-10-CM

## 2023-04-26 RX ORDER — HYDROXYZINE PAMOATE 50 MG/1
CAPSULE ORAL
Qty: 90 CAPSULE | Refills: 10 | Status: SHIPPED | OUTPATIENT
Start: 2023-04-26

## 2023-05-01 RX ORDER — DIPHENHYDRAMINE HCL 25 MG
CAPSULE ORAL
Qty: 90 CAPSULE | Refills: 10 | OUTPATIENT
Start: 2023-05-01

## 2023-05-18 ENCOUNTER — TELEMEDICINE (OUTPATIENT)
Dept: FAMILY MEDICINE CLINIC | Facility: CLINIC | Age: 38
End: 2023-05-18
Payer: COMMERCIAL

## 2023-05-18 DIAGNOSIS — Z79.890 HORMONE REPLACEMENT THERAPY (HRT): ICD-10-CM

## 2023-05-18 DIAGNOSIS — E11.44 DIABETIC AMYOTROPHY ASSOCIATED WITH TYPE 2 DIABETES MELLITUS: ICD-10-CM

## 2023-05-18 DIAGNOSIS — Z79.4 TYPE 2 DIABETES MELLITUS TREATED WITH INSULIN: ICD-10-CM

## 2023-05-18 DIAGNOSIS — G25.81 RLS (RESTLESS LEGS SYNDROME): Primary | ICD-10-CM

## 2023-05-18 DIAGNOSIS — E11.9 TYPE 2 DIABETES MELLITUS TREATED WITH INSULIN: ICD-10-CM

## 2023-05-18 RX ORDER — INSULIN PMP CART,AUT,G6/7,CNTR
1 EACH SUBCUTANEOUS DAILY
Qty: 1 KIT | Refills: 1 | Status: SHIPPED | OUTPATIENT
Start: 2023-05-18

## 2023-05-18 RX ORDER — GABAPENTIN 800 MG/1
800 TABLET ORAL 3 TIMES DAILY
Qty: 90 TABLET | Refills: 2 | Status: SHIPPED | OUTPATIENT
Start: 2023-05-18

## 2023-05-18 RX ORDER — ESTRADIOL 0.05 MG/D
1 FILM, EXTENDED RELEASE TRANSDERMAL
Qty: 8 PATCH | Refills: 2 | Status: SHIPPED | OUTPATIENT
Start: 2023-05-18

## 2023-05-18 NOTE — PROGRESS NOTES
"                      Established Patient        Chief Complaint: No chief complaint on file.      You have chosen to receive care through a telephone visit. Do you consent to use a telephone visit for your medical care today? Yes    History of Present Illness:    Thais Hobson is a 37 y.o. female who presents today via [employer] [facility] encounter. Patient is being seen today for medication refills. Patient has history of RLS and is prescribed gabapentin 800mg TID. Patient also takes Estradiol once every 3 days.     At the time of encounter today, patient is located in her home and provider is located in [facility] office in [place], [place].   Subjective     The following portions of the patient's history were reviewed and updated as appropriate: allergies, current medications, past family history, past medical history, past social history, past surgical history and problem list.    ALLERGIES  Allergies   Allergen Reactions   • Hydrochlorothiazide Hives   • Penicillins Hives   • Remicade [Infliximab] Anaphylaxis   • Soybean-Containing Drug Products Swelling     \"Soy sauce\"   • Statins Swelling     \"Soy sauce\"   • Xeljanz [Tofacitinib Citrate] Other (See Comments)     BLISTERS IN THROAT & ON ARMS   • Zofran [Ondansetron Hcl] Headache     migraines   • Ondansetron Mental Status Change   • Fidaxomicin Rash   • Reglan [Metoclopramide] Other (See Comments)     States feels weird when takes it       ROS  Review of Systems   Constitutional: Negative for fatigue and unexpected weight change.   Respiratory: Negative for cough and shortness of breath.    Cardiovascular: Negative for chest pain and palpitations.   Gastrointestinal: Positive for constipation and diarrhea. Negative for abdominal pain and nausea.   Neurological: Negative for dizziness and weakness.   Psychiatric/Behavioral: Negative for confusion and sleep disturbance.       Objective     Vital Signs:   LMP  (LMP Unknown)     BMI is >= 30 and <35. (Class 1 Obesity). " The following options were offered after discussion;: referral to primary care       Physical Exam   Physical Exam  Constitutional:       Appearance: Normal appearance.   Pulmonary:      Effort: Pulmonary effort is normal.   Neurological:      Mental Status: She is alert and oriented to person, place, and time.   Psychiatric:         Mood and Affect: Mood normal.         Behavior: Behavior normal.         Assessment and Plan      Assessment/Plan:   Diagnoses and all orders for this visit:    1. RLS (restless legs syndrome) (Primary)    2. Hormone replacement therapy (HRT)  -     estradiol (MINIVELLE, VIVELLE-DOT) 0.05 MG/24HR patch; Place 1 patch on the skin as directed by provider Every 3 (Three) Days.  Dispense: 8 patch; Refill: 2    3. Diabetic amyotrophy associated with type 2 diabetes mellitus  -     gabapentin (NEURONTIN) 800 MG tablet; Take 1 tablet by mouth 3 (Three) Times a Day.  Dispense: 90 tablet; Refill: 2        Discussion Summary:  Discussed plan of care in detail with pt today; pt verb understanding and agrees.      I spent 25 minutes caring for Thais on this date of service. This time includes time spent by me in the following activities:preparing for the visit, reviewing tests, obtaining and/or reviewing a separately obtained history, performing a medically appropriate examination and/or evaluation , counseling and educating the patient/family/caregiver, ordering medications, tests, or procedures and documenting information in the medical record      I have reviewed and updated all copied forward information, as appropriate.  I attest to the accuracy and relevance of any unchanged information.    This was an audio and video enabled telemedicine encounter.      Follow up:  Return for Next scheduled follow up.     Patient Education:  There are no Patient Instructions on file for this visit.    EMELYN Skinner  05/18/23  14:57 EDT          Please note that portions of this note may have been  completed with a voice recognition program.

## 2023-05-22 ENCOUNTER — TELEPHONE (OUTPATIENT)
Dept: FAMILY MEDICINE CLINIC | Facility: CLINIC | Age: 38
End: 2023-05-22
Payer: COMMERCIAL

## 2023-05-22 DIAGNOSIS — L29.9 ITCHING: Primary | ICD-10-CM

## 2023-05-22 NOTE — TELEPHONE ENCOUNTER
Caller:    PARRIS    Relationship: Pharmacy    Best call back number:     SEE ABOVE    Requested Prescriptions:      diphenhydrAMINE (Banophen) 25 mg capsule    Pharm[facility] where [facility] should be sent: [facility] [place] [id number] [phone] [facility] [phone]      Last office visit with prescribing clinician: Visit date not found   Last telemedicine visit with prescribing clinician: 5/7/2023   Next office visit with prescribing clinician: Visit date not found     Additional details provided by patient:    PHARMACY STATED PATIENT HAS AT LEAST A (3) DAY SUPPLY OF MEDICATION    Does the patient have less than a 3 day supply:  [] Yes  [x] No    Would you like a call back once the refill request has been completed: [] Yes [] No    If the office needs to give you a call back, can they leave a voicemail: [] Yes [] No    Katelin Paiz Rep   05/22/23 16:18 EDT     DANNY LIU

## 2023-05-23 DIAGNOSIS — G43.101 MIGRAINE WITH AURA AND WITH STATUS MIGRAINOSUS, NOT INTRACTABLE: ICD-10-CM

## 2023-05-24 RX ORDER — TOPIRAMATE 50 MG/1
TABLET, FILM COATED ORAL
Qty: 60 TABLET | Refills: 10 | Status: SHIPPED | OUTPATIENT
Start: 2023-05-24

## 2023-05-30 ENCOUNTER — TELEPHONE (OUTPATIENT)
Dept: FAMILY MEDICINE CLINIC | Facility: CLINIC | Age: 38
End: 2023-05-30

## 2023-05-30 NOTE — TELEPHONE ENCOUNTER
Caller: DUONG VICKERS    Best call back number:    [phone]       Requested Prescriptions:   Requested Prescriptions      No prescriptions requested or ordered in this encounter    FOR  INCONTINENCE SUPPLIES          Additional details provided    A FAX WAS SENT ON 5-23-23    ANOTHER FAX WILL BE SENT TODAY 5-30-23

## 2023-06-05 RX ORDER — UPADACITINIB 45 MG/1
TABLET, EXTENDED RELEASE ORAL
Qty: 56 TABLET | Refills: 0 | OUTPATIENT
Start: 2023-06-05

## 2023-06-05 RX ORDER — UPADACITINIB 15 MG/1
15 TABLET, EXTENDED RELEASE ORAL DAILY
Qty: 90 TABLET | Refills: 3 | Status: SHIPPED | OUTPATIENT
Start: 2023-06-05

## 2023-06-06 DIAGNOSIS — E11.9 TYPE 2 DIABETES MELLITUS TREATED WITH INSULIN: ICD-10-CM

## 2023-06-06 DIAGNOSIS — Z79.4 TYPE 2 DIABETES MELLITUS TREATED WITH INSULIN: ICD-10-CM

## 2023-06-06 RX ORDER — PROCHLORPERAZINE 25 MG/1
1 SUPPOSITORY RECTAL SEE ADMIN INSTRUCTIONS
Qty: 1 EACH | Refills: 1 | Status: SHIPPED | OUTPATIENT
Start: 2023-06-06

## 2023-06-09 RX ORDER — DIPHENHYDRAMINE HCL 25 MG
CAPSULE ORAL
Qty: 90 CAPSULE | Refills: 1 | Status: SHIPPED | OUTPATIENT
Start: 2023-06-09

## 2023-07-24 DIAGNOSIS — L29.9 ITCHING: ICD-10-CM

## 2023-07-25 RX ORDER — DIPHENHYDRAMINE HCL 25 MG
CAPSULE ORAL
Qty: 90 CAPSULE | Refills: 10 | Status: SHIPPED | OUTPATIENT
Start: 2023-07-25

## 2023-08-24 DIAGNOSIS — E11.44 DIABETIC AMYOTROPHY ASSOCIATED WITH TYPE 2 DIABETES MELLITUS: ICD-10-CM

## 2023-08-24 DIAGNOSIS — F41.0 PANIC DISORDER: ICD-10-CM

## 2023-08-24 DIAGNOSIS — F41.8 DEPRESSION WITH ANXIETY: ICD-10-CM

## 2023-08-25 DIAGNOSIS — Z79.4 TYPE 2 DIABETES MELLITUS TREATED WITH INSULIN: ICD-10-CM

## 2023-08-25 DIAGNOSIS — E11.9 TYPE 2 DIABETES MELLITUS TREATED WITH INSULIN: ICD-10-CM

## 2023-08-28 RX ORDER — VENLAFAXINE HYDROCHLORIDE 75 MG/1
CAPSULE, EXTENDED RELEASE ORAL
Qty: 30 CAPSULE | Refills: 10 | OUTPATIENT
Start: 2023-08-28

## 2023-08-28 RX ORDER — PROCHLORPERAZINE 25 MG/1
SUPPOSITORY RECTAL
Qty: 1 EACH | Refills: 10 | OUTPATIENT
Start: 2023-08-28

## 2023-08-30 DIAGNOSIS — F41.8 DEPRESSION WITH ANXIETY: ICD-10-CM

## 2023-08-30 DIAGNOSIS — F41.0 PANIC DISORDER: ICD-10-CM

## 2023-08-30 NOTE — TELEPHONE ENCOUNTER
Rx Refill Note  Requested Prescriptions     Pending Prescriptions Disp Refills    gabapentin (NEURONTIN) 800 MG tablet [Pharmacy Med Name: GABAPENTIN 800 MG TABS 800 Tablet] 90 tablet 10     Sig: TAKE 1 TABLET BY MOUTH 3 TIMES DAILY      Last office visit with prescribing clinician: Visit date not found   Last telemedicine visit with prescribing clinician: 5/18/2023   Next office visit with prescribing clinician: Visit date not found                         Would you like a call back once the refill request has been completed: [] Yes [] No    If the office needs to give you a call back, can they leave a voicemail: [] Yes [] No    Janet Faith MA  08/30/23, 13:21 EDT

## 2023-08-31 DIAGNOSIS — E11.9 TYPE 2 DIABETES MELLITUS TREATED WITH INSULIN: ICD-10-CM

## 2023-08-31 DIAGNOSIS — Z79.4 TYPE 2 DIABETES MELLITUS TREATED WITH INSULIN: ICD-10-CM

## 2023-08-31 RX ORDER — VENLAFAXINE HYDROCHLORIDE 75 MG/1
CAPSULE, EXTENDED RELEASE ORAL
Qty: 30 CAPSULE | Refills: 10 | OUTPATIENT
Start: 2023-08-31

## 2023-08-31 NOTE — TELEPHONE ENCOUNTER
Caller: Thais Hobson    Relationship: Self    Best call back number: [phone]     Requested Prescriptions:   Requested Prescriptions     Pending Prescriptions Disp Refills    Continuous Blood Gluc Transmit (Dexcom G6 Transmitter) misc 1 each 1     Sig: Inject 1 each under the skin into the appropriate area as directed See Admin Instructions.        Pharm[facility] where request should be sent: [facility], [address] [phone] [facility] [phone]      Last office visit with prescribing clinician: Visit date not found   Last telemedicine visit with prescribing clinician: 5/18/2023   Next office visit with prescribing clinician: Visit date not found     Additional details provided by patient: CURRENT TRANSMITTER WENT DEAD    Does the patient have less than a 3 day supply:  [x] Yes  [] No        Katelin Eldridge Rep   08/31/23 15:36 EDT

## 2023-09-01 RX ORDER — PROCHLORPERAZINE 25 MG/1
1 SUPPOSITORY RECTAL SEE ADMIN INSTRUCTIONS
Qty: 1 EACH | Refills: 1 | Status: SHIPPED | OUTPATIENT
Start: 2023-09-01

## 2023-09-01 RX ORDER — PROCHLORPERAZINE 25 MG/1
SUPPOSITORY RECTAL
Qty: 1 EACH | Refills: 10 | OUTPATIENT
Start: 2023-09-01

## 2023-09-07 RX ORDER — GABAPENTIN 800 MG/1
TABLET ORAL
Qty: 90 TABLET | Refills: 0 | Status: SHIPPED | OUTPATIENT
Start: 2023-09-07

## 2023-09-11 ENCOUNTER — TELEMEDICINE (OUTPATIENT)
Dept: FAMILY MEDICINE CLINIC | Facility: CLINIC | Age: 38
End: 2023-09-11
Payer: COMMERCIAL

## 2023-09-11 DIAGNOSIS — K51.90 ULCERATIVE COLITIS WITHOUT COMPLICATIONS, UNSPECIFIED LOCATION: ICD-10-CM

## 2023-09-11 DIAGNOSIS — E11.9 TYPE 2 DIABETES MELLITUS TREATED WITH INSULIN: ICD-10-CM

## 2023-09-11 DIAGNOSIS — K21.9 GASTROESOPHAGEAL REFLUX DISEASE WITHOUT ESOPHAGITIS: Chronic | ICD-10-CM

## 2023-09-11 DIAGNOSIS — Z79.4 TYPE 2 DIABETES MELLITUS TREATED WITH INSULIN: ICD-10-CM

## 2023-09-11 RX ORDER — PROCHLORPERAZINE 25 MG/1
SUPPOSITORY RECTAL
Qty: 3 EACH | Refills: 1 | Status: SHIPPED | OUTPATIENT
Start: 2023-09-11

## 2023-09-11 RX ORDER — PROMETHAZINE HYDROCHLORIDE 25 MG/1
25 TABLET ORAL EVERY 6 HOURS PRN
Qty: 30 TABLET | Refills: 2 | Status: SHIPPED | OUTPATIENT
Start: 2023-09-11

## 2023-09-11 NOTE — PROGRESS NOTES
Subjective     You have chosen to receive care through a telehealth visit.  Do you consent to use a video/audio connection for your medical care today? Yes  Patient is located in her home  I am located in the clinic      Chief Complaint:  neuropathy    History of Present Illness:   Needs refills   Currently on insulin pump through PCP, .35 units, has to give herself 1-2 units after she eats, she notes she is not using a whole lot of insulin, notes glucose has been around 165, most 200   Also on long acting insulin   Neuropathy   [facility], follows with omar MATTHEWS on [address]   Has not seen PCP in 2 years, last in office visit was Nov 2023      Review of Systems  Gen- No fevers, chills  CV- No chest pain, palpitations  Resp- No cough, dyspnea  GI- No N/V/D, abd pain  Neuro-No dizziness, headaches      I have reviewed and/or updated the patient's past medical, surgical, family, social history and problem list as appropriate.     Medications:    Current Outpatient Medications:     Continuous Blood Gluc Sensor (Dexcom G6 Sensor), Inject  under the skin into the appropriate area as directed Every 10 (Ten) Days., Disp: 3 each, Rfl: 1    promethazine (PHENERGAN) 25 MG tablet, Take 1 tablet by mouth Every 6 (Six) Hours As Needed for Nausea or Vomiting., Disp: 30 tablet, Rfl: 2    acetaminophen (TYLENOL) 500 MG tablet, Take 2 tablets by mouth As Needed., Disp: , Rfl:     Alcohol Swabs (Easy Touch Alcohol Prep Medium) 70 % pads, USE AS DIRECTED THREE TIMES A DAY, Disp: 100 each, Rfl: 10    Continuous Blood Gluc  (Dexcom G6 ) device, 1 Units Daily., Disp: 1 each, Rfl: 1    Continuous Blood Gluc Transmit (Dexcom G6 Transmitter) misc, Inject 1 each under the skin into the appropriate area as directed See Admin Instructions., Disp: 1 each, Rfl: 1    dicyclomine (Bentyl) 10 MG capsule, Take 1 capsule by mouth 4 (Four) Times a Day Before Meals & at Bedtime., Disp: 120 capsule, Rfl: 2    diphenhydrAMINE (Banophen)  "25 mg capsule, TAKE 1 CAPSULE BY MOUTH EVERY 8 HOURS AS NEEDED FOR ITCHING OR ALLERGIES, Disp: 90 capsule, Rfl: 10    EPINEPHrine (EpiPen 2-Luis) 0.3 MG/0.3ML solution auto-injector injection, Inject 0.3 mL into the appropriate muscle as directed by prescriber. For emergent anaphylaxis, Disp: 2 each, Rfl: 0    estradiol (MINIVELLE, VIVELLE-DOT) 0.05 MG/24HR patch, Place 1 patch on the skin as directed by provider Every 3 (Three) Days., Disp: 8 patch, Rfl: 2    gabapentin (NEURONTIN) 800 MG tablet, TAKE 1 TABLET BY MOUTH 3 TIMES DAILY, Disp: 90 tablet, Rfl: 0    glucose blood test strip, ONE TOUCH VERIO FLEX TEST STRIPS TO CHECK GLUCOSE 3 TIMES DAILY FOR DM E11.9, Disp: 100 each, Rfl: 12    hydrOXYzine pamoate (VISTARIL) 50 MG capsule, TAKE ONE (1) CAPSULE BY MOUTH THREE TIMES A DAY AS NEEDED FOR ANXIETY, Disp: 90 capsule, Rfl: 10    insulin aspart (novoLOG) 100 UNIT/ML injection, ADMINISTER UP TO 12 UNITS UNDER THE SKIN THREE TIMES DAILY WITH MEALS PER SLIDING SCALE, Disp: 10 mL, Rfl: 3    Insulin Aspart (novoLOG) 100 UNIT/ML injection, INJECT 12 UNITS SUBCUTANEOUSLY INTO THE APPROPRIATE AREA AS DIRECTED THREE TIMES A DAY BEFORE MEALS. PER SLIDING SCALE, Disp: 10 mL, Rfl: 10    Insulin Disposable Pump (Omnipod 5 G6 Intro, Gen 5,) kit, USE AS DIRECTED DAILY, Disp: 1 kit, Rfl: 10    Insulin Syringe 30G X 5/16\" 1 ML misc, 1 syringe 3 (Three) Times a Day., Disp: 100 each, Rfl: 2    Lancets misc, USE AS DIRECTED TO CHECK GLUCOSE 3 TIMES DAILY FOR DM E11.9, Disp: 100 each, Rfl: 3    Levemir FlexPen 100 UNIT/ML injection, INJECT 15 UNITS SUBCUTANEOUSLY INTO THE APPROPRIATE AREA EVERY NIGHT AS DIRECTED, Disp: 15 mL, Rfl: 10    predniSONE (DELTASONE) 10 MG tablet, 40 mg po daily for 1 week,  30 mg daily x 1 week,  20 mg x 1 week, 10 mg po daily x 1 week followed by 5mg po daily for 1 week   Dispense: 74 tablet, Disp: 74 tablet, Rfl: 0    promethazine (PHENERGAN) 25 MG suppository, Insert 1 suppository into the rectum Every 6 " "(Six) Hours As Needed for Nausea or Vomiting., Disp: 12 each, Rfl: 0    SUMAtriptan (Imitrex) 50 MG tablet, Take one tablet at onset of headache. May repeat dose one time in 2 hours if headache not relieved., Disp: 8 tablet, Rfl: 2    topiramate (TOPAMAX) 50 MG tablet, TAKE ONE (1) TABLET BY MOUTH TWICE DAILY, Disp: 60 tablet, Rfl: 10    Upadacitinib ER (Rinvoq) 15 MG tablet sustained-release 24 hour, Take 1 tablet by mouth Daily., Disp: 90 tablet, Rfl: 3    Allergies:  Allergies   Allergen Reactions    Hydrochlorothiazide Hives    Penicillins Hives    Remicade [Infliximab] Anaphylaxis    Soybean-Containing Drug Products Swelling     \"Soy sauce\"    Statins Swelling     \"Soy sauce\"    Xeljanz [Tofacitinib Citrate] Other (See Comments)     BLISTERS IN THROAT & ON ARMS    Zofran [Ondansetron Hcl] Headache     migraines    Ondansetron Mental Status Change    Fidaxomicin Rash    Reglan [Metoclopramide] Other (See Comments)     States feels weird when takes it       Objective     Vital Signs: There were no vitals filed for this visit.  There is no height or weight on file to calculate BMI.    Physical Exam:  Gen-no acute distress, video visit  Resp- normal WOB, on RA  Neuro-A&Ox3, face symmetrical, speech clear  Skin-no overt rashes noted  Psych-appropriate mood, cooperative       Assessment / Plan     Assessment/Plan:   Problem List Items Addressed This Visit          Endocrine and Metabolic    Type 2 diabetes mellitus treated with insulin    Relevant Medications    Insulin Syringe 30G X 5/16\" 1 ML misc    Continuous Blood Gluc  (Dexcom G6 ) device    insulin aspart (novoLOG) 100 UNIT/ML injection    Insulin Disposable Pump (Omnipod 5 G6 Intro, Gen 5,) kit    Insulin Aspart (novoLOG) 100 UNIT/ML injection    Levemir FlexPen 100 UNIT/ML injection    Continuous Blood Gluc Transmit (Dexcom G6 Transmitter) misc    Continuous Blood Gluc Sensor (Dexcom G6 Sensor)       Gastrointestinal Abdominal     " Gastroesophageal reflux disease without esophagitis (Chronic)    Relevant Medications    dicyclomine (Bentyl) 10 MG capsule    promethazine (PHENERGAN) 25 MG tablet    Ulcerative colitis flare    Relevant Medications    dicyclomine (Bentyl) 10 MG capsule    predniSONE (DELTASONE) 10 MG tablet    Upadacitinib ER (Rinvoq) 15 MG tablet sustained-release 24 hour    promethazine (PHENERGAN) 25 MG tablet       -- needs in office visit, discussed needs to be seen every 6 months   -- continue current meds    Discussed plan of care in detail with pt today; pt verb understanding and agrees.    Follow up:  3-4 weeks, PCP    Electronically signed by EMELYN Seymour   09/11/2023 08:48 EDT      Please note that portions of this note were completed with a voice recognition program.

## 2023-09-21 DIAGNOSIS — E11.44 DIABETIC AMYOTROPHY ASSOCIATED WITH TYPE 2 DIABETES MELLITUS: ICD-10-CM

## 2023-09-21 DIAGNOSIS — Z79.890 HORMONE REPLACEMENT THERAPY (HRT): ICD-10-CM

## 2023-09-22 RX ORDER — ESTRADIOL 0.05 MG/D
PATCH, EXTENDED RELEASE TRANSDERMAL
Qty: 8 PATCH | Refills: 10 | Status: SHIPPED | OUTPATIENT
Start: 2023-09-22

## 2023-09-22 NOTE — TELEPHONE ENCOUNTER
Rx Refill Note  Requested Prescriptions     Pending Prescriptions Disp Refills    gabapentin (NEURONTIN) 800 MG tablet [Pharmacy Med Name: GABAPENTIN 800 MG TABS 800 Tablet] 90 tablet 10     Sig: TAKE 1 TABLET BY MOUTH THREE TIMES DAILY      Last office visit with prescribing clinician: Visit date not found   Last telemedicine visit with prescribing clinician: 9/11/2023   Next office visit with prescribing clinician: 10/3/2023                         Would you like a call back once the refill request has been completed: [] Yes [] No    If the office needs to give you a call back, can they leave a voicemail: [] Yes [] No    Thais Gutierrez MA  09/22/23, 07:52 EDT

## 2023-09-24 RX ORDER — GABAPENTIN 800 MG/1
TABLET ORAL
Qty: 90 TABLET | Refills: 0 | Status: SHIPPED | OUTPATIENT
Start: 2023-09-24

## 2023-10-20 DIAGNOSIS — F41.0 PANIC DISORDER: ICD-10-CM

## 2023-10-20 DIAGNOSIS — F41.8 DEPRESSION WITH ANXIETY: ICD-10-CM

## 2023-10-23 RX ORDER — VENLAFAXINE HYDROCHLORIDE 37.5 MG/1
CAPSULE, EXTENDED RELEASE ORAL
Qty: 30 CAPSULE | Refills: 10 | OUTPATIENT
Start: 2023-10-23

## 2023-10-27 DIAGNOSIS — E11.44 DIABETIC AMYOTROPHY ASSOCIATED WITH TYPE 2 DIABETES MELLITUS: ICD-10-CM

## 2023-10-30 NOTE — TELEPHONE ENCOUNTER
Rx Refill Note  Requested Prescriptions     Pending Prescriptions Disp Refills    gabapentin (NEURONTIN) 800 MG tablet [Pharmacy Med Name: GABAPENTIN 800 MG TABS 800 Tablet] 90 tablet 10     Sig: TAKE 1 TABLET BY MOUTH THREE TIMES DAILY      Last office visit with prescribing clinician: Visit date not found   Last telemedicine visit with prescribing clinician: 9/11/2023   Next office visit with prescribing clinician: Visit date not found                         Would you like a call back once the refill request has been completed: [] Yes [] No    If the office needs to give you a call back, can they leave a voicemail: [] Yes [] No    Thais Gutierrez MA  10/30/23, 07:59 EDT

## 2023-11-08 DIAGNOSIS — F41.0 PANIC DISORDER: ICD-10-CM

## 2023-11-08 DIAGNOSIS — F41.8 DEPRESSION WITH ANXIETY: ICD-10-CM

## 2023-11-08 RX ORDER — GABAPENTIN 800 MG/1
TABLET ORAL
Qty: 90 TABLET | Refills: 2 | Status: SHIPPED | OUTPATIENT
Start: 2023-11-08

## 2023-11-08 RX ORDER — VENLAFAXINE HYDROCHLORIDE 37.5 MG/1
CAPSULE, EXTENDED RELEASE ORAL
Qty: 30 CAPSULE | Refills: 10 | OUTPATIENT
Start: 2023-11-08

## 2023-11-09 RX ORDER — VENLAFAXINE HYDROCHLORIDE 37.5 MG/1
CAPSULE, EXTENDED RELEASE ORAL
Qty: 30 CAPSULE | Refills: 10 | OUTPATIENT
Start: 2023-11-09

## 2023-11-09 NOTE — TELEPHONE ENCOUNTER
Rx Refill Note  Requested Prescriptions     Pending Prescriptions Disp Refills    venlafaxine XR (EFFEXOR-XR) 37.5 MG 24 hr capsule [Pharmacy Med Name: VENLAFAXINE ER 37.5MG CAP 37.5 Capsule] 30 capsule 10     Sig: TAKE ONE (1) CAPSULE BY MOUTH DAILY      Last office visit with prescribing clinician: 11/23/2022   Last telemedicine visit with prescribing clinician: 9/11/2023   Next office visit with prescribing clinician: Visit date not found      Maryann Ludwig MA  11/09/23, 08:48 EST

## 2023-11-20 DIAGNOSIS — K21.9 GASTROESOPHAGEAL REFLUX DISEASE WITHOUT ESOPHAGITIS: Chronic | ICD-10-CM

## 2023-11-20 DIAGNOSIS — K51.90 ULCERATIVE COLITIS WITHOUT COMPLICATIONS, UNSPECIFIED LOCATION: ICD-10-CM

## 2023-11-21 RX ORDER — PROMETHAZINE HYDROCHLORIDE 25 MG/1
25 TABLET ORAL EVERY 6 HOURS PRN
Qty: 30 TABLET | Refills: 10 | Status: SHIPPED | OUTPATIENT
Start: 2023-11-21

## 2023-11-29 ENCOUNTER — OFFICE VISIT (OUTPATIENT)
Dept: FAMILY MEDICINE CLINIC | Facility: CLINIC | Age: 38
End: 2023-11-29
Payer: COMMERCIAL

## 2023-11-29 VITALS
WEIGHT: 208 LBS | DIASTOLIC BLOOD PRESSURE: 68 MMHG | SYSTOLIC BLOOD PRESSURE: 100 MMHG | BODY MASS INDEX: 36.86 KG/M2 | OXYGEN SATURATION: 97 % | HEART RATE: 109 BPM | HEIGHT: 63 IN

## 2023-11-29 DIAGNOSIS — L29.9 ITCHING: ICD-10-CM

## 2023-11-29 DIAGNOSIS — Z00.00 ANNUAL PHYSICAL EXAM: Primary | ICD-10-CM

## 2023-11-29 DIAGNOSIS — G47.00 INSOMNIA, UNSPECIFIED TYPE: ICD-10-CM

## 2023-11-29 DIAGNOSIS — Z79.4 TYPE 2 DIABETES MELLITUS TREATED WITH INSULIN: ICD-10-CM

## 2023-11-29 DIAGNOSIS — Z79.890 HORMONE REPLACEMENT THERAPY (HRT): ICD-10-CM

## 2023-11-29 DIAGNOSIS — Z90.710 HISTORY OF TOTAL ABDOMINAL HYSTERECTOMY: ICD-10-CM

## 2023-11-29 DIAGNOSIS — R53.83 FATIGUE, UNSPECIFIED TYPE: ICD-10-CM

## 2023-11-29 DIAGNOSIS — N89.8 VAGINAL DISCHARGE: ICD-10-CM

## 2023-11-29 DIAGNOSIS — K51.00 ULCERATIVE PANCOLITIS WITHOUT COMPLICATION: ICD-10-CM

## 2023-11-29 DIAGNOSIS — E11.44 DIABETIC AMYOTROPHY ASSOCIATED WITH TYPE 2 DIABETES MELLITUS: ICD-10-CM

## 2023-11-29 DIAGNOSIS — F41.8 DEPRESSION WITH ANXIETY: ICD-10-CM

## 2023-11-29 DIAGNOSIS — Z51.81 ENCOUNTER FOR THERAPEUTIC DRUG MONITORING: ICD-10-CM

## 2023-11-29 DIAGNOSIS — Z11.59 ENCOUNTER FOR HEPATITIS C SCREENING TEST FOR LOW RISK PATIENT: ICD-10-CM

## 2023-11-29 DIAGNOSIS — E11.9 TYPE 2 DIABETES MELLITUS TREATED WITH INSULIN: ICD-10-CM

## 2023-11-29 DIAGNOSIS — G25.81 RLS (RESTLESS LEGS SYNDROME): ICD-10-CM

## 2023-11-29 DIAGNOSIS — G43.101 MIGRAINE WITH AURA AND WITH STATUS MIGRAINOSUS, NOT INTRACTABLE: ICD-10-CM

## 2023-11-29 RX ORDER — ACYCLOVIR 400 MG/1
1 TABLET ORAL CONTINUOUS
Qty: 3 EACH | Refills: 11 | Status: SHIPPED | OUTPATIENT
Start: 2023-11-29

## 2023-11-29 RX ORDER — ACYCLOVIR 400 MG/1
1 TABLET ORAL CONTINUOUS
Qty: 1 EACH | Refills: 0 | Status: SHIPPED | OUTPATIENT
Start: 2023-11-29

## 2023-11-29 RX ORDER — VENLAFAXINE HYDROCHLORIDE 75 MG/1
CAPSULE, EXTENDED RELEASE ORAL
COMMUNITY

## 2023-11-29 RX ORDER — TRAZODONE HYDROCHLORIDE 50 MG/1
TABLET ORAL
Qty: 60 TABLET | Refills: 2 | Status: SHIPPED | OUTPATIENT
Start: 2023-11-29

## 2023-11-29 RX ORDER — FLUCONAZOLE 150 MG/1
TABLET ORAL
Qty: 2 TABLET | Refills: 0 | Status: SHIPPED | OUTPATIENT
Start: 2023-11-29

## 2023-11-29 NOTE — PROGRESS NOTES
Subjective     Chief Complaint:    Chief Complaint   Patient presents with   • Annual Exam     Pt sts she thinks she has yeast infection       History of Present Illness:   Currently on insulin pump through PCP, .35 units, has to give herself 1-2 units after she eats, she notes she is not using a whole lot of insulin, glucose has been 250-300, she has trouble with transportaion and does a lot of telehealth visits, her PCP does not do those so she will see myself or other APRN  Also on long acting insulin 15 units  Reports compliance with diabetic diet  Neuropathy on zuleima  UC, follows with sherigar, severe, on biologics, several hospital stays, colectomy was recommended at once point but her A1c   HRT on vivelle due to hysterectomy   Migraines on topamax, imitrex, 4-5 migraines per month   Depression/anxiety on effexor, controlled at this time  Trouble with sleeping, at times will stay up all night, feels like she cannot turn mind off, does not drink caffiene, hydroxyzine not helping  Non drinker, non smoker,   Walks regularly  Wears seat belt    Review of Systems  Gen- No fevers, chills  CV- No chest pain, palpitations  Resp- No cough, dyspnea  GI- No N/V/D, abd pain  Neuro-No dizziness, headaches      I have reviewed and/or updated the patient's past medical, surgical, family, social history and problem list as appropriate.     Medications:    Current Outpatient Medications:   •  acetaminophen (TYLENOL) 500 MG tablet, Take 2 tablets by mouth As Needed., Disp: , Rfl:   •  Alcohol Swabs (Easy Touch Alcohol Prep Medium) 70 % pads, USE AS DIRECTED THREE TIMES A DAY, Disp: 100 each, Rfl: 10  •  Continuous Blood Gluc Transmit (Dexcom G6 Transmitter) misc, Inject 1 each under the skin into the appropriate area as directed See Admin Instructions., Disp: 1 each, Rfl: 1  •  dicyclomine (Bentyl) 10 MG capsule, Take 1 capsule by mouth 4 (Four) Times a Day Before Meals & at Bedtime., Disp: 120 capsule, Rfl: 2  •   "diphenhydrAMINE (Banophen) 25 mg capsule, TAKE 1 CAPSULE BY MOUTH EVERY 8 HOURS AS NEEDED FOR ITCHING OR ALLERGIES, Disp: 90 capsule, Rfl: 10  •  EPINEPHrine (EpiPen 2-Luis) 0.3 MG/0.3ML solution auto-injector injection, Inject 0.3 mL into the appropriate muscle as directed by prescriber. For emergent anaphylaxis, Disp: 2 each, Rfl: 0  •  estradiol (MINIVELLE, VIVELLE-DOT) 0.05 MG/24HR patch, PLACE 1 PATCH ONTO SKIN AS DIRECTED EVERY 3 DAYS, Disp: 8 patch, Rfl: 10  •  gabapentin (NEURONTIN) 800 MG tablet, TAKE 1 TABLET BY MOUTH THREE TIMES DAILY, Disp: 90 tablet, Rfl: 2  •  glucose blood test strip, ONE TOUCH VERIO FLEX TEST STRIPS TO CHECK GLUCOSE 3 TIMES DAILY FOR DM E11.9, Disp: 100 each, Rfl: 12  •  hydrOXYzine pamoate (VISTARIL) 50 MG capsule, TAKE ONE (1) CAPSULE BY MOUTH THREE TIMES A DAY AS NEEDED FOR ANXIETY, Disp: 90 capsule, Rfl: 10  •  insulin aspart (novoLOG) 100 UNIT/ML injection, ADMINISTER UP TO 12 UNITS UNDER THE SKIN THREE TIMES DAILY WITH MEALS PER SLIDING SCALE, Disp: 10 mL, Rfl: 3  •  Insulin Disposable Pump (Omnipod 5 G6 Intro, Gen 5,) kit, USE AS DIRECTED DAILY, Disp: 1 kit, Rfl: 10  •  Insulin Syringe 30G X 5/16\" 1 ML misc, 1 syringe 3 (Three) Times a Day., Disp: 100 each, Rfl: 2  •  Lancets misc, USE AS DIRECTED TO CHECK GLUCOSE 3 TIMES DAILY FOR DM E11.9, Disp: 100 each, Rfl: 3  •  Levemir FlexPen 100 UNIT/ML injection, INJECT 15 UNITS SUBCUTANEOUSLY INTO THE APPROPRIATE AREA EVERY NIGHT AS DIRECTED, Disp: 15 mL, Rfl: 10  •  promethazine (PHENERGAN) 25 MG suppository, Insert 1 suppository into the rectum Every 6 (Six) Hours As Needed for Nausea or Vomiting., Disp: 12 each, Rfl: 0  •  SUMAtriptan (Imitrex) 50 MG tablet, Take one tablet at onset of headache. May repeat dose one time in 2 hours if headache not relieved., Disp: 8 tablet, Rfl: 2  •  topiramate (TOPAMAX) 50 MG tablet, TAKE ONE (1) TABLET BY MOUTH TWICE DAILY, Disp: 60 tablet, Rfl: 10  •  Upadacitinib ER (Rinvoq) 15 MG tablet " "sustained-release 24 hour, Take 1 tablet by mouth Daily., Disp: 90 tablet, Rfl: 3  •  Continuous Blood Gluc  (Dexcom G7 ) device, Use 1 each Continuous., Disp: 1 each, Rfl: 0  •  Continuous Blood Gluc Sensor (Dexcom G7 Sensor) misc, Use 1 each Continuous., Disp: 3 each, Rfl: 11  •  fluconazole (Diflucan) 150 MG tablet, 1 po now, may repeat in 3 days if needed, Disp: 2 tablet, Rfl: 0  •  traZODone (DESYREL) 50 MG tablet, 1-2 po hs prn sleep, Disp: 60 tablet, Rfl: 2  •  venlafaxine XR (EFFEXOR-XR) 75 MG 24 hr capsule, TAKE ONE (1) CAPSULE BY MOUTH DAILY, Disp: , Rfl:     Allergies:  Allergies   Allergen Reactions   • Hydrochlorothiazide Hives   • Penicillins Hives   • Remicade [Infliximab] Anaphylaxis   • Soybean-Containing Drug Products Swelling     \"Soy sauce\"   • Statins Swelling     \"Soy sauce\"   • Xeljanz [Tofacitinib Citrate] Other (See Comments)     BLISTERS IN THROAT & ON ARMS   • Zofran [Ondansetron Hcl] Headache     migraines   • Ondansetron Mental Status Change   • Fidaxomicin Rash   • Reglan [Metoclopramide] Other (See Comments)     States feels weird when takes it       Objective     Vital Signs:   Vitals:    11/29/23 1337   BP: 100/68   Pulse: 109   SpO2: 97%   Weight: 94.3 kg (208 lb)   Height: 160 cm (63\")     Body mass index is 36.85 kg/m².    Physical Exam:    Physical Exam  Constitutional:       Appearance: Normal appearance. She is well-developed.   HENT:      Head: Normocephalic and atraumatic.      Right Ear: Ear canal and external ear normal. A middle ear effusion is present.      Left Ear: Ear canal and external ear normal. A middle ear effusion is present.      Nose: Nose normal.      Mouth/Throat:      Pharynx: Uvula midline.      Tonsils: 2+ on the right. 3+ on the left.   Eyes:      Pupils: Pupils are equal, round, and reactive to light.   Cardiovascular:      Rate and Rhythm: Normal rate and regular rhythm.      Heart sounds: Normal heart sounds. No murmur heard.     No " "friction rub. No gallop.   Pulmonary:      Effort: Pulmonary effort is normal.      Breath sounds: Normal breath sounds.   Abdominal:      General: Bowel sounds are normal.      Palpations: Abdomen is soft.      Tenderness: There is generalized abdominal tenderness.   Musculoskeletal:      Cervical back: Neck supple.   Lymphadenopathy:      Head:      Right side of head: No submental, submandibular, tonsillar, preauricular or posterior auricular adenopathy.      Left side of head: No submental, submandibular, tonsillar, preauricular or posterior auricular adenopathy.      Cervical: No cervical adenopathy.   Skin:     General: Skin is warm and dry.   Neurological:      General: No focal deficit present.      Mental Status: She is alert and oriented to person, place, and time.   Psychiatric:         Mood and Affect: Mood normal.         Behavior: Behavior normal.         Assessment / Plan     Assessment/Plan:   Problem List Items Addressed This Visit       Migraine    Relevant Medications    SUMAtriptan (Imitrex) 50 MG tablet    acetaminophen (TYLENOL) 500 MG tablet    topiramate (TOPAMAX) 50 MG tablet    Upadacitinib ER (Rinvoq) 15 MG tablet sustained-release 24 hour    gabapentin (NEURONTIN) 800 MG tablet    venlafaxine XR (EFFEXOR-XR) 75 MG 24 hr capsule    traZODone (DESYREL) 50 MG tablet    Depression with anxiety    Relevant Medications    hydrOXYzine pamoate (VISTARIL) 50 MG capsule    venlafaxine XR (EFFEXOR-XR) 75 MG 24 hr capsule    traZODone (DESYREL) 50 MG tablet    Type 2 diabetes mellitus treated with insulin    Relevant Medications    Insulin Syringe 30G X 5/16\" 1 ML misc    insulin aspart (novoLOG) 100 UNIT/ML injection    Insulin Disposable Pump (Omnipod 5 G6 Intro, Gen 5,) kit    Levemir FlexPen 100 UNIT/ML injection    Continuous Blood Gluc Transmit (Dexcom G6 Transmitter) misc    Continuous Blood Gluc  (Dexcom G7 ) device    Continuous Blood Gluc Sensor (Dexcom G7 Sensor) misc    " Other Relevant Orders    Ambulatory Referral to Endocrinology    MicroAlbumin, Urine, Random - Urine, Clean Catch    Comprehensive Metabolic Panel    CBC Auto Differential    TSH Rfx On Abnormal To Free T4    Lipid Panel    Hemoglobin A1c    History of total abdominal hysterectomy    Ulcerative pancolitis without complication    Overview     Added automatically from request for surgery [id number]         Relevant Medications    dicyclomine (Bentyl) 10 MG capsule    Upadacitinib ER (Rinvoq) 15 MG tablet sustained-release 24 hour    Other Relevant Orders    Ambulatory Referral to Gastroenterology     Other Visit Diagnoses       Annual physical exam    -  Primary    Itching        Relevant Orders    NuSwab VG+ - Swab, Vagina    Vaginal discharge        Relevant Medications    fluconazole (Diflucan) 150 MG tablet    Other Relevant Orders    NuSwab VG+ - Swab, Vagina    Hormone replacement therapy (HRT)        Fatigue, unspecified type        Relevant Orders    Vitamin D,25-Hydroxy    Vitamin B12    Folate    Insomnia, unspecified type        Relevant Medications    traZODone (DESYREL) 50 MG tablet    Encounter for hepatitis C screening test for low risk patient        Relevant Orders    Hepatitis C Antibody          -- physical performed today, encouraged clean eating, whole foods, limit processed and sugary foods, exercise 150min/week as tolerated  -- labs  -- will try to get her in with endo at  as she has transportation issues  -- continue current meds  -- add trazodone  -- empiric diflucan  -- csa and uds today  Controlled Substance  Prescription  As part of this patient's treatment plan I am prescribing controlled substances. The patient has been made aware of appropriate use of such medications, including potential risk of somnolence, limited ability to drive and /or work safely, and potential for dependence or overdose. It has also been made clear that these medications are for use by this patient only, without  concomitant use of alcohol or other substances unless prescribed. Mauricio reviewed and appropriate       Discussed plan of care in detail with pt today; pt verb understanding and agrees.    Follow up:  4 months, in person with lisette or telehealth with myself or carter    Electronically signed by EMELYN Seymour   11/29/2023 13:56 EST      Please note that portions of this note were completed with a voice recognition program.

## 2023-11-30 ENCOUNTER — TELEPHONE (OUTPATIENT)
Dept: INTERNAL MEDICINE | Facility: CLINIC | Age: 38
End: 2023-11-30
Payer: COMMERCIAL

## 2023-11-30 LAB
25(OH)D3+25(OH)D2 SERPL-MCNC: 11.8 NG/ML (ref 30–100)
ALBUMIN SERPL-MCNC: 4.6 G/DL (ref 3.5–5.2)
ALBUMIN/GLOB SERPL: 1.4 G/DL
ALP SERPL-CCNC: 173 U/L (ref 39–117)
ALT SERPL-CCNC: 43 U/L (ref 1–33)
AST SERPL-CCNC: 19 U/L (ref 1–32)
BASOPHILS # BLD AUTO: ABNORMAL 10*3/UL
BASOPHILS # BLD MANUAL: 0.12 10*3/MM3 (ref 0–0.2)
BASOPHILS NFR BLD MANUAL: 1 % (ref 0–1.5)
BILIRUB SERPL-MCNC: 0.2 MG/DL (ref 0–1.2)
BUN SERPL-MCNC: 9 MG/DL (ref 6–20)
BUN/CREAT SERPL: 17.6 (ref 7–25)
CALCIUM SERPL-MCNC: 9.8 MG/DL (ref 8.6–10.5)
CHLORIDE SERPL-SCNC: 101 MMOL/L (ref 98–107)
CHOLEST SERPL-MCNC: 296 MG/DL (ref 0–200)
CO2 SERPL-SCNC: 18.7 MMOL/L (ref 22–29)
CREAT SERPL-MCNC: 0.51 MG/DL (ref 0.57–1)
DIFFERENTIAL COMMENT: ABNORMAL
EGFRCR SERPLBLD CKD-EPI 2021: 123.5 ML/MIN/1.73
EOSINOPHIL # BLD AUTO: ABNORMAL 10*3/UL
EOSINOPHIL # BLD MANUAL: 0.25 10*3/MM3 (ref 0–0.4)
EOSINOPHIL NFR BLD AUTO: ABNORMAL %
EOSINOPHIL NFR BLD MANUAL: 2 % (ref 0.3–6.2)
ERYTHROCYTE [DISTWIDTH] IN BLOOD BY AUTOMATED COUNT: 13.4 % (ref 12.3–15.4)
FOLATE SERPL-MCNC: >20 NG/ML (ref 4.78–24.2)
GLOBULIN SER CALC-MCNC: 3.3 GM/DL
GLUCOSE SERPL-MCNC: 421 MG/DL (ref 65–99)
HBA1C MFR BLD: 11.2 % (ref 4.8–5.6)
HCT VFR BLD AUTO: 40 % (ref 34–46.6)
HCV IGG SERPL QL IA: NON REACTIVE
HDLC SERPL-MCNC: 31 MG/DL (ref 40–60)
HGB BLD-MCNC: 12.6 G/DL (ref 12–15.9)
LDLC SERPL CALC-MCNC: 142 MG/DL (ref 0–100)
LYMPHOCYTES # BLD AUTO: ABNORMAL 10*3/UL
LYMPHOCYTES # BLD MANUAL: 3.98 10*3/MM3 (ref 0.7–3.1)
LYMPHOCYTES NFR BLD AUTO: ABNORMAL %
LYMPHOCYTES NFR BLD MANUAL: 32 % (ref 19.6–45.3)
MCH RBC QN AUTO: 25.3 PG (ref 26.6–33)
MCHC RBC AUTO-ENTMCNC: 31.5 G/DL (ref 31.5–35.7)
MCV RBC AUTO: 80.3 FL (ref 79–97)
MICROALBUMIN UR-MCNC: 4.1 UG/ML
MONOCYTES # BLD MANUAL: 0.37 10*3/MM3 (ref 0.1–0.9)
MONOCYTES NFR BLD AUTO: ABNORMAL %
MONOCYTES NFR BLD MANUAL: 3 % (ref 5–12)
NEUTROPHILS # BLD MANUAL: 7.72 10*3/MM3 (ref 1.7–7)
NEUTROPHILS NFR BLD AUTO: ABNORMAL %
NEUTROPHILS NFR BLD MANUAL: 62 % (ref 42.7–76)
PLATELET # BLD AUTO: 396 10*3/MM3 (ref 140–450)
PLATELET BLD QL SMEAR: ABNORMAL
POTASSIUM SERPL-SCNC: 4.3 MMOL/L (ref 3.5–5.2)
PROT SERPL-MCNC: 7.9 G/DL (ref 6–8.5)
RBC # BLD AUTO: 4.98 10*6/MM3 (ref 3.77–5.28)
RBC MORPH BLD: ABNORMAL
SODIUM SERPL-SCNC: 133 MMOL/L (ref 136–145)
TRIGL SERPL-MCNC: 639 MG/DL (ref 0–150)
TSH SERPL DL<=0.005 MIU/L-ACNC: 1.22 UIU/ML (ref 0.27–4.2)
VIT B12 SERPL-MCNC: 419 PG/ML (ref 211–946)
VLDLC SERPL CALC-MCNC: 123 MG/DL (ref 5–40)
WBC # BLD AUTO: 12.45 10*3/MM3 (ref 3.4–10.8)

## 2023-11-30 NOTE — TELEPHONE ENCOUNTER
Lab called with critical glucose of 421.  With the patient's A1C being elevated above 11, this appears to be a chronic glucose elevation.  This was felt to be appropriate for PCP to address.

## 2023-12-01 LAB
A VAGINAE DNA VAG QL NAA+PROBE: ABNORMAL SCORE
BVAB2 DNA VAG QL NAA+PROBE: ABNORMAL SCORE
C ALBICANS DNA VAG QL NAA+PROBE: POSITIVE
C GLABRATA DNA VAG QL NAA+PROBE: NEGATIVE
C TRACH DNA VAG QL NAA+PROBE: NEGATIVE
MEGA1 DNA VAG QL NAA+PROBE: ABNORMAL SCORE
N GONORRHOEA DNA VAG QL NAA+PROBE: NEGATIVE
T VAGINALIS DNA VAG QL NAA+PROBE: NEGATIVE

## 2023-12-04 DIAGNOSIS — E55.9 VITAMIN D DEFICIENCY: Primary | ICD-10-CM

## 2023-12-04 RX ORDER — ERGOCALCIFEROL 1.25 MG/1
50000 CAPSULE ORAL
Qty: 12 CAPSULE | Refills: 0 | Status: SHIPPED | OUTPATIENT
Start: 2023-12-04

## 2023-12-04 RX ORDER — FENOFIBRATE 145 MG/1
145 TABLET, COATED ORAL DAILY
Qty: 90 TABLET | Refills: 0 | Status: SHIPPED | OUTPATIENT
Start: 2023-12-04

## 2023-12-04 NOTE — PROGRESS NOTES
Labs show several abnormalities,   1, vitamin d is very low. I am sending rx to pharmacy, take 50,000 once WEEKLY  2, triglycerides are very elevated. I am sending tricor to help. You are at risk for chronic pancreatitis with elevations over 500  3, A1c was very poorly controlled, again I recommend endocrinology as we discussed  4, you had a yeast infection but I already sent diflucan so that should have taken care of that

## 2023-12-05 LAB — DRUGS UR: NORMAL

## 2023-12-06 ENCOUNTER — TELEMEDICINE (OUTPATIENT)
Dept: FAMILY MEDICINE CLINIC | Facility: CLINIC | Age: 38
End: 2023-12-06
Payer: COMMERCIAL

## 2023-12-06 DIAGNOSIS — R53.83 FATIGUE, UNSPECIFIED TYPE: ICD-10-CM

## 2023-12-06 DIAGNOSIS — R53.1 WEAKNESS: ICD-10-CM

## 2023-12-06 DIAGNOSIS — Z79.4 TYPE 2 DIABETES MELLITUS WITH HYPERGLYCEMIA, WITH LONG-TERM CURRENT USE OF INSULIN: Primary | ICD-10-CM

## 2023-12-06 DIAGNOSIS — E11.65 TYPE 2 DIABETES MELLITUS WITH HYPERGLYCEMIA, WITH LONG-TERM CURRENT USE OF INSULIN: Primary | ICD-10-CM

## 2023-12-06 PROCEDURE — 99214 OFFICE O/P EST MOD 30 MIN: CPT | Performed by: NURSE PRACTITIONER

## 2023-12-06 PROCEDURE — 1160F RVW MEDS BY RX/DR IN RCRD: CPT | Performed by: NURSE PRACTITIONER

## 2023-12-06 PROCEDURE — 1159F MED LIST DOCD IN RCRD: CPT | Performed by: NURSE PRACTITIONER

## 2023-12-06 PROCEDURE — 3046F HEMOGLOBIN A1C LEVEL >9.0%: CPT | Performed by: NURSE PRACTITIONER

## 2023-12-06 NOTE — PROGRESS NOTES
Subjective     You have chosen to receive care through a telehealth visit.  Do you consent to use a video/audio connection for your medical care today? Yes  I am located in the clinic  She is located at home    Chief Complaint:  hyperglycemia    History of Present Illness:   Notes worsening fatigue, sick x 2-3 days, worsening, stomach pain, she has diarrhea, going 6 times per day, does have UC, glucose today has been over 400. She is nauseated. No soa or cough.   Now seeing endo for her diabetes, insulin was increased, note reviewed  She has eat some mashed potatoes today   Notes breath smells different       Review of Systems  Gen- No fevers, chills  CV- No chest pain, palpitations  Resp- No cough, dyspnea  Neuro- + dizziness, + headaches      I have reviewed and/or updated the patient's past medical, surgical, family, social history and problem list as appropriate.     Medications:    Current Outpatient Medications:     acetaminophen (TYLENOL) 500 MG tablet, Take 2 tablets by mouth As Needed., Disp: , Rfl:     Alcohol Swabs (Easy Touch Alcohol Prep Medium) 70 % pads, USE AS DIRECTED THREE TIMES A DAY, Disp: 100 each, Rfl: 10    Continuous Blood Gluc  (Dexcom G7 ) device, Use 1 each Continuous., Disp: 1 each, Rfl: 0    Continuous Blood Gluc Sensor (Dexcom G7 Sensor) misc, Use 1 each Continuous., Disp: 3 each, Rfl: 11    Continuous Blood Gluc Transmit (Dexcom G6 Transmitter) misc, Inject 1 each under the skin into the appropriate area as directed See Admin Instructions., Disp: 1 each, Rfl: 1    dicyclomine (Bentyl) 10 MG capsule, Take 1 capsule by mouth 4 (Four) Times a Day Before Meals & at Bedtime., Disp: 120 capsule, Rfl: 2    diphenhydrAMINE (Banophen) 25 mg capsule, TAKE 1 CAPSULE BY MOUTH EVERY 8 HOURS AS NEEDED FOR ITCHING OR ALLERGIES, Disp: 90 capsule, Rfl: 10    EPINEPHrine (EpiPen 2-Luis) 0.3 MG/0.3ML solution auto-injector injection, Inject 0.3 mL into the appropriate muscle as  "directed by prescriber. For emergent anaphylaxis, Disp: 2 each, Rfl: 0    estradiol (MINIVELLE, VIVELLE-DOT) 0.05 MG/24HR patch, PLACE 1 PATCH ONTO SKIN AS DIRECTED EVERY 3 DAYS, Disp: 8 patch, Rfl: 10    fenofibrate (Tricor) 145 MG tablet, Take 1 tablet by mouth Daily., Disp: 90 tablet, Rfl: 0    fluconazole (Diflucan) 150 MG tablet, 1 po now, may repeat in 3 days if needed, Disp: 2 tablet, Rfl: 0    gabapentin (NEURONTIN) 800 MG tablet, TAKE 1 TABLET BY MOUTH THREE TIMES DAILY, Disp: 90 tablet, Rfl: 2    glucose blood test strip, ONE TOUCH VERIO FLEX TEST STRIPS TO CHECK GLUCOSE 3 TIMES DAILY FOR DM E11.9, Disp: 100 each, Rfl: 12    hydrOXYzine pamoate (VISTARIL) 50 MG capsule, TAKE ONE (1) CAPSULE BY MOUTH THREE TIMES A DAY AS NEEDED FOR ANXIETY, Disp: 90 capsule, Rfl: 10    insulin aspart (novoLOG) 100 UNIT/ML injection, ADMINISTER UP TO 12 UNITS UNDER THE SKIN THREE TIMES DAILY WITH MEALS PER SLIDING SCALE, Disp: 10 mL, Rfl: 3    Insulin Disposable Pump (Omnipod 5 G6 Intro, Gen 5,) kit, USE AS DIRECTED DAILY, Disp: 1 kit, Rfl: 10    Insulin Syringe 30G X 5/16\" 1 ML misc, 1 syringe 3 (Three) Times a Day., Disp: 100 each, Rfl: 2    Lancets misc, USE AS DIRECTED TO CHECK GLUCOSE 3 TIMES DAILY FOR DM E11.9, Disp: 100 each, Rfl: 3    Levemir FlexPen 100 UNIT/ML injection, INJECT 15 UNITS SUBCUTANEOUSLY INTO THE APPROPRIATE AREA EVERY NIGHT AS DIRECTED, Disp: 15 mL, Rfl: 10    promethazine (PHENERGAN) 25 MG suppository, Insert 1 suppository into the rectum Every 6 (Six) Hours As Needed for Nausea or Vomiting., Disp: 12 each, Rfl: 0    SUMAtriptan (Imitrex) 50 MG tablet, Take one tablet at onset of headache. May repeat dose one time in 2 hours if headache not relieved., Disp: 8 tablet, Rfl: 2    topiramate (TOPAMAX) 50 MG tablet, TAKE ONE (1) TABLET BY MOUTH TWICE DAILY, Disp: 60 tablet, Rfl: 10    traZODone (DESYREL) 50 MG tablet, 1-2 po hs prn sleep, Disp: 60 tablet, Rfl: 2    Upadacitinib ER (Rinvoq) 15 MG tablet " "sustained-release 24 hour, Take 1 tablet by mouth Daily., Disp: 90 tablet, Rfl: 3    venlafaxine XR (EFFEXOR-XR) 75 MG 24 hr capsule, TAKE ONE (1) CAPSULE BY MOUTH DAILY, Disp: , Rfl:     vitamin D (ERGOCALCIFEROL) 1.25 MG (10784 UT) capsule capsule, Take 1 capsule by mouth Every 7 (Seven) Days., Disp: 12 capsule, Rfl: 0    Allergies:  Allergies   Allergen Reactions    Hydrochlorothiazide Hives    Penicillins Hives    Remicade [Infliximab] Anaphylaxis    Soybean-Containing Drug Products Swelling     \"Soy sauce\"    Statins Swelling     \"Soy sauce\"    Xeljanz [Tofacitinib Citrate] Other (See Comments)     BLISTERS IN THROAT & ON ARMS    Zofran [Ondansetron Hcl] Headache     migraines    Ondansetron Mental Status Change    Fidaxomicin Rash    Reglan [Metoclopramide] Other (See Comments)     States feels weird when takes it       Objective     Vital Signs: There were no vitals filed for this visit.  There is no height or weight on file to calculate BMI.    Physical Exam:  Gen-no acute distress, ill appearing, video visit  Resp- normal WOB, on RA  Neuro-A&Ox3, face symmetrical, speech clear  Skin-no overt rashes noted  Psych-appropriate mood, cooperative         Assessment / Plan     Assessment/Plan:   Problem List Items Addressed This Visit    None  Visit Diagnoses       Type 2 diabetes mellitus with hyperglycemia, with long-term current use of insulin    -  Primary    Fatigue, unspecified type        Weakness                -- glucose very high, she does have some symptoms of DKA, exam limited do to telehealth. I have encouraged her to hydrate and monitor glucose. If glucose remains elevated and she remains feeling poorly over the next 2 hours she needs to go to ED for eval    Discussed plan of care in detail with pt today; pt verb understanding and agrees.    Follow up:  As needed    Total Time of Encounter 25 minutes    Electronically signed by EMELYN Seymour   12/06/2023 14:08 EST      Please note that " portions of this note were completed with a voice recognition program.

## 2023-12-29 DIAGNOSIS — Z79.4 TYPE 2 DIABETES MELLITUS TREATED WITH INSULIN: ICD-10-CM

## 2023-12-29 DIAGNOSIS — E11.9 TYPE 2 DIABETES MELLITUS TREATED WITH INSULIN: ICD-10-CM

## 2023-12-29 RX ORDER — INSULIN DETEMIR 100 [IU]/ML
15 INJECTION, SOLUTION SUBCUTANEOUS NIGHTLY
Qty: 15 ML | Refills: 10 | Status: SHIPPED | OUTPATIENT
Start: 2023-12-29

## 2024-01-05 ENCOUNTER — TELEPHONE (OUTPATIENT)
Dept: FAMILY MEDICINE CLINIC | Facility: CLINIC | Age: 39
End: 2024-01-05

## 2024-01-05 DIAGNOSIS — N89.8 VAGINAL DISCHARGE: ICD-10-CM

## 2024-01-05 NOTE — TELEPHONE ENCOUNTER
Pharmacy Name: [facility], [place] - [address] - [phone] [facility] [phone] FX     Reference Number (if applicable): [id number]    Pharmacy representative name: RAJ    Pharmacy representative phone number: [phone]     What medication are you calling in regards to: HUMBERTO    Who is the provider that prescribed the medication: DR DE LA VEGA    What question does the pharmacy have: PHARMACY ADVISED THAT THIS MEDICATION HAS BEEN DISCONTINUED BY THE  AND WILL BE NEEDING A NEW ALTERNATIVE PRESCRIPTION CALLED IN.    PHARMACY ESTIMATES THAT THE PATIENT SHOULD HAVE AT LEAST A WEEK OR SO REMAINING FROM THEIR LAST FILL.    PLEASE ADVISE

## 2024-01-09 ENCOUNTER — TELEPHONE (OUTPATIENT)
Dept: FAMILY MEDICINE CLINIC | Facility: CLINIC | Age: 39
End: 2024-01-09
Payer: COMMERCIAL

## 2024-01-09 NOTE — TELEPHONE ENCOUNTER
Caller: [employer]    Relationship: Other    Best call back number: [phone]    What is the best time to reach you: ANYTIME, OK TO LEAVE VOICEMAIL.    Who are you requesting to speak with (clinical staff, provider,  specific staff member): CLINICAL STAFF    Do you know the name of the person who called: TRAN    What was the call regarding: TRAN ADVISES THAT PATIENT WOULD LIKE TO HAVE GLUCERNA SHAKES CALLED IN TO [facility]CRIPTION [facility]. PLEASE FAX TO:  [phone]  PRESCRIPTION PAD ATTN MAGALIE  PLEASE INCLUDE DEMOGRAPHICS PAGE, PRESCRIPTION WITH QUANTITY PER DAY, AND CLINICAL NOTES SUPPORTING WHY SHE NEEDS THIS NUTRITIONAL SHAKE DUE TO ORAL SURGERY.

## 2024-01-11 RX ORDER — FLUCONAZOLE 150 MG/1
TABLET ORAL
Qty: 2 TABLET | Refills: 10 | OUTPATIENT
Start: 2024-01-11

## 2024-01-19 DIAGNOSIS — E11.9 TYPE 2 DIABETES MELLITUS TREATED WITH INSULIN: ICD-10-CM

## 2024-01-19 DIAGNOSIS — E11.44 DIABETIC AMYOTROPHY ASSOCIATED WITH TYPE 2 DIABETES MELLITUS: ICD-10-CM

## 2024-01-19 DIAGNOSIS — Z79.4 TYPE 2 DIABETES MELLITUS TREATED WITH INSULIN: ICD-10-CM

## 2024-01-22 RX ORDER — GABAPENTIN 800 MG/1
TABLET ORAL
Qty: 90 TABLET | Refills: 10 | Status: SHIPPED | OUTPATIENT
Start: 2024-01-22

## 2024-01-22 RX ORDER — PROCHLORPERAZINE 25 MG/1
SUPPOSITORY RECTAL
Qty: 3 EACH | Refills: 10 | Status: SHIPPED | OUTPATIENT
Start: 2024-01-22

## 2024-01-22 NOTE — TELEPHONE ENCOUNTER
Rx Refill Note  Requested Prescriptions     Pending Prescriptions Disp Refills    gabapentin (NEURONTIN) 800 MG tablet [Pharmacy Med Name: GABAPENTIN 800 MG TABS 800 Tablet] 90 tablet 10     Sig: TAKE 1 TABLET BY MOUTH THREE TIMES DAILY      Last office visit with prescribing clinician: Visit date not found   Last telemedicine visit with prescribing clinician: 12/6/2023   Next office visit with prescribing clinician: Visit date not found                         Would you like a call back once the refill request has been completed: [] Yes [] No    If the office needs to give you a call back, can they leave a voicemail: [] Yes [] No    Thais Gutierrez MA  01/22/24, 14:35 EST

## 2024-01-24 NOTE — TELEPHONE ENCOUNTER
MYRA WITH [facility] CALLED TO CHECK STATUS FOR RX LEVEMIR DISCONTINUATION, STATED THAT ITS AROUND THE TIME FOR PT TO BE CALLING IN FOR REFILL.    PLEASE ADVISE.CALL BACK:[phone]

## 2024-01-31 NOTE — TELEPHONE ENCOUNTER
JULISSA MATT WROTE SCRIPT. I FAXED THE SCRIPT, DEMOGRAPHICS & LAST OFFICE NOTE [phone], ATTN: MAGALIE

## 2024-01-31 NOTE — TELEPHONE ENCOUNTER
"TRAN WITH [facility] CALLED AGAIN ASKING FOR A SCRIPT TO BE FAXED TO \"[facility]\" FOR GLUCERNA SHAKES, AND NEEDS TO SAY UP TO 3 PER DAY (WHICH SHE SAYS IS THE NORM), AND IF HE WANTS TO INCLUDE ANY REFILLS. PATIENT JUST HAD HER 2ND ORAL SURGERY, AND DOES NOT HAVE ANY TEETH. THEY ARE LOOKING AT POSSIBLY GETTING DENTURES DOWN THE ROAD, BUT NOT SURE WHEN. I TRIED TO MAKE FOLLOW UP APPT WITH PCP, BUT SHE SAYS SHE IS UNABLE TO AT TRHIS TIME. SHE STATES THAT SHE WAS SEEN NOT TOO LONG AGO, (12/6 VIDEO VISIT WITH JULISSA).       INFO FROM PREVIOUS PHONE MESSAGE:    PLEASE FAX TO:  [phone]  PRESCRIPTION PAD ATTJASWANT MAGALIE  PLEASE INCLUDE DEMOGRAPHICS PAGE, PRESCRIPTION WITH QUANTITY PER DAY, AND CLINICAL NOTES SUPPORTING WHY SHE NEEDS THIS NUTRITIONAL SHAKE DUE TO ORAL SURGERY.              "

## 2024-02-01 DIAGNOSIS — E55.9 VITAMIN D DEFICIENCY: ICD-10-CM

## 2024-02-02 RX ORDER — ERGOCALCIFEROL 1.25 MG/1
50000 CAPSULE ORAL
Qty: 5 CAPSULE | Refills: 10 | Status: SHIPPED | OUTPATIENT
Start: 2024-02-02

## 2024-02-04 DIAGNOSIS — Z79.4 TYPE 2 DIABETES MELLITUS TREATED WITH INSULIN: ICD-10-CM

## 2024-02-04 DIAGNOSIS — E11.9 TYPE 2 DIABETES MELLITUS TREATED WITH INSULIN: ICD-10-CM

## 2024-02-07 RX ORDER — PROCHLORPERAZINE 25 MG/1
SUPPOSITORY RECTAL
Qty: 1 EACH | Refills: 10 | Status: SHIPPED | OUTPATIENT
Start: 2024-02-07

## 2024-02-11 NOTE — TELEPHONE ENCOUNTER
[facility]  [facility] [place]  CONSULT FOLLOW-UP         SUBJECTIVE     Reason for Consult: GIB    Interval Events/Subjective:   - NAOE, pt afebrile and HDS  - Hgb stable @ 7.8 (from 8.1). No e/o GI bleeding    ROS: Complete ROS obtained, negative unless otherwise indicated above.     Medications:  [Held by provider] amLODIPine, 10 mg, oral, Daily  betamethasone (augmented), 1 Application, Topical, Daily  brimonidine, 1 drop, Both Eyes, BID  dicyclomine, 20 mg, oral, TID  famotidine, 20 mg, oral, Nightly  insulin lispro, 0-5 Units, subcutaneous, TID with meals  latanoprost, 1 drop, Both Eyes, q AM  loratadine, 10 mg, oral, Daily  [Held by provider] losartan, 25 mg, oral, Nightly  metoprolol succinate XL, 200 mg, oral, Daily  pantoprazole, 40 mg, intravenous, BID  polyethylene glycol, 17 g, oral, Daily  rosuvastatin, 20 mg, oral, Nightly  simethicone, 80 mg, oral, q6h  timolol, 1 drop, Both Eyes, BID  tiotropium, 2 Inhalation, inhalation, Daily      PRN medications: acetaminophen, albuterol, benzonatate, dextrose 10 % in water (D10W), dextrose, glucagon, heparin, ondansetron      EXAM     Physical Exam   Vitals:    02/10/24 1932   BP: 127/52   Pulse: 78   Resp: 16   Temp: 36.6 °C (97.9 °F)   SpO2: 100%         General: NAD, AA&O x 3  Eyes: EOMI, PERRLA  ENT: MMM  Heart: RRR  Lungs: No respiratory distress  Abdomen: Soft, non tender, non distended  Skin: No jaundice   Neuro: Appropriately responds to questions/commands         DATA                                                                            Labs     Lab Results   Component Value Date    WBC 9.8 02/10/2024    HGB 7.8 (L) 02/10/2024    HCT 27.0 (L) 02/10/2024    MCV 78 (L) 02/10/2024     02/10/2024     Lab Results   Component Value Date    GLUCOSE 179 (H) 02/10/2024    CALCIUM 9.3 02/10/2024     02/10/2024    K 4.3 02/10/2024    CO2 25 02/10/2024     (H) 02/10/2024    BUN 17 02/10/2024     LOV 3/11/21    FOV 5/11/21      UDS 3/11/21    CSA 10/12/20   CREATININE 1.46 (H) 02/10/2024     Lab Results   Component Value Date    ALT 26 02/05/2024    AST 22 02/05/2024    GGT 33 12/23/2020    ALKPHOS 140 (H) 02/05/2024    BILITOT 0.4 02/05/2024     Lab Results   Component Value Date    INR 1.2 (H) 02/08/2024    INR 1.0 02/08/2024    INR 1.1 02/05/2024    PROTIME 13.2 (H) 02/08/2024    PROTIME 11.5 02/08/2024    PROTIME 12.4 02/05/2024                                                                                  Imaging     === 02/05/24 ===    CT ANGIO CHEST FOR PULMONARY EMBOLISM    - Impression -  1. There is an acute saddle type pulmonary embolus extending to  bilateral upper and right lower lobar and segmental branches .  Calculated RV to LV ratio of 1.0.  2. Main pulmonary artery is dilated up to 3.5 cm which can be seen  with pulmonary arterial hypertension.  3. Large hiatal hernia with significant portion of the stomach  intrathoracic in location. There is adjacent compressive atelectasis.  4. 3.8 cm hypodense lesion in the left upper quadrant is incompletely  imaged, likely arising from the left kidney. This measures higher  than simple fluid attenuation. This can be further evaluated with  nonemergent renal ultrasound.  5. Additional findings as described above.      MACRO:  Elle Ceballos discussed the significance and urgency of this critical  finding by telephone with  Dr. Zamora On 2/6/2024 at 2:22 am.  (**-RCF-**) Findings:  See findings.    Signed by: Elle eCballos 2/6/2024 2:26 AM  Dictation workstation:   [id number]                                                                         GI Procedures           ASSESSMENT / PLAN     Assessment and Recommendations:     Vania Andrews is a 68 y.o. 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 GI is consulted for dark stools and down-trending H/H.     # Dark stool   # Anemia  :: Developed abdominal pain, nausea dark stools during current admission for PE and on heparin  :: Hbg 9.2 (2/8 am) -> 7.8 (2/8 pm) -> 7.2 (2/9 am) -> 7.5 (2/9 pm)  :: BUN 16 (2/6) -> 42 (2/8) -> 24 (2/9)  :: Recent past admission for SOB and anemia requiring blood transfusion. Not scoped at that time with plan for capsule endoscopy  :: EGD 1/2019 showed 9 cm hiatal hernia and gastritis, small gastric lipoma. Underwent capsule endoscopy done inpatient in 2019 and was negative (no official report available).  EGD 3/2023 with 6 cm hiatal hernia, multiple gastric fundic polyps. No celiac diease. Colonoscopy 12/2011 with 5mm TA/internal hemorhoids, 1/2019 with 2mm polyp (pathology report non available).   - New dark stools following anticoagulation with drop in Hgb and rise in BUN may be due to upper GI bleed, however BUN and Hgb stabilizing now.    EGD (2/9)   Tortuous esophagus  Regular Z-line 29 cm from the incisors  Large 11 cm hiatal hernia  Multiple sessile polyps measuring smaller than 5 mm in the hiatal hernia; no bleeding was identified. Appearances consistent with fundic gland polyps as noted on recent EGD.  Linear erosion in the body of the stomach; no bleeding was identified;  Scattered patchy erythema seen in body of stomach. No evidence of bleeding  The duodenal bulb and 2nd part of the duodenum appeared normal.    Recommendations:  - Recommend outpatient Capsule endoscopy (scheduled)   - Continue heparin and monitor for ongoing bleeding     NICOLE per primary team     ------------------------------------------------------------------------  Perry Danielson  MD  Gastroenterology Fellow  After 5PM and on Weekends, please page on-call fellow.    To be discussed with service attending Dr. Bower  Final recommendations pending attending attestation.

## 2024-02-20 DIAGNOSIS — G47.00 INSOMNIA, UNSPECIFIED TYPE: ICD-10-CM

## 2024-02-21 RX ORDER — FENOFIBRATE 145 MG/1
145 TABLET, COATED ORAL DAILY
Qty: 30 TABLET | Refills: 10 | Status: SHIPPED | OUTPATIENT
Start: 2024-02-21

## 2024-02-21 RX ORDER — TRAZODONE HYDROCHLORIDE 50 MG/1
TABLET ORAL
Qty: 60 TABLET | Refills: 10 | Status: SHIPPED | OUTPATIENT
Start: 2024-02-21

## 2024-03-05 ENCOUNTER — TELEPHONE (OUTPATIENT)
Dept: FAMILY MEDICINE CLINIC | Facility: CLINIC | Age: 39
End: 2024-03-05
Payer: COMMERCIAL

## 2024-03-20 DIAGNOSIS — F41.8 DEPRESSION WITH ANXIETY: ICD-10-CM

## 2024-03-21 RX ORDER — HYDROXYZINE PAMOATE 50 MG/1
CAPSULE ORAL
Qty: 90 CAPSULE | Refills: 10 | Status: SHIPPED | OUTPATIENT
Start: 2024-03-21

## 2024-04-18 DIAGNOSIS — G43.101 MIGRAINE WITH AURA AND WITH STATUS MIGRAINOSUS, NOT INTRACTABLE: ICD-10-CM

## 2024-04-19 RX ORDER — TOPIRAMATE 50 MG/1
50 TABLET, FILM COATED ORAL 2 TIMES DAILY
Qty: 60 TABLET | Refills: 10 | Status: SHIPPED | OUTPATIENT
Start: 2024-04-19

## 2024-05-03 ENCOUNTER — TELEMEDICINE (OUTPATIENT)
Dept: FAMILY MEDICINE CLINIC | Facility: CLINIC | Age: 39
End: 2024-05-03
Payer: COMMERCIAL

## 2024-05-03 DIAGNOSIS — K51.80 OTHER ULCERATIVE COLITIS WITHOUT COMPLICATION: Primary | ICD-10-CM

## 2024-05-03 PROCEDURE — 99214 OFFICE O/P EST MOD 30 MIN: CPT | Performed by: NURSE PRACTITIONER

## 2024-05-03 PROCEDURE — 1125F AMNT PAIN NOTED PAIN PRSNT: CPT | Performed by: NURSE PRACTITIONER

## 2024-05-03 PROCEDURE — 1160F RVW MEDS BY RX/DR IN RCRD: CPT | Performed by: NURSE PRACTITIONER

## 2024-05-03 PROCEDURE — 1159F MED LIST DOCD IN RCRD: CPT | Performed by: NURSE PRACTITIONER

## 2024-05-03 RX ORDER — PREDNISONE 10 MG/1
TABLET ORAL
Qty: 15 TABLET | Refills: 0 | Status: SHIPPED | OUTPATIENT
Start: 2024-05-03

## 2024-05-03 NOTE — PROGRESS NOTES
"                      Established Patient        Chief Complaint: No chief complaint on file.      You have chosen to receive care through a telephone visit. Do you consent to use a telephone visit for your medical care today? Yes    History of Present Illness:    Thais Hobson is a 39 y.o. female who presents today via [facility] encounter. Patient is being seen today for follow up of epigastric abdominal pain. Patient was seen at [facility] 2 days ago for c/o upper abdominal pain that radiates to her back, vomiting. Onset 5 days ago. History of pancreatitis. CT abdomen noted fatty infiltration of sigmoid colon as well as umbilical hernia containing omental fat.     Reports persistent epigastric pain. Unable to keep food down. Promethazine not helping. Also c/o indigestion.     At the time of encounter today, patient is located in her home and provider is located in [facility] office in [place], [place].   Subjective     The following portions of the patient's history were reviewed and updated as appropriate: allergies, current medications, past family history, past medical history, past social history, past surgical history and problem list.    ALLERGIES  Allergies   Allergen Reactions    Hydrochlorothiazide Hives    Penicillins Hives    Remicade [Infliximab] Anaphylaxis    Soybean-Containing Drug Products Swelling     \"Soy sauce\"    Statins Swelling     \"Soy sauce\"    Xeljanz [Tofacitinib Citrate] Other (See Comments)     BLISTERS IN THROAT & ON ARMS    Zofran [Ondansetron Hcl] Headache     migraines    Ondansetron Mental Status Change    Fidaxomicin Rash    Reglan [Metoclopramide] Other (See Comments)     States feels weird when takes it       ROS  Review of Systems   Constitutional:  Positive for appetite change.   Gastrointestinal:  Positive for abdominal pain, nausea and vomiting.        Indigestion   Musculoskeletal:  Positive for back pain.       Objective     Vital Signs:   LMP  (LMP Unknown)         "     Physical Exam   Physical Exam  Constitutional:       Appearance: Normal appearance.   Pulmonary:      Effort: Pulmonary effort is normal.   Neurological:      Mental Status: She is alert and oriented to person, place, and time.   Psychiatric:         Mood and Affect: Mood normal.         Behavior: Behavior normal.         Assessment and Plan      Assessment/Plan:   Diagnoses and all orders for this visit:    1. Other ulcerative colitis without complication (Primary)  -     mesalamine (PENTASA) 250 MG CR capsule; Take 1 capsule by mouth 4 (Four) Times a Day.  Dispense: 120 capsule; Refill: 2  -     predniSONE (DELTASONE) 10 MG tablet; Take 5 tablets today, decrease dose by 1 tablet each day until gone. 5,4,3,2,1  Dispense: 15 tablet; Refill: 0  -     Ambulatory Referral to Gastroenterology    Risks, benefits, and potential side effects of current/new medications reviewed with patient.  Patient voiced understanding and wished to proceed with treatment.    Referral to Gastro for further evaluation of UC.     Patient was encouraged to keep me informed of any acute changes, lack of improvement, or any new concerning symptoms.    Patient to return to ED for worsening symptoms, fever, decreased UOP.    Discussion Summary:  Discussed plan of care in detail with pt today; pt verb understanding and agrees.      I spent 30 minutes caring for Thais on this date of service. This time includes time spent by me in the following activities:preparing for the visit, reviewing tests, obtaining and/or reviewing a separately obtained history, performing a medically appropriate examination and/or evaluation , counseling and educating the patient/family/caregiver, ordering medications, tests, or procedures, referring and communicating with other health care professionals , and documenting information in the medical record      I have reviewed and updated all copied forward information, as appropriate.  I attest to the accuracy and  relevance of any unchanged information.    This was an audio and video enabled telemedicine encounter.      Follow up:  Return if symptoms worsen or fail to improve.     Patient Education:  There are no Patient Instructions on file for this visit.    EMELYN Skinner  05/21/24  14:00 EDT          Please note that portions of this note may have been completed with a voice recognition program.

## 2024-05-13 DIAGNOSIS — E11.9 TYPE 2 DIABETES MELLITUS TREATED WITH INSULIN: ICD-10-CM

## 2024-05-13 DIAGNOSIS — Z79.4 TYPE 2 DIABETES MELLITUS TREATED WITH INSULIN: ICD-10-CM

## 2024-05-14 RX ORDER — INSULIN ASPART 100 [IU]/ML
INJECTION, SOLUTION INTRAVENOUS; SUBCUTANEOUS
Qty: 10 ML | Refills: 10 | Status: SHIPPED | OUTPATIENT
Start: 2024-05-14

## 2024-05-21 RX ORDER — ISOPROPYL ALCOHOL 70 ML/100ML
SWAB TOPICAL
Qty: 100 EACH | Refills: 10 | Status: SHIPPED | OUTPATIENT
Start: 2024-05-21

## 2024-06-04 ENCOUNTER — READMISSION MANAGEMENT (OUTPATIENT)
Dept: CALL CENTER | Facility: HOSPITAL | Age: 39
End: 2024-06-04
Payer: COMMERCIAL

## 2024-06-04 NOTE — OUTREACH NOTE
Prep Survey      Flowsheet Row Responses   [facility]   Is LACE score < 7 ? Non-BH Discharge   Eligibility T[facility]   Date of Admission 06/02/24   Date of Discharge 06/04/24   Discharge Disposition Home or Self Care   Discharge diagnosis Diabetic ketoacidosis   Does the patient have one of the following disease processes/diagnoses(primary or secondary)? Other   Prep survey completed? Yes            Chaparrita Altamirano Registered Nurse

## 2024-06-05 ENCOUNTER — TRANSITIONAL CARE MANAGEMENT TELEPHONE ENCOUNTER (OUTPATIENT)
Dept: CALL CENTER | Facility: HOSPITAL | Age: 39
End: 2024-06-05
Payer: COMMERCIAL

## 2024-06-05 NOTE — OUTREACH NOTE
Call Center TCM Note      Flowsheet Row Responses   [facility] [place] [facility]  [place]   Does the patient have one of the following disease processes/diagnoses(primary or secondary)? Other   TCM attempt successful? Yes   Call start time 0821   Call end time 0824   Discharge diagnosis Diabetic ketoacidosis   Meds reviewed with patient/caregiver? Yes   Is the patient having any side effects they believe may be caused by any medication additions or changes? No   Does the patient have all medications ordered at discharge? Yes   Is the patient taking all medications as directed (includes completed medication regime)? Yes   Medication comments using her insulin pump   Comments Appt with EMELYN Seymour for today (6/5)   Does the patient have an appointment with their PCP within 7-14 days of discharge? No   Nursing Interventions Confirmed date/time of appointment   Has home health visited the patient within 72 hours of discharge? N/A   Psychosocial issues? No   Did the patient receive a copy of their discharge instructions? Yes   What is the patient's perception of their health status since discharge? Improving   Is the patient/caregiver able to teach back signs and symptoms related to disease process for when to call PCP? Yes   Is the patient/caregiver able to teach back signs and symptoms related to disease process for when to call 911? Yes   Is the patient/caregiver able to teach back the hierarchy of who to call/visit for symptoms/problems? PCP, Specialist, Home health nurse, Urgent Care, ED, 911 Yes   TCM call completed? Yes   Wrap up additional comments Doing well, states she has her insulin pump on and it is working well, confirmed appt with PCP office for later today (6/5).   Call end time 0824   Would this patient benefit from a Referral to Amb Social Work? No   Is the patient interested in additional calls from an ambulatory ? No            Princess Hogan,  RN    6/5/2024, 08:24 EDT

## 2024-06-18 DIAGNOSIS — L29.9 ITCHING: ICD-10-CM

## 2024-06-19 RX ORDER — DIPHENHYDRAMINE HCL 25 MG
CAPSULE ORAL
Qty: 90 CAPSULE | Refills: 10 | Status: SHIPPED | OUTPATIENT
Start: 2024-06-19

## 2024-08-01 ENCOUNTER — TELEPHONE (OUTPATIENT)
Dept: GASTROENTEROLOGY | Facility: CLINIC | Age: 39
End: 2024-08-01
Payer: COMMERCIAL

## 2024-08-01 NOTE — TELEPHONE ENCOUNTER
Provider: TARIQ BUI    Caller: FABRICIO CUMMINGS    Relationship to Patient: SELF    Phone Number: [phone]    Reason for Call: PATIENT STATES THAT SHE DOES NOT WANT TO SEE TARIQ BUI OR TAM BRYAN. PATIENT STATES THAT SHE WAS NOT SATISFIED WITH HER CARE THROUGH [facility] AND SHE STATED THAT SHE ONLY WANTS TO SEE JESSICA LOPEZ. PER SUPERVISOR, SENDING TE FOR OFFICE TO SCHEDULE.

## 2024-08-16 DIAGNOSIS — Z79.890 HORMONE REPLACEMENT THERAPY (HRT): ICD-10-CM

## 2024-08-20 RX ORDER — ESTRADIOL 0.05 MG/D
PATCH, EXTENDED RELEASE TRANSDERMAL
Qty: 8 PATCH | Refills: 10 | Status: SHIPPED | OUTPATIENT
Start: 2024-08-20

## 2024-09-03 DIAGNOSIS — E11.9 TYPE 2 DIABETES MELLITUS TREATED WITH INSULIN: ICD-10-CM

## 2024-09-03 DIAGNOSIS — Z79.4 TYPE 2 DIABETES MELLITUS TREATED WITH INSULIN: ICD-10-CM

## 2024-09-03 NOTE — TELEPHONE ENCOUNTER
Caller: Thais Hobson    Relationship: Self    Best call back number: [phone]    Requested Prescriptions:   Requested Prescriptions     Pending Prescriptions Disp Refills    Insulin Disposable Pump (Omnipod 5 G6 Intro, Gen 5,) kit 1 kit 10     Sig: Daily.        Pharmacy where request should be sent: [facility] [phone] [facility] [phone]      Last office visit with prescribing clinician: Visit date not found   Last telemedicine visit with prescribing clinician: 5/3/2024   Next office visit with prescribing clinician: Visit date not found     Additional details provided by patient: PATIENT IS OUT OF MEDICATION    Does the patient have less than a 3 day supply:  [x] Yes  [] No      Katelin Muse Rep   09/03/24 15:44 EDT

## 2024-09-04 RX ORDER — INSULIN PMP CART,AUT,G6/7,CNTR
EACH SUBCUTANEOUS DAILY
Qty: 1 KIT | Refills: 10 | OUTPATIENT
Start: 2024-09-04

## 2024-10-22 ENCOUNTER — OFFICE VISIT (OUTPATIENT)
Dept: FAMILY MEDICINE CLINIC | Facility: CLINIC | Age: 39
End: 2024-10-22
Payer: COMMERCIAL

## 2024-10-22 VITALS
TEMPERATURE: 97.2 F | SYSTOLIC BLOOD PRESSURE: 118 MMHG | RESPIRATION RATE: 16 BRPM | BODY MASS INDEX: 30.33 KG/M2 | WEIGHT: 171.2 LBS | HEART RATE: 101 BPM | HEIGHT: 63 IN | OXYGEN SATURATION: 98 % | DIASTOLIC BLOOD PRESSURE: 78 MMHG

## 2024-10-22 DIAGNOSIS — M91.11 JUVENILE OSTEOCHONDROSIS OF HEAD OF RIGHT FEMUR: ICD-10-CM

## 2024-10-22 DIAGNOSIS — M54.50 LOW BACK PAIN RADIATING TO RIGHT LEG: ICD-10-CM

## 2024-10-22 DIAGNOSIS — M25.551 RIGHT HIP PAIN: Primary | ICD-10-CM

## 2024-10-22 DIAGNOSIS — M79.604 LOW BACK PAIN RADIATING TO RIGHT LEG: ICD-10-CM

## 2024-10-22 PROCEDURE — 1160F RVW MEDS BY RX/DR IN RCRD: CPT | Performed by: NURSE PRACTITIONER

## 2024-10-22 PROCEDURE — 1159F MED LIST DOCD IN RCRD: CPT | Performed by: NURSE PRACTITIONER

## 2024-10-22 PROCEDURE — 99214 OFFICE O/P EST MOD 30 MIN: CPT | Performed by: NURSE PRACTITIONER

## 2024-10-22 PROCEDURE — 1125F AMNT PAIN NOTED PAIN PRSNT: CPT | Performed by: NURSE PRACTITIONER

## 2024-10-22 RX ORDER — METHOCARBAMOL 750 MG/1
750 TABLET, FILM COATED ORAL 3 TIMES DAILY PRN
Qty: 30 TABLET | Refills: 1 | Status: SHIPPED | OUTPATIENT
Start: 2024-10-22

## 2024-10-22 RX ORDER — FLUCONAZOLE 150 MG/1
TABLET ORAL
Qty: 4 TABLET | Refills: 0 | Status: SHIPPED | OUTPATIENT
Start: 2024-10-22

## 2024-10-22 RX ORDER — DICLOFENAC SODIUM 75 MG/1
75 TABLET, DELAYED RELEASE ORAL 2 TIMES DAILY
Qty: 60 TABLET | Refills: 1 | Status: SHIPPED | OUTPATIENT
Start: 2024-10-22

## 2024-10-22 NOTE — PROGRESS NOTES
Subjective     Chief Complaint:    Chief Complaint   Patient presents with    Hip Pain     Right hip pain no other concerns        History of Present Illness:   Right hip pain x several days, also pain in low back on the right glute, pain radiates to her knee, feels like bone on bone, has hx of legg calves disease, used to see [facility], has hx of muscle clipping, traction, has not seen ortho in several years, no recent imaging   She has been taking motrin without much improvement, is keeping her awake at night   She is not currently not on medication for UC    Review of Systems  Gen- No fevers, chills  CV- No chest pain, palpitations  Resp- No cough, dyspnea  GI- No N/V/D, abd pain  Neuro-No dizziness, headaches      I have reviewed and/or updated the patient's past medical, surgical, family, social history and problem list as appropriate.     Medications:    Current Outpatient Medications:     diphenhydrAMINE (Banophen) 25 mg capsule, TAKE 1 CAPSULE BY MOUTH EVERY 8 HOURS AS NEEDED FOR ITCHING OR ALLERGIES, Disp: 90 capsule, Rfl: 10    EPINEPHrine (EpiPen 2-Luis) 0.3 MG/0.3ML solution auto-injector injection, Inject 0.3 mL into the appropriate muscle as directed by prescriber. For emergent anaphylaxis, Disp: 2 each, Rfl: 0    estradiol (MINIVELLE, VIVELLE-DOT) 0.05 MG/24HR patch, PLACE 1 PATCH ONTO SKIN AS DIRECTED EVERY 3 DAYS, Disp: 8 patch, Rfl: 10    fenofibrate (TRICOR) 145 MG tablet, TAKE 1 TABLET BY MOUTH DAILY., Disp: 30 tablet, Rfl: 10    fluconazole (Diflucan) 150 MG tablet, 1 po now, may repeat in 3 days if needed, Disp: 2 tablet, Rfl: 0    gabapentin (NEURONTIN) 800 MG tablet, TAKE 1 TABLET BY MOUTH THREE TIMES DAILY, Disp: 90 tablet, Rfl: 10    glucose blood test strip, ONE TOUCH VERIO FLEX TEST STRIPS TO CHECK GLUCOSE 3 TIMES DAILY FOR DM E11.9, Disp: 100 each, Rfl: 12    hydrOXYzine pamoate (VISTARIL) 50 MG capsule, TAKE 1 CAPSULE BY MOUTH 3 TIMES DAILY AS NEEDED FOR ANXIETY, Disp: 90 capsule,  "Rfl: 10    insulin aspart (novoLOG) 100 UNIT/ML injection, ADMINISTER UP TO 12 UNITS UNDER THE SKIN THREE TIMES DAILY WITH MEALS PER SLIDING SCALE, Disp: 10 mL, Rfl: 3    Insulin Aspart (novoLOG) 100 UNIT/ML injection, INJECT 12 UNITS SUBCUTANEOUSLY INTO THE APPROPRIATE AREA AS DIRECTED THREE TIMES A DAY BEFORE MEALS PER SLIDING SCALE, Disp: 10 mL, Rfl: 10    Insulin Disposable Pump (Omnipod 5 G6 Intro, Gen 5,) kit, USE AS DIRECTED DAILY, Disp: 1 kit, Rfl: 10    Insulin Syringe 30G X 5/16\" 1 ML misc, 1 syringe 3 (Three) Times a Day., Disp: 100 each, Rfl: 2    Lancets misc, USE AS DIRECTED TO CHECK GLUCOSE 3 TIMES DAILY FOR DM E11.9, Disp: 100 each, Rfl: 3    promethazine (PHENERGAN) 25 MG suppository, Insert 1 suppository into the rectum Every 6 (Six) Hours As Needed for Nausea or Vomiting. (Patient taking differently: Insert 1 suppository into the rectum As Needed for Nausea or Vomiting. Not a suppository), Disp: 12 each, Rfl: 0    SUMAtriptan (Imitrex) 50 MG tablet, Take one tablet at onset of headache. May repeat dose one time in 2 hours if headache not relieved., Disp: 8 tablet, Rfl: 2    topiramate (TOPAMAX) 50 MG tablet, TAKE 1 TABLET BY MOUTH TWICE DAILY, Disp: 60 tablet, Rfl: 10    traZODone (DESYREL) 50 MG tablet, TAKE 1-2 TABLETS BY MOUTH AT BEDTIME AS NEEDED FOR SLEEP, Disp: 60 tablet, Rfl: 10    vitamin D (ERGOCALCIFEROL) 1.25 MG (15178 UT) capsule capsule, TAKE 1 CAPSULE BY MOUTH EVERY 7 DAYS, Disp: 5 capsule, Rfl: 10    Alcohol Swabs (Easy Touch Alcohol Prep Medium) 70 % pads, USE AS DIRECTED THREE TIMES A DAY, Disp: 100 each, Rfl: 10    diclofenac (VOLTAREN) 75 MG EC tablet, Take 1 tablet by mouth 2 (Two) Times a Day., Disp: 60 tablet, Rfl: 1    methocarbamol (ROBAXIN) 750 MG tablet, Take 1 tablet by mouth 3 (Three) Times a Day As Needed for Muscle Spasms., Disp: 30 tablet, Rfl: 1    Allergies:  Allergies   Allergen Reactions    Hydrochlorothiazide Hives    Penicillins Hives    Remicade [Infliximab] " "Anaphylaxis    Soybean-Containing Drug Products Swelling     \"Soy sauce\"    Statins Swelling     \"Soy sauce\"    Xeljanz [Tofacitinib Citrate] Other (See Comments)     BLISTERS IN THROAT & ON ARMS    Zofran [Ondansetron Hcl] Headache     migraines    Ondansetron Mental Status Change    Fidaxomicin Rash    Reglan [Metoclopramide] Other (See Comments)     States feels weird when takes it       Objective     Vital Signs:   Vitals:    10/22/24 0949   BP: 118/78   Pulse: 101   Resp: 16   Temp: 97.2 °F (36.2 °C)   TempSrc: Temporal   SpO2: 98%   Weight: 77.7 kg (171 lb 3.2 oz)   Height: 160 cm (63\")     Body mass index is 30.33 kg/m².    Physical Exam:    Physical Exam  Vitals and nursing note reviewed.   Constitutional:       Appearance: She is well-developed.   HENT:      Head: Normocephalic and atraumatic.   Eyes:      Pupils: Pupils are equal, round, and reactive to light.   Cardiovascular:      Rate and Rhythm: Normal rate and regular rhythm.      Heart sounds: Normal heart sounds.   Pulmonary:      Effort: Pulmonary effort is normal.      Breath sounds: Normal breath sounds.   Abdominal:      General: Bowel sounds are normal.      Palpations: Abdomen is soft.   Musculoskeletal:      Cervical back: Neck supple.      Lumbar back: Tenderness and bony tenderness present. Positive right straight leg raise test. Negative left straight leg raise test.      Right hip: Tenderness and bony tenderness present. Decreased range of motion. Decreased strength.   Skin:     General: Skin is warm and dry.   Neurological:      General: No focal deficit present.      Mental Status: She is alert and oriented to person, place, and time.   Psychiatric:         Mood and Affect: Mood normal.         Behavior: Behavior normal.         Assessment / Plan     Assessment/Plan:   Problem List Items Addressed This Visit       Jqui-Dsevr-Eppdtrq disease    Relevant Medications    diclofenac (VOLTAREN) 75 MG EC tablet    methocarbamol (ROBAXIN) 750 " MG tablet    Other Relevant Orders    Ambulatory Referral to Orthopedic Surgery     Other Visit Diagnoses       Right hip pain    -  Primary    Relevant Medications    diclofenac (VOLTAREN) 75 MG EC tablet    methocarbamol (ROBAXIN) 750 MG tablet    Other Relevant Orders    XR Hip With or Without Pelvis 2 - 3 View Right    Ambulatory Referral to Orthopedic Surgery    Low back pain radiating to right leg        Relevant Medications    diclofenac (VOLTAREN) 75 MG EC tablet    methocarbamol (ROBAXIN) 750 MG tablet    Other Relevant Orders    XR Spine Lumbar Complete With Flex & Ext    Ambulatory Referral to Orthopedic Surgery          -- xray, voltaren, ortho  -- I wonder if some of her UC meds were masking this pain and now that she is not on UC infusion her pain is flaring     Discussed plan of care in detail with pt today; pt verb understanding and agrees.    Follow up:  As needed    Electronically signed by EMELYN Seymour   10/22/2024 10:06 EDT      Please note that portions of this note were completed with a voice recognition program.

## 2024-11-13 RX ORDER — PROMETHAZINE HYDROCHLORIDE 25 MG/1
25 SUPPOSITORY RECTAL EVERY 6 HOURS PRN
Qty: 12 SUPPOSITORY | Refills: 0 | Status: SHIPPED | OUTPATIENT
Start: 2024-11-13

## 2024-11-19 RX ORDER — PROMETHAZINE HYDROCHLORIDE 25 MG/1
SUPPOSITORY RECTAL
Qty: 12 SUPPOSITORY | Refills: 10 | Status: SHIPPED | OUTPATIENT
Start: 2024-11-19

## 2024-12-16 DIAGNOSIS — E11.9 TYPE 2 DIABETES MELLITUS TREATED WITH INSULIN: ICD-10-CM

## 2024-12-16 DIAGNOSIS — M25.551 RIGHT HIP PAIN: ICD-10-CM

## 2024-12-16 DIAGNOSIS — M79.604 LOW BACK PAIN RADIATING TO RIGHT LEG: ICD-10-CM

## 2024-12-16 DIAGNOSIS — M54.50 LOW BACK PAIN RADIATING TO RIGHT LEG: ICD-10-CM

## 2024-12-16 DIAGNOSIS — M91.11 JUVENILE OSTEOCHONDROSIS OF HEAD OF RIGHT FEMUR: ICD-10-CM

## 2024-12-16 DIAGNOSIS — Z79.4 TYPE 2 DIABETES MELLITUS TREATED WITH INSULIN: ICD-10-CM

## 2024-12-16 DIAGNOSIS — E11.44 DIABETIC AMYOTROPHY ASSOCIATED WITH TYPE 2 DIABETES MELLITUS: ICD-10-CM

## 2024-12-17 RX ORDER — METHOCARBAMOL 750 MG/1
TABLET, FILM COATED ORAL
Qty: 30 TABLET | Refills: 10 | Status: SHIPPED | OUTPATIENT
Start: 2024-12-17

## 2024-12-17 RX ORDER — PROCHLORPERAZINE 25 MG/1
SUPPOSITORY RECTAL
Qty: 3 EACH | Refills: 10 | OUTPATIENT
Start: 2024-12-17

## 2024-12-17 RX ORDER — DICLOFENAC SODIUM 75 MG/1
75 TABLET, DELAYED RELEASE ORAL 2 TIMES DAILY
Qty: 60 TABLET | Refills: 10 | Status: SHIPPED | OUTPATIENT
Start: 2024-12-17

## 2024-12-20 RX ORDER — GABAPENTIN 800 MG/1
TABLET ORAL
Qty: 90 TABLET | Refills: 1 | Status: SHIPPED | OUTPATIENT
Start: 2024-12-20

## 2024-12-20 NOTE — TELEPHONE ENCOUNTER
2 weeks of gabapentin sent, patient needs to let us know who she is now going to see for care given Dr Sanchez is no longer at this practice or if she is going to see him [place]

## 2024-12-22 DIAGNOSIS — E11.44 DIABETIC AMYOTROPHY ASSOCIATED WITH TYPE 2 DIABETES MELLITUS: ICD-10-CM

## 2024-12-23 RX ORDER — GABAPENTIN 800 MG/1
TABLET ORAL
Qty: 90 TABLET | Refills: 10 | OUTPATIENT
Start: 2024-12-23

## 2025-01-16 DIAGNOSIS — E55.9 VITAMIN D DEFICIENCY: ICD-10-CM

## 2025-01-16 DIAGNOSIS — G47.00 INSOMNIA, UNSPECIFIED TYPE: ICD-10-CM

## 2025-01-17 RX ORDER — TRAZODONE HYDROCHLORIDE 50 MG/1
TABLET, FILM COATED ORAL
Qty: 60 TABLET | Refills: 10 | Status: SHIPPED | OUTPATIENT
Start: 2025-01-17

## 2025-01-17 RX ORDER — FENOFIBRATE 145 MG/1
145 TABLET, COATED ORAL DAILY
Qty: 30 TABLET | Refills: 10 | Status: SHIPPED | OUTPATIENT
Start: 2025-01-17

## 2025-01-17 RX ORDER — ERGOCALCIFEROL 1.25 MG/1
50000 CAPSULE, LIQUID FILLED ORAL
Qty: 5 CAPSULE | Refills: 10 | Status: SHIPPED | OUTPATIENT
Start: 2025-01-17

## 2025-02-14 DIAGNOSIS — E11.44 DIABETIC AMYOTROPHY ASSOCIATED WITH TYPE 2 DIABETES MELLITUS: ICD-10-CM

## 2025-02-14 DIAGNOSIS — F41.8 DEPRESSION WITH ANXIETY: ICD-10-CM

## 2025-02-17 RX ORDER — GABAPENTIN 800 MG/1
TABLET ORAL
Qty: 90 TABLET | Refills: 1 | Status: SHIPPED | OUTPATIENT
Start: 2025-02-17

## 2025-02-17 RX ORDER — HYDROXYZINE PAMOATE 50 MG/1
CAPSULE ORAL
Qty: 90 CAPSULE | Refills: 10 | OUTPATIENT
Start: 2025-02-17

## 2025-02-17 NOTE — TELEPHONE ENCOUNTER
Rx Refill Note  Requested Prescriptions     Pending Prescriptions Disp Refills    gabapentin (NEURONTIN) 800 MG tablet [Pharmacy Med Name: GABAPENTIN 800 MG TABS 800 Tablet] 90 tablet 10     Sig: TAKE 1 TABLET BY MOUTH THREE TIMES DAILY      Last office visit with prescribing clinician: 10/22/2024   Last telemedicine visit with prescribing clinician: Visit date not found   Next office visit with prescribing clinician: Visit date not found                         Would you like a call back once the refill request has been completed: [] Yes [] No    If the office needs to give you a call back, can they leave a voicemail: [] Yes [] No    Reshma Russell MA  02/17/25, 09:09 EST

## 2025-03-11 DIAGNOSIS — Z79.4 TYPE 2 DIABETES MELLITUS TREATED WITH INSULIN: ICD-10-CM

## 2025-03-11 DIAGNOSIS — E11.9 TYPE 2 DIABETES MELLITUS TREATED WITH INSULIN: ICD-10-CM

## 2025-03-11 RX ORDER — INSULIN ASPART 100 [IU]/ML
INJECTION, SOLUTION INTRAVENOUS; SUBCUTANEOUS
Qty: 10 ML | Refills: 0 | Status: SHIPPED | OUTPATIENT
Start: 2025-03-11

## 2025-03-11 NOTE — TELEPHONE ENCOUNTER
Previous patient of Dr Sanchez. Has had 2 appts scheduled with you but has cancelled/no showed them. Please advise on refill.

## 2025-03-11 NOTE — TELEPHONE ENCOUNTER
Rx Refill Note  Requested Prescriptions     Pending Prescriptions Disp Refills    Insulin Aspart (novoLOG) 100 UNIT/ML injection [Pharmacy Med Name: INS ASPART 100U/ML 10ML  Solution] 10 mL 10     Sig: INJECT 12 UNITS SUBCUTANEOUSLY INTO THE APPROPRIATE AREA AS DIRECTED THREE TIMES A DAY BEFORE MEALS PER SLIDING SCALE      Last office visit with prescribing clinician: Visit date not found   Last telemedicine visit with prescribing clinician: Visit date not found   Next office visit with prescribing clinician: Visit date not found                         Would you like a call back once the refill request has been completed: [] Yes [] No    If the office needs to give you a call back, can they leave a voicemail: [] Yes [] No    Mireya Lea, [facility]  03/11/25, 10:03 EDT

## 2025-03-17 DIAGNOSIS — E11.9 TYPE 2 DIABETES MELLITUS TREATED WITH INSULIN: ICD-10-CM

## 2025-03-17 DIAGNOSIS — G43.101 MIGRAINE WITH AURA AND WITH STATUS MIGRAINOSUS, NOT INTRACTABLE: ICD-10-CM

## 2025-03-17 DIAGNOSIS — Z79.4 TYPE 2 DIABETES MELLITUS TREATED WITH INSULIN: ICD-10-CM

## 2025-04-08 NOTE — TELEPHONE ENCOUNTER
Caller: [facility] [place], [place] - [address] [address] [place] [address] FX    Relationship: Pharmacy    Requested Prescriptions:   Requested Prescriptions     Pending Prescriptions Disp Refills    topiramate (TOPAMAX) 50 MG tablet [Pharmacy Med Name: TOPIRAMATE 50 MG TABS 50 Tablet] 60 tablet 10     Sig: TAKE 1 TABLET BY MOUTH TWICE DAILY    Insulin Aspart (novoLOG) 100 UNIT/ML injection [Pharmacy Med Name: INS ASPART 100U/ML 10ML  Solution] 10 mL 10     Sig: INJECT 12 UNITS SUBCUTANEOUSLY INTO THE APPROPRIATE AREA AS DIRECTED THREE TIMES A DAY BEFORE MEALS PER SLIDING SCALE        Pharmacy where request should be sent: [facility], [place] - [address] [id number] [place] [address] FX     Last office visit with prescribing clinician: Visit date not found   Last telemedicine visit with prescribing clinician: Visit date not found   Next office visit with prescribing clinician: Visit date not found             Katelin Stewart Rep   04/08/25 14:58 EDT

## 2025-04-09 RX ORDER — INSULIN ASPART 100 [IU]/ML
INJECTION, SOLUTION INTRAVENOUS; SUBCUTANEOUS
Qty: 10 ML | Refills: 10 | Status: SHIPPED | OUTPATIENT
Start: 2025-04-09

## 2025-04-09 RX ORDER — TOPIRAMATE 50 MG/1
50 TABLET, FILM COATED ORAL 2 TIMES DAILY
Qty: 60 TABLET | Refills: 10 | Status: SHIPPED | OUTPATIENT
Start: 2025-04-09

## 2025-04-18 DIAGNOSIS — E11.44 DIABETIC AMYOTROPHY ASSOCIATED WITH TYPE 2 DIABETES MELLITUS: ICD-10-CM

## 2025-04-18 RX ORDER — GABAPENTIN 800 MG/1
800 TABLET ORAL 3 TIMES DAILY
Qty: 90 TABLET | Refills: 11 | OUTPATIENT
Start: 2025-04-18

## 2025-04-18 NOTE — TELEPHONE ENCOUNTER
Rx Refill Note  Requested Prescriptions     Pending Prescriptions Disp Refills    gabapentin (NEURONTIN) 800 MG tablet [Pharmacy Med Name: GABAPENTIN 800 MG TABS 800 Tablet] 90 tablet 11     Sig: TAKE 1 TABLET BY MOUTH THREE TIMES DAILY      Last office visit with prescribing clinician: Visit date not found   Last telemedicine visit with prescribing clinician: Visit date not found   Next office visit with prescribing clinician: Visit date not found                         Would you like a call back once the refill request has been completed: [] Yes [] No    If the office needs to give you a call back, can they leave a voicemail: [] Yes [] No    Sridevi Lees MA  04/18/25, 11:29 EDT

## 2025-05-15 RX ORDER — UBIQUINOL 100 MG
CAPSULE ORAL 3 TIMES DAILY
Qty: 100 EACH | Refills: 11 | Status: SHIPPED | OUTPATIENT
Start: 2025-05-15

## 2025-06-09 ENCOUNTER — TELEPHONE (OUTPATIENT)
Dept: FAMILY MEDICINE CLINIC | Facility: CLINIC | Age: 40
End: 2025-06-09

## 2025-06-09 DIAGNOSIS — E11.44 DIABETIC AMYOTROPHY ASSOCIATED WITH TYPE 2 DIABETES MELLITUS: ICD-10-CM

## 2025-06-09 DIAGNOSIS — F41.8 DEPRESSION WITH ANXIETY: ICD-10-CM

## 2025-06-09 RX ORDER — HYDROXYZINE PAMOATE 50 MG/1
CAPSULE ORAL
Qty: 90 CAPSULE | Refills: 10 | Status: CANCELLED | OUTPATIENT
Start: 2025-06-09

## 2025-06-09 RX ORDER — GABAPENTIN 800 MG/1
800 TABLET ORAL 3 TIMES DAILY
Qty: 90 TABLET | Refills: 1 | Status: CANCELLED | OUTPATIENT
Start: 2025-06-09

## 2025-06-09 NOTE — TELEPHONE ENCOUNTER
Caller: [facility], [place] - [address] [facility] - [phone]  - [phone] FX    Relationship: Pharmacy    Best call back number: [phone]     (CARLEEN)    Equipment requested: DEXCOM 7 - SENSORS & TRANSMITTER    Reason for the request: UPGRADE    Prescribing Provider: DANNY LIU    Additional information or concerns: NEED NEW RX, PREVIOUS RX HAS .

## 2025-06-09 NOTE — TELEPHONE ENCOUNTER
Caller: [facility], [place] - [address] [facility] [phone] [facility] [phone] FX    Relationship: Pharmacy    Best call back number: [phone]     (CARLEEN)    Requested Prescriptions:   Requested Prescriptions     Pending Prescriptions Disp Refills    gabapentin (NEURONTIN) 800 MG tablet 90 tablet 1     Sig: Take 1 tablet by mouth 3 (Three) Times a Day.    hydrOXYzine pamoate (VISTARIL) 50 MG capsule 90 capsule 10        Pharmacy where request should be sent: [facility], [place] - [address] [facility] [phone] [facility] [phone] FX     Last office visit with prescribing clinician: Visit date not found   Last telemedicine visit with prescribing clinician: Visit date not found   Next office visit with prescribing clinician: 6/17/2025     Additional details provided by patient:     Does the patient have less than a 3 day supply:  [x] Yes  [] No         Marilee Gagnon MA   06/09/25 11:24 EDT

## 2025-06-09 NOTE — TELEPHONE ENCOUNTER
Rx Refill Note  Requested Prescriptions     Pending Prescriptions Disp Refills    gabapentin (NEURONTIN) 800 MG tablet 90 tablet 1     Sig: Take 1 tablet by mouth 3 (Three) Times a Day.    hydrOXYzine pamoate (VISTARIL) 50 MG capsule 90 capsule 10      Last office visit with prescribing clinician: Visit date not found   Last telemedicine visit with prescribing clinician: Visit date not found   Next office visit with prescribing clinician: 6/9/2025                         Would you like a call back once the refill request has been completed: [] Yes [] No    If the office needs to give you a call back, can they leave a voicemail: [] Yes [] No    Sridevi Lees MA  06/09/25, 12:52 EDT

## 2025-06-17 ENCOUNTER — OFFICE VISIT (OUTPATIENT)
Dept: FAMILY MEDICINE CLINIC | Facility: CLINIC | Age: 40
End: 2025-06-17
Payer: COMMERCIAL

## 2025-06-17 ENCOUNTER — TELEPHONE (OUTPATIENT)
Dept: FAMILY MEDICINE CLINIC | Facility: CLINIC | Age: 40
End: 2025-06-17

## 2025-06-17 VITALS
WEIGHT: 174 LBS | BODY MASS INDEX: 30.83 KG/M2 | HEART RATE: 97 BPM | RESPIRATION RATE: 16 BRPM | HEIGHT: 63 IN | OXYGEN SATURATION: 98 % | SYSTOLIC BLOOD PRESSURE: 116 MMHG | DIASTOLIC BLOOD PRESSURE: 72 MMHG

## 2025-06-17 DIAGNOSIS — R11.0 NAUSEA: ICD-10-CM

## 2025-06-17 DIAGNOSIS — Z13.29 SCREENING FOR THYROID DISORDER: ICD-10-CM

## 2025-06-17 DIAGNOSIS — Z79.890 HORMONE REPLACEMENT THERAPY (HRT): ICD-10-CM

## 2025-06-17 DIAGNOSIS — M25.551 RIGHT HIP PAIN: ICD-10-CM

## 2025-06-17 DIAGNOSIS — G47.00 INSOMNIA, UNSPECIFIED TYPE: ICD-10-CM

## 2025-06-17 DIAGNOSIS — Z79.4 TYPE 2 DIABETES MELLITUS TREATED WITH INSULIN: Primary | ICD-10-CM

## 2025-06-17 DIAGNOSIS — M91.11 JUVENILE OSTEOCHONDROSIS OF HEAD OF RIGHT FEMUR: ICD-10-CM

## 2025-06-17 DIAGNOSIS — G43.101 MIGRAINE WITH AURA AND WITH STATUS MIGRAINOSUS, NOT INTRACTABLE: ICD-10-CM

## 2025-06-17 DIAGNOSIS — F41.8 DEPRESSION WITH ANXIETY: ICD-10-CM

## 2025-06-17 DIAGNOSIS — M25.50 CHRONIC JOINT PAIN: ICD-10-CM

## 2025-06-17 DIAGNOSIS — M54.50 LOW BACK PAIN RADIATING TO RIGHT LEG: ICD-10-CM

## 2025-06-17 DIAGNOSIS — G89.29 CHRONIC JOINT PAIN: ICD-10-CM

## 2025-06-17 DIAGNOSIS — M79.604 LOW BACK PAIN RADIATING TO RIGHT LEG: ICD-10-CM

## 2025-06-17 DIAGNOSIS — E11.9 TYPE 2 DIABETES MELLITUS TREATED WITH INSULIN: Primary | ICD-10-CM

## 2025-06-17 DIAGNOSIS — E11.44 DIABETIC AMYOTROPHY ASSOCIATED WITH TYPE 2 DIABETES MELLITUS: ICD-10-CM

## 2025-06-17 DIAGNOSIS — R79.89 ELEVATED LFTS: ICD-10-CM

## 2025-06-17 LAB
EXPIRATION DATE: NORMAL
GLUCOSE BLDC GLUCOMTR-MCNC: 0 MG/DL (ref 70–130)
Lab: NORMAL
POC ALBUMIN, URINE: 30 MG/L
POC CREATININE, URINE: 50 MG/DL
POC URINE ALB/CREA RATIO: NORMAL

## 2025-06-17 RX ORDER — UBIQUINOL 100 MG
1 CAPSULE ORAL 3 TIMES DAILY
Qty: 100 EACH | Refills: 11 | Status: SHIPPED | OUTPATIENT
Start: 2025-06-17

## 2025-06-17 RX ORDER — NAPROXEN SODIUM 220 MG
1 TABLET ORAL 3 TIMES DAILY
Qty: 100 EACH | Refills: 2 | Status: SHIPPED | OUTPATIENT
Start: 2025-06-17 | End: 2025-06-27 | Stop reason: SDUPTHER

## 2025-06-17 RX ORDER — HYDROXYZINE PAMOATE 50 MG/1
50 CAPSULE ORAL 3 TIMES DAILY PRN
Qty: 90 CAPSULE | Refills: 10 | Status: SHIPPED | OUTPATIENT
Start: 2025-06-17 | End: 2025-06-27 | Stop reason: SDUPTHER

## 2025-06-17 RX ORDER — FENOFIBRATE 145 MG/1
145 TABLET, FILM COATED ORAL DAILY
Qty: 30 TABLET | Refills: 10 | Status: SHIPPED | OUTPATIENT
Start: 2025-06-17

## 2025-06-17 RX ORDER — METHOCARBAMOL 750 MG/1
750 TABLET, FILM COATED ORAL 3 TIMES DAILY
Qty: 30 TABLET | Refills: 10 | Status: SHIPPED | OUTPATIENT
Start: 2025-06-17

## 2025-06-17 RX ORDER — ACYCLOVIR 400 MG/1
1 TABLET ORAL
Qty: 3 EACH | Refills: 2 | Status: SHIPPED | OUTPATIENT
Start: 2025-06-17

## 2025-06-17 RX ORDER — INSULIN PMP CART,AUT,G6/7,CNTR
EACH SUBCUTANEOUS DAILY
Qty: 1 KIT | Refills: 10 | Status: CANCELLED | OUTPATIENT
Start: 2025-06-17

## 2025-06-17 RX ORDER — INSULIN ASPART 100 [IU]/ML
12 INJECTION, SOLUTION INTRAVENOUS; SUBCUTANEOUS
Qty: 10 ML | Refills: 10 | Status: SHIPPED | OUTPATIENT
Start: 2025-06-17

## 2025-06-17 RX ORDER — ESTRADIOL 0.05 MG/D
1 PATCH, EXTENDED RELEASE TRANSDERMAL 2 TIMES WEEKLY
Qty: 8 PATCH | Refills: 10 | Status: SHIPPED | OUTPATIENT
Start: 2025-06-19

## 2025-06-17 RX ORDER — INSULIN PMP CART,AUT,G6/7,CNTR
EACH SUBCUTANEOUS
COMMUNITY
End: 2025-06-17

## 2025-06-17 RX ORDER — TRAZODONE HYDROCHLORIDE 50 MG/1
TABLET ORAL
Qty: 60 TABLET | Refills: 10 | Status: SHIPPED | OUTPATIENT
Start: 2025-06-17

## 2025-06-17 RX ORDER — PROMETHAZINE HYDROCHLORIDE 12.5 MG/1
12.5-25 TABLET ORAL EVERY 8 HOURS PRN
Qty: 40 TABLET | Refills: 1 | Status: SHIPPED | OUTPATIENT
Start: 2025-06-17

## 2025-06-17 RX ORDER — INSULIN PMP CART,AUT,G6/7,CNTR
1 EACH SUBCUTANEOUS DAILY
Qty: 1 KIT | Refills: 2 | Status: SHIPPED | OUTPATIENT
Start: 2025-06-17

## 2025-06-17 RX ORDER — DICLOFENAC SODIUM 75 MG/1
75 TABLET, DELAYED RELEASE ORAL 2 TIMES DAILY
Qty: 60 TABLET | Refills: 10 | Status: SHIPPED | OUTPATIENT
Start: 2025-06-17

## 2025-06-17 RX ORDER — PROMETHAZINE HYDROCHLORIDE 25 MG/1
25 SUPPOSITORY RECTAL EVERY 4 HOURS PRN
Qty: 12 SUPPOSITORY | Refills: 10 | Status: CANCELLED | OUTPATIENT
Start: 2025-06-17

## 2025-06-17 RX ORDER — INSULIN PMP CART,AUT,G6/7,CNTR
1 EACH SUBCUTANEOUS
Qty: 10 EACH | Refills: 2 | Status: SHIPPED | OUTPATIENT
Start: 2025-06-17 | End: 2025-06-27 | Stop reason: SDUPTHER

## 2025-06-17 RX ORDER — TOPIRAMATE 50 MG/1
50 TABLET, FILM COATED ORAL 2 TIMES DAILY
Qty: 60 TABLET | Refills: 10 | Status: SHIPPED | OUTPATIENT
Start: 2025-06-17

## 2025-06-17 RX ORDER — GABAPENTIN 800 MG/1
800 TABLET ORAL 3 TIMES DAILY
Qty: 90 TABLET | Refills: 1 | Status: CANCELLED | OUTPATIENT
Start: 2025-06-17

## 2025-06-17 RX ORDER — SUMATRIPTAN 50 MG/1
TABLET, FILM COATED ORAL
Qty: 8 TABLET | Refills: 2 | Status: SHIPPED | OUTPATIENT
Start: 2025-06-17 | End: 2025-06-27 | Stop reason: SDUPTHER

## 2025-06-17 NOTE — TELEPHONE ENCOUNTER
Patient came in for appointment today with Hannah, her glucose was over 600 and she had not had insulin in over a week. Hannah advised her to go to ER.  Patients car broke down in our parking lot and she was unable to drive herself to the ER.  I was told to call 911, they arrived at 9:45 to take patient to ER.

## 2025-06-17 NOTE — PROGRESS NOTES
"                      Established Patient        Chief Complaint:   Chief Complaint   Patient presents with    Diabetes     Pt is here to establish care and for a follow up on diabetes. Pt would like to have labs          History of Present Illness  Patient presents for diabetes management, elevated liver function tests, migraines, and joint pain.    Diabetes: Has been without insulin pump (OmniPod 5) for an extended period and requires refill. Uses OmniPod 5 (changed every 3 days) and Dexcom G6 (replaced every 10 days). Provided with G7 but continues receiving G6. Has not monitored blood sugar levels and uses NovoLog with sliding scale. Currently fasting, consumed only water, and reports nausea.    Liver Function: Admitted to ED in 04/2025 for elevated liver function tests, attributed to excessive Tylenol use. Continues frequent Tylenol use.    Migraines: Experiences severe migraines localized to the frontal region. Cannot use ibuprofen due to stomach bleeding.    Joint Pain: Reports significant joint pain and swelling in fingers and knees. Previous gastroenterology consultation considered lupus, but evaluation disrupted due to physician departure. Reports constant diarrhea, possibly related to blood sugar levels.      Subjective     The following portions of the patient's history were reviewed and updated as appropriate: allergies, current medications, past family history, past medical history, past social history, past surgical history and problem list.    ALLERGIES  Allergies   Allergen Reactions    Hydrochlorothiazide Hives    Penicillins Hives    Remicade [Infliximab] Anaphylaxis    Soybean-Containing Drug Products Swelling     \"Soy sauce\"    Statins Swelling     \"Soy sauce\"    Xeljanz [Tofacitinib Citrate] Other (See Comments)     BLISTERS IN THROAT & ON ARMS    Zofran [Ondansetron Hcl] Headache     migraines    Ondansetron Mental Status Change    Fidaxomicin Rash    Reglan [Metoclopramide] Other (See " "Comments)     States feels weird when takes it       Review of Systems  As above.    Objective     Vital Signs:   /72   Pulse 97   Resp 16   Ht 160 cm (63\")   Wt 78.9 kg (174 lb)   LMP  (LMP Unknown)   SpO2 98%   BMI 30.82 kg/m²                Physical Exam   Physical Exam  Vitals and nursing note reviewed.   HENT:      Mouth/Throat:      Lips: Pink.   Eyes:      General: Lids are normal.   Cardiovascular:      Rate and Rhythm: Normal rate and regular rhythm.   Pulmonary:      Effort: Pulmonary effort is normal.      Breath sounds: Normal breath sounds.   Neurological:      Mental Status: She is alert and oriented to person, place, and time.      Gait: Gait is intact.   Psychiatric:         Attention and Perception: Attention normal.         Mood and Affect: Mood and affect normal.         Speech: Speech normal.         Behavior: Behavior normal. Behavior is cooperative.         Assessment and Plan      Assessment/Plan:   Diagnoses and all orders for this visit:    1. Type 2 diabetes mellitus treated with insulin (Primary)  -     fenofibrate (TRICOR) 145 MG tablet; Take 1 tablet by mouth Daily.  Dispense: 30 tablet; Refill: 10  -     Alcohol Swabs (Alcohol Prep) 70 % pads; Place 1 each on the skin as directed by provider 3 (Three) Times a Day. as directed  Dispense: 100 each; Refill: 11  -     Insulin Aspart (novoLOG) 100 UNIT/ML injection; Inject 12 Units under the skin into the appropriate area as directed 3 (Three) Times a Day Before Meals.  Dispense: 10 mL; Refill: 10  -     Discontinue: Insulin Syringe 30G X 5/16\" 1 ML misc; Use 1 syringe 3 (Three) Times a Day.  Dispense: 100 each; Refill: 2  -     Discontinue: Insulin Disposable Pump (Omnipod 5 GmcD1R4 Pods Gen 5) misc; Use 1 each Every 3 (Three) Days.  Dispense: 10 each; Refill: 2  -     Insulin Disposable Pump (Omnipod 5 [id number] Intro Gen 5) kit; Use 1 each Daily.  Dispense: 1 kit; Refill: 2  -     Continuous Glucose Sensor (Dexcom G7 Sensor) " misc; Use 1 each Every 10 (Ten) Days.  Dispense: 3 each; Refill: 2  -     Hemoglobin A1c  -     Discontinue: insulin aspart (novoLOG) 100 UNIT/ML injection; Inject 12 Units under the skin into the appropriate area as directed 3 (Three) Times a Day Before Meals. ADMINISTER UP TO 12 UNITS UNDER THE SKIN THREE TIMES DAILY WITH MEALS PER SLIDING SCALE  Dispense: 10 mL; Refill: 0  -     Insulin Aspart (novoLOG) 100 UNIT/ML injection; Inject 12 Units under the skin into the appropriate area as directed 3 (Three) Times a Day Before Meals.  Dispense: 10 mL; Refill: 10  -     POCT Glucose  -     POC Albumin/Creatinine Ratio Urine    2. Right hip pain  -     diclofenac (VOLTAREN) 75 MG EC tablet; Take 1 tablet by mouth 2 (Two) Times a Day.  Dispense: 60 tablet; Refill: 10  -     methocarbamol (ROBAXIN) 750 MG tablet; Take 1 tablet by mouth 3 (Three) Times a Day.  Dispense: 30 tablet; Refill: 10    3. 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  Dispense: 60 tablet; Refill: 10  -     methocarbamol (ROBAXIN) 750 MG tablet; Take 1 tablet by mouth 3 (Three) Times a Day.  Dispense: 30 tablet; Refill: 10    4. Low back pain radiating to right leg  -     diclofenac (VOLTAREN) 75 MG EC tablet; Take 1 tablet by mouth 2 (Two) Times a Day.  Dispense: 60 tablet; Refill: 10  -     methocarbamol (ROBAXIN) 750 MG tablet; Take 1 tablet by mouth 3 (Three) Times a Day.  Dispense: 30 tablet; Refill: 10    5. Hormone replacement therapy (HRT)  -     estradiol (MINIVELLE, VIVELLE-DOT) 0.05 MG/24HR patch; Place 1 patch on the skin as directed by provider 2 (Two) Times a Week.  Dispense: 8 patch; Refill: 10    6. Diabetic amyotrophy associated with type 2 diabetes mellitus    7. Depression with anxiety  -     Discontinue: hydrOXYzine pamoate (VISTARIL) 50 MG capsule; Take 1 capsule by mouth 3 (Three) Times a Day As Needed for Itching.  Dispense: 90  capsule; Refill: 10    8. Migraine with aura and with status migrainosus, not intractable  -     Discontinue: SUMAtriptan (Imitrex) 50 MG tablet; Take one tablet at onset of headache. May repeat dose one time in 2 hours if headache not relieved.  Dispense: 8 tablet; Refill: 2  -     topiramate (TOPAMAX) 50 MG tablet; Take 1 tablet by mouth 2 (Two) Times a Day.  Dispense: 60 tablet; Refill: 10    9. Insomnia, unspecified type  -     traZODone (DESYREL) 50 MG tablet; TAKE 1-2 TABLETS BY MOUTH AT BEDTIME AS NEEDED FOR SLEEP  Dispense: 60 tablet; Refill: 10    10. Chronic joint pain  -     RAQUEL With / DsDNA, RNP, Sjogrens A / B, Ross  -     CBC & Differential  -     Comprehensive Metabolic Panel  -     Sedimentation Rate  -     C-reactive Protein  -     Rheumatoid Factor    11. Elevated LFTs  -     Hepatitis B surface antigen  -     Hepatitis B surface antibody  -     Hepatitis B core antibody, total  -     Hepatitis A antibody, total  -     Hepatitis C antibody    12. Screening for thyroid disorder  -     TSH  -     T4, Free    13. Nausea  -     promethazine (PHENERGAN) 12.5 MG tablet; Take 1-2 tablets by mouth Every 8 (Eight) Hours As Needed for Nausea or Vomiting.  Dispense: 40 tablet; Refill: 1    Risks, benefits, and potential side effects of current/new medications reviewed with patient.  Patient voiced understanding and wished to proceed with treatment.    Encouraged ambulatory glucose monitoring. Patient to call office or RTC for glucose levels consistently greater than 300 or poorly controlled with current medication regimen.     I will contact patient regarding test results and provide instructions regarding any necessary changes in plan of care.    Patient was encouraged to keep me informed of any acute changes, lack of improvement, or any new concerning symptoms.        Assessment & Plan  Diabetes Mellitus  - Symptoms: nausea, fatigue, dizziness  - Fasting blood glucose exceeded glucometer capacity; elevated  hemoglobin A1c  - Recommended immediate ER evaluation for potential diabetic ketoacidosis  - Patient agreed and left for ER    Elevated Liver Function Tests  - Likely due to frequent Tylenol use  - Recheck liver function tests today    Migraines  - Severe frontal migraines reported  - Consider alternative headache medications to avoid stomach bleeding    Joint Pain  - Reports pain and swelling in fingers and knees  - Blood work today to evaluate lupus and other causes    Discussion Summary:  Discussed plan of care in detail with pt today; pt verb understanding and agrees.        I have reviewed and updated all copied forward information, as appropriate.  I attest to the accuracy and relevance of any unchanged information.      Follow up:  No follow-ups on file.     Patient Education:  There are no Patient Instructions on file for this visit.    Patient or patient representative verbalized consent for the use of Ambient Listening during the visit with  EMELYN Skinner for chart documentation. 6/30/2025  13:30 EDT    EMELYN Skinner  06/30/25  13:30 EDT          Please note that portions of this note may have been completed with a voice recognition program.

## 2025-06-27 DIAGNOSIS — G43.101 MIGRAINE WITH AURA AND WITH STATUS MIGRAINOSUS, NOT INTRACTABLE: ICD-10-CM

## 2025-06-27 DIAGNOSIS — Z79.4 TYPE 2 DIABETES MELLITUS TREATED WITH INSULIN: ICD-10-CM

## 2025-06-27 DIAGNOSIS — F41.8 DEPRESSION WITH ANXIETY: ICD-10-CM

## 2025-06-27 DIAGNOSIS — E11.9 TYPE 2 DIABETES MELLITUS TREATED WITH INSULIN: ICD-10-CM

## 2025-06-27 DIAGNOSIS — E11.44 DIABETIC AMYOTROPHY ASSOCIATED WITH TYPE 2 DIABETES MELLITUS: ICD-10-CM

## 2025-06-27 RX ORDER — INSULIN PMP CART,AUT,G6/7,CNTR
1 EACH SUBCUTANEOUS
Qty: 10 EACH | Refills: 2 | Status: SHIPPED | OUTPATIENT
Start: 2025-06-27

## 2025-06-27 RX ORDER — NAPROXEN SODIUM 220 MG
1 TABLET ORAL 3 TIMES DAILY
Qty: 100 EACH | Refills: 2 | Status: SHIPPED | OUTPATIENT
Start: 2025-06-27

## 2025-06-27 RX ORDER — SUMATRIPTAN 50 MG/1
TABLET, FILM COATED ORAL
Qty: 8 TABLET | Refills: 2 | Status: SHIPPED | OUTPATIENT
Start: 2025-06-27

## 2025-06-27 RX ORDER — HYDROXYZINE PAMOATE 50 MG/1
50 CAPSULE ORAL 3 TIMES DAILY PRN
Qty: 90 CAPSULE | Refills: 1 | Status: SHIPPED | OUTPATIENT
Start: 2025-06-27

## 2025-06-27 NOTE — TELEPHONE ENCOUNTER
Rx Refill Note  Requested Prescriptions     Pending Prescriptions Disp Refills    gabapentin (NEURONTIN) 800 MG tablet 90 tablet 1     Sig: Take 1 tablet by mouth 3 (Three) Times a Day.     Signed Prescriptions Disp Refills    hydrOXYzine pamoate (VISTARIL) 50 MG capsule 90 capsule 1     Sig: Take 1 capsule by mouth 3 (Three) Times a Day As Needed for Itching.     Authorizing Provider: DANNY LIU     Ordering User: MIREYA OHARA    Insulin Disposable Pump (Omnipod 5 [id number] Pods Gen 5) misc 10 each 2     Sig: Use 1 each Every 3 (Three) Days.     Authorizing Provider: DANNY LIU     Ordering User: MIREYA OHARA    SUMAtriptan (Imitrex) 50 MG tablet 8 tablet 2     Sig: Take one tablet at onset of headache. May repeat dose one time in 2 hours if headache not relieved.     Authorizing Provider: DANNY LIU     Ordering User: MIREYA OHARA      Last office visit with prescribing clinician: 6/17/2025   Last telemedicine visit with prescribing clinician: Visit date not found   Next office visit with prescribing clinician: Visit date not found                         Would you like a call back once the refill request has been completed: [] Yes [] No    If the office needs to give you a call back, can they leave a voicemail: [] Yes [] No    Mireya Lea CMA  06/27/25, 14:55 EDT

## 2025-06-27 NOTE — TELEPHONE ENCOUNTER
Caller: Jazlyn Thaisjanell King    Relationship: Self    Best call back number: [phone]     Requested Prescriptions:   Requested Prescriptions     Pending Prescriptions Disp Refills    gabapentin (NEURONTIN) 800 MG tablet 90 tablet 1     Sig: Take 1 tablet by mouth 3 (Three) Times a Day.    hydrOXYzine pamoate (VISTARIL) 50 MG capsule 90 capsule 10     Sig: Take 1 capsule by mouth 3 (Three) Times a Day As Needed for Itching.    Insulin Disposable Pump (Omnipod 5 EryQ1H3 Pods Gen 5) misc 10 each 2     Sig: Use 1 each Every 3 (Three) Days.    SUMAtriptan (Imitrex) 50 MG tablet 8 tablet 2     Sig: Take one tablet at onset of headache. May repeat dose one time in 2 hours if headache not relieved.        Pharmacy where request should be sent: [address] [phone] [facility] [phone]      Last office visit with prescribing clinician: 6/17/2025   Last telemedicine visit with prescribing clinician: Visit date not found   Next office visit with prescribing clinician: Visit date not found     Additional details provided by patient: PT IS OUT OF ALL MEDS.    Does the patient have less than a 3 day supply:  [x] Yes  [] No    Would you like a call back once the refill request has been completed: [] Yes [x] No    If the office needs to give you a call back, can they leave a voicemail: [] Yes [x] No    Katelin Maguire   06/27/25 14:44 EDT

## 2025-06-30 RX ORDER — GABAPENTIN 800 MG/1
800 TABLET ORAL 3 TIMES DAILY
Qty: 90 TABLET | Refills: 1 | Status: SHIPPED | OUTPATIENT
Start: 2025-06-30

## 2025-07-10 ENCOUNTER — TELEPHONE (OUTPATIENT)
Dept: FAMILY MEDICINE CLINIC | Facility: CLINIC | Age: 40
End: 2025-07-10
Payer: COMMERCIAL

## 2025-07-11 NOTE — TELEPHONE ENCOUNTER
It looks like it was not done at her last appointment. Pt needs to come back in to have done. Message left for pt.

## 2025-07-17 ENCOUNTER — PRIOR AUTHORIZATION (OUTPATIENT)
Dept: FAMILY MEDICINE CLINIC | Facility: CLINIC | Age: 40
End: 2025-07-17
Payer: COMMERCIAL

## 2025-07-17 NOTE — TELEPHONE ENCOUNTER
"Prior Authorization has been started on Cover My Meds for Insulin Syringe 30G X 5/16\" 1 ML misc     Waiting on response from insurance       Key:[id number]  "

## 2025-07-25 DIAGNOSIS — E11.44 DIABETIC AMYOTROPHY ASSOCIATED WITH TYPE 2 DIABETES MELLITUS: ICD-10-CM

## 2025-07-25 RX ORDER — GABAPENTIN 800 MG/1
800 TABLET ORAL 3 TIMES DAILY
Qty: 90 TABLET | Refills: 11 | OUTPATIENT
Start: 2025-07-25

## 2025-07-25 NOTE — TELEPHONE ENCOUNTER
Rx Refill Note  Requested Prescriptions     Pending Prescriptions Disp Refills    gabapentin (NEURONTIN) 800 MG tablet [Pharmacy Med Name: GABAPENTIN 800 MG TABS 800 Tablet] 90 tablet 11     Sig: TAKE 1 TABLET BY MOUTH 3 TIMES DAILY      Last office visit with prescribing clinician: 6/17/2025   Last telemedicine visit with prescribing clinician: Visit date not found   Next office visit with prescribing clinician: 7/29/2025                         Would you like a call back once the refill request has been completed: [] Yes [] No    If the office needs to give you a call back, can they leave a voicemail: [] Yes [] No    Alon English MA  07/25/25, 08:49 EDT

## 2025-08-05 ENCOUNTER — OFFICE VISIT (OUTPATIENT)
Dept: FAMILY MEDICINE CLINIC | Facility: CLINIC | Age: 40
End: 2025-08-05
Payer: COMMERCIAL

## 2025-08-05 VITALS
HEART RATE: 96 BPM | BODY MASS INDEX: 32.69 KG/M2 | RESPIRATION RATE: 16 BRPM | HEIGHT: 63 IN | DIASTOLIC BLOOD PRESSURE: 72 MMHG | OXYGEN SATURATION: 99 % | WEIGHT: 184.5 LBS | SYSTOLIC BLOOD PRESSURE: 114 MMHG

## 2025-08-05 DIAGNOSIS — R00.2 PALPITATIONS: ICD-10-CM

## 2025-08-05 DIAGNOSIS — Z79.4 TYPE 2 DIABETES MELLITUS TREATED WITH INSULIN: Primary | ICD-10-CM

## 2025-08-05 DIAGNOSIS — E53.8 B12 DEFICIENCY: ICD-10-CM

## 2025-08-05 DIAGNOSIS — F41.1 GENERALIZED ANXIETY DISORDER: ICD-10-CM

## 2025-08-05 DIAGNOSIS — E55.9 VITAMIN D DEFICIENCY: ICD-10-CM

## 2025-08-05 DIAGNOSIS — F41.0 PANIC ATTACK: ICD-10-CM

## 2025-08-05 DIAGNOSIS — E11.9 TYPE 2 DIABETES MELLITUS TREATED WITH INSULIN: Primary | ICD-10-CM

## 2025-08-05 PROCEDURE — 1125F AMNT PAIN NOTED PAIN PRSNT: CPT | Performed by: NURSE PRACTITIONER

## 2025-08-05 PROCEDURE — 99214 OFFICE O/P EST MOD 30 MIN: CPT | Performed by: NURSE PRACTITIONER

## 2025-08-05 RX ORDER — PROPRANOLOL HYDROCHLORIDE 10 MG/1
10 TABLET ORAL 2 TIMES DAILY PRN
Qty: 20 TABLET | Refills: 2 | Status: SHIPPED | OUTPATIENT
Start: 2025-08-05

## 2025-08-13 DIAGNOSIS — R11.0 NAUSEA: ICD-10-CM

## 2025-08-14 DIAGNOSIS — E11.9 TYPE 2 DIABETES MELLITUS TREATED WITH INSULIN: ICD-10-CM

## 2025-08-14 DIAGNOSIS — Z79.4 TYPE 2 DIABETES MELLITUS TREATED WITH INSULIN: ICD-10-CM

## 2025-08-14 RX ORDER — PROMETHAZINE HYDROCHLORIDE 12.5 MG/1
TABLET ORAL
Qty: 40 TABLET | Refills: 11 | Status: SHIPPED | OUTPATIENT
Start: 2025-08-14

## 2025-08-15 RX ORDER — ACYCLOVIR 400 MG/1
TABLET ORAL
Qty: 3 EACH | Refills: 11 | Status: SHIPPED | OUTPATIENT
Start: 2025-08-15

## (undated) DEVICE — Device

## (undated) DEVICE — INTRO ACCSR BLNT TP

## (undated) DEVICE — SINGLE-USE BIOPSY FORCEPS: Brand: RADIAL JAW 4

## (undated) DEVICE — LUBE GEL ENDOGLIDE 1.1OZ

## (undated) DEVICE — TUBING, SUCTION, 1/4" X 10', STRAIGHT: Brand: MEDLINE

## (undated) DEVICE — CONTN GRAD MEAS TRIANG 32OZ BLK

## (undated) DEVICE — SOL IRR H2O BTL 1000ML STRL

## (undated) DEVICE — KT ORCA ORCAPOD DISP STRL

## (undated) DEVICE — HYBRID CO2 TUBING/CAP SET FOR OLYMPUS® SCOPES & CO2 SOURCE: Brand: ERBE

## (undated) DEVICE — PAD GRND REM POLYHESIVE A/ DISP

## (undated) DEVICE — ENDOSCOPY PORT CONNECTOR FOR OLYMPUS® SCOPES: Brand: ERBE

## (undated) DEVICE — SPNG VERSALON 4X4 4PLY NONSTRL LF BG/200

## (undated) DEVICE — THE BITE BLOCK MAXI, LATEX FREE STRAP IS USED TO PROTECT THE ENDOSCOPE INSERTION TUBE FROM BEING BITTEN BY THE PATIENT.

## (undated) DEVICE — HYBRID TUBING/CAP SET FOR OLYMPUS® SCOPES: Brand: ERBE

## (undated) DEVICE — VLV SXN AIR/H2O ORCAPOD3 1P/U STRL

## (undated) DEVICE — FRCP BIOP COLD ENDOJAW ALLGTR W/NDL 2.8X2300MM BLU

## (undated) DEVICE — SYR LUERLOK 50ML

## (undated) DEVICE — SUCTION CANISTER, 1000CC,SAFELINER: Brand: DEROYAL

## (undated) DEVICE — SAFELINER SUCTION CANISTER 1000CC: Brand: DEROYAL

## (undated) DEVICE — SPNG ENDO BEDSIDE TUB ENZYM

## (undated) DEVICE — SUCTION CANISTER, 1500CC, RIGID: Brand: DEROYAL

## (undated) DEVICE — LUBE JELLY PK/2.75GM STRL BX/144

## (undated) DEVICE — CONMED SCOPE SAVER BITE BLOCK, 20X27 MM: Brand: SCOPE SAVER